# Patient Record
Sex: MALE | Race: WHITE | NOT HISPANIC OR LATINO | Employment: FULL TIME | URBAN - METROPOLITAN AREA
[De-identification: names, ages, dates, MRNs, and addresses within clinical notes are randomized per-mention and may not be internally consistent; named-entity substitution may affect disease eponyms.]

---

## 2017-01-13 ENCOUNTER — RADIOLOGY INJECTION OFFICE VISIT (OUTPATIENT)
Dept: PALLIATIVE MEDICINE | Facility: CLINIC | Age: 52
End: 2017-01-13
Payer: COMMERCIAL

## 2017-01-13 ENCOUNTER — RADIANT APPOINTMENT (OUTPATIENT)
Dept: RADIOLOGY | Facility: CLINIC | Age: 52
End: 2017-01-13
Attending: ANESTHESIOLOGY
Payer: COMMERCIAL

## 2017-01-13 VITALS — DIASTOLIC BLOOD PRESSURE: 86 MMHG | OXYGEN SATURATION: 96 % | HEART RATE: 102 BPM | SYSTOLIC BLOOD PRESSURE: 129 MMHG

## 2017-01-13 DIAGNOSIS — M54.41 ACUTE RIGHT-SIDED LOW BACK PAIN WITH RIGHT-SIDED SCIATICA: ICD-10-CM

## 2017-01-13 DIAGNOSIS — M54.16 LUMBAR RADICULOPATHY: Primary | ICD-10-CM

## 2017-01-13 DIAGNOSIS — M99.73 CONNECTIVE TISSUE AND DISC STENOSIS OF INTERVERTEBRAL FORAMINA OF LUMBAR REGION: ICD-10-CM

## 2017-01-13 PROCEDURE — 64483 NJX AA&/STRD TFRM EPI L/S 1: CPT | Mod: LT | Performed by: ANESTHESIOLOGY

## 2017-01-13 PROCEDURE — 64484 NJX AA&/STRD TFRM EPI L/S EA: CPT | Mod: LT | Performed by: ANESTHESIOLOGY

## 2017-01-13 ASSESSMENT — PAIN SCALES - GENERAL: PAINLEVEL: SEVERE PAIN (7)

## 2017-01-13 NOTE — NURSING NOTE
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 While walking patient out, I was uncomfortable being in the elevator.Patient was having hard time turning his phone on, asked him if I can help turn on thr phone, patient was unhappy and very loud patient stated \"no one touch my phone.\"    Lea Christianson MA    "

## 2017-01-13 NOTE — Clinical Note
[facility]  [address] [facility] [address]  Phone: [phone]  Fax: [phone]    2017        Re:  Bairon Reed  [address]  [place] [address]  :  [birth year]      To whom it may concern:    RE: Bairon Reed    Patient was seen and treated today at our clinic.    Please contact me for questions or concerns.      Sincerely,        Fahad Mathur MD

## 2017-01-13 NOTE — PATIENT INSTRUCTIONS
[facility]   Procedure Discharge Instructions    Today you saw:    Dr. Fahad Mathur    You had an:  Epidural steroid injection  -lumbar   Medications used:  Lidocaine, lidocaine with epinephrine,    Bupivacaine   Dexamethasone  Depo-medrol  Omnipaque           Be cautious when walking. Numbness and/or weakness in the lower extremities may occur up to 6-8 hours after the procedure due to effect of the local anesthetic    Do not drive for 6 hours. The effect of the local anesthetic could slow your reflexes.     You may resume your regular activities after 24 hours    Avoid strenuous activity for the first 24 hours    You may shower, however avoid swimming, tub baths or hot tubs for 24 hours following your procedure    You may have a mild to moderate increase in pain for several days following the injection.    It may take up to 14 days for the steroid medication to start working although you may feel the effect as early as a few days after the procedure.       You may use ice packs for 10-15 minutes, 3 to 4 times a day at the injection site for comfort    Do not use heat to painful areas for 6 to 8 hours. This will give the local anesthetic time to wear off and prevent you from accidentally burning your skin.     You may use anti-inflammatory medications (such as Ibuprofen or Aleve or Advil) or Tylenol for pain control if necessary    If you were fasting, you may resume your normal diet and medications after the procedure    If you have diabetes, check your blood sugar more frequently than usual as your blood sugar may be higher than normal for 10-14 days following a steroid injection. Contact your doctor who manages your diabetes if your blood sugar is higher than usual    If you experience any of the following, call the pain center nursing line during work hours at [phone] or the after hours provider line at [phone]:  -Fever over 100 degree F  -Swelling, bleeding, redness,  drainage, warmth at the injection site  -Progressive weakness or numbness in your legs or arms  -Loss of bowel or bladder function  -Unusual headache that is not relieved by Tylenol  -Unusual new onset of pain that is not improving      Phone #s:  Appointment line: [phone];  Nurse line: [phone]

## 2017-01-13 NOTE — MR AVS SNAPSHOT
After Visit Summary   1/13/2017    Bairon Reed    MRN: [id number]           Patient Information     Date Of Birth          1965        Visit Information        Provider Department      1/13/2017 7:45 AM Fahad Bates MD [facility]        Care Instructions    [facility]   Procedure Discharge Instructions    Today you saw:    Dr. Fahad Mathur    You had an:  Epidural steroid injection  -lumbar   Medications used:  Lidocaine, lidocaine with epinephrine,    Bupivacaine   Dexamethasone  Depo-medrol  Omnipaque           Be cautious when walking. Numbness and/or weakness in the lower extremities may occur up to 6-8 hours after the procedure due to effect of the local anesthetic    Do not drive for 6 hours. The effect of the local anesthetic could slow your reflexes.     You may resume your regular activities after 24 hours    Avoid strenuous activity for the first 24 hours    You may shower, however avoid swimming, tub baths or hot tubs for 24 hours following your procedure    You may have a mild to moderate increase in pain for several days following the injection.    It may take up to 14 days for the steroid medication to start working although you may feel the effect as early as a few days after the procedure.       You may use ice packs for 10-15 minutes, 3 to 4 times a day at the injection site for comfort    Do not use heat to painful areas for 6 to 8 hours. This will give the local anesthetic time to wear off and prevent you from accidentally burning your skin.     You may use anti-inflammatory medications (such as Ibuprofen or Aleve or Advil) or Tylenol for pain control if necessary    If you were fasting, you may resume your normal diet and medications after the procedure    If you have diabetes, check your blood sugar more frequently than usual as your blood sugar may be higher than normal for 10-14 days following a steroid injection.  Contact your doctor who manages your diabetes if your blood sugar is higher than usual    If you experience any of the following, call the pain center nursing line during work hours at [phone] or the after hours provider line at [phone]:  -Fever over 100 degree F  -Swelling, bleeding, redness, drainage, warmth at the injection site  -Progressive weakness or numbness in your legs or arms  -Loss of bowel or bladder function  -Unusual headache that is not relieved by Tylenol  -Unusual new onset of pain that is not improving      Phone #s:  Appointment line: [phone];  Nurse line: [phone]            Follow-ups after your visit        Your next 10 appointments already scheduled     Jan 23, 2017  3:00 PM   [facility]    [address] [address] [phone]            Feb 15, 2017  3:30 PM   Diabetic Education with [facility]    [address] [address] [phone]              Who to contact     If you have questions or need follow up information about today's clinic visit or your schedule please contact [facility] directly at [phone].  Normal or non-critical lab and imaging results will be communicated to you by [employer], letter or phone within 4 business days after the clinic has received the results. If you do not hear from us within 7 days, please contact the clinic through [employer] or phone. If you have a critical or abnormal lab result, we will notify you by phone as soon as possible.  Submit refill requests through [employer] or call your pharmacy and they will forward the refill request to us. Please allow 3 business days for your refill to be completed.          Additional Information About Your Visit        [employer] Information     [employer] lets you send messages to your doctor,  "view your test results, renew your prescriptions, schedule appointments and more. To sign up, go to [email or web address] . Click on \"Log in\" on the left side of the screen, which will take you to the Welcome page. Then click on \"Sign up Now\" on the right side of the page.     You will be asked to enter the access code listed below, as well as some personal information. Please follow the directions to create your username and password.     Your access code is: [email or web address]  Expires: 2017  7:51 AM     Your access code will  in 90 days. If you need help or a new code, please call your [place] clinic or [phone].        Care EveryWhere ID     This is your Care EveryWhere ID. This could be used by other organizations to access your [place] medical records  [id number]        Your Vitals Were     Pulse                   88            Blood Pressure from Last 3 Encounters:   17 133/93   16 112/77   16 132/84    Weight from Last 3 Encounters:   16 88.905 kg (196 lb)   16 87.091 kg (192 lb)   16 88.814 kg (195 lb 12.8 oz)              Today, you had the following     No orders found for display       Primary Care Provider Office Phone # Fax #    Triston Adame [phone]       [facility] [address] [address] [address]        Thank you!     Thank you for choosing [facility]  for your care. Our goal is always to provide you with excellent care. Hearing back from our patients is one way we can continue to improve our services. Please take a few minutes to complete the written survey that you may receive in the mail after your visit with us. Thank you!             Your Updated Medication List - Protect others around you: Learn how to safely use, store and throw away your medicines at www.disposemymeds.org.          This list is accurate as of: 17  8:34 AM.  Always use your most recent med list.                   " Brand Name Dispense Instructions for use    calcium carbonate 500 MG tablet    OS-HANNAH 500 mg [place]. Ca     Take 500 mg by mouth daily       cyclobenzaprine 10 MG tablet    FLEXERIL    30 tablet    Take 1 tablet (10 mg) by mouth nightly as needed for muscle spasms       fluticasone 50 MCG/ACT spray    FLONASE    1 Bottle    Spray 2 sprays into both nostrils daily       glucosamine-chondroitin 500-400 MG Caps per capsule      Take 1 capsule by mouth daily       hydrOXYzine 25 MG tablet    ATARAX    180 tablet    Take 2 tablets (50 mg) by mouth nightly as needed for itching       indomethacin 50 MG capsule    INDOCIN    30 capsule    Take 1 capsule (50 mg) by mouth 3 times daily as needed for moderate pain       lisinopril 5 MG tablet    PRINIVIL/ZESTRIL    90 tablet    Take 1 tablet (5 mg) by mouth daily       montelukast 10 MG tablet    SINGULAIR    90 tablet    Take 1 tablet (10 mg) by mouth At Bedtime       MULTIVITAMIN PO      Take 1 tablet by mouth daily.       naproxen 500 MG tablet    NAPROSYN    180 tablet    Take 1 tablet (500 mg) by mouth 2 times daily as needed for moderate pain       OMEGA-3 FISH OIL PO      Take 2 g by mouth daily       omeprazole 20 MG CR capsule    priLOSEC    90 capsule    Take 1 capsule (20 mg) by mouth daily       predniSONE 20 MG tablet    DELTASONE    10 tablet    Take 2 tablets (40 mg) by mouth daily as needed Gout flares x 2 days.       tamsulosin 0.4 MG capsule    FLOMAX    90 capsule    Take 2 capsules (0.8 mg) by mouth daily       tiZANidine 4 MG tablet    ZANAFLEX    90 tablet    1-2 tablets  3 times a day for daytime muscle spasm.       VITAMIN D3 PO      Take 1,000 Units by mouth daily       VITAMIN E NATURAL PO      Take 100 Units by mouth

## 2017-01-13 NOTE — PROGRESS NOTES
Pre procedure Diagnosis: lumbar radiculopathy, lumbar stenosis, post laminectomy lower back pain   Post procedure Diagnosis: Same  Procedure performed: lumbar transforaminal epidural steroid injection, L4-5 left, attempted L5-S1 left  Anesthesia: local  Complications: patient had post procedure numbness that was nearly completely resolved after 30 minutes observation  Operators: Fahad Mathur MD     Indications:   Bairon Reed is a 51 year old male was sent by Dr. Jorge Wallace for lumbar epidural steroid injection.  They have a history of prior lumbar laminotomy with discectomy L4-5 as well as bony fusion at L5-S1.  He has had recent onset of pain in the lower back on the left with pain down the left lower extremity and associated weakness on the left.  Exam shows antalgic gain, tenderness in the lower back, with positive straight leg raise and they have tried conservative treatment including medication management and home exercises.    MRI lumbar spine was done on 11/24/16 which showed   IMPRESSION:  1. Postsurgical changes at L4-L5 related to left hemilaminotomy with  enhancement in the laminotomy bed and left lateral recess which may be  related to postsurgical scar or granulation.  2. L4-L5 broad-based central disc herniation lateralizing slightly  greater to the left of midline and associated severe central stenosis  similar in appearance to 2/22/2013.  3. L3-L4 moderate central stenosis and bilateral foraminal stenosis,  unchanged.  4. Posterior fusion at L5-S1.    Options/alternatives, benefits and risks were discussed with the patient including bleeding, infection, tissue trauma, numbness, weakness, paralysis, spinal cord injury, radiation exposure, headache and reaction to medications. Questions were answered to his satisfaction and he agrees to proceed. Voluntary informed consent was obtained and signed.     Vitals were reviewed: Yes  /86 mmHg  Pulse 102  SpO2 96%  Allergies were  reviewed:  Yes   Medications were reviewed:  Yes   Pre-procedure pain score: 7/10    Procedure:  After getting informed consent, patient was brought into the procedure suite and was placed in a prone position on the procedure table.   A Pause for the Cause was performed.  Patient was prepped and draped in sterile fashion.     After identifying the left L4-5 neuroforamen, the C-arm was rotated to a left lateral oblique angle.  A total of 1 ml of Lidocaine 1% was used to anesthetize the skin and the needle track at a skin entry site coaxial with the fluoroscopy beam, and overriding the superior aspect of the neuroforamen.  A 27 gauge 3.5 inch spinal needle was advanced under intermittent fluoroscopy until it entered the foramen superiorly.    The position was then inspected from anteroposterior and lateral views, and the needle adjusted appropriately.  Aspiration was negative for blood or CSF.  Omnipaque-300 was injected, confirming appropriate position, with spread into the nerve root sheath and the epidural space, without intravascular or intrathecal uptake.    Then, I attempted to locate the L5-S1 neuroforamen on the left.  The skin and subcutaneous tissues were anesthetized with Lidocaine 1% overlying what appeared to be the L5-S1 neuroforamen.  Due to the patient's prior bony fusion at L5-S1 the neuroforamen was not apparent.  A 27 gauge 3.5 inch spinal needle was advanced towards the neuroforamen but I was not able to gain access to the neuroforamen after multiple attempts, so injection at the L5-S1 level was aborted.    Omnipaque used:  1 ml  Omnipaque wasted  9 ml    Then, after repeated negative aspiraton for blood or CSF, a combination of Depomedrol 40 mg, Decadron 5 mg, Bupivicaine 0.5% 1 ml diluted with 2.5 ml of normal saline was injected in a slow and incremental fashion and the needle was flushed with lidocaine as it was withdrawn.    During the procedure, there was not a paresthesia.   Hemostasis was  achieved, the area was cleaned, and bandaids were placed when appropriate.  The patient tolerated the procedure well, and was taken to the recovery room.    Images were saved to PACS.    Post-procedure pain score: 0/10  Follow-up includes:   -f/u phone call in one week  -f/u with referring provider    Of note, the patient presented to the pain clinic with some gait instability due to lower extremity weakness.  He did have some numbness in the left leg after the injection which resolved after approximately 30 minutes.  However, he appeared to have somewhat more unsteady gait which led to further questioning by the staff and the patient at that time stated that he had been drinking alcohol prior to the procedure, which was the likely caused his instability of gait.  He was escorted to his ride after recovery by medical staff without difficulty.    Fahad Mathur MD  [facility]

## 2017-01-13 NOTE — NURSING NOTE
Discharge Information    IV Discontiued Time:  NA    Amount of Fluid Infused:  NA    Discharge Criteria = When patient returns to baseline or as per MD order    Consciousness:  Pt is fully awake    Circulation:  BP +/- 20% of pre-procedure level    Respiration:  Patient is able to breathe deeply    O2 Sat:  Patient is able to maintain O2 Sat >92% on room air    Activity:  Moves 4 extremities on command    Ambulation:  Patient is able to stand and walk or stand and pivot into wheelchair    Dressing:  Clean/dry or No Dressing    Notes:   Discharge instructions and AVS given to patient    Patient meets criteria for discharge?  YES    Admitted to PCU?  No    Responsible adult present to accompany patient home?  Yes    Signature/Title:    bridget marin RN Care Coordinator  [facility]

## 2017-01-13 NOTE — NURSING NOTE
"Chief Complaint   Patient presents with     Pain     Low back pain        Initial /93 mmHg  Pulse 88 Estimated body mass index is 27.72 kg/(m^2) as calculated from the following:    Height as of 11/16/16: 1.791 m (5' 10.5\").    Weight as of 12/6/16: 88.905 kg (196 lb).  BP completed using cuff size: regular    Injection intake:    If this procedure is requiring IV sedation has patient been NPO for 6  Hours? NA    Is patient on coumadin, plavix or other prescribed blood thinner?   No    If patient is on coumadin was it held for 5 days?   NA    If patient is on plavix was it held for 7 days?    NA     Does patient take aspirin?  No    If this is for a cervical procedure and patient is on aspirin has it been held for 6 days?   NA    Any allergies to contrast dye, iodine, steroid and/or numbing medications?  NO    Is patient currently taking antibiotics or have an active infection?  NO    Does patient have a ? Yes       Is patient pregnant or breastfeeding?  Not Applicable    Are the vital signs normal?  Yes    Lea Christianson MA      "

## 2017-01-20 ENCOUNTER — TELEPHONE (OUTPATIENT)
Dept: PALLIATIVE MEDICINE | Facility: CLINIC | Age: 52
End: 2017-01-20

## 2017-01-20 NOTE — TELEPHONE ENCOUNTER
Patient had a Transforaminal  injection on 1/13/17.  Called patient for an update.      Left message that we were calling for an update about how s/he was doing after the injection.  LM that if s/he has any problems or questions to call the nurse line at [phone].     Anusha Soriano RT (R)

## 2017-02-01 DIAGNOSIS — N40.1 BENIGN NON-NODULAR PROSTATIC HYPERPLASIA WITH LOWER URINARY TRACT SYMPTOMS: Primary | ICD-10-CM

## 2017-02-01 RX ORDER — TAMSULOSIN HYDROCHLORIDE 0.4 MG/1
0.8 CAPSULE ORAL DAILY
Qty: 90 CAPSULE | Refills: 2 | Status: SHIPPED | OUTPATIENT
Start: 2017-02-01 | End: 2018-01-26

## 2017-02-21 ENCOUNTER — OFFICE VISIT (OUTPATIENT)
Dept: FAMILY MEDICINE | Facility: CLINIC | Age: 52
End: 2017-02-21
Payer: COMMERCIAL

## 2017-02-21 ENCOUNTER — OFFICE VISIT (OUTPATIENT)
Dept: BEHAVIORAL HEALTH | Facility: CLINIC | Age: 52
End: 2017-02-21

## 2017-02-21 VITALS
HEART RATE: 120 BPM | DIASTOLIC BLOOD PRESSURE: 86 MMHG | TEMPERATURE: 97.9 F | BODY MASS INDEX: 28.15 KG/M2 | WEIGHT: 199 LBS | SYSTOLIC BLOOD PRESSURE: 102 MMHG

## 2017-02-21 DIAGNOSIS — N32.81 OAB (OVERACTIVE BLADDER): Primary | ICD-10-CM

## 2017-02-21 DIAGNOSIS — R35.0 URINARY FREQUENCY: ICD-10-CM

## 2017-02-21 DIAGNOSIS — R69 DIAGNOSIS DEFERRED: Primary | ICD-10-CM

## 2017-02-21 LAB
ALBUMIN UR-MCNC: NEGATIVE MG/DL
APPEARANCE UR: CLEAR
BILIRUB UR QL STRIP: NEGATIVE
COLOR UR AUTO: YELLOW
GLUCOSE UR STRIP-MCNC: NEGATIVE MG/DL
HBA1C MFR BLD: 5.8 % (ref 4.3–6)
HGB UR QL STRIP: NEGATIVE
KETONES UR STRIP-MCNC: NEGATIVE MG/DL
LEUKOCYTE ESTERASE UR QL STRIP: NEGATIVE
NITRATE UR QL: NEGATIVE
PH UR STRIP: 7 PH (ref 5–7)
RBC #/AREA URNS AUTO: NORMAL /HPF (ref 0–2)
SP GR UR STRIP: 1.02 (ref 1–1.03)
URN SPEC COLLECT METH UR: NORMAL
UROBILINOGEN UR STRIP-ACNC: 0.2 EU/DL (ref 0.2–1)
WBC #/AREA URNS AUTO: NORMAL /HPF (ref 0–2)

## 2017-02-21 PROCEDURE — 81001 URINALYSIS AUTO W/SCOPE: CPT | Performed by: FAMILY MEDICINE

## 2017-02-21 PROCEDURE — 83036 HEMOGLOBIN GLYCOSYLATED A1C: CPT | Performed by: FAMILY MEDICINE

## 2017-02-21 PROCEDURE — 99213 OFFICE O/P EST LOW 20 MIN: CPT | Performed by: FAMILY MEDICINE

## 2017-02-21 PROCEDURE — 36415 COLL VENOUS BLD VENIPUNCTURE: CPT | Performed by: FAMILY MEDICINE

## 2017-02-21 RX ORDER — OXYBUTYNIN CHLORIDE 5 MG/1
5 TABLET, EXTENDED RELEASE ORAL DAILY
Qty: 30 TABLET | Refills: 1 | Status: SHIPPED | OUTPATIENT
Start: 2017-02-21 | End: 2018-01-26

## 2017-02-21 NOTE — PROGRESS NOTES
Bairon,  I have reviewed the results of the laboratory tests that we recently ordered. All of the lab work performed was normal or considered normal for you.  Sincerely,   Marcos Flower

## 2017-02-21 NOTE — PATIENT INSTRUCTIONS
If your urinary symptom(s) do not improve with the medication You should call specialty scheduling at [phone] to schedule the urology  appointment.    If your symptom(s) are well controlled please follow up with Dr Adame in 1-2 month(s)

## 2017-02-21 NOTE — MR AVS SNAPSHOT
"              After Visit Summary   2017    Bairon Reed    MRN: [id number]           Patient Information     Date Of Birth          1965        Visit Information        Provider Department      2017 3:00 PM [facility]        Today's Diagnoses     Diagnosis deferred    -  1       Follow-ups after your visit        Who to contact     If you have questions or need follow up information about today's clinic visit or your schedule please contact [facility] directly at [phone].  Normal or non-critical lab and imaging results will be communicated to you by [employer], letter or phone within 4 business days after the clinic has received the results. If you do not hear from us within 7 days, please contact the clinic through [employer] or phone. If you have a critical or abnormal lab result, we will notify you by phone as soon as possible.  Submit refill requests through [employer]na or call your pharmacy and they will forward the refill request to us. Please allow 3 business days for your refill to be completed.          Additional Information About Your Visit        MyChart Information     [employer]na lets you send messages to your doctor, view your test results, renew your prescriptions, schedule appointments and more. To sign up, go to [email or web address] . Click on \"Log in\" on the left side of the screen, which will take you to the Welcome page. Then click on \"Sign up Now\" on the right side of the page.     You will be asked to enter the access code listed below, as well as some personal information. Please follow the directions to create your username and password.     Your access code is: [email or web address]  Expires: 2017  7:51 AM     Your access code will  in 90 days. If you need help or a new code, please call your [facility] or [phone].        Care EveryWhere ID     This is your Care EveryWhere ID. This could be used by other organizations " to access your [place] medical records  [id number]         Blood Pressure from Last 3 Encounters:   02/21/17 102/86   01/13/17 129/86   12/06/16 112/77    Weight from Last 3 Encounters:   02/21/17 90.3 kg (199 lb)   12/06/16 88.9 kg (196 lb)   11/30/16 87.1 kg (192 lb)              Today, you had the following     No orders found for display         Today's Medication Changes          These changes are accurate as of: 2/21/17  3:21 PM.  If you have any questions, ask your nurse or doctor.               Start taking these medicines.        Dose/Directions    oxybutynin 5 MG 24 hr tablet   Commonly known as:  DITROPAN-XL   Used for:  OAB (overactive bladder)   Started by:  Marcos Flower MD        Dose:  5 mg   Take 1 tablet (5 mg) by mouth daily   Quantity:  30 tablet   Refills:  1            Where to get your medicines      These medications were sent to [facility] [address] [place] [address]  [address] [facility] [address]     Phone:  [phone]     oxybutynin 5 MG 24 hr tablet                Primary Care Provider Office Phone # Fax #    Triston Adame [phone]       [facility] [address] [facility] [address]        Thank you!     Thank you for choosing [facility]  for your care. Our goal is always to provide you with excellent care. Hearing back from our patients is one way we can continue to improve our services. Please take a few minutes to complete the written survey that you may receive in the mail after your visit with us. Thank you!             Your Updated Medication List - Protect others around you: Learn how to safely use, store and throw away your medicines at www.disposemymeds.org.          This list is accurate as of: 2/21/17  3:21 PM.  Always use your most recent med list.                   Brand Name Dispense Instructions for use    calcium carbonate 500 MG tablet    OS-HANNAH 500 mg [place]. Ca     Take  500 mg by mouth daily       glucosamine-chondroitin 500-400 MG Caps per capsule      Take 1 capsule by mouth daily       indomethacin 50 MG capsule    INDOCIN    30 capsule    Take 1 capsule (50 mg) by mouth 3 times daily as needed for moderate pain       lisinopril 5 MG tablet    PRINIVIL/ZESTRIL    90 tablet    Take 1 tablet (5 mg) by mouth daily       MULTIVITAMIN PO      Take 1 tablet by mouth daily.       naproxen 500 MG tablet    NAPROSYN    180 tablet    Take 1 tablet (500 mg) by mouth 2 times daily as needed for moderate pain       OMEGA-3 FISH OIL PO      Take 2 g by mouth daily       oxybutynin 5 MG 24 hr tablet    DITROPAN-XL    30 tablet    Take 1 tablet (5 mg) by mouth daily       tamsulosin 0.4 MG capsule    FLOMAX    90 capsule    Take 2 capsules (0.8 mg) by mouth daily       VITAMIN D3 PO      Take 1,000 Units by mouth daily       VITAMIN E NATURAL PO      Take 100 Units by mouth

## 2017-02-21 NOTE — MR AVS SNAPSHOT
After Visit Summary   2/21/2017    Bairon Reed    MRN: [id number]           Patient Information     Date Of Birth          1965        Visit Information        Provider Department      2/21/2017 2:30 PM Marcos Flower MD [facility]        Today's Diagnoses     OAB (overactive bladder)    -  1    Urinary frequency          Care Instructions    If your urinary symptom(s) do not improve with the medication You should call specialty scheduling at [phone] to schedule the urology  appointment.    If your symptom(s) are well controlled please follow up with Dr Adame in 1-2 month(s)         Follow-ups after your visit        Additional Services     UROLOGY ADULT REFERRAL       Your provider has referred you to: [facility]: [facility] [phone]   [email or web address]    Please be aware that coverage of these services is subject to the terms and limitations of your health insurance plan.  Call member services at your health plan with any benefit or coverage questions.      Please bring the following with you to your appointment:    (1) Any X-Rays, CTs or MRIs which have been performed.  Contact the facility where they were done to arrange for  prior to your scheduled appointment.    (2) List of current medications  (3) This referral request   (4) Any documents/labs given to you for this referral                  Who to contact     If you have questions or need follow up information about today's clinic visit or your schedule please contact [facility] directly at [phone].  Normal or non-critical lab and imaging results will be communicated to you by MyChart, letter or phone within 4 business days after the clinic has received the results. If you do not hear from us within 7 days, please contact the clinic through MyChart or phone. If you have a critical or abnormal lab result, we will notify you by phone as soon  "as possible.  Submit refill requests through [email or web address] or call your pharmacy and they will forward the refill request to us. Please allow 3 business days for your refill to be completed.          Additional Information About Your Visit        [email or web address] Information     [email or web address] lets you send messages to your doctor, view your test results, renew your prescriptions, schedule appointments and more. To sign up, go to [email or web address] . Click on \"Log in\" on the left side of the screen, which will take you to the Welcome page. Then click on \"Sign up Now\" on the right side of the page.     You will be asked to enter the access code listed below, as well as some personal information. Please follow the directions to create your username and password.     Your access code is: [email or web address]  Expires: 2017  7:51 AM     Your access code will  in 90 days. If you need help or a new code, please call your [place] clinic or [phone].        Care EveryWhere ID     This is your Care EveryWhere ID. This could be used by other organizations to access your [place] medical records  [id number]        Your Vitals Were     Pulse Temperature BMI (Body Mass Index)             120 97.9  F (36.6  C) (Oral) 28.15 kg/m2          Blood Pressure from Last 3 Encounters:   17 91/66   17 129/86   16 112/77    Weight from Last 3 Encounters:   17 199 lb (90.3 kg)   16 196 lb (88.9 kg)   16 192 lb (87.1 kg)              We Performed the Following     UROLOGY ADULT REFERRAL          Today's Medication Changes          These changes are accurate as of: 17  2:58 PM.  If you have any questions, ask your nurse or doctor.               Start taking these medicines.        Dose/Directions    oxybutynin 5 MG 24 hr tablet   Commonly known as:  DITROPAN-XL   Used for:  OAB (overactive bladder)   Started by:  Marcos Flower MD        Dose:  5 mg   Take 1 tablet (5 mg) by mouth daily   Quantity:  30 " tablet   Refills:  1            Where to get your medicines      These medications were sent to [facility] [id number]  [address] - [address]  [address] [place] [address]     Phone:  [phone]     oxybutynin 5 MG 24 hr tablet                Primary Care Provider Office Phone # Fax #    Triston Adame [phone]       [facility] [address] [address]        Thank you!     Thank you for choosing [facility]  for your care. Our goal is always to provide you with excellent care. Hearing back from our patients is one way we can continue to improve our services. Please take a few minutes to complete the written survey that you may receive in the mail after your visit with us. Thank you!             Your Updated Medication List - Protect others around you: Learn how to safely use, store and throw away your medicines at www.disposemymeds.org.          This list is accurate as of: 2/21/17  2:58 PM.  Always use your most recent med list.                   Brand Name Dispense Instructions for use    calcium carbonate 500 MG tablet    OS-HANNAH 500 mg [place]. [place]     Take 500 mg by mouth daily       glucosamine-chondroitin 500-400 MG Caps per capsule      Take 1 capsule by mouth daily       indomethacin 50 MG capsule    INDOCIN    30 capsule    Take 1 capsule (50 mg) by mouth 3 times daily as needed for moderate pain       lisinopril 5 MG tablet    PRINIVIL/ZESTRIL    90 tablet    Take 1 tablet (5 mg) by mouth daily       MULTIVITAMIN PO      Take 1 tablet by mouth daily.       naproxen 500 MG tablet    NAPROSYN    180 tablet    Take 1 tablet (500 mg) by mouth 2 times daily as needed for moderate pain       OMEGA-3 FISH OIL PO      Take 2 g by mouth daily       oxybutynin 5 MG 24 hr tablet    DITROPAN-XL    30 tablet    Take 1 tablet (5 mg) by mouth daily       tamsulosin 0.4 MG capsule    FLOMAX    90 capsule    Take 2 capsules (0.8  mg) by mouth daily       VITAMIN D3 PO      Take 1,000 Units by mouth daily       VITAMIN E NATURAL PO      Take 100 Units by mouth

## 2017-02-21 NOTE — LETTER
[facility]  [address] [address] [phone]        February 21, 2017    Bairon Reed  [address]  [place] [address]            Dear Bairon,    I have reviewed the results of the laboratory tests that we recently ordered. All of the lab work performed was normal or considered normal for you.   Sincerely,   Marcos Flower MD/salma    Results for orders placed or performed in visit on 02/21/17   UA with Microscopic   Result Value Ref Range    Color Urine Yellow     Appearance Urine Clear     Glucose Urine Negative NEG mg/dL    Bilirubin Urine Negative NEG    Ketones Urine Negative NEG mg/dL    Specific Gravity Urine 1.020 1.003 - 1.035    pH Urine 7.0 5.0 - 7.0 pH    Protein Albumin Urine Negative NEG mg/dL    Urobilinogen Urine 0.2 0.2 - 1.0 EU/dL    Nitrite Urine Negative NEG    Blood Urine Negative NEG    Leukocyte Esterase Urine Negative NEG    Source Midstream Urine     WBC Urine O - 2 0 - 2 /HPF    RBC Urine O - 2 0 - 2 /HPF   Hemoglobin A1c   Result Value Ref Range    Hemoglobin A1C 5.8 4.3 - 6.0 %

## 2017-02-21 NOTE — PROGRESS NOTES
SUBJECTIVE:   Bairon Reed is a 51 year old male who presents today for symptom of frequency. He started having these symptoms 6month(s) ago. He  denies hematuria, denies back pain,  denies nausea or vomiting,  denies fever or chills.  He denies a history of penile discharge.   He denies any dysuria  He has been taking the flomax for about 6 month(s).      Past Medical History   Diagnosis Date     Anxiety      Cervical spondylosis with myelopathy 2/4/2013     Cervicalgia      Chronic low back pain 9/13/2011     Chronic low back pain      Degeneration of cervical intervertebral disc      Gout      Hyperlipidemia LDL goal <160 11/18/2009     Hypertension goal BP (blood pressure) < 140/90 2/10/2015     Insomnia 11/18/2009     Nondependent alcohol abuse 11/14/2012     Pain in shoulder 9/13/2011     Peroneal nerve palsy Feb 2012     rt     Radiculopathy, cervical 9/13/2011     Shoulder impingement      right     Vocal cord paralysis      LEFT     Current Outpatient Prescriptions   Medication Sig Dispense Refill     oxybutynin (DITROPAN-XL) 5 MG 24 hr tablet Take 1 tablet (5 mg) by mouth daily 30 tablet 1     tamsulosin (FLOMAX) 0.4 MG capsule Take 2 capsules (0.8 mg) by mouth daily 90 capsule 2     Omega-3 Fatty Acids (OMEGA-3 FISH OIL PO) Take 2 g by mouth daily       Cholecalciferol (VITAMIN D3 PO) Take 1,000 Units by mouth daily       VITAMIN E NATURAL PO Take 100 Units by mouth       glucosamine-chondroitin 500-400 MG CAPS Take 1 capsule by mouth daily       calcium carbonate (OS-HANNAH 500 MG [place]. [place]) 500 MG tablet Take 500 mg by mouth daily       lisinopril (PRINIVIL,ZESTRIL) 5 MG tablet Take 1 tablet (5 mg) by mouth daily 90 tablet 3     Multiple Vitamins-Minerals (MULTIVITAMIN OR) Take 1 tablet by mouth daily.       naproxen (NAPROSYN) 500 MG tablet Take 1 tablet (500 mg) by mouth 2 times daily as needed for moderate pain (Patient not taking: Reported on 2/21/2017) 180 tablet 3     indomethacin (INDOCIN) 50 MG  capsule Take 1 capsule (50 mg) by mouth 3 times daily as needed for moderate pain (Patient not taking: Reported on 2/21/2017) 30 capsule 3     Social History   Substance Use Topics     Smoking status: Never Smoker     Smokeless tobacco: Current User     Types: Chew     Last attempt to quit: 4/1/2013      Comment: Lives in smoke free household     Alcohol use No      Comment: Quit 2010. Chemical dependency treatment in 1989. ...31 yrs of 1.5 tins weekly.  sober for 7 months..         OBJECTIVE:  /86  Pulse 120  Temp 97.9  F (36.6  C) (Oral)  Wt 199 lb (90.3 kg)  BMI 28.15 kg/m2  GENERAL APPEARANCE: healthy, alert and no distress    BACK: No CVA tenderness  Rectal exam: prostate 1+ enlarged without nodules, prostate nontender to palpation  SKIN: no suspicious lesions or rashes    Results for orders placed or performed in visit on 02/21/17   UA with Microscopic   Result Value Ref Range    Color Urine Yellow     Appearance Urine Clear     Glucose Urine Negative NEG mg/dL    Bilirubin Urine Negative NEG    Ketones Urine Negative NEG mg/dL    Specific Gravity Urine 1.020 1.003 - 1.035    pH Urine 7.0 5.0 - 7.0 pH    Protein Albumin Urine Negative NEG mg/dL    Urobilinogen Urine 0.2 0.2 - 1.0 EU/dL    Nitrite Urine Negative NEG    Blood Urine Negative NEG    Leukocyte Esterase Urine Negative NEG    Source Midstream Urine     WBC Urine O - 2 0 - 2 /HPF    RBC Urine O - 2 0 - 2 /HPF   Hemoglobin A1c   Result Value Ref Range    Hemoglobin A1C 5.8 4.3 - 6.0 %        ASSESSMENT:   Lower, uncomplicated urinary tract infection.    PLAN:  Start oxybutin  Patient Instructions   If your urinary symptom(s) do not improve with the medication You should call specialty scheduling at [phone] to schedule the urology  appointment.    If your symptom(s) are well controlled please follow up with Dr Adame in 1-2 month(s)

## 2017-02-21 NOTE — PROGRESS NOTES
Behavioral Health Home Services  [facility]: [place]    Social Work Care Navigator Note    Patient: Bairon Reed  Date: February 21, 2017  Preferred Name: Bairon    Previous PHQ-9:   PHQ-9 SCORE 4/3/2012   Total Score 10     Previous KAYODE-7:   KAYODE-7 SCORE 4/3/2012   Total Score 9     SOUMYA LEVEL:  SOUMYA Score (Last Two) 12/26/2012   SOUMYA Raw Score 50   Activation Score 86.3   SOUMYA Level 4       Preferred Contact:    Type of Contact: Face to Face in Clinic    Data: (subjective / Objective):    [facility] Introduction:  Hi my name is Dalia Johns from your [place] primary care clinic.     I work closely with your primary care provider, Triston Adame.     If it's ok I'd like to talk about some new services available to you, at no out of pocket cost to you.      Before we get started can you verify your insurance for me? [employer]    What social work or case management services do you receive? (If so, are you receiving ACT or TCM?) None Per Pt     Getting to Know You - Whole Person Care:  This new service is called Behavioral Health Home services, which is designed to support you as a whole person beyond just your medical needs.      Tell me about what types of things that are causing you stress OR impacting your quality of life?    I'm here to be a central point of contact for your healthcare needs and to help with:    Housing    Transportation    Financial resources    Comprehensive Health needs (appointment help, medication costs, etc.)    Employment    Education    Health Insurance applications    And connecting with social supports or community resources    Out of the things I mentioned what would you find helpful?    Writer met with pt to introduce [facility] service, pt took [facility] information but declined service. Stated that he is in a good place right now and would notify his PCP or writer should he want service further down the road. Writer welcomed phone calls from patient.     Patient response to [facility] Service offering:  "  Not interested enrolling in [facility] services    Dalia Johns, Social Work Care Coordinator   ________________________________________________________________  Administrative - Social Work Staff Info:     Update the [facility] Brief Needs Assessment Flowsheet \"enrollment status\"     \"declined\"    \"considering\" enrolling in [facility] services.      \"Interested and will enroll\"    \"waitlisted\"    Update enrollment status to \"enrolled\" only when they have come into clinic and completed the paper consent and brief needs assessment.    Verify that patient has had Diagnostic Assessment within the past 12 months and if not schedule an appointment with the [facility] to complete.            "

## 2017-02-22 ASSESSMENT — ANXIETY QUESTIONNAIRES
2. NOT BEING ABLE TO STOP OR CONTROL WORRYING: NOT AT ALL
3. WORRYING TOO MUCH ABOUT DIFFERENT THINGS: NOT AT ALL
IF YOU CHECKED OFF ANY PROBLEMS ON THIS QUESTIONNAIRE, HOW DIFFICULT HAVE THESE PROBLEMS MADE IT FOR YOU TO DO YOUR WORK, TAKE CARE OF THINGS AT HOME, OR GET ALONG WITH OTHER PEOPLE: NOT DIFFICULT AT ALL
5. BEING SO RESTLESS THAT IT IS HARD TO SIT STILL: NOT AT ALL
1. FEELING NERVOUS, ANXIOUS, OR ON EDGE: NOT AT ALL
6. BECOMING EASILY ANNOYED OR IRRITABLE: NOT AT ALL
GAD7 TOTAL SCORE: 1
7. FEELING AFRAID AS IF SOMETHING AWFUL MIGHT HAPPEN: NOT AT ALL

## 2017-02-22 ASSESSMENT — PATIENT HEALTH QUESTIONNAIRE - PHQ9: 5. POOR APPETITE OR OVEREATING: SEVERAL DAYS

## 2017-02-23 ASSESSMENT — ANXIETY QUESTIONNAIRES: GAD7 TOTAL SCORE: 1

## 2017-02-23 ASSESSMENT — PATIENT HEALTH QUESTIONNAIRE - PHQ9: SUM OF ALL RESPONSES TO PHQ QUESTIONS 1-9: 4

## 2017-02-27 ENCOUNTER — OFFICE VISIT (OUTPATIENT)
Dept: FAMILY MEDICINE | Facility: CLINIC | Age: 52
End: 2017-02-27
Payer: COMMERCIAL

## 2017-02-27 VITALS
WEIGHT: 198 LBS | BODY MASS INDEX: 28.35 KG/M2 | TEMPERATURE: 98.8 F | OXYGEN SATURATION: 93 % | SYSTOLIC BLOOD PRESSURE: 125 MMHG | HEART RATE: 90 BPM | HEIGHT: 70 IN | DIASTOLIC BLOOD PRESSURE: 87 MMHG

## 2017-02-27 DIAGNOSIS — R21 RASH: ICD-10-CM

## 2017-02-27 DIAGNOSIS — M54.50 RIGHT-SIDED LOW BACK PAIN WITHOUT SCIATICA, UNSPECIFIED CHRONICITY: Primary | ICD-10-CM

## 2017-02-27 PROCEDURE — 99214 OFFICE O/P EST MOD 30 MIN: CPT | Performed by: FAMILY MEDICINE

## 2017-02-27 RX ORDER — TRIAMCINOLONE ACETONIDE 1 MG/G
CREAM TOPICAL
Qty: 30 G | Refills: 0 | Status: SHIPPED | OUTPATIENT
Start: 2017-02-27 | End: 2018-03-23

## 2017-02-27 NOTE — PROGRESS NOTES
SUBJECTIVE:                                                    Bairon Reed is a 51 year old male who presents to clinic today for the following health issues:      FLANK PAIN     Onset: 6 months ago    Description:   Character: Sharp  Location: right flank lower  Radiation: None    Intensity: 6/10    Progression of Symptoms:  worsening    Accompanying Signs & Symptoms:  Fever/Chills?: no   Gas/Bloating: no   Nausea: no   Vomitting: no   Diarrhea?: no   Constipation:no   Dysuria or Hematuria: no    History:   Trauma: no   Previous similar pain: YES   Previous tests done: none    Precipitating factors:   Does the pain change with:     Food: no      BM: no     Urination: no     Alleviating factors:  Sitting    Therapies Tried and outcome: None    LMP:  not applicable       He's had this right sided mid to low back discomfort off and on for 6 months or so. He has a history of chronic back problems. He's had 2 surgeries on his back in 2 surgeries on his neck. He had a cortisone injection a month or so ago for his back. This back discomfort is not going down his leg. It's worse when he is moving around, especially going from a sitting to a standing position. It doesn't bother him when he is just sitting around.  He was seen last week for some urinary issues. He had a normal urinalysis and A1c. He is not having hematuria. He was concerned this current discomfort may represent a kidney problem.    Problem list and histories reviewed & adjusted, as indicated.  Additional history: as documented    Patient Active Problem List   Diagnosis     Hyperlipidemia LDL goal <160     Insomnia     Bilateral arm weakness     Anxiety     Nondependent alcohol abuse     Shoulder impingement - right     Cervical radiculopathy at C7     Cervicalgia     Degeneration of cervical intervertebral disc     Vitamin D deficiency     Cervical radiculopathy     S/P spinal fusion     Peroneal nerve palsy     Foot drop, right     Lumbar stenosis  with neurogenic claudication     Lumbar radiculopathy     Lumbar herniated disc     Lumbago     S/P lumbar microdiscectomy     Hypertension goal BP (blood pressure) < 140/90     Benign non-nodular prostatic hyperplasia with lower urinary tract symptoms     Gout, unspecified cause, unspecified chronicity, unspecified site     Past Surgical History   Procedure Laterality Date     Neck surgery  2/2010     [facility]     Knee surgery  2009     Right     Back surgery  1986-1988     x 3. Lower back surgery     Fusion cervical anterior one level  3/5/2013     Procedure: FUSION CERVICAL ANTERIOR ONE LEVEL;  C 6-7 ANTERIOR DISCECTOMY AND FUSION WITH NEURO MONITORING ;  Surgeon: Andrew Alejandre MD;  Location:  OR     Discectomy lumbar posterior microscopic one level  5/17/2013     Procedure: DISCECTOMY LUMBAR POSTERIOR MICROSCOPIC ONE LEVEL;  BILATERAL LUMBAR L4-5 EXTENDED HEMILAMINECTOMY, MICROFORAMINOTOMY AND MICRODISCECTOMY WITH METRIX II;  Surgeon: Andrew Alejandre MD;  Location:  OR     Laryngoscopy with microscope  5/28/2013     Procedure: LARYNGOSCOPY WITH MICROSCOPE;  MICRODIRECT LARYNGOSCOPY, WITH LEFT VOCAL CORD INJECTION ;  Surgeon: Jorge Vergara MD;  Location:  [facility]       Social History   Substance Use Topics     Smoking status: Never Smoker     Smokeless tobacco: Current User     Types: Chew     Last attempt to quit: 4/1/2013      Comment: Lives in smoke free household     Alcohol use No      Comment: Quit 2010. Chemical dependency treatment in 1989. ...31 yrs of 1.5 tins weekly.  sober for 7 months..     Family History   Problem Relation Age of Onset     HEART DISEASE Father      Arthritis Sister      Glaucoma No family hx of      Macular Degeneration No family hx of      CANCER No family hx of      DIABETES No family hx of      Hypertension No family hx of      CEREBROVASCULAR DISEASE No family hx of      Thyroid Disease No family hx of          Current Outpatient Prescriptions  "  Medication Sig Dispense Refill     triamcinolone (KENALOG) 0.1 % cream Apply sparingly to affected area two-three times daily for 14 days. 30 g 0     oxybutynin (DITROPAN-XL) 5 MG 24 hr tablet Take 1 tablet (5 mg) by mouth daily 30 tablet 1     tamsulosin (FLOMAX) 0.4 MG capsule Take 2 capsules (0.8 mg) by mouth daily 90 capsule 2     naproxen (NAPROSYN) 500 MG tablet Take 1 tablet (500 mg) by mouth 2 times daily as needed for moderate pain 180 tablet 3     Omega-3 Fatty Acids (OMEGA-3 FISH OIL PO) Take 2 g by mouth daily       Cholecalciferol (VITAMIN D3 PO) Take 1,000 Units by mouth daily       VITAMIN E NATURAL PO Take 100 Units by mouth       glucosamine-chondroitin 500-400 MG CAPS Take 1 capsule by mouth daily       calcium carbonate (OS-HANNAH 500 MG [place]. [place]) 500 MG tablet Take 500 mg by mouth daily       lisinopril (PRINIVIL,ZESTRIL) 5 MG tablet Take 1 tablet (5 mg) by mouth daily 90 tablet 3     indomethacin (INDOCIN) 50 MG capsule Take 1 capsule (50 mg) by mouth 3 times daily as needed for moderate pain 30 capsule 3     Multiple Vitamins-Minerals (MULTIVITAMIN OR) Take 1 tablet by mouth daily.       No Known Allergies    ROS:  He has a small rash on his right forearm that has been present for 2-3 months. He's tried a variety of over-the-counter antifungal products, but hasn't clear. It itches. He wonders if I can prescribe something for him to treat this.    OBJECTIVE:                                                    /87 (BP Location: Right arm, Patient Position: Chair, Cuff Size: Adult Regular)  Pulse 90  Temp 98.8  F (37.1  C) (Oral)  Ht 5' 9.75\" (1.772 m)  Wt 198 lb (89.8 kg)  SpO2 93%  BMI 28.61 kg/m2  Body mass index is 28.61 kg/(m^2).  GENERAL: alert, no distress and over weight  MS: He has some mild discomfort to palpation over the right mid to low back a bit lower than the CVA region. He has increased discomfort there with forward flexion at the waist and side to side trunk rotation. " Straight leg raising is negative on the right.  SKIN: He has a roughly 1 cm diameter circular erythematous scaly macular skin lesion on the proximal right forearm  NEURO: Grossly normal strength and tone, mentation intact and speech normal    Diagnostic Test Results:  Office Visit on 02/21/2017   Component Date Value Ref Range Status     Color Urine 02/21/2017 Yellow   Final     Appearance Urine 02/21/2017 Clear   Final     Glucose Urine 02/21/2017 Negative  NEG mg/dL Final     Bilirubin Urine 02/21/2017 Negative  NEG Final     Ketones Urine 02/21/2017 Negative  NEG mg/dL Final     Specific Gravity Urine 02/21/2017 1.020  1.003 - 1.035 Final     pH Urine 02/21/2017 7.0  5.0 - 7.0 pH Final     Protein Albumin Urine 02/21/2017 Negative  NEG mg/dL Final     Urobilinogen Urine 02/21/2017 0.2  0.2 - 1.0 EU/dL Final     Nitrite Urine 02/21/2017 Negative  NEG Final     Blood Urine 02/21/2017 Negative  NEG Final     Leukocyte Esterase Urine 02/21/2017 Negative  NEG Final     Source 02/21/2017 Midstream Urine   Final     WBC Urine 02/21/2017 O - 2  0 - 2 /HPF Final     RBC Urine 02/21/2017 O - 2  0 - 2 /HPF Final     Hemoglobin A1C 02/21/2017 5.8  4.3 - 6.0 % Final          ASSESSMENT/PLAN:                                                        ICD-10-CM    1. Right-sided low back pain without sciatica, unspecified chronicity M54.5    2. Rash R21 triamcinolone (KENALOG) 0.1 % cream     I think his current right-sided back discomfort is mechanical in nature and unrelated to a kidney problem  I discussed that with him and he was reassured  I recommended heat and stretching exercises and physical therapy          He plans to start physical therapy for this in the near future  The small skin rash on his forearm likely represents nummular eczema versus tinea corporis          It sounds like he's tried over-the-counter antifungal products, however, which haven't cleared it, so we will try some triamcinolone cream for this    If  new, worsening or persistent symptoms, the patient is to call or return for a recheck prn      Beto Salmeron MD  [facility]

## 2017-02-27 NOTE — NURSING NOTE
"Chief Complaint   Patient presents with     Flank Pain     Right side pain, did a urine test on 2/21/17       Initial BP (!) 134/91 (BP Location: Right arm, Patient Position: Chair, Cuff Size: Adult Regular)  Pulse 90  Temp 98.8  F (37.1  C) (Oral)  Ht 5' 9.75\" (1.772 m)  Wt 198 lb (89.8 kg)  SpO2 93%  BMI 28.61 kg/m2 Estimated body mass index is 28.61 kg/(m^2) as calculated from the following:    Height as of this encounter: 5' 9.75\" (1.772 m).    Weight as of this encounter: 198 lb (89.8 kg).  Medication Reconciliation: ruby Olmos CMA       "

## 2017-02-27 NOTE — MR AVS SNAPSHOT
"              After Visit Summary   2017    Bairon Reed    MRN: [id number]           Patient Information     Date Of Birth          1965        Visit Information        Provider Department      2017 9:00 AM Beto Salmeron MD [facility]        Today's Diagnoses     Right-sided low back pain without sciatica, unspecified chronicity    -  1    Rash           Follow-ups after your visit        Follow-up notes from your care team     Return if symptoms worsen or fail to improve.      Who to contact     If you have questions or need follow up information about today's clinic visit or your schedule please contact [facility] directly at [phone].  Normal or non-critical lab and imaging results will be communicated to you by MyChart, letter or phone within 4 business days after the clinic has received the results. If you do not hear from us within 7 days, please contact the clinic through MyChart or phone. If you have a critical or abnormal lab result, we will notify you by phone as soon as possible.  Submit refill requests through [employer] or call your pharmacy and they will forward the refill request to us. Please allow 3 business days for your refill to be completed.          Additional Information About Your Visit        MyChart Information     [employer] lets you send messages to your doctor, view your test results, renew your prescriptions, schedule appointments and more. To sign up, go to [email or web address] . Click on \"Log in\" on the left side of the screen, which will take you to the Welcome page. Then click on \"Sign up Now\" on the right side of the page.     You will be asked to enter the access code listed below, as well as some personal information. Please follow the directions to create your username and password.     Your access code is: [email or web address]  Expires: 2017  7:51 AM     Your access code will  in 90 days. If you need " "help or a new code, please call your [facility] clinic or [phone].        Care EveryWhere ID     This is your Care EveryWhere ID. This could be used by other organizations to access your [facility] medical records  [id number]        Your Vitals Were     Pulse Temperature Height Pulse Oximetry BMI (Body Mass Index)       90 98.8  F (37.1  C) (Oral) 5' 9.75\" (1.772 m) 93% 28.61 kg/m2        Blood Pressure from Last 3 Encounters:   02/27/17 (!) 134/91   02/21/17 102/86   01/13/17 129/86    Weight from Last 3 Encounters:   02/27/17 198 lb (89.8 kg)   02/21/17 199 lb (90.3 kg)   12/06/16 196 lb (88.9 kg)              Today, you had the following     No orders found for display         Today's Medication Changes          These changes are accurate as of: 2/27/17  9:43 AM.  If you have any questions, ask your nurse or doctor.               Start taking these medicines.        Dose/Directions    triamcinolone 0.1 % cream   Commonly known as:  KENALOG   Used for:  Rash   Started by:  Beto Salmeron MD        Apply sparingly to affected area two-three times daily for 14 days.   Quantity:  30 g   Refills:  0            Where to get your medicines      These medications were sent to [facility]  [address] [facility] [address]     Phone:  [phone]     triamcinolone 0.1 % cream                Primary Care Provider Office Phone # Fax #    Triston Adame [phone]       [facility] [address] [facility] [address]        Thank you!     Thank you for choosing [facility]  for your care. Our goal is always to provide you with excellent care. Hearing back from our patients is one way we can continue to improve our services. Please take a few minutes to complete the written survey that you may receive in the mail after your visit with us. Thank you!             Your Updated Medication List - Protect " others around you: Learn how to safely use, store and throw away your medicines at www.disposemymeds.org.          This list is accurate as of: 2/27/17  9:43 AM.  Always use your most recent med list.                   Brand Name Dispense Instructions for use    calcium carbonate 500 MG tablet    OS-HANNAH 500 mg [place]. [place]     Take 500 mg by mouth daily       glucosamine-chondroitin 500-400 MG Caps per capsule      Take 1 capsule by mouth daily       indomethacin 50 MG capsule    INDOCIN    30 capsule    Take 1 capsule (50 mg) by mouth 3 times daily as needed for moderate pain       lisinopril 5 MG tablet    PRINIVIL/ZESTRIL    90 tablet    Take 1 tablet (5 mg) by mouth daily       MULTIVITAMIN PO      Take 1 tablet by mouth daily.       naproxen 500 MG tablet    NAPROSYN    180 tablet    Take 1 tablet (500 mg) by mouth 2 times daily as needed for moderate pain       OMEGA-3 FISH OIL PO      Take 2 g by mouth daily       oxybutynin 5 MG 24 hr tablet    DITROPAN-XL    30 tablet    Take 1 tablet (5 mg) by mouth daily       tamsulosin 0.4 MG capsule    FLOMAX    90 capsule    Take 2 capsules (0.8 mg) by mouth daily       triamcinolone 0.1 % cream    KENALOG    30 g    Apply sparingly to affected area two-three times daily for 14 days.       VITAMIN D3 PO      Take 1,000 Units by mouth daily       VITAMIN E NATURAL PO      Take 100 Units by mouth

## 2017-03-07 ENCOUNTER — TELEPHONE (OUTPATIENT)
Dept: FAMILY MEDICINE | Facility: CLINIC | Age: 52
End: 2017-03-07

## 2017-03-07 DIAGNOSIS — M10.9 ACUTE GOUT OF LEFT FOOT, UNSPECIFIED CAUSE: ICD-10-CM

## 2017-03-07 RX ORDER — PREDNISONE 20 MG/1
40 TABLET ORAL DAILY PRN
Qty: 10 TABLET | Refills: 0 | Status: SHIPPED | OUTPATIENT
Start: 2017-03-07 | End: 2017-05-10

## 2017-03-07 NOTE — TELEPHONE ENCOUNTER
Reason for Call:  Medication or medication refill:     Name of the pharmacy and phone number for the current request: [facility] [address] [place] [address], [place] - [address] [facility]  Name of the medication requested:methylPREDNISolone (MEDROL DOSEPAK) 4 MG tablet   Can we leave a detailed message on this number? YES    Phone number patient can be reached at: Home number on file [phone] (home)    Best Time: Anytime    Call taken on 3/7/2017 at 8:05 AM by Coty Coulter

## 2017-03-07 NOTE — TELEPHONE ENCOUNTER
Patient is calling to check on the script.    Patient has to go to work at 3 and needs to take this medication before he goes.    Please call patient once the script is being sent to pharmacy.    Niesha Ma

## 2017-03-07 NOTE — TELEPHONE ENCOUNTER
He takes prednisonel for a gout flare on right big toe & then takes indomethicin afterwords. He can't get boot on & he has to work at 3pm today.  Seen 8/24 & it is mentioned in notes, pended.  Shey Jaimes RN

## 2017-03-07 NOTE — TELEPHONE ENCOUNTER
Typically I will see patient's for this. So please let him know that I will fill it now, but if further flares, he should have a visit to discuss management of flares.    PALLAVI Garvin, PA-C  [facility]

## 2017-03-14 ENCOUNTER — TELEPHONE (OUTPATIENT)
Dept: FAMILY MEDICINE | Facility: CLINIC | Age: 52
End: 2017-03-14

## 2017-03-14 NOTE — TELEPHONE ENCOUNTER
Composite cancer screening  Chart review shows that this patient is due/due soon for the following Colonoscopy  Summary:    Patient is due/failing the following:   COLONOSCOPY    Action needed:   Patient needs office visit for colonoscopy.    Type of outreach:    Phone, left message for patient to call back.     Questions for provider review:    None                                                                                                                                      Giulia Boyd MA       Chart routed to Care Team .

## 2017-03-14 NOTE — LETTER
[address] [address]  [phone]      March 21, 2017      Bairon Reed  [address]  [place] [address]              Dear aBiron,    In order to ensure we are providing the best quality care, we have reviewed your chart and see that you are due for:  1 Colonoscopy   Please call the clinic at your earliest convenience to schedule an appointment with [facility] [phone]    Thank you for trusting us with your health care.        Sincerely,    Grace Marin PA-C/pranav

## 2017-03-31 ENCOUNTER — OFFICE VISIT (OUTPATIENT)
Dept: FAMILY MEDICINE | Facility: CLINIC | Age: 52
End: 2017-03-31
Payer: COMMERCIAL

## 2017-03-31 VITALS
SYSTOLIC BLOOD PRESSURE: 116 MMHG | WEIGHT: 199.8 LBS | TEMPERATURE: 97.6 F | DIASTOLIC BLOOD PRESSURE: 78 MMHG | HEART RATE: 104 BPM | OXYGEN SATURATION: 97 % | BODY MASS INDEX: 28.87 KG/M2

## 2017-03-31 DIAGNOSIS — M10.071 ACUTE IDIOPATHIC GOUT OF RIGHT FOOT: Primary | ICD-10-CM

## 2017-03-31 PROCEDURE — 99213 OFFICE O/P EST LOW 20 MIN: CPT | Performed by: NURSE PRACTITIONER

## 2017-03-31 RX ORDER — PREDNISONE 20 MG/1
TABLET ORAL
Qty: 20 TABLET | Refills: 0 | Status: SHIPPED | OUTPATIENT
Start: 2017-03-31 | End: 2017-05-12

## 2017-03-31 NOTE — PROGRESS NOTES
SUBJECTIVE:                                                    Bairon Reed is a 51 year old male who presents to clinic today for the following health issues:        Patient presents with:  Arthritis: Rt big toe possible gout    Patient has a history of gout.  He has noticed pain to his right great toe for the past 2 weeks.  He has been taking indomethacin with some improvement, but pain remains.  Patient denies fever.    Problem list and histories reviewed & adjusted, as indicated.  Additional history: as documented    Patient Active Problem List   Diagnosis     Hyperlipidemia LDL goal <160     Insomnia     Bilateral arm weakness     Anxiety     Nondependent alcohol abuse     Shoulder impingement - right     Cervical radiculopathy at C7     Cervicalgia     Degeneration of cervical intervertebral disc     Vitamin D deficiency     Cervical radiculopathy     S/P spinal fusion     Peroneal nerve palsy     Foot drop, right     Lumbar stenosis with neurogenic claudication     Lumbar radiculopathy     Lumbar herniated disc     Lumbago     S/P lumbar microdiscectomy     Hypertension goal BP (blood pressure) < 140/90     Benign non-nodular prostatic hyperplasia with lower urinary tract symptoms     Gout, unspecified cause, unspecified chronicity, unspecified site     Past Surgical History:   Procedure Laterality Date     BACK SURGERY  1986-1988    x 3. Lower back surgery     DISCECTOMY LUMBAR POSTERIOR MICROSCOPIC ONE LEVEL  5/17/2013    Procedure: DISCECTOMY LUMBAR POSTERIOR MICROSCOPIC ONE LEVEL;  BILATERAL LUMBAR L4-5 EXTENDED HEMILAMINECTOMY, MICROFORAMINOTOMY AND MICRODISCECTOMY WITH METRIX II;  Surgeon: Andrew Alejandre MD;  Location: [facility] OR     FUSION CERVICAL ANTERIOR ONE LEVEL  3/5/2013    Procedure: FUSION CERVICAL ANTERIOR ONE LEVEL;  C 6-7 ANTERIOR DISCECTOMY AND FUSION WITH NEURO MONITORING ;  Surgeon: Andrew Alejandre MD;  Location: [facility] OR     KNEE SURGERY  2009    Right     LARYNGOSCOPY  WITH MICROSCOPE  5/28/2013    Procedure: LARYNGOSCOPY WITH MICROSCOPE;  MICRODIRECT LARYNGOSCOPY, WITH LEFT VOCAL CORD INJECTION ;  Surgeon: Jorge Vergara MD;  Location: [facility]     NECK SURGERY  2/2010    [facility]       Social History   Substance Use Topics     Smoking status: Never Smoker     Smokeless tobacco: Current User     Types: Chew     Last attempt to quit: 4/1/2013      Comment: Lives in smoke free household     Alcohol use No      Comment: Quit 2010. Chemical dependency treatment in 1989. ...31 yrs of 1.5 tins weekly.  sober for 7 months..     Family History   Problem Relation Age of Onset     HEART DISEASE Father      Arthritis Sister      Glaucoma No family hx of      Macular Degeneration No family hx of      CANCER No family hx of      DIABETES No family hx of      Hypertension No family hx of      CEREBROVASCULAR DISEASE No family hx of      Thyroid Disease No family hx of          Current Outpatient Prescriptions   Medication Sig Dispense Refill     predniSONE (DELTASONE) 20 MG tablet Take 3 tabs (60 mg) by mouth daily x 3 days, 2 tabs (40 mg) daily x 3 days, 1 tab (20 mg) daily x 3 days, then 1/2 tab (10 mg) x 3 days. 20 tablet 0     triamcinolone (KENALOG) 0.1 % cream Apply sparingly to affected area two-three times daily for 14 days. 30 g 0     oxybutynin (DITROPAN-XL) 5 MG 24 hr tablet Take 1 tablet (5 mg) by mouth daily 30 tablet 1     tamsulosin (FLOMAX) 0.4 MG capsule Take 2 capsules (0.8 mg) by mouth daily 90 capsule 2     naproxen (NAPROSYN) 500 MG tablet Take 1 tablet (500 mg) by mouth 2 times daily as needed for moderate pain 180 tablet 3     Omega-3 Fatty Acids (OMEGA-3 FISH OIL PO) Take 2 g by mouth daily       Cholecalciferol (VITAMIN D3 PO) Take 1,000 Units by mouth daily       VITAMIN E NATURAL PO Take 100 Units by mouth       glucosamine-chondroitin 500-400 MG CAPS Take 1 capsule by mouth daily       calcium carbonate (OS-HANNAH 500 MG [employer] [place]) 500 MG tablet Take 500 mg by  mouth daily       lisinopril (PRINIVIL,ZESTRIL) 5 MG tablet Take 1 tablet (5 mg) by mouth daily 90 tablet 3     indomethacin (INDOCIN) 50 MG capsule Take 1 capsule (50 mg) by mouth 3 times daily as needed for moderate pain 30 capsule 3     Multiple Vitamins-Minerals (MULTIVITAMIN OR) Take 1 tablet by mouth daily.       predniSONE (DELTASONE) 20 MG tablet Take 2 tablets (40 mg) by mouth daily as needed Gout flares x 2 days. (Patient not taking: Reported on 3/31/2017) 10 tablet 0     No Known Allergies  BP Readings from Last 3 Encounters:   03/31/17 116/78   02/27/17 125/87   02/21/17 102/86    Wt Readings from Last 3 Encounters:   03/31/17 199 lb 12.8 oz (90.6 kg)   02/27/17 198 lb (89.8 kg)   02/21/17 199 lb (90.3 kg)                  Labs reviewed in EPIC    Reviewed and updated as needed this visit by clinical staff  Tobacco  Allergies  Meds  Med Hx  Surg Hx  Fam Hx  Soc Hx      Reviewed and updated as needed this visit by Provider         ROS:  C: NEGATIVE for fever, chills, change in weight  R: NEGATIVE for significant cough or SOB  CV: NEGATIVE for chest pain, palpitations or peripheral edema  MUSCULOSKELETAL: POSITIVE  for Right great toe pain    OBJECTIVE:                                                    /78 (BP Location: Right arm, Patient Position: Chair, Cuff Size: Adult Regular)  Pulse 104  Temp 97.6  F (36.4  C) (Oral)  Wt 199 lb 12.8 oz (90.6 kg)  SpO2 97%  BMI 28.87 kg/m2  Body mass index is 28.87 kg/(m^2).  GENERAL: healthy, alert and no distress  MS: Right great toe: tenderness, erythema, warmth noted to 1st MTP joint.    Diagnostic Test Results:  none      ASSESSMENT/PLAN:                                                      1. Acute idiopathic gout of right foot  If no improvement, patient to follow-up with podiatry.    - predniSONE (DELTASONE) 20 MG tablet; Take 3 tabs (60 mg) by mouth daily x 3 days, 2 tabs (40 mg) daily x 3 days, 1 tab (20 mg) daily x 3 days, then 1/2 tab (10  mg) x 3 days.  Dispense: 20 tablet; Refill: 0    FUTURE APPOINTMENTS:       - Follow-up for annual visit or as needed    YULIA Molina CNP  [facility]

## 2017-03-31 NOTE — PATIENT INSTRUCTIONS
[facility]    If you have any questions regarding to your visit please contact your care team:     Team Pink:   Clinic Hours Telephone Number   Internal Medicine:  Dr. Laura Gallagher NP       7am-7pm  Monday - Thursday   7am-5pm  Fridays  [phone]  (Appointment scheduling available 24/7)    Questions about your visit?  Team Line  [phone]   Urgent Care - [place] and [facility] - 11am-9pm Monday-Friday Saturday-Sunday- 9am-5pm   [place] - 5pm-9pm Monday-Friday Saturday-Sunday- 9am-5pm  [phone] - [place]   [phone] - [place]       What options do I have for visits at the clinic other than the traditional office visit?  To expand how we care for you, many of our providers are utilizing electronic visits (e-visits) and telephone visits, when medically appropriate, for interactions with their patients rather than a visit in the clinic.   We also offer nurse visits for many medical concerns. Just like any other service, we will bill your insurance company for this type of visit based on time spent on the phone with your provider. Not all insurance companies cover these visits. Please check with your medical insurance if this type of visit is covered. You will be responsible for any charges that are not paid by your insurance.      E-visits via [employer]:  generally incur a $35.00 fee.  Telephone visits:  Time spent on the phone: *charged based on time that is spent on the phone in increments of 10 minutes. Estimated cost:   5-10 mins $30.00   11-20 mins. $59.00   21-30 mins. $85.00   Use [employer] (secure email communication and access to your chart) to send your primary care provider a message or make an appointment. Ask someone on your Team how to sign up for [employer].    For a Price Quote for your services, please call our Consumer Price Line at [phone].    As always, Thank you for trusting us with your health care  needs!    Nathalie ODOM CMA ([facility])

## 2017-03-31 NOTE — NURSING NOTE
"Chief Complaint   Patient presents with     Arthritis     Rt big toe possible gout       Initial /78 (BP Location: Right arm, Patient Position: Chair, Cuff Size: Adult Regular)  Pulse 104  Temp 97.6  F (36.4  C) (Oral)  Wt 199 lb 12.8 oz (90.6 kg)  SpO2 97%  BMI 28.87 kg/m2 Estimated body mass index is 28.87 kg/(m^2) as calculated from the following:    Height as of 2/27/17: 5' 9.75\" (1.772 m).    Weight as of this encounter: 199 lb 12.8 oz (90.6 kg).  Medication Reconciliation: complete   Nathalie ODOM CMA ([facility])      "

## 2017-03-31 NOTE — MR AVS SNAPSHOT
After Visit Summary   3/31/2017    Bairon Reed    MRN: [id number]           Patient Information     Date Of Birth          1965        Visit Information        Provider Department      3/31/2017 3:40 PM Letty Gallagher APRN [facility]        Today's Diagnoses     Acute idiopathic gout of right foot    -  1      Care Instructions    [facility]    If you have any questions regarding to your visit please contact your care team:     Team Pink:   Clinic Hours Telephone Number   Internal Medicine:  Dr. Laura Gallagher, NP       7am-7pm  Monday - Thursday   7am-5pm  Fridays  [phone]  (Appointment scheduling available 24/7)    Questions about your visit?  Team Line  [phone]   Urgent Care - [place] and [facility] - 11am-9pm Monday-Friday Saturday-Sunday- 9am-5pm   [place] - 5pm-9pm Monday-Friday Saturday-Sunday- 9am-5pm  [phone] - [place]   [phone] - [place]       What options do I have for visits at the clinic other than the traditional office visit?  To expand how we care for you, many of our providers are utilizing electronic visits (e-visits) and telephone visits, when medically appropriate, for interactions with their patients rather than a visit in the clinic.   We also offer nurse visits for many medical concerns. Just like any other service, we will bill your insurance company for this type of visit based on time spent on the phone with your provider. Not all insurance companies cover these visits. Please check with your medical insurance if this type of visit is covered. You will be responsible for any charges that are not paid by your insurance.      E-visits via Multigig:  generally incur a $35.00 fee.  Telephone visits:  Time spent on the phone: *charged based on time that is spent on the phone in increments of 10 minutes. Estimated cost:   5-10 mins $30.00   11-20 mins.  "$59.00   21-30 mins. $85.00   Use EXPOhart (secure email communication and access to your chart) to send your primary care provider a message or make an appointment. Ask someone on your Team how to sign up for EXPOhart.    For a Price Quote for your services, please call our Consumer Price Line at [phone].    As always, Thank you for trusting us with your health care needs!    Nathalie ODOM CMA ([facility])          Follow-ups after your visit        Who to contact     If you have questions or need follow up information about today's clinic visit or your schedule please contact [facility] directly at [phone].  Normal or non-critical lab and imaging results will be communicated to you by MyChart, letter or phone within 4 business days after the clinic has received the results. If you do not hear from us within 7 days, please contact the clinic through EXPOhart or phone. If you have a critical or abnormal lab result, we will notify you by phone as soon as possible.  Submit refill requests through KSKT or call your pharmacy and they will forward the refill request to us. Please allow 3 business days for your refill to be completed.          Additional Information About Your Visit        EXPOhart Information     Qubellt lets you send messages to your doctor, view your test results, renew your prescriptions, schedule appointments and more. To sign up, go to [email or web address] . Click on \"Log in\" on the left side of the screen, which will take you to the Welcome page. Then click on \"Sign up Now\" on the right side of the page.     You will be asked to enter the access code listed below, as well as some personal information. Please follow the directions to create your username and password.     Your access code is: [email or web address]  Expires: 2017  4:05 PM     Your access code will  in 90 days. If you need help or a new code, please call your [place] clinic or [phone].        Care EveryWhere ID "     This is your Care EveryWhere ID. This could be used by other organizations to access your [place] medical records  [id number]        Your Vitals Were     Pulse Temperature Pulse Oximetry BMI (Body Mass Index)          104 97.6  F (36.4  C) (Oral) 97% 28.87 kg/m2         Blood Pressure from Last 3 Encounters:   03/31/17 116/78   02/27/17 125/87   02/21/17 102/86    Weight from Last 3 Encounters:   03/31/17 199 lb 12.8 oz (90.6 kg)   02/27/17 198 lb (89.8 kg)   02/21/17 199 lb (90.3 kg)              Today, you had the following     No orders found for display         Today's Medication Changes          These changes are accurate as of: 3/31/17  4:05 PM.  If you have any questions, ask your nurse or doctor.               These medicines have changed or have updated prescriptions.        Dose/Directions    * predniSONE 20 MG tablet   Commonly known as:  DELTASONE   This may have changed:  Another medication with the same name was added. Make sure you understand how and when to take each.   Used for:  Acute gout of left foot, unspecified cause   Changed by:  Grace Marin PA-C        Dose:  40 mg   Take 2 tablets (40 mg) by mouth daily as needed Gout flares x 2 days.   Quantity:  10 tablet   Refills:  0       * predniSONE 20 MG tablet   Commonly known as:  DELTASONE   This may have changed:  You were already taking a medication with the same name, and this prescription was added. Make sure you understand how and when to take each.   Used for:  Acute idiopathic gout of right foot   Changed by:  Letty Gallagher APRN CNP        Take 3 tabs (60 mg) by mouth daily x 3 days, 2 tabs (40 mg) daily x 3 days, 1 tab (20 mg) daily x 3 days, then 1/2 tab (10 mg) x 3 days.   Quantity:  20 tablet   Refills:  0       * Notice:  This list has 2 medication(s) that are the same as other medications prescribed for you. Read the directions carefully, and ask your doctor or other care provider to review them with you.          Where to get your medicines      These medications were sent to [facility] [place] - [place], [place] - [address]  [address], [place] [address]     Phone:  [phone]     predniSONE 20 MG tablet                Primary Care Provider Office Phone # Fax #    Triston Adame [phone]       [facility] [address] [facility] [address]        Thank you!     Thank you for choosing [facility]  for your care. Our goal is always to provide you with excellent care. Hearing back from our patients is one way we can continue to improve our services. Please take a few minutes to complete the written survey that you may receive in the mail after your visit with us. Thank you!             Your Updated Medication List - Protect others around you: Learn how to safely use, store and throw away your medicines at www.disposemymeds.org.          This list is accurate as of: 3/31/17  4:05 PM.  Always use your most recent med list.                   Brand Name Dispense Instructions for use    calcium carbonate 500 MG tablet    OS-HANNAH 500 mg [place]. Ca     Take 500 mg by mouth daily       glucosamine-chondroitin 500-400 MG Caps per capsule      Take 1 capsule by mouth daily       indomethacin 50 MG capsule    INDOCIN    30 capsule    Take 1 capsule (50 mg) by mouth 3 times daily as needed for moderate pain       lisinopril 5 MG tablet    PRINIVIL/ZESTRIL    90 tablet    Take 1 tablet (5 mg) by mouth daily       MULTIVITAMIN PO      Take 1 tablet by mouth daily.       naproxen 500 MG tablet    NAPROSYN    180 tablet    Take 1 tablet (500 mg) by mouth 2 times daily as needed for moderate pain       OMEGA-3 FISH OIL PO      Take 2 g by mouth daily       oxybutynin 5 MG 24 hr tablet    DITROPAN-XL    30 tablet    Take 1 tablet (5 mg) by mouth daily       * predniSONE 20 MG tablet    DELTASONE    10 tablet    Take 2 tablets (40 mg) by mouth daily as  needed Gout flares x 2 days.       * predniSONE 20 MG tablet    DELTASONE    20 tablet    Take 3 tabs (60 mg) by mouth daily x 3 days, 2 tabs (40 mg) daily x 3 days, 1 tab (20 mg) daily x 3 days, then 1/2 tab (10 mg) x 3 days.       tamsulosin 0.4 MG capsule    FLOMAX    90 capsule    Take 2 capsules (0.8 mg) by mouth daily       triamcinolone 0.1 % cream    KENALOG    30 g    Apply sparingly to affected area two-three times daily for 14 days.       VITAMIN D3 PO      Take 1,000 Units by mouth daily       VITAMIN E NATURAL PO      Take 100 Units by mouth       * Notice:  This list has 2 medication(s) that are the same as other medications prescribed for you. Read the directions carefully, and ask your doctor or other care provider to review them with you.

## 2017-04-04 ENCOUNTER — OFFICE VISIT (OUTPATIENT)
Dept: FAMILY MEDICINE | Facility: CLINIC | Age: 52
End: 2017-04-04
Payer: COMMERCIAL

## 2017-04-04 VITALS
DIASTOLIC BLOOD PRESSURE: 70 MMHG | HEIGHT: 70 IN | HEART RATE: 115 BPM | SYSTOLIC BLOOD PRESSURE: 124 MMHG | RESPIRATION RATE: 24 BRPM | BODY MASS INDEX: 28.36 KG/M2 | TEMPERATURE: 98.4 F | OXYGEN SATURATION: 95 % | WEIGHT: 198.1 LBS

## 2017-04-04 DIAGNOSIS — R07.81 RIB PAIN ON RIGHT SIDE: Primary | ICD-10-CM

## 2017-04-04 DIAGNOSIS — S29.9XXA CHEST WALL INJURY, INITIAL ENCOUNTER: ICD-10-CM

## 2017-04-04 PROCEDURE — 99213 OFFICE O/P EST LOW 20 MIN: CPT | Performed by: PHYSICIAN ASSISTANT

## 2017-04-04 NOTE — PROGRESS NOTES
"  SUBJECTIVE:                                                    Bairon Reed is a 51 year old male who presents to clinic today for the following health issues:      Joint Pain     Onset:  Saturday. Patient was seen Friday for gout and started on prednisone. He got up in the night and tripped over foot due to pain and fell on his left side and right face. He reports rib pain, but only with pushing on it. He is able to do activities and reports that movement actually helps some.     He needs a note he can return to work.    Description:   Location: Left Rib Pain  Character: Sharp    Intensity: moderate, severe    Progression of Symptoms: intermittent    Accompanying Signs & Symptoms:  Other symptoms: none   History:   Previous similar pain: no       Precipitating factors:   Trauma or overuse: YES-  Having Issues with Gout and then on Saturday he Fell and hit table    Alleviating factors:  Improved by: nothing       Therapies Tried and outcome: None        -------------------------------------    Problem list, Medication list, Allergies, and Medical/Social/Surgical histories reviewed in [facility] and updated as appropriate.    ROS:  A pertinent ROS of the General  Cardiovascular  Pulmonary  Musculoskeletal   Neurological  Integumentary systems is otherwise unremarkable.      OBJECTIVE:                                                    /70 (BP Location: Right arm, Patient Position: Chair, Cuff Size: Adult Large)  Pulse 115  Temp 98.4  F (36.9  C) (Oral)  Resp 24  Ht 5' 9.75\" (1.772 m)  Wt 198 lb 1.6 oz (89.9 kg)  SpO2 95%  BMI 28.63 kg/m2 .  GENERAL: healthy, alert and no distress  RESP: lungs clear to auscultation - no rales, no rhonchi, no wheezes  CV: regular rates and rhythm, normal S1 S2, no S3 or S4 and no murmur, no click or rub -  MS: tender to palpation right anterior rib 8 and 9    No ecchymosis or abdominal tenderness.    Diagnostic test results:  Diagnostic Test Results:  none  " "    ASSESSMENT/PLAN:                                                          ICD-10-CM    1. Rib pain on right side R07.81    2. Chest wall injury, initial encounter S29.9XXA      Recommend patient use ice on the area 2-3 times daily.  Use NSAIDs as needed for pain, he prefers to avoid medication and \"tough it out\". He feels he can do all his work activities at this point.  Work note given. If activities at work do aggravate it or he gets worse, he should follow up for change in restrictions. Patient is in agreement with this plan.    Follow up with Provider - BELINDAN         Grace Marin PA-C  [facility]   "

## 2017-04-04 NOTE — PATIENT INSTRUCTIONS
[facility]   Discharged by : Coty Irene MA    Paper scripts provided to patient : no     If you have any questions regarding your visit please contact your care team:     Team Gold Clinic Hours Telephone Number   Dr. Sarai Marin PA-C   7am-7pm Monday - Thursday   7am-5pm Fridays  [phone]   (Appointment scheduling available 24/7)   RN Line   [phone] option 2       For a Price Quote for your services, please call our Consumer Price Line at [phone].     What options do I have for visits at the clinic other than the traditional office visit?     To expand how we care for you, many of our providers are utilizing electronic visits (e-visits) and telephone visits, when medically appropriate, for interactions with their patients rather than a visit in the clinic. We also offer nurse visits for many medical concerns. Just like any other service, we will bill your insurance company for this type of visit based on time spent on the phone with your provider. Not all insurance companies cover these visits. Please check with your medical insurance if this type of visit is covered. You will be responsible for any charges that are not paid by your insurance.   E-visits via Kerlink: generally incur a $35.00 fee.     Telephone visits:   Time spent on the phone: *charged based on time that is spent on the phone in increments of 10 minutes. Estimated cost:   5-10 mins $30.00   11-20 mins. $59.00   21-30 mins. $85.00     Use CytoLogichart (secure email communication and access to your chart) to send your primary care provider a message or make an appointment. Ask someone on your Team how to sign up for Clipsuret.     As always, Thank you for trusting us with your health care needs!      [facility]:    Scheduling Appointments  [facility]  Call: [phone]    [facility]  Call:  [phone]    [facility]  Call: [phone]      WHERE TO GO FOR CARE?    Clinic    Make an appointment if you:       Are sick (cold, cough, flu, sore throat, earache or in pain).       Have a small injury (sprain, small cut, burn or broken bone).       Need a physical exam, Pap smear, vaccine or prescription refill.       Have questions about your health or medicines.    To reach us:      Call [phone] ([phone]). Open 24 hours every day. (For counseling services, call [phone].)    Log into [email or web address] at [employer]. (Visit [email or web address] to create an account.) Hospital emergency room    An emergency is a serious or life- threatening problem that must be treated right away.    Call 036 or get to the hospital if you have:      Very bad or sudden:            - Chest pain or pressure         - Bleeding         - Head or belly pain         - Dizziness or trouble seeing, walking or                          Speaking      Problems breathing      Blood in your vomit or you are coughing up blood      A major injury (knocked out, loss of a finger or limb, rape, broken bone protruding from skin)    A mental health crisis. (Or call the Mental Health Crisis line at 1-598.971.7195 or Suicide Prevention Hotline at 1-698.539.8261.)    Open 24 hours every day. You don't need an appointment.     Urgent care    Visit urgent care for sickness or small injuries when the clinic is closed. You don't need an appointment. To check hours or find an urgent care near you, visit [email or web address]. Online care    Get online care from [employer] for more than 70 common problems, like colds, allergies and infections. Open 24 hours every day at: [email or web address]   Need help deciding?    For advice about where to be seen, you may call your clinic and ask to speak with a nurse. We're here for you 24 hours every day.         If you are deaf or hard of hearing, please let us  know. We provide many free services including sign language interpreters, oral interpreters, TTYs, telephone amplifiers, note takers and written materials.

## 2017-04-04 NOTE — NURSING NOTE
"Chief Complaint   Patient presents with     Rib Pain     Left Side     Patient Request for Note/Letter     Missed work yesterday and today and needs excuse for work       Initial /70 (BP Location: Right arm, Patient Position: Chair, Cuff Size: Adult Large)  Pulse 115  Temp 98.4  F (36.9  C) (Oral)  Resp 24  Ht 5' 9.75\" (1.772 m)  Wt 198 lb 1.6 oz (89.9 kg)  SpO2 95%  BMI 28.63 kg/m2 Estimated body mass index is 28.63 kg/(m^2) as calculated from the following:    Height as of this encounter: 5' 9.75\" (1.772 m).    Weight as of this encounter: 198 lb 1.6 oz (89.9 kg).  Medication Reconciliation: complete    "

## 2017-04-04 NOTE — MR AVS SNAPSHOT
After Visit Summary   4/4/2017    Bairon Reed    MRN: [id number]           Patient Information     Date Of Birth          1965        Visit Information        Provider Department      4/4/2017 1:20 PM Grace Marin PA-C [facility]        Today's Diagnoses     Rib pain on right side    -  1    Chest wall injury, initial encounter          Care Instructions    [facility]   Discharged by : Coty Irene MA    Paper scripts provided to patient : no     If you have any questions regarding your visit please contact your care team:     Team Gold Clinic Hours Telephone Number   Dr. Sarai Marin PA-C   7am-7pm Monday - Thursday   7am-5pm Fridays  [phone]   (Appointment scheduling available 24/7)   RN Line   [phone] option 2       For a Price Quote for your services, please call our Consumer Price Line at [phone].     What options do I have for visits at the clinic other than the traditional office visit?     To expand how we care for you, many of our providers are utilizing electronic visits (e-visits) and telephone visits, when medically appropriate, for interactions with their patients rather than a visit in the clinic. We also offer nurse visits for many medical concerns. Just like any other service, we will bill your insurance company for this type of visit based on time spent on the phone with your provider. Not all insurance companies cover these visits. Please check with your medical insurance if this type of visit is covered. You will be responsible for any charges that are not paid by your insurance.   E-visits via Property Pointe: generally incur a $35.00 fee.     Telephone visits:   Time spent on the phone: *charged based on time that is spent on the phone in increments of 10 minutes. Estimated cost:   5-10 mins $30.00   11-20 mins. $59.00   21-30 mins. $85.00     Use Property Pointe  (secure email communication and access to your chart) to send your primary care provider a message or make an appointment. Ask someone on your Team how to sign up for Okta.     As always, Thank you for trusting us with your health care needs!      [facility]:    Scheduling Appointments  [facility]  Call: [phone]    [facility] [place]  Call: [phone]    [facility]  Call: [phone]      WHERE TO GO FOR CARE?    Clinic    Make an appointment if you:       Are sick (cold, cough, flu, sore throat, earache or in pain).       Have a small injury (sprain, small cut, burn or broken bone).       Need a physical exam, Pap smear, vaccine or prescription refill.       Have questions about your health or medicines.    To reach us:      Call [phone] ([phone]). Open 24 hours every day. (For counseling services, call [phone].)    Log into Okta at [email or web address]. (Visit [email or web address] to create an account.) Hospital emergency room    An emergency is a serious or life- threatening problem that must be treated right away.    Call 232 or get to the hospital if you have:      Very bad or sudden:            - Chest pain or pressure         - Bleeding         - Head or belly pain         - Dizziness or trouble seeing, walking or                          Speaking      Problems breathing      Blood in your vomit or you are coughing up blood      A major injury (knocked out, loss of a finger or limb, rape, broken bone protruding from skin)    A mental health crisis. (Or call the Mental Health Crisis line at 1-548.333.6299 or Suicide Prevention Hotline at 1-605.973.2993.)    Open 24 hours every day. You don't need an appointment.     Urgent care    Visit urgent care for sickness or small injuries when the clinic is closed. You don't need an appointment. To check hours or find an urgent care near you, visit  "[email or web address]. Online care    Get online care from [facility] for more than 70 common problems, like colds, allergies and infections. Open 24 hours every day at: [email or web address]   Need help deciding?    For advice about where to be seen, you may call your clinic and ask to speak with a nurse. We're here for you 24 hours every day.         If you are deaf or hard of hearing, please let us know. We provide many free services including sign language interpreters, oral interpreters, TTYs, telephone amplifiers, note takers and written materials.                       Follow-ups after your visit        Who to contact     If you have questions or need follow up information about today's clinic visit or your schedule please contact [facility] directly at [phone].  Normal or non-critical lab and imaging results will be communicated to you by motifyhart, letter or phone within 4 business days after the clinic has received the results. If you do not hear from us within 7 days, please contact the clinic through MyChart or phone. If you have a critical or abnormal lab result, we will notify you by phone as soon as possible.  Submit refill requests through Calleoo or call your pharmacy and they will forward the refill request to us. Please allow 3 business days for your refill to be completed.          Additional Information About Your Visit        Calleoo Information     Calleoo lets you send messages to your doctor, view your test results, renew your prescriptions, schedule appointments and more. To sign up, go to [email or web address] . Click on \"Log in\" on the left side of the screen, which will take you to the Welcome page. Then click on \"Sign up Now\" on the right side of the page.     You will be asked to enter the access code listed below, as well as some personal information. Please follow the directions to create your username and password.     Your access code is: " [email or web address]  Expires: 2017  4:05 PM     Your access code will  in 90 days. If you need help or a new code, please call your [facility] clinic or [phone].        Care EveryWhere ID     This is your Care EveryWhere ID. This could be used by other organizations to access your [facility] medical records  [id number]        Your Vitals Were     Pulse Temperature Respirations Height Pulse Oximetry BMI (Body Mass Index)    115 98.4  F (36.9  C) (Oral) 24 5' 9.75\" (1.772 m) 95% 28.63 kg/m2       Blood Pressure from Last 3 Encounters:   17 124/70   17 116/78   17 125/87    Weight from Last 3 Encounters:   17 198 lb 1.6 oz (89.9 kg)   17 199 lb 12.8 oz (90.6 kg)   17 198 lb (89.8 kg)              Today, you had the following     No orders found for display       Primary Care Provider Office Phone # Fax #    Triston Adame [phone]       [facility] [address] [address]        Thank you!     Thank you for choosing [facility]  for your care. Our goal is always to provide you with excellent care. Hearing back from our patients is one way we can continue to improve our services. Please take a few minutes to complete the written survey that you may receive in the mail after your visit with us. Thank you!             Your Updated Medication List - Protect others around you: Learn how to safely use, store and throw away your medicines at www.disposemymeds.org.          This list is accurate as of: 17  2:05 PM.  Always use your most recent med list.                   Brand Name Dispense Instructions for use    calcium carbonate 500 MG tablet    OS-HANNAH 500 mg [place]. Ca     Take 500 mg by mouth daily       glucosamine-chondroitin 500-400 MG Caps per capsule      Take 1 capsule by mouth daily       indomethacin 50 MG capsule    INDOCIN    30 capsule    Take 1 capsule (50 mg) by mouth 3 times daily as needed for " moderate pain       lisinopril 5 MG tablet    PRINIVIL/ZESTRIL    90 tablet    Take 1 tablet (5 mg) by mouth daily       MULTIVITAMIN PO      Take 1 tablet by mouth daily.       naproxen 500 MG tablet    NAPROSYN    180 tablet    Take 1 tablet (500 mg) by mouth 2 times daily as needed for moderate pain       OMEGA-3 FISH OIL PO      Take 2 g by mouth daily       oxybutynin 5 MG 24 hr tablet    DITROPAN-XL    30 tablet    Take 1 tablet (5 mg) by mouth daily       * predniSONE 20 MG tablet    DELTASONE    10 tablet    Take 2 tablets (40 mg) by mouth daily as needed Gout flares x 2 days.       * predniSONE 20 MG tablet    DELTASONE    20 tablet    Take 3 tabs (60 mg) by mouth daily x 3 days, 2 tabs (40 mg) daily x 3 days, 1 tab (20 mg) daily x 3 days, then 1/2 tab (10 mg) x 3 days.       tamsulosin 0.4 MG capsule    FLOMAX    90 capsule    Take 2 capsules (0.8 mg) by mouth daily       triamcinolone 0.1 % cream    KENALOG    30 g    Apply sparingly to affected area two-three times daily for 14 days.       VITAMIN D3 PO      Take 1,000 Units by mouth daily       VITAMIN E NATURAL PO      Take 100 Units by mouth       * Notice:  This list has 2 medication(s) that are the same as other medications prescribed for you. Read the directions carefully, and ask your doctor or other care provider to review them with you.

## 2017-04-04 NOTE — LETTER
[facility]  [address] [address]  Phone: [phone]    April 4, 2017        Bairon Reed  [address]  [place] [address]          To whom it may concern:    RE: Bairon Reed    Patient was seen and treated today at our clinic for rib injury. Patient can return to work without restrictions. If symptoms worsen, he will follow up.     Patient was also seen at  Urgent care Friday 3/31/17 for gout.     Please contact me for questions or concerns.      Sincerely,         Grace Marin PA-C

## 2017-05-05 ENCOUNTER — TELEPHONE (OUTPATIENT)
Dept: FAMILY MEDICINE | Facility: CLINIC | Age: 52
End: 2017-05-05

## 2017-05-05 NOTE — TELEPHONE ENCOUNTER
5/5/2017    Call Regarding Preventive Health Screening Colonoscopy    Attempt 2    Message on voicemail     Comments: Clinic made 1 prior attempt      Outreach   CC

## 2017-05-10 ENCOUNTER — OFFICE VISIT (OUTPATIENT)
Dept: FAMILY MEDICINE | Facility: CLINIC | Age: 52
End: 2017-05-10
Payer: MEDICAID

## 2017-05-10 VITALS
WEIGHT: 190 LBS | RESPIRATION RATE: 14 BRPM | DIASTOLIC BLOOD PRESSURE: 70 MMHG | HEIGHT: 71 IN | SYSTOLIC BLOOD PRESSURE: 110 MMHG | TEMPERATURE: 98.9 F | HEART RATE: 86 BPM | BODY MASS INDEX: 26.6 KG/M2 | OXYGEN SATURATION: 96 %

## 2017-05-10 DIAGNOSIS — M10.9 ACUTE GOUT INVOLVING TOE OF RIGHT FOOT, UNSPECIFIED CAUSE: Primary | ICD-10-CM

## 2017-05-10 DIAGNOSIS — M10.071 ACUTE IDIOPATHIC GOUT OF RIGHT FOOT: ICD-10-CM

## 2017-05-10 DIAGNOSIS — Z12.11 SCREEN FOR COLON CANCER: ICD-10-CM

## 2017-05-10 DIAGNOSIS — M10.9 ACUTE GOUT OF LEFT FOOT, UNSPECIFIED CAUSE: ICD-10-CM

## 2017-05-10 LAB
CREAT SERPL-MCNC: 0.78 MG/DL (ref 0.66–1.25)
GFR SERPL CREATININE-BSD FRML MDRD: NORMAL ML/MIN/1.7M2
URATE SERPL-MCNC: 6.4 MG/DL (ref 3.5–7.2)

## 2017-05-10 PROCEDURE — 82565 ASSAY OF CREATININE: CPT | Performed by: FAMILY MEDICINE

## 2017-05-10 PROCEDURE — 84550 ASSAY OF BLOOD/URIC ACID: CPT | Performed by: FAMILY MEDICINE

## 2017-05-10 PROCEDURE — 36415 COLL VENOUS BLD VENIPUNCTURE: CPT | Performed by: FAMILY MEDICINE

## 2017-05-10 PROCEDURE — 99213 OFFICE O/P EST LOW 20 MIN: CPT | Performed by: FAMILY MEDICINE

## 2017-05-10 RX ORDER — COLCHICINE 0.6 MG/1
TABLET ORAL
Qty: 30 TABLET | Refills: 0 | Status: SHIPPED | OUTPATIENT
Start: 2017-05-10 | End: 2017-06-28

## 2017-05-10 RX ORDER — PREDNISONE 20 MG/1
40 TABLET ORAL DAILY PRN
Qty: 10 TABLET | Refills: 0 | Status: CANCELLED | OUTPATIENT
Start: 2017-05-10

## 2017-05-10 ASSESSMENT — PAIN SCALES - GENERAL: PAINLEVEL: SEVERE PAIN (7)

## 2017-05-10 NOTE — TELEPHONE ENCOUNTER
Medication Detail      Disp Refills Start End DANETTE   predniSONE (DELTASONE) 20 MG tablet 20 tablet 0 3/31/2017  --   Sig: Take 3 tabs (60 mg) by mouth daily x 3 days, 2 tabs (40 mg) daily x 3 days, 1 tab (20 mg) daily x 3 days, then 1/2 tab (10 mg) x 3 days.   Class: E-Prescribe   Order: [id number]       Last Office Visit with [facility] or [facility] prescribing provider: 4/4/2017  Future Office visit:    Next 5 appointments (look out 90 days)     May 10, 2017  2:40 PM CDT   SHORT with Martina Ocampo MD   [facility] ([facility]    [address] [address]   [phone]                   Routing refill request to provider for review/approval because:  Drug not on the [facility], [facility] or [facility] refill protocol or controlled substance    gabapentin (NEURONTIN) 300 MG capsule (Discontinued) 180 capsule 0 6/24/2015 11/4/2015 --      Sig: Take 1 tablet (300 mg) every night for 1-3 days, then 1 tablet twice daily for 1-3 days, then 1 tablet three times daily, after taking medication three times daily for 1-2 weeks, may increase to 2 capsules three times daily     Class: Fax     Reason for Discontinue: Reorder     Order: [id number]       Last Office Visit with [facility] or [facility] prescribing provider: 4/4/2017  Future Office visit:    Next 5 appointments (look out 90 days)     May 10, 2017  2:40 PM CDT   SHORT with Martina Ocampo MD   [facility] ([facility]    [address] [address]   [phone]                   Routing refill request to provider for review/approval because:  Drug not active on patient's medication list

## 2017-05-10 NOTE — PATIENT INSTRUCTIONS
Eating to Prevent Gout  Gout is a painful form of arthritis caused by an excess of uric acid. This is a waste product made by the body. It builds up in the body and forms crystals that collect in the joints, bringing on a gout attack. Alcohol and certain foods can trigger a gout attack. Below are some guidelines for changing your diet to help you manage gout. Your healthcare provider can work with you to determine the best eating plan for you. Know that diet is only one part of managing gout. Take your medicines as prescribed and follow the other guidelines your healthcare provider has given you.  Foods to limit  Eating too many foods containing purines may increase the levels of uric acid in your body and increase your risk for a gout attack. It may be best to limit these high-purine foods:    Alcohol (beer, red wine). You may be told to avoid alcohol completely.    Certain fish (anchovies, sardines, fish roes, herring, tuna, mussels, codfish, scallops, trout, and ramos)    Certain meats (red meat, processed meat, monteiro, turkey, wild game, and goose)    Sauces and gravies made with meat    Organ meats (such as liver, kidneys, sweetbreads, and tripe)    Legumes (such as dried beans, peas)    Mushrooms, spinach, asparagus, and cauliflower    Yeast and yeast extract supplements  Foods to try  Some foods may be helpful for people with gout. You may want to try adding some of the following foods to your diet:    Dark berries: These include blueberries, blackberries, and cherries. These berries contain chemicals that may lower uric acid.    Tofu: Tofu, which is made from soy, is a good source of protein. Studies have shown that it may be a better choice than meat for people with gout.    Omega fatty acids: These acids are found in fatty fish (such as salmon), certain oils (such as flax, olive, or nut oils), or nuts. They may help prevent inflammation due to gout.  The following guidelines are recommended by the  American Medical Association for people with gout. Your diet should be:    High in fiber, whole grains, fruits, and vegetables.    Low in protein (15% of calories should come from protein. Choose lean sources such as soy, lean meats, and poultry).    Low in fat (no more than 30% of calories should come from fat, with only 10% coming from animal fat).     8431-7539 [employer]. [address] [place] [address], [place] [address]. All rights reserved. This information is not intended as a substitute for professional medical care. Always follow your healthcare professional's instructions.        Treating Gout Attacks  Gout is a disease that affects the joints. Left untreated, it can lead to painful foot deformity and even kidney problems. But, by treating gout early, you can relieve pain and help prevent future problems. Gout can usually be treated with medication and proper diet. In severe cases, surgery may be needed.  Gout attacks are painful and often happen more than once. Taking medications may reduce pain and prevent attacks in the future. There are also some things you can do at home to relieve symptoms.    Medications for Gout  Your health care provider may prescribe a daily medication to reduce levels of uric acid. This may help prevent gout attacks. Other medications can help relieve pain and swelling during an attack. Be sure to take your medication as directed.  What You Can Do  Below are some things you can do at home to relieve gout symptoms. Your health care provider may have other tips.    Rest the painful joint as much as you can.    Raise the painful joint so it is at a level higher than your heart.  How Can I Prevent Gout?  With a little effort, you may be able to prevent gout attacks in the future. Here are some things you can do:    Avoid alcohol and foods that trigger gout.    Avoid foods high in purines    Certain meats (red meat, processed meat, turkey)    Organ meats (kidney, liver,  sweetbread)    Shellfish (lobster, crab, shrimp, scallop, mussel)    Certain fish (anchovy, sardine, herring, mackerel)    Take any medications prescribed by your health care provider.    Lose weight if you need to.    Control blood pressure and cholesterol.    Drink plenty of water to help flush uric acid from your body.    0731-6030 [employer]. [address] [place] [address]. All rights reserved. This information is not intended as a substitute for professional medical care. Always follow your healthcare professional's instructions.      [facility]    If you have any questions regarding to your visit please contact your care team:       Team Red:   Clinic Hours Telephone Number   Dr. Cathie Canchola, NP   7am-7pm  Monday - Thursday   7am-5pm  Fridays  [phone]  (Appointment scheduling available 24/7)    Questions about your visit?   Team Line  [phone]   Urgent Care - [place] [facility] - 11am-9pm Monday-Friday Saturday-Sunday- 9am-5pm   [place] - 5pm-9pm Monday-Friday Saturday-Sunday- 9am-5pm  [phone] - [place]   [phone] - [place]       What options do I have for visits at the clinic other than the traditional office visit?  To expand how we care for you, many of our providers are utilizing electronic visits (e-visits) and telephone visits, when medically appropriate, for interactions with their patients rather than a visit in the clinic.   We also offer nurse visits for many medical concerns. Just like any other service, we will bill your insurance company for this type of visit based on time spent on the phone with your provider. Not all insurance companies cover these visits. Please check with your medical insurance if this type of visit is covered. You will be responsible for any charges that are not paid by your insurance.      E-visits via Altair Prep:  generally incur a $35.00  fee.  Telephone visits:  Time spent on the phone: *charged based on time that is spent on the phone in increments of 10 minutes. Estimated cost:   5-10 mins $30.00   11-20 mins. $59.00   21-30 mins. $85.00     Use Payverishart (secure email communication and access to your chart) to send your primary care provider a message or make an appointment. Ask someone on your Team how to sign up for demandmartt.  For a Price Quote for your services, please call our [employer] Price Line at [phone].      As always, Thank you for trusting us with your health care needs!  Discharged by SCARLET Matthew

## 2017-05-10 NOTE — NURSING NOTE
"Chief Complaint   Patient presents with     Arthritis       Initial /70  Pulse 86  Temp 98.9  F (37.2  C) (Oral)  Resp 14  Ht 5' 10.75\" (1.797 m)  Wt 190 lb (86.2 kg)  SpO2 96%  BMI 26.69 kg/m2 Estimated body mass index is 26.69 kg/(m^2) as calculated from the following:    Height as of this encounter: 5' 10.75\" (1.797 m).    Weight as of this encounter: 190 lb (86.2 kg).  Medication Reconciliation: complete   Krista Duggan CMA (AAMA)      "

## 2017-05-10 NOTE — MR AVS SNAPSHOT
After Visit Summary   5/10/2017    Bairon Reed    MRN: [id number]           Patient Information     Date Of Birth          1965        Visit Information        Provider Department      5/10/2017 2:40 PM Martina Ocampo MD [facility]        Today's Diagnoses     Acute gout involving toe of right foot, unspecified cause    -  1      Care Instructions      Eating to Prevent Gout  Gout is a painful form of arthritis caused by an excess of uric acid. This is a waste product made by the body. It builds up in the body and forms crystals that collect in the joints, bringing on a gout attack. Alcohol and certain foods can trigger a gout attack. Below are some guidelines for changing your diet to help you manage gout. Your healthcare provider can work with you to determine the best eating plan for you. Know that diet is only one part of managing gout. Take your medicines as prescribed and follow the other guidelines your healthcare provider has given you.  Foods to limit  Eating too many foods containing purines may increase the levels of uric acid in your body and increase your risk for a gout attack. It may be best to limit these high-purine foods:    Alcohol (beer, red wine). You may be told to avoid alcohol completely.    Certain fish (anchovies, sardines, fish roes, herring, tuna, mussels, codfish, scallops, trout, and ramos)    Certain meats (red meat, processed meat, monteiro, turkey, wild game, and goose)    Sauces and gravies made with meat    Organ meats (such as liver, kidneys, sweetbreads, and tripe)    Legumes (such as dried beans, peas)    Mushrooms, spinach, asparagus, and cauliflower    Yeast and yeast extract supplements  Foods to try  Some foods may be helpful for people with gout. You may want to try adding some of the following foods to your diet:    Dark berries: These include blueberries, blackberries, and cherries. These berries contain chemicals that may lower uric  acid.    Tofu: Tofu, which is made from soy, is a good source of protein. Studies have shown that it may be a better choice than meat for people with gout.    Omega fatty acids: These acids are found in fatty fish (such as salmon), certain oils (such as flax, olive, or nut oils), or nuts. They may help prevent inflammation due to gout.  The following guidelines are recommended by the American Medical Association for people with gout. Your diet should be:    High in fiber, whole grains, fruits, and vegetables.    Low in protein (15% of calories should come from protein. Choose lean sources such as soy, lean meats, and poultry).    Low in fat (no more than 30% of calories should come from fat, with only 10% coming from animal fat).     6987-1540 [employer]. [address] [place] [address]. All rights reserved. This information is not intended as a substitute for professional medical care. Always follow your healthcare professional's instructions.        Treating Gout Attacks  Gout is a disease that affects the joints. Left untreated, it can lead to painful foot deformity and even kidney problems. But, by treating gout early, you can relieve pain and help prevent future problems. Gout can usually be treated with medication and proper diet. In severe cases, surgery may be needed.  Gout attacks are painful and often happen more than once. Taking medications may reduce pain and prevent attacks in the future. There are also some things you can do at home to relieve symptoms.    Medications for Gout  Your health care provider may prescribe a daily medication to reduce levels of uric acid. This may help prevent gout attacks. Other medications can help relieve pain and swelling during an attack. Be sure to take your medication as directed.  What You Can Do  Below are some things you can do at home to relieve gout symptoms. Your health care provider may have other tips.    Rest the painful joint as  much as you can.    Raise the painful joint so it is at a level higher than your heart.  How Can I Prevent Gout?  With a little effort, you may be able to prevent gout attacks in the future. Here are some things you can do:    Avoid alcohol and foods that trigger gout.    Avoid foods high in purines    Certain meats (red meat, processed meat, turkey)    Organ meats (kidney, liver, sweetbread)    Shellfish (lobster, crab, shrimp, scallop, mussel)    Certain fish (anchovy, sardine, herring, mackerel)    Take any medications prescribed by your health care provider.    Lose weight if you need to.    Control blood pressure and cholesterol.    Drink plenty of water to help flush uric acid from your body.    0560-1733 [employer]. [address] [place] [address], [place] [address]. All rights reserved. This information is not intended as a substitute for professional medical care. Always follow your healthcare professional's instructions.      [facility]    If you have any questions regarding to your visit please contact your care team:       Team Red:   Clinic Hours Telephone Number   Dr. Cathie Canchola, NP   7am-7pm  Monday - Thursday   7am-5pm  Fridays  [phone]  (Appointment scheduling available 24/7)    Questions about your visit?   Team Line  [phone]   Urgent Care - [facility] - 11am-9pm Monday-Friday Saturday-Sunday- 9am-5pm   [place] - 5pm-9pm Monday-Friday Saturday-Sunday- 9am-5pm  [phone] - [facility]  [phone] - [place]       What options do I have for visits at the clinic other than the traditional office visit?  To expand how we care for you, many of our providers are utilizing electronic visits (e-visits) and telephone visits, when medically appropriate, for interactions with their patients rather than a visit in the clinic.   We also offer nurse visits for many medical concerns. Just like  any other service, we will bill your insurance company for this type of visit based on time spent on the phone with your provider. Not all insurance companies cover these visits. Please check with your medical insurance if this type of visit is covered. You will be responsible for any charges that are not paid by your insurance.      E-visits via [employer]:  generally incur a $35.00 fee.  Telephone visits:  Time spent on the phone: *charged based on time that is spent on the phone in increments of 10 minutes. Estimated cost:   5-10 mins $30.00   11-20 mins. $59.00   21-30 mins. $85.00     Use Qnary (secure email communication and access to your chart) to send your primary care provider a message or make an appointment. Ask someone on your Team how to sign up for Qnary.  For a Price Quote for your services, please call our [employer] Line at [phone].      As always, Thank you for trusting us with your health care needs!  Discharged by SCAR[facility]          Follow-ups after your visit        Who to contact     If you have questions or need follow up information about today's clinic visit or your schedule please contact [facility] directly at [phone].  Normal or non-critical lab and imaging results will be communicated to you by MyChart, letter or phone within 4 business days after the clinic has received the results. If you do not hear from us within 7 days, please contact the clinic through [employer] or phone. If you have a critical or abnormal lab result, we will notify you by phone as soon as possible.  Submit refill requests through Qnary or call your pharmacy and they will forward the refill request to us. Please allow 3 business days for your refill to be completed.          Additional Information About Your Visit        Qnary Information     Qnary lets you send messages to your doctor, view your test results, renew your prescriptions, schedule appointments and more. To sign up,  "go to [email or web address] . Click on \"Log in\" on the left side of the screen, which will take you to the Welcome page. Then click on \"Sign up Now\" on the right side of the page.     You will be asked to enter the access code listed below, as well as some personal information. Please follow the directions to create your username and password.     Your access code is: [email or web address]  Expires: 2017  4:05 PM     Your access code will  in 90 days. If you need help or a new code, please call your [facility] clinic or [phone].        Care EveryWhere ID     This is your Care EveryWhere ID. This could be used by other organizations to access your [facility] medical records  [id number]        Your Vitals Were     Pulse Temperature Respirations Height Pulse Oximetry BMI (Body Mass Index)    86 98.9  F (37.2  C) (Oral) 14 5' 10.75\" (1.797 m) 96% 26.69 kg/m2       Blood Pressure from Last 3 Encounters:   05/10/17 110/70   17 124/70   17 116/78    Weight from Last 3 Encounters:   05/10/17 190 lb (86.2 kg)   17 198 lb 1.6 oz (89.9 kg)   17 199 lb 12.8 oz (90.6 kg)              We Performed the Following     Creatinine     Uric acid          Today's Medication Changes          These changes are accurate as of: 5/10/17  3:34 PM.  If you have any questions, ask your nurse or doctor.               Start taking these medicines.        Dose/Directions    colchicine 0.6 MG tablet   Commonly known as:  COLCRYS   Used for:  Acute gout involving toe of right foot, unspecified cause   Started by:  Martina Ocampo MD        2 tabs at the onset of flare, then 1 tab every 2 hrs until pain is better or diarrhea occurs, up to 10 doses. Wait 3 days until taking again   Quantity:  30 tablet   Refills:  0         These medicines have changed or have updated prescriptions.        Dose/Directions    predniSONE 20 MG tablet   Commonly known as:  DELTASONE   This may have changed:  Another medication with the same " name was removed. Continue taking this medication, and follow the directions you see here.   Used for:  Acute idiopathic gout of right foot   Changed by:  Letty Gallagher APRN CNP        Take 3 tabs (60 mg) by mouth daily x 3 days, 2 tabs (40 mg) daily x 3 days, 1 tab (20 mg) daily x 3 days, then 1/2 tab (10 mg) x 3 days.   Quantity:  20 tablet   Refills:  0            Where to get your medicines      These medications were sent to [facility] [place] - [address]  [address], [facility] [address]     Phone:  [phone]     colchicine 0.6 MG tablet                Primary Care Provider Office Phone # Fax #    Triston Adame [phone]       [facility] [address] [facility] [address]        Thank you!     Thank you for choosing [facility]  for your care. Our goal is always to provide you with excellent care. Hearing back from our patients is one way we can continue to improve our services. Please take a few minutes to complete the written survey that you may receive in the mail after your visit with us. Thank you!             Your Updated Medication List - Protect others around you: Learn how to safely use, store and throw away your medicines at www.disposemymeds.org.          This list is accurate as of: 5/10/17  3:34 PM.  Always use your most recent med list.                   Brand Name Dispense Instructions for use    calcium carbonate 500 MG tablet    OS-HANNAH 500 mg [place]. [place]     Take 500 mg by mouth daily       colchicine 0.6 MG tablet    COLCRYS    30 tablet    2 tabs at the onset of flare, then 1 tab every 2 hrs until pain is better or diarrhea occurs, up to 10 doses. Wait 3 days until taking again       glucosamine-chondroitin 500-400 MG Caps per capsule      Take 1 capsule by mouth daily       indomethacin 50 MG capsule    INDOCIN    30 capsule    Take 1 capsule (50 mg) by mouth 3 times daily as  needed for moderate pain       lisinopril 5 MG tablet    PRINIVIL/ZESTRIL    90 tablet    Take 1 tablet (5 mg) by mouth daily       MULTIVITAMIN PO      Take 1 tablet by mouth daily.       naproxen 500 MG tablet    NAPROSYN    180 tablet    Take 1 tablet (500 mg) by mouth 2 times daily as needed for moderate pain       OMEGA-3 FISH OIL PO      Take 2 g by mouth daily       oxybutynin 5 MG 24 hr tablet    DITROPAN-XL    30 tablet    Take 1 tablet (5 mg) by mouth daily       predniSONE 20 MG tablet    DELTASONE    20 tablet    Take 3 tabs (60 mg) by mouth daily x 3 days, 2 tabs (40 mg) daily x 3 days, 1 tab (20 mg) daily x 3 days, then 1/2 tab (10 mg) x 3 days.       tamsulosin 0.4 MG capsule    FLOMAX    90 capsule    Take 2 capsules (0.8 mg) by mouth daily       triamcinolone 0.1 % cream    KENALOG    30 g    Apply sparingly to affected area two-three times daily for 14 days.       VITAMIN D3 PO      Take 1,000 Units by mouth daily       VITAMIN E NATURAL PO      Take 100 Units by mouth

## 2017-05-10 NOTE — PROGRESS NOTES
SUBJECTIVE:                                                    Bairon Reed is a 51 year old male who presents to clinic today for the following health issues:    Gout  Duration: 2 weeks  Description   Location: big toe and little toe - right  Joint Swelling: YES  Redness: YES  Pain intensity:  moderate  Accompanying signs and symptoms: None  History  Previous history of gout: YES   Trauma to the area: no   Precipitating factors:   Alcohol usage: none  Diuretic use: no   Recent illness: no   Therapies tried and outcome: prednisone but out of medication   Pt Took Prednisone 2 weeks ago and Then Indocin        Problem list and histories reviewed & adjusted, as indicated.  Additional history: as documented    Patient Active Problem List   Diagnosis     Hyperlipidemia LDL goal <160     Insomnia     Bilateral arm weakness     Anxiety     Nondependent alcohol abuse     Shoulder impingement - right     Cervical radiculopathy at C7     Cervicalgia     Degeneration of cervical intervertebral disc     Vitamin D deficiency     Cervical radiculopathy     S/P spinal fusion     Peroneal nerve palsy     Foot drop, right     Lumbar stenosis with neurogenic claudication     Lumbar radiculopathy     Lumbar herniated disc     Lumbago     S/P lumbar microdiscectomy     Hypertension goal BP (blood pressure) < 140/90     Benign non-nodular prostatic hyperplasia with lower urinary tract symptoms     Gout, unspecified cause, unspecified chronicity, unspecified site     Past Surgical History:   Procedure Laterality Date     BACK SURGERY  1986-1988    x 3. Lower back surgery     DISCECTOMY LUMBAR POSTERIOR MICROSCOPIC ONE LEVEL  5/17/2013    Procedure: DISCECTOMY LUMBAR POSTERIOR MICROSCOPIC ONE LEVEL;  BILATERAL LUMBAR L4-5 EXTENDED HEMILAMINECTOMY, MICROFORAMINOTOMY AND MICRODISCECTOMY WITH METRIX II;  Surgeon: Andrew Alejandre MD;  Location: [facility] OR     FUSION CERVICAL ANTERIOR ONE LEVEL  3/5/2013    Procedure: FUSION  CERVICAL ANTERIOR ONE LEVEL;  C 6-7 ANTERIOR DISCECTOMY AND FUSION WITH NEURO MONITORING ;  Surgeon: Andrew Alejandre MD;  Location:  OR     KNEE SURGERY  2009    Right     LARYNGOSCOPY WITH MICROSCOPE  5/28/2013    Procedure: LARYNGOSCOPY WITH MICROSCOPE;  MICRODIRECT LARYNGOSCOPY, WITH LEFT VOCAL CORD INJECTION ;  Surgeon: Jorge Vergara MD;  Location:  [facility]     NECK SURGERY  2/2010    [facility]       Social History   Substance Use Topics     Smoking status: Never Smoker     Smokeless tobacco: Current User     Types: Chew     Last attempt to quit: 4/1/2013      Comment: Lives in smoke free household     Alcohol use No      Comment: Quit 2010. Chemical dependency treatment in 1989. ...31 yrs of 1.5 tins weekly.  sober for 7 months..     Family History   Problem Relation Age of Onset     HEART DISEASE Father      Arthritis Sister      Glaucoma No family hx of      Macular Degeneration No family hx of      CANCER No family hx of      DIABETES No family hx of      Hypertension No family hx of      CEREBROVASCULAR DISEASE No family hx of      Thyroid Disease No family hx of          Current Outpatient Prescriptions   Medication Sig Dispense Refill     colchicine (COLCRYS) 0.6 MG tablet 2 tabs at the onset of flare, then 1 tab every 2 hrs until pain is better or diarrhea occurs, up to 10 doses. Wait 3 days until taking again 30 tablet 0     triamcinolone (KENALOG) 0.1 % cream Apply sparingly to affected area two-three times daily for 14 days. 30 g 0     oxybutynin (DITROPAN-XL) 5 MG 24 hr tablet Take 1 tablet (5 mg) by mouth daily 30 tablet 1     tamsulosin (FLOMAX) 0.4 MG capsule Take 2 capsules (0.8 mg) by mouth daily 90 capsule 2     naproxen (NAPROSYN) 500 MG tablet Take 1 tablet (500 mg) by mouth 2 times daily as needed for moderate pain 180 tablet 3     Omega-3 Fatty Acids (OMEGA-3 FISH OIL PO) Take 2 g by mouth daily       Cholecalciferol (VITAMIN D3 PO) Take 1,000 Units by mouth daily        VITAMIN E NATURAL PO Take 100 Units by mouth       glucosamine-chondroitin 500-400 MG CAPS Take 1 capsule by mouth daily       calcium carbonate (OS-HANNAH 500 MG [place]. [place]) 500 MG tablet Take 500 mg by mouth daily       lisinopril (PRINIVIL,ZESTRIL) 5 MG tablet Take 1 tablet (5 mg) by mouth daily 90 tablet 3     indomethacin (INDOCIN) 50 MG capsule Take 1 capsule (50 mg) by mouth 3 times daily as needed for moderate pain 30 capsule 3     Multiple Vitamins-Minerals (MULTIVITAMIN OR) Take 1 tablet by mouth daily.       predniSONE (DELTASONE) 20 MG tablet Take 3 tabs (60 mg) by mouth daily x 3 days, 2 tabs (40 mg) daily x 3 days, 1 tab (20 mg) daily x 3 days, then 1/2 tab (10 mg) x 3 days. (Patient not taking: Reported on 5/10/2017) 20 tablet 0     No Known Allergies  Recent Labs   Lab Test  02/21/17   1507  08/24/16   1617  01/25/16   0949  02/04/15   1033   02/11/13   1526   A1C  5.8   --    --    --    --   5.5   LDL   --   113*  127*  101   --    --    HDL   --   43  38*  44   --    --    TRIG   --   214*  169*  179*   --    --    ALT   --   40   --   48   --    --    CR   --   0.87  0.88  0.87   < >  0.85   GFRESTIMATED   --   >90  Non  GFR Calc    >90  Non  GFR Calc    >90  Non  GFR Calc     < >  >90   GFRESTBLACK   --   >90   GFR Calc    >90   GFR Calc    >90   GFR Calc     < >  >90   POTASSIUM   --   4.2  4.2  4.3   < >  4.3   TSH   --    --    --   1.17   --    --     < > = values in this interval not displayed.      BP Readings from Last 3 Encounters:   05/10/17 110/70   04/04/17 124/70   03/31/17 116/78    Wt Readings from Last 3 Encounters:   05/10/17 190 lb (86.2 kg)   04/04/17 198 lb 1.6 oz (89.9 kg)   03/31/17 199 lb 12.8 oz (90.6 kg)                  Labs reviewed in EPIC    Reviewed and updated as needed this visit by clinical staff  Tobacco  Allergies  Meds  Med Hx  Surg Hx  Fam Hx  Soc Hx      Reviewed  "and updated as needed this visit by Provider         ROS:  Rest of the pertinent  ROS is Negative except see above and Problem list [stable]      OBJECTIVE:                                                    /70  Pulse 86  Temp 98.9  F (37.2  C) (Oral)  Resp 14  Ht 5' 10.75\" (1.797 m)  Wt 190 lb (86.2 kg)  SpO2 96%  BMI 26.69 kg/m2  Body mass index is 26.69 kg/(m^2).  GENERAL: healthy, alert and no distress  Right foot swelling Right Big toe  And Little Toe    Diagnostic Test Results:  Pending      ASSESSMENT/PLAN:                                                      1. Acute gout involving toe of right foot, unspecified cause  SEE [facility] care orders  The potential side effects of this medication have been discussed with the patient.  Call if any significant problems with these are experienced.    - colchicine (COLCRYS) 0.6 MG tablet; 2 tabs at the onset of flare, then 1 tab every 2 hrs until pain is better or diarrhea occurs, up to 10 doses. Wait 3 days until taking again  Dispense: 30 tablet; Refill: 0  - Uric acid  - Creatinine  Follow up if not better  SEE [facility] care orders  The potential side effects of this medication have been discussed with the patient.  Call if any significant problems with these are experienced.    2. Screen for colon cancer  Advised   - GASTROENTEROLOGY ADULT REF PROCEDURE ONLY    Martina Ocampo MD  [facility]    "

## 2017-05-10 NOTE — LETTER
[address] [place] [address]    May 11, 2017    Bairon Reed  [address] [place] [address]  [facility] [place] [facility] [place] [place] [address]          Dear Bairon,  Uric acid and Kidney test is normal.  Take care.  Enclosed is a copy of your results.     Results for orders placed or performed in visit on 05/10/17   Uric acid   Result Value Ref Range    Uric Acid 6.4 3.5 - 7.2 mg/dL   Creatinine   Result Value Ref Range    Creatinine 0.78 0.66 - 1.25 mg/dL    GFR Estimate >90  Non  GFR Calc   >60 mL/min/1.7m2    GFR Estimate If Black >90   GFR Calc   >60 mL/min/1.7m2       If you have any questions or concerns, please call myself or my nurse at [phone].      Sincerely,      Martina Ocampo MD/pb

## 2017-05-11 NOTE — TELEPHONE ENCOUNTER
Route to last provider- patient was seen yesterday for gout & he states he forgot to ask for it.     Shey Jaimes RN

## 2017-05-12 RX ORDER — PREDNISONE 20 MG/1
TABLET ORAL
Qty: 20 TABLET | Refills: 0 | Status: SHIPPED | OUTPATIENT
Start: 2017-05-12 | End: 2017-06-28

## 2017-05-12 RX ORDER — TRAZODONE HYDROCHLORIDE 50 MG/1
50 TABLET, FILM COATED ORAL
Qty: 30 TABLET | Refills: 1 | OUTPATIENT
Start: 2017-05-12

## 2017-05-12 RX ORDER — GABAPENTIN 300 MG/1
300 CAPSULE ORAL 3 TIMES DAILY
Qty: 270 CAPSULE | Refills: 1 | OUTPATIENT
Start: 2017-05-12

## 2017-05-12 NOTE — TELEPHONE ENCOUNTER
Patient states symptoms are much better. Would like prednisone just in case it gets worse but is okay without it.    Diana Spencer, ABDI - BC

## 2017-05-15 ENCOUNTER — TELEPHONE (OUTPATIENT)
Dept: FAMILY MEDICINE | Facility: CLINIC | Age: 52
End: 2017-05-15

## 2017-05-15 NOTE — TELEPHONE ENCOUNTER
Last visit with me 1/25/16. He has seen many other providers in the interim.    May forward to them. Or must come in to see me.    Triston Adame M.D.

## 2017-05-15 NOTE — TELEPHONE ENCOUNTER
[facility]    Trazodone 50mg, take 1 tablet by mouth nightly as needed for sleep. Last fill 6/3/2016  Gabapentin 300mg   Last fill 6/24/2015

## 2017-05-24 ENCOUNTER — APPOINTMENT (OUTPATIENT)
Dept: OPTOMETRY | Facility: CLINIC | Age: 52
End: 2017-05-24
Payer: MEDICAID

## 2017-05-24 ENCOUNTER — OFFICE VISIT (OUTPATIENT)
Dept: OPTOMETRY | Facility: CLINIC | Age: 52
End: 2017-05-24
Payer: MEDICAID

## 2017-05-24 DIAGNOSIS — H52.203 ASTIGMATISM OF BOTH EYES: Primary | ICD-10-CM

## 2017-05-24 DIAGNOSIS — H52.4 PRESBYOPIA: ICD-10-CM

## 2017-05-24 PROCEDURE — 92341 FIT SPECTACLES BIFOCAL: CPT | Performed by: OPTOMETRIST

## 2017-05-24 PROCEDURE — 92014 COMPRE OPH EXAM EST PT 1/>: CPT | Performed by: OPTOMETRIST

## 2017-05-24 PROCEDURE — 92015 DETERMINE REFRACTIVE STATE: CPT | Performed by: OPTOMETRIST

## 2017-05-24 ASSESSMENT — KERATOMETRY
OS_AXISANGLE2_DEGREES: 5
OS_K2POWER_DIOPTERS: 44.75
OD_K2POWER_DIOPTERS: 44.50
OD_K1POWER_DIOPTERS: 43.75
OS_K1POWER_DIOPTERS: 44.00
OD_AXISANGLE2_DEGREES: 168

## 2017-05-24 ASSESSMENT — EXTERNAL EXAM - LEFT EYE: OS_EXAM: NORMAL

## 2017-05-24 ASSESSMENT — CUP TO DISC RATIO
OD_RATIO: 0.4
OS_RATIO: 0.4

## 2017-05-24 ASSESSMENT — REFRACTION_WEARINGRX
OS_AXIS: 080
OS_ADD: +2.00
SPECS_TYPE: BIFOCAL
OD_CYLINDER: +0.75
OS_SPHERE: -1.25
OD_AXIS: 085
OD_ADD: +2.00
OD_SPHERE: -1.25
OS_CYLINDER: +0.75

## 2017-05-24 ASSESSMENT — VISUAL ACUITY
OD_SC: 20/30
OS_SC: 20/50-1
METHOD: SNELLEN - LINEAR
OS_SC: 20/30
OD_SC+: -1
OD_SC: 20/70-2
OS_SC+: -1

## 2017-05-24 ASSESSMENT — TONOMETRY
IOP_METHOD: APPLANATION
OS_IOP_MMHG: 17
OD_IOP_MMHG: 17

## 2017-05-24 ASSESSMENT — REFRACTION_MANIFEST
OS_SPHERE: -1.25
OS_AXIS: 084
OD_CYLINDER: +0.75
OD_ADD: +2.00
OD_SPHERE: -0.75
OS_SPHERE: -1.25
OS_CYLINDER: +1.00
METHOD_AUTOREFRACTION: 1
OD_AXIS: 085
OS_AXIS: 085
OS_CYLINDER: +0.75
OS_ADD: +2.00
OD_SPHERE: -1.00
OD_CYLINDER: +1.00
OD_AXIS: 084

## 2017-05-24 ASSESSMENT — CONF VISUAL FIELD
OS_NORMAL: 1
OD_NORMAL: 1

## 2017-05-24 ASSESSMENT — EXTERNAL EXAM - RIGHT EYE: OD_EXAM: NORMAL

## 2017-05-24 ASSESSMENT — SLIT LAMP EXAM - LIDS
COMMENTS: NORMAL
COMMENTS: NORMAL

## 2017-05-24 NOTE — PROGRESS NOTES
Chief Complaint   Patient presents with     COMPREHENSIVE EYE EXAM         Last Eye Exam: 7/14/2015  Dilated Previously: Yes    What are you currently using to see?  Patient had glasses but he lost them,about 3 months ago . Also uses OTC Readers for near tasks        Distance Vision Acuity: Noticed gradual change in both eyes, thinks that his vision while driving has gotten a little worse, he used to use the glasses to drive     Near Vision Acuity: Satisfied with vision while reading and using computer with readers, he uses +1.75's, he thinks.     Eye Comfort: good  Do you use eye drops? : No  Occupation or Hobbies: [employer]           Medical, surgical and family histories reviewed and updated 5/24/2017.       OBJECTIVE: See Ophthalmology exam    ASSESSMENT:    ICD-10-CM    1. Astigmatism of both eyes H52.203 EYE EXAM (SIMPLE-NONBILLABLE)     REFRACTIVE STATUS   2. Presbyopia H52.4 EYE EXAM (SIMPLE-NONBILLABLE)     REFRACTIVE STATUS      PLAN:     Patient Instructions   Patient was advised of today's exam findings.  Fill glasses prescription  Return in 1-2 years for eye exam    Taylor Isbell O.D.  [facility]   [address] [place], [place] [id number] [phone]

## 2017-05-24 NOTE — MR AVS SNAPSHOT
"              After Visit Summary   2017    Bairon Reed    MRN: [id number]           Patient Information     Date Of Birth          1965        Visit Information        Provider Department      2017 3:30 PM Taylor Isbell OD [facility]        Care Instructions    Patient was advised of today's exam findings.  Fill glasses prescription  Return in 1-2 years for eye exam    Taylor Isbell O.D.  [facility]   [address] [place] [address]  [phone]            Follow-ups after your visit        Who to contact     If you have questions or need follow up information about today's clinic visit or your schedule please contact [facility] directly at [phone].  Normal or non-critical lab and imaging results will be communicated to you by MyChart, letter or phone within 4 business days after the clinic has received the results. If you do not hear from us within 7 days, please contact the clinic through MyChart or phone. If you have a critical or abnormal lab result, we will notify you by phone as soon as possible.  Submit refill requests through Bookitit or call your pharmacy and they will forward the refill request to us. Please allow 3 business days for your refill to be completed.          Additional Information About Your Visit        [employer] Information     Bookitit lets you send messages to your doctor, view your test results, renew your prescriptions, schedule appointments and more. To sign up, go to [email or web address] . Click on \"Log in\" on the left side of the screen, which will take you to the Welcome page. Then click on \"Sign up Now\" on the right side of the page.     You will be asked to enter the access code listed below, as well as some personal information. Please follow the directions to create your username and password.     Your access code is: [email or web address]  Expires: 2017  4:05 PM     Your access code will  in 90 " days. If you need help or a new code, please call your [facility] clinic or [phone].        Care EveryWhere ID     This is your Care EveryWhere ID. This could be used by other organizations to access your [facility] medical records  [id number]         Blood Pressure from Last 3 Encounters:   05/10/17 110/70   04/04/17 124/70   03/31/17 116/78    Weight from Last 3 Encounters:   05/10/17 86.2 kg (190 lb)   04/04/17 89.9 kg (198 lb 1.6 oz)   03/31/17 90.6 kg (199 lb 12.8 oz)              Today, you had the following     No orders found for display       Primary Care Provider Office Phone # Fax #    Triston Adame [phone]       [facility] [address] [facility] [address]        Thank you!     Thank you for choosing [facility]  for your care. Our goal is always to provide you with excellent care. Hearing back from our patients is one way we can continue to improve our services. Please take a few minutes to complete the written survey that you may receive in the mail after your visit with us. Thank you!             Your Updated Medication List - Protect others around you: Learn how to safely use, store and throw away your medicines at www.disposemymeds.org.          This list is accurate as of: 5/24/17  4:32 PM.  Always use your most recent med list.                   Brand Name Dispense Instructions for use    calcium carbonate 500 MG tablet    OS-HANNAH 500 mg [place]. [place]     Take 500 mg by mouth daily       colchicine 0.6 MG tablet    COLCRYS    30 tablet    2 tabs at the onset of flare, then 1 tab every 2 hrs until pain is better or diarrhea occurs, up to 10 doses. Wait 3 days until taking again       glucosamine-chondroitin 500-400 MG Caps per capsule      Take 1 capsule by mouth daily       indomethacin 50 MG capsule    INDOCIN    30 capsule    Take 1 capsule (50 mg) by mouth 3 times daily as needed for moderate pain       lisinopril 5 MG tablet     PRINIVIL/ZESTRIL    90 tablet    Take 1 tablet (5 mg) by mouth daily       MULTIVITAMIN PO      Take 1 tablet by mouth daily.       naproxen 500 MG tablet    NAPROSYN    180 tablet    Take 1 tablet (500 mg) by mouth 2 times daily as needed for moderate pain       OMEGA-3 FISH OIL PO      Take 2 g by mouth daily       oxybutynin 5 MG 24 hr tablet    DITROPAN-XL    30 tablet    Take 1 tablet (5 mg) by mouth daily       predniSONE 20 MG tablet    DELTASONE    20 tablet    Take 3 tabs (60 mg) by mouth daily x 3 days, 2 tabs (40 mg) daily x 3 days, 1 tab (20 mg) daily x 3 days, then 1/2 tab (10 mg) x 3 days.       tamsulosin 0.4 MG capsule    FLOMAX    90 capsule    Take 2 capsules (0.8 mg) by mouth daily       triamcinolone 0.1 % cream    KENALOG    30 g    Apply sparingly to affected area two-three times daily for 14 days.       VITAMIN D3 PO      Take 1,000 Units by mouth daily       VITAMIN E NATURAL PO      Take 100 Units by mouth

## 2017-05-24 NOTE — PATIENT INSTRUCTIONS
Patient was advised of today's exam findings.  Fill glasses prescription  Return in 1-2 years for eye exam    Taylor Isbell O.D.  [facility]   [address] [place], [place] [address] [phone]

## 2017-06-13 ENCOUNTER — TELEPHONE (OUTPATIENT)
Dept: FAMILY MEDICINE | Facility: CLINIC | Age: 52
End: 2017-06-13

## 2017-06-13 NOTE — TELEPHONE ENCOUNTER
Patient returned call at 11:38 AM.  Patient advised of message below.  Patient advised to keep appointment on 6- and that Dr. Aadme would recheck prostate and look at the patients mole.  Patient stated Dr. Adame has not looked at the mole previously.  Patient advised if Dr. Adame felt mole removal was appropriate we could schedule him for a mole removal while in clinic for a later date.    Patient verbalized good understanding.    Dianna Hudson LPN

## 2017-06-13 NOTE — TELEPHONE ENCOUNTER
Patient scheduled an appointment with Dr. Adame on 6- @ 4:40pm for Re Ck Prostrate / remove mole on back.    Patient needs a longer appointment time for a removal and no other concerns can be addressed during a procedure appointment.    Called patient @ 8:59 AM.  Left message to call back [phone] ([facility] hotline)    Dianna Hudson LPN

## 2017-06-20 ENCOUNTER — TELEPHONE (OUTPATIENT)
Dept: FAMILY MEDICINE | Facility: CLINIC | Age: 52
End: 2017-06-20

## 2017-06-20 DIAGNOSIS — M54.41 ACUTE RIGHT-SIDED LOW BACK PAIN WITH RIGHT-SIDED SCIATICA: ICD-10-CM

## 2017-06-20 RX ORDER — NAPROXEN 500 MG/1
500 TABLET ORAL 2 TIMES DAILY PRN
Qty: 60 TABLET | Refills: 0 | Status: SHIPPED | OUTPATIENT
Start: 2017-06-20 | End: 2017-09-19

## 2017-06-20 NOTE — TELEPHONE ENCOUNTER
Patient needs refill on Naproxen. He had to resched. His appt. A couple of weeks out due to heavy traffic. Ok to leave a message.

## 2017-06-20 NOTE — TELEPHONE ENCOUNTER
Medication refilled per [facility] RN protocol.   Patient notified.       No further questions or concerns at this time.  Kiarra Katz RN   [facility]

## 2017-06-28 ENCOUNTER — OFFICE VISIT (OUTPATIENT)
Dept: FAMILY MEDICINE | Facility: CLINIC | Age: 52
End: 2017-06-28
Payer: COMMERCIAL

## 2017-06-28 VITALS
OXYGEN SATURATION: 97 % | BODY MASS INDEX: 27.44 KG/M2 | HEART RATE: 88 BPM | SYSTOLIC BLOOD PRESSURE: 122 MMHG | TEMPERATURE: 98 F | DIASTOLIC BLOOD PRESSURE: 70 MMHG | HEIGHT: 71 IN | WEIGHT: 196 LBS

## 2017-06-28 DIAGNOSIS — Z12.11 SCREEN FOR COLON CANCER: ICD-10-CM

## 2017-06-28 DIAGNOSIS — M10.9 GOUT, UNSPECIFIED CAUSE, UNSPECIFIED CHRONICITY, UNSPECIFIED SITE: ICD-10-CM

## 2017-06-28 DIAGNOSIS — R19.7 DIARRHEA, UNSPECIFIED TYPE: ICD-10-CM

## 2017-06-28 DIAGNOSIS — M10.9 ACUTE GOUTY ARTHRITIS: ICD-10-CM

## 2017-06-28 DIAGNOSIS — M79.671 RIGHT FOOT PAIN: Primary | ICD-10-CM

## 2017-06-28 PROCEDURE — 99213 OFFICE O/P EST LOW 20 MIN: CPT | Performed by: FAMILY MEDICINE

## 2017-06-28 RX ORDER — PREDNISONE 20 MG/1
60 TABLET ORAL DAILY
Qty: 15 TABLET | Refills: 0 | Status: SHIPPED | OUTPATIENT
Start: 2017-06-28 | End: 2018-01-26

## 2017-06-28 RX ORDER — LOPERAMIDE HYDROCHLORIDE 2 MG/1
TABLET ORAL
Qty: 20 TABLET | Refills: 0 | Status: SHIPPED | OUTPATIENT
Start: 2017-06-28 | End: 2018-01-26

## 2017-06-28 RX ORDER — INDOMETHACIN 50 MG/1
50 CAPSULE ORAL 3 TIMES DAILY PRN
Qty: 30 CAPSULE | Refills: 0 | Status: SHIPPED | OUTPATIENT
Start: 2017-06-28 | End: 2018-09-18

## 2017-06-28 NOTE — MR AVS SNAPSHOT
After Visit Summary   6/28/2017    Bairon Reed    MRN: [id number]           Patient Information     Date Of Birth          1965        Visit Information        Provider Department      6/28/2017 9:40 AM Iglesia Barrientos MD [facility]        Today's Diagnoses     Right foot pain    -  1    Acute gouty arthritis        Diarrhea, unspecified type        Screen for colon cancer        Gout, unspecified cause, unspecified chronicity, unspecified site          Care Instructions    [facility]    If you have any questions regarding to your visit please contact your care team:       Team Purple:   Clinic Hours Telephone Number   Dr. Salina Jones     7am-7pm  Monday - Thursday   7am-5pm  Fridays  [phone]  (Appointment scheduling available 24/7)    Questions about your Visit?   Team Line:  [phone]   [facility] [place] [facility] - 11am-9pm Monday-Friday Saturday-Sunday- 9am-5pm   [place] - 5pm-9pm Monday-Friday Saturday-Sunday- 9am-5pm  [phone] - [place]   [phone] - [place]       What options do I have for visits at the clinic other than the traditional office visit?  To expand how we care for you, many of our providers are utilizing electronic visits (e-visits) and telephone visits, when medically appropriate, for interactions with their patients rather than a visit in the clinic.   We also offer nurse visits for many medical concerns. Just like any other service, we will bill your insurance company for this type of visit based on time spent on the phone with your provider. Not all insurance companies cover these visits. Please check with your medical insurance if this type of visit is covered. You will be responsible for any charges that are not paid by your insurance.      E-visits via [employer]:  generally incur a $35.00 fee.  Telephone visits:  Time spent on the phone:  "*charged based on time that is spent on the phone in increments of 10 minutes. Estimated cost:   5-10 mins $30.00   11-20 mins. $59.00   21-30 mins. $85.00     Use AppLovinhart (secure email communication and access to your chart) to send your primary care provider a message or make an appointment. Ask someone on your Team how to sign up for [employer].  For a Price Quote for your services, please call our Consumer Price Line at [phone].  As always, Thank you for trusting us with your health care needs!    Discharged By: An            Follow-ups after your visit        Your next 10 appointments already scheduled     Jul 03, 2017  2:50 PM CDT   [facility]    [address] [facility] [address] [phone]              Who to contact     If you have questions or need follow up information about today's clinic visit or your schedule please contact [facility] directly at [phone].  Normal or non-critical lab and imaging results will be communicated to you by AppLovinhart, letter or phone within 4 business days after the clinic has received the results. If you do not hear from us within 7 days, please contact the clinic through AppLovinhart or phone. If you have a critical or abnormal lab result, we will notify you by phone as soon as possible.  Submit refill requests through BiocroÃƒÂ­ or call your pharmacy and they will forward the refill request to us. Please allow 3 business days for your refill to be completed.          Additional Information About Your Visit        BiocroÃƒÂ­ Information     BiocroÃƒÂ­ lets you send messages to your doctor, view your test results, renew your prescriptions, schedule appointments and more. To sign up, go to [email or web address] . Click on \"Log in\" on the left side of the screen, which will take you to the Welcome page. Then click on \"Sign up Now\" on the right side of the page.     You will be asked to " "enter the access code listed below, as well as some personal information. Please follow the directions to create your username and password.     Your access code is: [email or web address]  Expires: 2017  4:05 PM     Your access code will  in 90 days. If you need help or a new code, please call your [facility] clinic or [phone].        Care EveryWhere ID     This is your Care EveryWhere ID. This could be used by other organizations to access your [facility] medical records  [id number]        Your Vitals Were     Pulse Temperature Height Pulse Oximetry BMI (Body Mass Index)       88 98  F (36.7  C) (Oral) 5' 10.75\" (1.797 m) 97% 27.53 kg/m2        Blood Pressure from Last 3 Encounters:   17 122/70   05/10/17 110/70   17 124/70    Weight from Last 3 Encounters:   17 196 lb (88.9 kg)   05/10/17 190 lb (86.2 kg)   17 198 lb 1.6 oz (89.9 kg)              Today, you had the following     No orders found for display         Today's Medication Changes          These changes are accurate as of: 17 10:12 AM.  If you have any questions, ask your nurse or doctor.               Start taking these medicines.        Dose/Directions    loperamide 2 MG tablet   Commonly known as:  IMODIUM A-D   Used for:  Diarrhea, unspecified type   Started by:  Iglesia Barrientos MD        Take 2 tabs (4 mg) after first loose stool, and then take one tab (2 mg) after each diarrheal stool.  Max of 8 tabs (16 mg) per day.   Quantity:  20 tablet   Refills:  0       predniSONE 20 MG tablet   Commonly known as:  DELTASONE   Used for:  Right foot pain   Started by:  Iglesia Barrientos MD        Dose:  60 mg   Take 3 tablets (60 mg) by mouth daily   Quantity:  15 tablet   Refills:  0            Where to get your medicines      These medications were sent to [facility]  [address]  [address]  [address] [place] [address]     Phone:  [phone]     indomethacin 50 MG " capsule    loperamide 2 MG tablet    predniSONE 20 MG tablet                Primary Care Provider Office Phone # Fax #    Triston Adame [phone]       [facility] [address] [facility] [address]        Equal Access to Services     MUSA OCAMPO : Hadii caroline ku hadmorenao Soomaali, waaxda luqadaha, qaybta kaalmada adeegyada, waxay idiin haykingsleyn adereina khvikisteph senior. So [facility] [phone].    ATENCIÓN: Si habla español, tiene a walter disposición servicios gratuitos de asistencia lingüística. Llame al [phone].    We comply with applicable federal civil rights laws and [place] laws. We do not discriminate on the basis of race, color, national origin, age, disability sex, sexual orientation or gender identity.            Thank you!     Thank you for choosing [facility]  for your care. Our goal is always to provide you with excellent care. Hearing back from our patients is one way we can continue to improve our services. Please take a few minutes to complete the written survey that you may receive in the mail after your visit with us. Thank you!             Your Updated Medication List - Protect others around you: Learn how to safely use, store and throw away your medicines at www.disposemymeds.org.          This list is accurate as of: 6/28/17 10:12 AM.  Always use your most recent med list.                   Brand Name Dispense Instructions for use Diagnosis    calcium carbonate 1250 MG tablet    OS-HANNAH 500 mg [place]. [place]     Take 500 mg by mouth daily        glucosamine-chondroitin 500-400 MG Caps per capsule      Take 1 capsule by mouth daily        indomethacin 50 MG capsule    INDOCIN    30 capsule    Take 1 capsule (50 mg) by mouth 3 times daily as needed for moderate pain    Gout, unspecified cause, unspecified chronicity, unspecified site       lisinopril 5 MG tablet    PRINIVIL/ZESTRIL    90 tablet    Take 1 tablet (5 mg) by mouth daily    Essential hypertension  with goal blood pressure less than 140/90       loperamide 2 MG tablet    IMODIUM A-D    20 tablet    Take 2 tabs (4 mg) after first loose stool, and then take one tab (2 mg) after each diarrheal stool.  Max of 8 tabs (16 mg) per day.    Diarrhea, unspecified type       MULTIVITAMIN PO      Take 1 tablet by mouth daily.        naproxen 500 MG tablet    NAPROSYN    60 tablet    Take 1 tablet (500 mg) by mouth 2 times daily as needed for moderate pain    Acute right-sided low back pain with right-sided sciatica       OMEGA-3 FISH OIL PO      Take 2 g by mouth daily        oxybutynin 5 MG 24 hr tablet    DITROPAN-XL    30 tablet    Take 1 tablet (5 mg) by mouth daily    OAB (overactive bladder)       predniSONE 20 MG tablet    DELTASONE    15 tablet    Take 3 tablets (60 mg) by mouth daily    Right foot pain       tamsulosin 0.4 MG capsule    FLOMAX    90 capsule    Take 2 capsules (0.8 mg) by mouth daily    Benign non-nodular prostatic hyperplasia with lower urinary tract symptoms       triamcinolone 0.1 % cream    KENALOG    30 g    Apply sparingly to affected area two-three times daily for 14 days.    Rash       VITAMIN D3 PO      Take 1,000 Units by mouth daily        VITAMIN E NATURAL PO      Take 100 Units by mouth

## 2017-06-28 NOTE — PATIENT INSTRUCTIONS
[facility]    If you have any questions regarding to your visit please contact your care team:       Team Purple:   Clinic Hours Telephone Number   Dr. Salina Jones     7am-7pm  Monday - Thursday   7am-5pm  Fridays  [phone]  (Appointment scheduling available 24/7)    Questions about your Visit?   Team Line:  [phone]   Urgent Care - [place] [facility] - 11am-9pm Monday-Friday Saturday-Sunday- 9am-5pm   [place] - 5pm-9pm Monday-Friday Saturday-Sunday- 9am-5pm  [phone] - [facility]  [phone] - [place]       What options do I have for visits at the clinic other than the traditional office visit?  To expand how we care for you, many of our providers are utilizing electronic visits (e-visits) and telephone visits, when medically appropriate, for interactions with their patients rather than a visit in the clinic.   We also offer nurse visits for many medical concerns. Just like any other service, we will bill your insurance company for this type of visit based on time spent on the phone with your provider. Not all insurance companies cover these visits. Please check with your medical insurance if this type of visit is covered. You will be responsible for any charges that are not paid by your insurance.      E-visits via [employer]:  generally incur a $35.00 fee.  Telephone visits:  Time spent on the phone: *charged based on time that is spent on the phone in increments of 10 minutes. Estimated cost:   5-10 mins $30.00   11-20 mins. $59.00   21-30 mins. $85.00     Use ShipHawkt (secure email communication and access to your chart) to send your primary care provider a message or make an appointment. Ask someone on your Team how to sign up for [employer].  For a Price Quote for your services, please call our Consumer Price Line at [phone].  As always, Thank you for trusting us with your health care needs!    Discharged By: An

## 2017-06-28 NOTE — PROGRESS NOTES
SUBJECTIVE:                                                    Bairon Reed is a 51 year old male who presents to clinic today for the following health issues:    Musculoskeletal problem/pain      Duration: 3 days     Description  Location: Right foot and right calf    Intensity:  8/10    Accompanying signs and symptoms: swelling and throbbing pain     History  Previous similar problem: YES  Previous evaluation:  none    Precipitating or alleviating factors:  Trauma or overuse: no   Aggravating factors include: standing, walking and climbing stairs    Therapies tried and outcome: nothing    Pain in the Rt foot going up the calf.    Diarrhea    For past 4 days   Stools are watery, no blood   Thinks could be stress related.    No history of travel or eating out.    Problem list and histories reviewed & adjusted, as indicated.  Additional history: as documented    Patient Active Problem List   Diagnosis     Hyperlipidemia LDL goal <160     Insomnia     Bilateral arm weakness     Anxiety     Nondependent alcohol abuse     Shoulder impingement - right     Cervical radiculopathy at C7     Cervicalgia     Degeneration of cervical intervertebral disc     Vitamin D deficiency     Cervical radiculopathy     S/P spinal fusion     Peroneal nerve palsy     Foot drop, right     Lumbar stenosis with neurogenic claudication     Lumbar radiculopathy     Lumbar herniated disc     Lumbago     S/P lumbar microdiscectomy     Hypertension goal BP (blood pressure) < 140/90     Benign non-nodular prostatic hyperplasia with lower urinary tract symptoms     Gout, unspecified cause, unspecified chronicity, unspecified site     Past Surgical History:   Procedure Laterality Date     BACK SURGERY  1986-1988    x 3. Lower back surgery     DISCECTOMY LUMBAR POSTERIOR MICROSCOPIC ONE LEVEL  5/17/2013    Procedure: DISCECTOMY LUMBAR POSTERIOR MICROSCOPIC ONE LEVEL;  BILATERAL LUMBAR L4-5 EXTENDED HEMILAMINECTOMY, MICROFORAMINOTOMY AND  "MICRODISCECTOMY WITH METRIX II;  Surgeon: Andrew Alejandre MD;  Location: [facility] OR     FUSION CERVICAL ANTERIOR ONE LEVEL  3/5/2013    Procedure: FUSION CERVICAL ANTERIOR ONE LEVEL;  C 6-7 ANTERIOR DISCECTOMY AND FUSION WITH NEURO MONITORING ;  Surgeon: Andrew Alejandre MD;  Location:  OR     KNEE SURGERY  2009    Right     LARYNGOSCOPY WITH MICROSCOPE  5/28/2013    Procedure: LARYNGOSCOPY WITH MICROSCOPE;  MICRODIRECT LARYNGOSCOPY, WITH LEFT VOCAL CORD INJECTION ;  Surgeon: Jorge Vergara MD;  Location:  [facility]     NECK SURGERY  2/2010    [facility]       Social History   Substance Use Topics     Smoking status: Never Smoker     Smokeless tobacco: Current User     Types: Chew     Last attempt to quit: 4/1/2013      Comment: Lives in smoke free household     Alcohol use No      Comment: Quit 2010. Chemical dependency treatment in 1989. ...31 yrs of 1.5 tins weekly.  sober for 7 months..     Family History   Problem Relation Age of Onset     HEART DISEASE Father      Arthritis Sister      Glaucoma No family hx of      Macular Degeneration No family hx of      CANCER No family hx of      DIABETES No family hx of      Hypertension No family hx of      CEREBROVASCULAR DISEASE No family hx of      Thyroid Disease No family hx of            Reviewed and updated as needed this visit by clinical staff  Tobacco  Allergies  Meds  Med Hx  Surg Hx  Fam Hx  Soc Hx      Reviewed and updated as needed this visit by Provider         ROS:  Constitutional, HEENT, cardiovascular, pulmonary, gi and gu systems are negative, except as otherwise noted.    OBJECTIVE:     /70  Pulse 88  Temp 98  F (36.7  C) (Oral)  Ht 5' 10.75\" (1.797 m)  Wt 196 lb (88.9 kg)  SpO2 97%  BMI 27.53 kg/m2  Body mass index is 27.53 kg/(m^2).  GENERAL: healthy, alert and no distress  NECK: no adenopathy and thyroid normal to palpation  RESP: lungs clear to auscultation - no rales, rhonchi or wheezes  CV: regular rate and " rhythm, no murmur, click or rub, no peripheral edema   MS:   Rt FOOT:   mild swelling around the ankle joint, tenderness over the lateral and anterior ankle joint as well as around the 1st MTP. No tenderness over the 5th metatarsal.    ASSESSMENT/PLAN:     (M79.671) Right foot pain  (primary encounter diagnosis)  Comment: Consistent with acute gouty arthritis. Treatment with prednisone started  Plan: predniSONE (DELTASONE) 20 MG tablet      (M10.9) Gout, unspecified cause, unspecified chronicity, unspecified site  Comment: Usually does indomethacin with flare ups  Plan: indomethacin (INDOCIN) 50 MG capsule    (R19.7) Diarrhea, unspecified type  Comment: Appears benign , probably irritable in nature, short term antidiarrhea agent. Plenty of fluids  Plan: loperamide (IMODIUM A-D) 2 MG tablet    Call or return to clinic prn if these symptoms worsen or fail to improve as anticipated in 1 week.    Iglesia Barrientos MD  [facility]

## 2017-06-28 NOTE — NURSING NOTE
"Chief Complaint   Patient presents with     Foot Problems     right foot is painful and very swelling x 3 days, pain radiated to the right calf        Initial /70  Pulse 88  Temp 98  F (36.7  C) (Oral)  Ht 5' 10.75\" (1.797 m)  Wt 196 lb (88.9 kg)  SpO2 97%  BMI 27.53 kg/m2 Estimated body mass index is 27.53 kg/(m^2) as calculated from the following:    Height as of this encounter: 5' 10.75\" (1.797 m).    Weight as of this encounter: 196 lb (88.9 kg).  Medication Reconciliation: complete     An RYANNE Boyd    "

## 2017-09-19 DIAGNOSIS — M54.41 ACUTE RIGHT-SIDED LOW BACK PAIN WITH RIGHT-SIDED SCIATICA: ICD-10-CM

## 2017-09-20 ENCOUNTER — TELEPHONE (OUTPATIENT)
Dept: FAMILY MEDICINE | Facility: CLINIC | Age: 52
End: 2017-09-20

## 2017-09-20 RX ORDER — NAPROXEN 500 MG/1
TABLET ORAL
Qty: 60 TABLET | Refills: 3 | Status: SHIPPED | OUTPATIENT
Start: 2017-09-20 | End: 2018-05-16

## 2017-09-20 NOTE — TELEPHONE ENCOUNTER
Left message on pt vm that it was allready filled today.  Received refill request yesterday.    Grace Steen RN

## 2017-10-19 ENCOUNTER — TELEPHONE (OUTPATIENT)
Dept: FAMILY MEDICINE | Facility: CLINIC | Age: 52
End: 2017-10-19

## 2017-10-19 NOTE — TELEPHONE ENCOUNTER
Panel Management Review      Patient has the following on his problem list: None      Composite cancer screening  Chart review shows that this patient is due/due soon for the following Colonoscopy  Summary:    Patient is due/failing the following:   COLONOSCOPY    Action needed:   Patient needs referral/order: Colonoscopy    Type of outreach:    Sent letter.    Questions for provider review:    None                                                                                                                                    eBth Boyd MA       Chart routed to none .

## 2017-10-19 NOTE — LETTER
October 19, 2017          Bairon Reed,  [address] [place] [address]        Dear Bairon Reed      Monitoring and managing your preventative and chronic health conditions are very important to us. Our records indicate that you have not scheduled for Colonoscopy  which was recommended by Dr. Barrientos.      If you have received your health care elsewhere, please call the clinic so the information can be documented in your chart.    Please call [phone] or message us through your [employer] account to schedule an appointment or provide information for your chart.     Feel free to contact us if you have any questions or concerns!    I look forward to seeing you and working with you on your health care needs.     Sincerely,         Iglesia Barrientos / [facility]

## 2017-11-09 ENCOUNTER — TELEPHONE (OUTPATIENT)
Dept: FAMILY MEDICINE | Facility: CLINIC | Age: 52
End: 2017-11-09

## 2017-11-09 NOTE — TELEPHONE ENCOUNTER
Called and informed him of Dr Adame's message. Patient will talk to Dr Adame at his appointment on 11/15/17.Janelle Almonte MA/RENE

## 2017-11-09 NOTE — TELEPHONE ENCOUNTER
I called and spoke to patient to verify what he is asking for. He wants another lumbar injection, he had one in January and it has lasted until now. He also said that he might be dur for a CT or MRI of his back. Is this something that you can order(lumbar injection)?Janelle Almonte MA/RENE

## 2017-11-09 NOTE — TELEPHONE ENCOUNTER
Last visit with me Jan 2017. He has seen multiple providers since then. His options are:     1. Set up an appointment with pain clinic - consult first and injection if they feel it is appropriate.   2. Set up an appointment with spine specialist (non surgical) - consult first and injection if they feel it is appropriate.   3. Come to see me - I can order injection after discussion.   4. Come to see another provider.    Triston Adame M.D.

## 2017-11-10 ENCOUNTER — TELEPHONE (OUTPATIENT)
Dept: FAMILY MEDICINE | Facility: CLINIC | Age: 52
End: 2017-11-10

## 2017-11-10 DIAGNOSIS — I10 ESSENTIAL HYPERTENSION WITH GOAL BLOOD PRESSURE LESS THAN 140/90: ICD-10-CM

## 2017-11-10 RX ORDER — LISINOPRIL 5 MG/1
5 TABLET ORAL DAILY
Qty: 30 TABLET | Refills: 0 | Status: SHIPPED | OUTPATIENT
Start: 2017-11-10 | End: 2018-01-26

## 2017-11-15 ENCOUNTER — TELEPHONE (OUTPATIENT)
Dept: FAMILY MEDICINE | Facility: CLINIC | Age: 52
End: 2017-11-15

## 2017-11-15 NOTE — TELEPHONE ENCOUNTER
Advised pt Dr. Triston Adame has not seen him since Jan 2016 and providers need to see patients at least once every 12 months to do orders.  Pt has flu now so cancelled appointment today.  Pt states this is chronic pain.  Pt asking if Dr. Triston Adame can do a pain pill.  Advised ov needed within last 12 months for prescriptions also. Pt has not seen any other provider recently for back pain.  Appointment made for next Tuesday evening with Dr. Triston Adame for evaluation and plan.  Pt agrees to plan.  To provider to cosign.  Grace Steen RN

## 2017-11-15 NOTE — TELEPHONE ENCOUNTER
Patient has the flu and cannot come in to his appointment today. He cancelled it and is wondering if he can get a call personally from Dr. Adame. He stated that Dr. Adame is aware of his condition with his back. He thinks he needs an MRI or cortisone shot. He wants to consult/discuss with Dr. Adame if he needs to be referred to pain management or what his recommendation would be.     Grace HORAN  Central Scheduler

## 2018-01-22 ENCOUNTER — OFFICE VISIT (OUTPATIENT)
Dept: FAMILY MEDICINE | Facility: CLINIC | Age: 53
End: 2018-01-22
Payer: COMMERCIAL

## 2018-01-22 VITALS
WEIGHT: 194 LBS | DIASTOLIC BLOOD PRESSURE: 77 MMHG | HEART RATE: 99 BPM | BODY MASS INDEX: 27.77 KG/M2 | HEIGHT: 70 IN | OXYGEN SATURATION: 98 % | SYSTOLIC BLOOD PRESSURE: 111 MMHG | TEMPERATURE: 98.9 F

## 2018-01-22 DIAGNOSIS — H69.92 DYSFUNCTION OF LEFT EUSTACHIAN TUBE: ICD-10-CM

## 2018-01-22 DIAGNOSIS — R00.0 TACHYCARDIA: ICD-10-CM

## 2018-01-22 DIAGNOSIS — R07.0 THROAT PAIN: Primary | ICD-10-CM

## 2018-01-22 LAB
DEPRECATED S PYO AG THROAT QL EIA: NORMAL
SPECIMEN SOURCE: NORMAL

## 2018-01-22 PROCEDURE — 99213 OFFICE O/P EST LOW 20 MIN: CPT | Performed by: FAMILY MEDICINE

## 2018-01-22 PROCEDURE — 87081 CULTURE SCREEN ONLY: CPT | Performed by: FAMILY MEDICINE

## 2018-01-22 PROCEDURE — 87880 STREP A ASSAY W/OPTIC: CPT | Performed by: FAMILY MEDICINE

## 2018-01-22 RX ORDER — PREDNISONE 20 MG/1
60 TABLET ORAL DAILY
Qty: 15 TABLET | Refills: 0 | Status: SHIPPED | OUTPATIENT
Start: 2018-01-22 | End: 2018-05-07

## 2018-01-22 ASSESSMENT — ANXIETY QUESTIONNAIRES
7. FEELING AFRAID AS IF SOMETHING AWFUL MIGHT HAPPEN: NOT AT ALL
GAD7 TOTAL SCORE: 0
3. WORRYING TOO MUCH ABOUT DIFFERENT THINGS: NOT AT ALL
6. BECOMING EASILY ANNOYED OR IRRITABLE: NOT AT ALL
2. NOT BEING ABLE TO STOP OR CONTROL WORRYING: NOT AT ALL
IF YOU CHECKED OFF ANY PROBLEMS ON THIS QUESTIONNAIRE, HOW DIFFICULT HAVE THESE PROBLEMS MADE IT FOR YOU TO DO YOUR WORK, TAKE CARE OF THINGS AT HOME, OR GET ALONG WITH OTHER PEOPLE: NOT DIFFICULT AT ALL
5. BEING SO RESTLESS THAT IT IS HARD TO SIT STILL: NOT AT ALL
1. FEELING NERVOUS, ANXIOUS, OR ON EDGE: NOT AT ALL

## 2018-01-22 ASSESSMENT — PATIENT HEALTH QUESTIONNAIRE - PHQ9
SUM OF ALL RESPONSES TO PHQ QUESTIONS 1-9: 4
5. POOR APPETITE OR OVEREATING: NOT AT ALL

## 2018-01-22 ASSESSMENT — PAIN SCALES - GENERAL: PAINLEVEL: SEVERE PAIN (6)

## 2018-01-22 NOTE — PROGRESS NOTES
SUBJECTIVE:   Bairon Reed is a 52 year old male who presents to clinic today for the following health issues:      Acute Illness   Acute illness concerns: URI  Onset: 2 weeks    Fever: no    Chills/Sweats: YES, sweats    Headache (location?): no    Sinus Pressure:YES    Conjunctivitis:  no    Ear Pain: YES: left    Rhinorrhea: YES    Congestion: YES    Sore Throat: YES     Cough: YES-productive, unsure of color    Wheeze: no    Decreased Appetite: YES    Nausea: no    Vomiting: no    Diarrhea:  YES    Dysuria/Freq.: no    Fatigue/Achiness: YES- achy ear    Sick/Strep Exposure: no     Therapies Tried and outcome: OTC cold and cough which does not help  History of gout in right toe   Occurs every 6 months     He has never used an inhaler     History of ablation in the past   He jogs every day   He does have a family history of dying at 51 (father )   He occasionally gets lightheaded while walking     Problem list and histories reviewed & adjusted, as indicated.    Reviewed and updated as needed this visit by clinical staffTobacco  Allergies  Meds  Med Hx  Surg Hx  Fam Hx  Soc Hx      Reviewed and updated as needed this visit by Provider       Current Outpatient Prescriptions   Medication Sig Dispense Refill       15 tablet 0     lisinopril (PRINIVIL/ZESTRIL) 5 MG tablet Take 1 tablet (5 mg) by mouth daily 30 tablet 0     naproxen (NAPROSYN) 500 MG tablet TAKE ONE TABLET BY MOUTH TWICE DAILY AS NEEDED FOR MODERATE PAIN 60 tablet 3     predniSONE (DELTASONE) 20 MG tablet Take 3 tablets (60 mg) by mouth daily (Patient taking differently: Take 60 mg by mouth ) 15 tablet 0     indomethacin (INDOCIN) 50 MG capsule Take 1 capsule (50 mg) by mouth 3 times daily as needed for moderate pain 30 capsule 0     tamsulosin (FLOMAX) 0.4 MG capsule Take 2 capsules (0.8 mg) by mouth daily 90 capsule 2     Omega-3 Fatty Acids (OMEGA-3 FISH OIL PO) Take 2 g by mouth daily       Cholecalciferol (VITAMIN D3 PO) Take 1,000  "Units by mouth daily       VITAMIN E NATURAL PO Take 100 Units by mouth       glucosamine-chondroitin 500-400 MG CAPS Take 1 capsule by mouth daily       calcium carbonate (OS-HANNAH 500 MG [place]. [place]) 500 MG tablet Take 500 mg by mouth daily       Multiple Vitamins-Minerals (MULTIVITAMIN OR) Take 1 tablet by mouth daily.       loperamide (IMODIUM A-D) 2 MG tablet Take 2 tabs (4 mg) after first loose stool, and then take one tab (2 mg) after each diarrheal stool.  Max of 8 tabs (16 mg) per day. (Patient not taking: Reported on 1/22/2018) 20 tablet 0     triamcinolone (KENALOG) 0.1 % cream Apply sparingly to affected area two-three times daily for 14 days. (Patient not taking: Reported on 6/28/2017) 30 g 0     oxybutynin (DITROPAN-XL) 5 MG 24 hr tablet Take 1 tablet (5 mg) by mouth daily (Patient not taking: Reported on 6/28/2017) 30 tablet 1     O: /77 (BP Location: Right arm, Patient Position: Chair, Cuff Size: Adult Regular)  Pulse 99  Temp 98.9  F (37.2  C) (Oral)  Ht 5' 9.59\" (1.768 m)  Wt 194 lb (88 kg)  SpO2 98%  BMI 28.17 kg/m2    Head: Normocephalic, atraumatic.  Eyes: Conjunctiva clear, non icteric. PERRLA.  Ears: External ears and TMs normal BL.  Nose: Septum midline, nasal mucosa pink and moist. No discharge.  Mouth / Throat: Normal dentition.  No oral lesions. Pharynx non erythematous, tonsils without hypertrophy.  Neck: Supple, no enlarged LN, trachea midline.    Chest wall normal to inspection and palpation. Good excursion bilaterally. Lungs clear to auscultation. Good air movement bilaterally without rales,slight  Wheezes present on expiration but no  rhonchi.   Regular rate and  rhythm. S1 and S2 normal, no murmurs, clicks, gallops or rubs. No edema or JVD.    No evidence of rhythm problems now       ICD-10-CM    1. Throat pain R07.0 Rapid strep screen     Beta strep group A culture   2. Dysfunction of left eustachian tube H69.82 predniSONE (DELTASONE) 20 MG tablet   3. Tachycardia R00.0 " CARDIOLOGY EVAL ADULT REFERRAL     With history of tachycardia with some kind of ablation years ago patient [facility]

## 2018-01-22 NOTE — LETTER
[facility]  [address] [place]  [address]  [phone]        January 24, 2018    Bairon Reed  [address]  [facility] [facility] [address]        Dear Bairon,    Strep culture is normal    Results for orders placed or performed in visit on 01/22/18   Rapid strep screen   Result Value Ref Range    Specimen Description Throat     Rapid Strep A Screen       NEGATIVE: No Group A streptococcal antigen detected by immunoassay, await culture report.   Beta strep group A culture   Result Value Ref Range    Specimen Description Throat     Culture Micro No beta hemolytic Streptococcus Group A isolated        If you have any questions please call the clinic at [phone].    Sincerely,    Danilo NIELSEN

## 2018-01-22 NOTE — LETTER
January 22, 2018      Bairon Reed  [address] [place] [address]        To Whom It May Concern:    Bairon Reed was seen in our clinic. He may return to work without restrictions.      Sincerely,        Danilo Jones MD

## 2018-01-22 NOTE — LETTER
[facility]   [address] [place]  [address]  [phone]                                   January 22, 2018    Bairon Reed  [address]  [place] [address]        Dear Bairon,    Rapid strep screen reported to patient as normal.  No treatment, culture pending.     Results for orders placed or performed in visit on 01/22/18   Rapid strep screen   Result Value Ref Range    Specimen Description Throat     Rapid Strep A Screen       NEGATIVE: No Group A streptococcal antigen detected by immunoassay, await culture report.       If you have any questions please call the clinic at [phone]    Sincerely,    Danilo Jones MD  bmd

## 2018-01-22 NOTE — NURSING NOTE
"Chief Complaint   Patient presents with     URI     Health Maintenance     FIT, PHQ-9, lipid       Initial /77 (BP Location: Right arm, Patient Position: Chair, Cuff Size: Adult Regular)  Pulse 99  Temp 98.9  F (37.2  C) (Oral)  Ht 5' 9.59\" (1.768 m)  Wt 194 lb (88 kg)  SpO2 98%  BMI 28.17 kg/m2 Estimated body mass index is 28.17 kg/(m^2) as calculated from the following:    Height as of this encounter: 5' 9.59\" (1.768 m).    Weight as of this encounter: 194 lb (88 kg).  Medication Reconciliation: complete   Anitha See RYANNE Neal      "

## 2018-01-22 NOTE — MR AVS SNAPSHOT
After Visit Summary   1/22/2018    Bairon Reed    MRN: [id number]           Patient Information     Date Of Birth          1965        Visit Information        Provider Department      1/22/2018 8:20 AM Danilo Jones MD [facility]        Today's Diagnoses     Throat pain    -  1    Dysfunction of left eustachian tube        Tachycardia           Follow-ups after your visit        Additional Services     CARDIOLOGY EVAL ADULT REFERRAL       Your provider has referred you to:  FM[facility]: [facility] [phone]   [email or web address]    Please be aware that coverage of these services is subject to the terms and limitations of your health insurance plan.  Call member services at your health plan with any benefit or coverage questions.      Type of Referral:  New Cardiology Consult    Timeframe requested:  Within 1 month    Please bring the following to your appointment:  >>   Any x-rays, CTs or MRIs which have been performed.  Contact the facility where they were done to arrange for  prior to your scheduled appointment.    >>   List of current medications  >>   This referral request   >>   Any documents/labs given to you for this referral                  Your next 10 appointments already scheduled     Jan 26, 2018  1:30 PM CST   SHORT with Triston Adame MD   [facility] ([facility]    [address] [facility] [address] [phone]              Who to contact     If you have questions or need follow up information about today's clinic visit or your schedule please contact [facility] directly at [phone].  Normal or non-critical lab and imaging results will be communicated to you by MyChart, letter or phone within 4 business days after the clinic has received the results. If you do not hear from us within 7 days, please  "contact the clinic through [employer] or phone. If you have a critical or abnormal lab result, we will notify you by phone as soon as possible.  Submit refill requests through [employer] or call your pharmacy and they will forward the refill request to us. Please allow 3 business days for your refill to be completed.          Additional Information About Your Visit        [employer] Information     [employer] lets you send messages to your doctor, view your test results, renew your prescriptions, schedule appointments and more. To sign up, go to [email or web address] . Click on \"Log in\" on the left side of the screen, which will take you to the Welcome page. Then click on \"Sign up Now\" on the right side of the page.     You will be asked to enter the access code listed below, as well as some personal information. Please follow the directions to create your username and password.     Your access code is: [email or web address]  Expires: 2018  9:14 AM     Your access code will  in 90 days. If you need help or a new code, please call your [facility] clinic or [phone].        Care EveryWhere ID     This is your Care EveryWhere ID. This could be used by other organizations to access your [facility] medical records  [id number]        Your Vitals Were     Pulse Temperature Height Pulse Oximetry BMI (Body Mass Index)       99 98.9  F (37.2  C) (Oral) 5' 9.59\" (1.768 m) 98% 28.17 kg/m2        Blood Pressure from Last 3 Encounters:   18 111/77   17 122/70   05/10/17 110/70    Weight from Last 3 Encounters:   18 194 lb (88 kg)   17 196 lb (88.9 kg)   05/10/17 190 lb (86.2 kg)              We Performed the Following     Beta strep group A culture     CARDIOLOGY EVAL ADULT REFERRAL     Rapid strep screen          Today's Medication Changes          These changes are accurate as of: 18  9:14 AM.  If you have any questions, ask your nurse or doctor.               These medicines have changed or have updated " prescriptions.        Dose/Directions    * predniSONE 20 MG tablet   Commonly known as:  DELTASONE   This may have changed:  when to take this   Used for:  Right foot pain   Changed by:  Iglesia Barrientos MD        Dose:  60 mg   Take 3 tablets (60 mg) by mouth daily   Quantity:  15 tablet   Refills:  0       * predniSONE 20 MG tablet   Commonly known as:  DELTASONE   This may have changed:  You were already taking a medication with the same name, and this prescription was added. Make sure you understand how and when to take each.   Used for:  Dysfunction of left eustachian tube   Changed by:  Danilo Jones MD        Dose:  60 mg   Take 3 tablets (60 mg) by mouth daily   Quantity:  15 tablet   Refills:  0       * Notice:  This list has 2 medication(s) that are the same as other medications prescribed for you. Read the directions carefully, and ask your doctor or other care provider to review them with you.         Where to get your medicines      These medications were sent to [facility] - [place], [place] - [address]  [address], [facility] [address]     Phone:  [phone]     predniSONE 20 MG tablet                Primary Care Provider Office Phone # Fax #    Triston Adame [phone] [address] [facility] [address]        Equal Access to Services     MUSA OCAMPO : Hadii aad ku hadasho Soomaali, waaxda luqadaha, qaybta kaalmada adeegyada, bairon senior. So [facility] [phone].    ATENCIÓN: Si habla español, tiene a walter disposición servicios gratuitos de asistencia lingüística. Llame al [phone].    We comply with applicable federal civil rights laws and [place] laws. We do not discriminate on the basis of race, color, national origin, age, disability, sex, sexual orientation, or gender identity.            Thank you!     Thank you for choosing [facility]  for  your care. Our goal is always to provide you with excellent care. Hearing back from our patients is one way we can continue to improve our services. Please take a few minutes to complete the written survey that you may receive in the mail after your visit with us. Thank you!             Your Updated Medication List - Protect others around you: Learn how to safely use, store and throw away your medicines at www.disposemymeds.org.          This list is accurate as of: 1/22/18  9:14 AM.  Always use your most recent med list.                   Brand Name Dispense Instructions for use Diagnosis    calcium carbonate 1250 MG tablet    OS-HANNAH 500 mg [place]. [place]     Take 500 mg by mouth daily        glucosamine-chondroitin 500-400 MG Caps per capsule      Take 1 capsule by mouth daily        indomethacin 50 MG capsule    INDOCIN    30 capsule    Take 1 capsule (50 mg) by mouth 3 times daily as needed for moderate pain    Gout, unspecified cause, unspecified chronicity, unspecified site       lisinopril 5 MG tablet    PRINIVIL/ZESTRIL    30 tablet    Take 1 tablet (5 mg) by mouth daily    Essential hypertension with goal blood pressure less than 140/90       loperamide 2 MG tablet    IMODIUM A-D    20 tablet    Take 2 tabs (4 mg) after first loose stool, and then take one tab (2 mg) after each diarrheal stool.  Max of 8 tabs (16 mg) per day.    Diarrhea, unspecified type       MULTIVITAMIN PO      Take 1 tablet by mouth daily.        naproxen 500 MG tablet    NAPROSYN    60 tablet    TAKE ONE TABLET BY MOUTH TWICE DAILY AS NEEDED FOR MODERATE PAIN    Acute right-sided low back pain with right-sided sciatica       OMEGA-3 FISH OIL PO      Take 2 g by mouth daily        oxybutynin 5 MG 24 hr tablet    DITROPAN-XL    30 tablet    Take 1 tablet (5 mg) by mouth daily    OAB (overactive bladder)       * predniSONE 20 MG tablet    DELTASONE    15 tablet    Take 3 tablets (60 mg) by mouth daily    Right foot pain       * predniSONE 20  MG tablet    DELTASONE    15 tablet    Take 3 tablets (60 mg) by mouth daily    Dysfunction of left eustachian tube       tamsulosin 0.4 MG capsule    FLOMAX    90 capsule    Take 2 capsules (0.8 mg) by mouth daily    Benign non-nodular prostatic hyperplasia with lower urinary tract symptoms       triamcinolone 0.1 % cream    KENALOG    30 g    Apply sparingly to affected area two-three times daily for 14 days.    Rash       VITAMIN D3 PO      Take 1,000 Units by mouth daily        VITAMIN E NATURAL PO      Take 100 Units by mouth        * Notice:  This list has 2 medication(s) that are the same as other medications prescribed for you. Read the directions carefully, and ask your doctor or other care provider to review them with you.

## 2018-01-23 LAB
BACTERIA SPEC CULT: NORMAL
SPECIMEN SOURCE: NORMAL

## 2018-01-23 ASSESSMENT — ANXIETY QUESTIONNAIRES: GAD7 TOTAL SCORE: 0

## 2018-01-26 ENCOUNTER — OFFICE VISIT (OUTPATIENT)
Dept: FAMILY MEDICINE | Facility: CLINIC | Age: 53
End: 2018-01-26
Payer: COMMERCIAL

## 2018-01-26 VITALS
DIASTOLIC BLOOD PRESSURE: 87 MMHG | TEMPERATURE: 98 F | WEIGHT: 197 LBS | OXYGEN SATURATION: 97 % | SYSTOLIC BLOOD PRESSURE: 128 MMHG | HEART RATE: 81 BPM | BODY MASS INDEX: 28.6 KG/M2

## 2018-01-26 DIAGNOSIS — N32.81 OAB (OVERACTIVE BLADDER): ICD-10-CM

## 2018-01-26 DIAGNOSIS — N40.1 BENIGN NON-NODULAR PROSTATIC HYPERPLASIA WITH LOWER URINARY TRACT SYMPTOMS: ICD-10-CM

## 2018-01-26 DIAGNOSIS — M54.16 LUMBAR RADICULOPATHY: Primary | ICD-10-CM

## 2018-01-26 DIAGNOSIS — I10 ESSENTIAL HYPERTENSION WITH GOAL BLOOD PRESSURE LESS THAN 140/90: ICD-10-CM

## 2018-01-26 PROCEDURE — 99215 OFFICE O/P EST HI 40 MIN: CPT | Performed by: FAMILY MEDICINE

## 2018-01-26 RX ORDER — LISINOPRIL 5 MG/1
5 TABLET ORAL DAILY
Qty: 90 TABLET | Refills: 3 | Status: SHIPPED | OUTPATIENT
Start: 2018-01-26 | End: 2018-03-23

## 2018-01-26 RX ORDER — GABAPENTIN 100 MG/1
100 CAPSULE ORAL 3 TIMES DAILY
Qty: 90 CAPSULE | Refills: 0 | Status: SHIPPED | OUTPATIENT
Start: 2018-01-26 | End: 2018-03-23

## 2018-01-26 RX ORDER — TAMSULOSIN HYDROCHLORIDE 0.4 MG/1
0.8 CAPSULE ORAL DAILY
Qty: 180 CAPSULE | Refills: 3 | Status: SHIPPED | OUTPATIENT
Start: 2018-01-26 | End: 2018-09-25

## 2018-01-26 RX ORDER — OXYBUTYNIN CHLORIDE 5 MG/1
5 TABLET, EXTENDED RELEASE ORAL DAILY
Qty: 90 TABLET | Refills: 3 | Status: SHIPPED | OUTPATIENT
Start: 2018-01-26 | End: 2019-02-01

## 2018-01-26 NOTE — MR AVS SNAPSHOT
After Visit Summary   1/26/2018    Bairon Reed    MRN: [id number]           Patient Information     Date Of Birth          1965        Visit Information        Provider Department      1/26/2018 1:30 PM Triston Adame MD [facility]        Today's Diagnoses     Lumbar radiculopathy    -  1    OAB (overactive bladder)        Essential hypertension with goal blood pressure less than 140/90        Benign non-nodular prostatic hyperplasia with lower urinary tract symptoms          Care Instructions    1. Set up the MRI - [phone].    2. Set up the spine specialist appointment [phone].    3. Gabapentin 100 mg 3 times per day with the option of increasing to 300 or even 600 mg 3 times per day.    4. Start Finasteride 5 mg daily for prostate - may take up to 6 months to work.    5. Take Oxybutynin 5 mg daily as needed for frequency with the option of 10 mg per day.    6. See you in 1-2 months for a physical with fasting labs.          Follow-ups after your visit        Additional Services     ORTHO  REFERRAL       [facility] is referring you to the Orthopedic  Services at [facility] [place].       The  Representative will assist you in the coordination of your Orthopedic and Musculoskeletal Care as prescribed by your physician.    The  Representative will call you within 1 business day to help schedule your appointment, or you may contact the  Representative at:    All areas ~ [phone]     Type of Referral : Spine: Lumbar  **Choose Medical Spine Specialist (unless patient was seen by a Medical Spine Specialist within the past 6 months).**  Surgical Evaluation is advised if the patient presents with one or more of the following red flags: Evidence of Spinal Tumor, Infection or Fracture, Cauda Equina Syndrome, Sudden or Progressive Weakness, Loss of Bowel or Bladder Control, or any other  "documented emergent neurological condition resulting from a Lumbar Spinal Condition. Medical Spine Specialist        Timeframe requested: Routine    Coverage of these services is subject to the terms and limitations of your health insurance plan.  Please call member services at your health plan with any benefit or coverage questions.      If X-rays, CT or MRI's have been performed, please contact the facility where they were done to arrange for , prior to your scheduled appointment.  Please bring this referral request to your appointment and present it to your specialist.                  Future tests that were ordered for you today     Open Future Orders        Priority Expected Expires Ordered    MR Lumbar Spine w/o & w Contrast Routine  1/26/2019 1/26/2018            Who to contact     If you have questions or need follow up information about today's clinic visit or your schedule please contact [facility] directly at [phone].  Normal or non-critical lab and imaging results will be communicated to you by Vupenhart, letter or phone within 4 business days after the clinic has received the results. If you do not hear from us within 7 days, please contact the clinic through Vupenhart or phone. If you have a critical or abnormal lab result, we will notify you by phone as soon as possible.  Submit refill requests through [employer] or call your pharmacy and they will forward the refill request to us. Please allow 3 business days for your refill to be completed.          Additional Information About Your Visit        [employer] Information     [employer] lets you send messages to your doctor, view your test results, renew your prescriptions, schedule appointments and more. To sign up, go to [email or web address] . Click on \"Log in\" on the left side of the screen, which will take you to the Welcome page. Then click on \"Sign up Now\" on the right side of the page.     You will be asked to enter the access " code listed below, as well as some personal information. Please follow the directions to create your username and password.     Your access code is: [email or web address]  Expires: 2018  9:14 AM     Your access code will  in 90 days. If you need help or a new code, please call your [facility] clinic or [phone].        Care EveryWhere ID     This is your Care EveryWhere ID. This could be used by other organizations to access your [facility] medical records  [id number]        Your Vitals Were     Pulse Temperature Pulse Oximetry BMI (Body Mass Index)          81 98  F (36.7  C) (Oral) 97% 28.6 kg/m2         Blood Pressure from Last 3 Encounters:   18 128/87   18 111/77   17 122/70    Weight from Last 3 Encounters:   18 197 lb (89.4 kg)   18 194 lb (88 kg)   17 196 lb (88.9 kg)              We Performed the Following     ORTHO  REFERRAL          Today's Medication Changes          These changes are accurate as of 18  2:08 PM.  If you have any questions, ask your nurse or doctor.               Start taking these medicines.        Dose/Directions    gabapentin 100 MG capsule   Commonly known as:  NEURONTIN   Used for:  Lumbar radiculopathy   Started by:  Triston Adame MD        Dose:  100 mg   Take 1 capsule (100 mg) by mouth 3 times daily   Quantity:  90 capsule   Refills:  0         These medicines have changed or have updated prescriptions.        Dose/Directions    predniSONE 20 MG tablet   Commonly known as:  DELTASONE   This may have changed:  Another medication with the same name was removed. Continue taking this medication, and follow the directions you see here.   Used for:  Dysfunction of left eustachian tube   Changed by:  Triston Adame MD        Dose:  60 mg   Take 3 tablets (60 mg) by mouth daily   Quantity:  15 tablet   Refills:  0         Stop taking these medicines if you haven't already. Please contact your care team if you have questions.      loperamide 2 MG tablet   Commonly known as:  IMODIUM A-D   Stopped by:  Triston Adame MD                Where to get your medicines      These medications were sent to [facility] - [place], [place] - [address] [place] [address]     Phone:  [phone]     gabapentin 100 MG capsule    lisinopril 5 MG tablet    oxybutynin 5 MG 24 hr tablet    tamsulosin 0.4 MG capsule                Primary Care Provider Office Phone # Fax #    Trsiton Adame [phone] [address] [address]        Equal Access to Services     [facility]: Hadii aad ku hadasho Soomaali, waaxda luqadaha, qaybta kaalmada adeegyada, waxay idiin hayaan adereina trotter . So [facility] [phone].    ATENCIÓN: Si habla español, tiene a walter disposición servicios gratuitos de asistencia lingüística. [facility] [phone].    We comply with applicable federal civil rights laws and [place] laws. We do not discriminate on the basis of race, color, national origin, age, disability, sex, sexual orientation, or gender identity.            Thank you!     Thank you for choosing [facility]  for your care. Our goal is always to provide you with excellent care. Hearing back from our patients is one way we can continue to improve our services. Please take a few minutes to complete the written survey that you may receive in the mail after your visit with us. Thank you!             Your Updated Medication List - Protect others around you: Learn how to safely use, store and throw away your medicines at www.disposemymeds.org.          This list is accurate as of 1/26/18  2:08 PM.  Always use your most recent med list.                   Brand Name Dispense Instructions for use Diagnosis    calcium carbonate 1250 MG tablet    OS-HANNAH 500 mg [place]. [place]     Take 500 mg by mouth daily        gabapentin 100 MG capsule    NEURONTIN    90 capsule    Take 1 capsule (100 mg)  by mouth 3 times daily    Lumbar radiculopathy       glucosamine-chondroitin 500-400 MG Caps per capsule      Take 1 capsule by mouth daily        indomethacin 50 MG capsule    INDOCIN    30 capsule    Take 1 capsule (50 mg) by mouth 3 times daily as needed for moderate pain    Gout, unspecified cause, unspecified chronicity, unspecified site       lisinopril 5 MG tablet    PRINIVIL/ZESTRIL    90 tablet    Take 1 tablet (5 mg) by mouth daily    Essential hypertension with goal blood pressure less than 140/90       MULTIVITAMIN PO      Take 1 tablet by mouth daily.        naproxen 500 MG tablet    NAPROSYN    60 tablet    TAKE ONE TABLET BY MOUTH TWICE DAILY AS NEEDED FOR MODERATE PAIN    Acute right-sided low back pain with right-sided sciatica       OMEGA-3 FISH OIL PO      Take 2 g by mouth daily        oxybutynin 5 MG 24 hr tablet    DITROPAN-XL    90 tablet    Take 1 tablet (5 mg) by mouth daily    OAB (overactive bladder)       predniSONE 20 MG tablet    DELTASONE    15 tablet    Take 3 tablets (60 mg) by mouth daily    Dysfunction of left eustachian tube       tamsulosin 0.4 MG capsule    FLOMAX    180 capsule    Take 2 capsules (0.8 mg) by mouth daily    Benign non-nodular prostatic hyperplasia with lower urinary tract symptoms       triamcinolone 0.1 % cream    KENALOG    30 g    Apply sparingly to affected area two-three times daily for 14 days.    Rash       VITAMIN D3 PO      Take 1,000 Units by mouth daily        VITAMIN E NATURAL PO      Take 100 Units by mouth

## 2018-01-26 NOTE — NURSING NOTE
"Chief Complaint   Patient presents with     Prostate Problem       Initial /87 (Cuff Size: Adult Large)  Pulse 81  Temp 98  F (36.7  C) (Oral)  Wt 197 lb (89.4 kg)  SpO2 97%  BMI 28.6 kg/m2 Estimated body mass index is 28.6 kg/(m^2) as calculated from the following:    Height as of 1/22/18: 5' 9.59\" (1.768 m).    Weight as of this encounter: 197 lb (89.4 kg).  Medication Reconciliation: complete    Dianna Hudson LPN    "

## 2018-01-26 NOTE — PATIENT INSTRUCTIONS
1. Set up the MRI - [phone].    2. Set up the spine specialist appointment [phone].    3. Gabapentin 100 mg 3 times per day with the option of increasing to 300 or even 600 mg 3 times per day.    4. Start Finasteride 5 mg daily for prostate - may take up to 6 months to work.    5. Take Oxybutynin 5 mg daily as needed for frequency with the option of 10 mg per day.    6. See you in 1-2 months for a physical with fasting labs.
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
## 2018-03-16 ENCOUNTER — RADIANT APPOINTMENT (OUTPATIENT)
Dept: MRI IMAGING | Facility: CLINIC | Age: 53
End: 2018-03-16
Attending: FAMILY MEDICINE
Payer: COMMERCIAL

## 2018-03-16 DIAGNOSIS — M54.16 LUMBAR RADICULOPATHY: ICD-10-CM

## 2018-03-16 PROCEDURE — 72158 MRI LUMBAR SPINE W/O & W/DYE: CPT | Mod: TC

## 2018-03-16 PROCEDURE — A9585 GADOBUTROL INJECTION: HCPCS | Mod: JW | Performed by: FAMILY MEDICINE

## 2018-03-16 RX ORDER — GADOBUTROL 604.72 MG/ML
10 INJECTION INTRAVENOUS ONCE
Status: COMPLETED | OUTPATIENT
Start: 2018-03-16 | End: 2018-03-16

## 2018-03-16 RX ADMIN — GADOBUTROL 10 ML: 604.72 INJECTION INTRAVENOUS at 11:44

## 2018-03-21 ENCOUNTER — TELEPHONE (OUTPATIENT)
Dept: FAMILY MEDICINE | Facility: CLINIC | Age: 53
End: 2018-03-21

## 2018-03-21 NOTE — TELEPHONE ENCOUNTER
Please call patient:     1. Your MRI showed disc bulge and nerve pinch a bit worse than before.      2. Set up the spine specialist appointment [phone].     3. I wanted to see you for a physical with fasting labs - please set that up.    Thanks.    Triston Adame M.D.

## 2018-03-21 NOTE — TELEPHONE ENCOUNTER
Pt notified of provider message as written. Pt states this is very bad news for him.  He has hx of back surgery.  Pt asking if he can just come back and see Dr. Triston Adame.  Advised he can see Dr. 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  Pt states this may be workers comp.  Appointment made for tomorrow to discuss result, plan, and if this is workers comp.  Pt agrees to plan.  Grace Steen RN

## 2018-03-23 ENCOUNTER — TELEPHONE (OUTPATIENT)
Dept: PALLIATIVE MEDICINE | Facility: CLINIC | Age: 53
End: 2018-03-23

## 2018-03-23 ENCOUNTER — OFFICE VISIT (OUTPATIENT)
Dept: FAMILY MEDICINE | Facility: CLINIC | Age: 53
End: 2018-03-23
Payer: COMMERCIAL

## 2018-03-23 VITALS
TEMPERATURE: 97.2 F | WEIGHT: 186 LBS | OXYGEN SATURATION: 98 % | SYSTOLIC BLOOD PRESSURE: 124 MMHG | DIASTOLIC BLOOD PRESSURE: 94 MMHG | BODY MASS INDEX: 27.01 KG/M2 | HEART RATE: 94 BPM

## 2018-03-23 DIAGNOSIS — M54.42 ACUTE BILATERAL LOW BACK PAIN WITH LEFT-SIDED SCIATICA: Primary | ICD-10-CM

## 2018-03-23 DIAGNOSIS — I10 ESSENTIAL HYPERTENSION WITH GOAL BLOOD PRESSURE LESS THAN 140/90: ICD-10-CM

## 2018-03-23 DIAGNOSIS — I10 HYPERTENSION GOAL BP (BLOOD PRESSURE) < 140/90: ICD-10-CM

## 2018-03-23 PROCEDURE — 99214 OFFICE O/P EST MOD 30 MIN: CPT | Performed by: FAMILY MEDICINE

## 2018-03-23 RX ORDER — LISINOPRIL 10 MG/1
10 TABLET ORAL DAILY
Qty: 30 TABLET | Refills: 1 | Status: SHIPPED | OUTPATIENT
Start: 2018-03-23 | End: 2018-05-07 | Stop reason: DRUGHIGH

## 2018-03-23 NOTE — TELEPHONE ENCOUNTER
Requesting chart review.   Order came for interventional eval for TBD injection.   Routing for determination of radiology vs non-radiology procedure.   Please route back to schedulers to coordinate.          Sandra HORAN    [facility] [place]

## 2018-03-23 NOTE — TELEPHONE ENCOUNTER
I wanted him to have a discussion about the injection.  It had helped in the past, but his exam and his MRI are not that clear cut to me.

## 2018-03-23 NOTE — TELEPHONE ENCOUNTER
Hi Dr Jones    Did you want a clinic visit for patient to discuss an injection or just a lumbar injection? Let us know so that we can schedule appropriately.     Thanks.     Berta Blanco RN, [facility]  Pain Clinic Care Coordinator

## 2018-03-23 NOTE — MR AVS SNAPSHOT
After Visit Summary   3/23/2018    Bairon Reed    MRN: [id number]           Patient Information     Date Of Birth          1965        Visit Information        Provider Department      3/23/2018 10:40 AM Danilo Jones MD [facility]        Today's Diagnoses     Acute bilateral low back pain with left-sided sciatica    -  1    Essential hypertension with goal blood pressure less than 140/90           Follow-ups after your visit        Additional Services     PAIN INJECTION EVAL/TREAT/FOLLOW UP           [facility] PT, HAND, AND CHIROPRACTIC REFERRAL       === This order will print in the [facility] Scheduling  Office ===    Physical therapy, hand therapy and chiropractic care are available through:    [facility]  [facility]    Call one easy number to schedule at any of the above locations:  [phone].    Your provider has referred you to Physical Therapy at [facility] or [facility]    Indication/Reason for Referral: Low Back Pain  Onset of Illness:    Therapy Orders:  Evaluate and Treat  Special Programs:  None  Special Request:  None    Additional Comments for the therapist or chiropractor:      Please be aware that coverage of these services is subject to the terms and limitations of your health insurance plan.  Call member services at your health plan with any benefit or coverage questions.      Please bring the following to your appointment:    >>   Your personal calendar for scheduling future appointments  >>   Comfortable clothing                  Future tests that were ordered for you today     Open Future Orders        Priority Expected Expires Ordered    [facility] PT, HAND, AND CHIROPRACTIC REFERRAL Routine 3/24/2018 4/6/2018 3/23/2018            Who to contact     If you have questions or need follow up information about today's clinic visit or your schedule please contact [facility] directly  "at [phone].  Normal or non-critical lab and imaging results will be communicated to you by MyChart, letter or phone within 4 business days after the clinic has received the results. If you do not hear from us within 7 days, please contact the clinic through [employer] or phone. If you have a critical or abnormal lab result, we will notify you by phone as soon as possible.  Submit refill requests through [employer] or call your pharmacy and they will forward the refill request to us. Please allow 3 business days for your refill to be completed.          Additional Information About Your Visit        [employer] Information     [employer] lets you send messages to your doctor, view your test results, renew your prescriptions, schedule appointments and more. To sign up, go to [email or web address] . Click on \"Log in\" on the left side of the screen, which will take you to the Welcome page. Then click on \"Sign up Now\" on the right side of the page.     You will be asked to enter the access code listed below, as well as some personal information. Please follow the directions to create your username and password.     Your access code is: [email or web address]  Expires: 2018 10:14 AM     Your access code will  in 90 days. If you need help or a new code, please call your [facility] clinic or [phone].        Care EveryWhere ID     This is your Care EveryWhere ID. This could be used by other organizations to access your [facility] medical records  [id number]        Your Vitals Were     Pulse Temperature Pulse Oximetry BMI (Body Mass Index)          94 97.2  F (36.2  C) (Oral) 98% 27.01 kg/m2         Blood Pressure from Last 3 Encounters:   18 (!) 134/94   18 128/87   18 111/77    Weight from Last 3 Encounters:   18 186 lb (84.4 kg)   18 197 lb (89.4 kg)   18 194 lb (88 kg)              We Performed the Following     PAIN INJECTION EVAL/TREAT/FOLLOW UP          Today's Medication Changes       "    These changes are accurate as of 3/23/18 11:26 AM.  If you have any questions, ask your nurse or doctor.               These medicines have changed or have updated prescriptions.        Dose/Directions    lisinopril 10 MG tablet   Commonly known as:  PRINIVIL/ZESTRIL   This may have changed:    - medication strength  - how much to take   Used for:  Essential hypertension with goal blood pressure less than 140/90   Changed by:  Danilo Jones MD        Dose:  10 mg   Take 1 tablet (10 mg) by mouth daily   Quantity:  30 tablet   Refills:  1       predniSONE 20 MG tablet   Commonly known as:  DELTASONE   This may have changed:    - when to take this  - reasons to take this   Used for:  Dysfunction of left eustachian tube        Dose:  60 mg   Take 3 tablets (60 mg) by mouth daily   Quantity:  15 tablet   Refills:  0         Stop taking these medicines if you haven't already. Please contact your care team if you have questions.     gabapentin 100 MG capsule   Commonly known as:  NEURONTIN   Stopped by:  Danilo Jones MD           triamcinolone 0.1 % cream   Commonly known as:  KENALOG   Stopped by:  Danilo Jones MD                Where to get your medicines      These medications were sent to [facility] [place], [place] - [address]  [address], [facility] [address]     Phone:  [phone]     lisinopril 10 MG tablet                Primary Care Provider Office Phone # Fax #    Triston Adame [phone] [address] [facility] [address]        Equal Access to Services     [facility] AH: Hadii aad ku hadasho Soomaali, waaxda luqadaha, qaybta kaalmada adeegyada, bairon senior. So [facility] [phone].    ATENCIÓN: Si habla español, tiene a walter disposición servicios gratuitos de asistencia lingüística. Llame al [phone].    We comply with applicable federal civil rights laws and  [place] laws. We do not discriminate on the basis of race, color, national origin, age, disability, sex, sexual orientation, or gender identity.            Thank you!     Thank you for choosing [facility]  for your care. Our goal is always to provide you with excellent care. Hearing back from our patients is one way we can continue to improve our services. Please take a few minutes to complete the written survey that you may receive in the mail after your visit with us. Thank you!             Your Updated Medication List - Protect others around you: Learn how to safely use, store and throw away your medicines at www.disposemymeds.org.          This list is accurate as of 3/23/18 11:26 AM.  Always use your most recent med list.                   Brand Name Dispense Instructions for use Diagnosis    calcium carbonate 1250 MG tablet    OS-HANNAH 500 mg Ruby. Ca     Take 500 mg by mouth daily        glucosamine-chondroitin 500-400 MG Caps per capsule      Take 1 capsule by mouth daily        indomethacin 50 MG capsule    INDOCIN    30 capsule    Take 1 capsule (50 mg) by mouth 3 times daily as needed for moderate pain    Gout, unspecified cause, unspecified chronicity, unspecified site       lisinopril 10 MG tablet    PRINIVIL/ZESTRIL    30 tablet    Take 1 tablet (10 mg) by mouth daily    Essential hypertension with goal blood pressure less than 140/90       MULTIVITAMIN PO      Take 1 tablet by mouth daily.        naproxen 500 MG tablet    NAPROSYN    60 tablet    TAKE ONE TABLET BY MOUTH TWICE DAILY AS NEEDED FOR MODERATE PAIN    Acute right-sided low back pain with right-sided sciatica       OMEGA-3 FISH OIL PO      Take 2 g by mouth daily        oxybutynin 5 MG 24 hr tablet    DITROPAN-XL    90 tablet    Take 1 tablet (5 mg) by mouth daily    OAB (overactive bladder)       predniSONE 20 MG tablet    DELTASONE    15 tablet    Take 3 tablets (60 mg) by mouth daily    Dysfunction of left  eustachian tube       tamsulosin 0.4 MG capsule    FLOMAX    180 capsule    Take 2 capsules (0.8 mg) by mouth daily    Benign non-nodular prostatic hyperplasia with lower urinary tract symptoms       VITAMIN D3 PO      Take 1,000 Units by mouth daily        VITAMIN E NATURAL PO      Take 100 Units by mouth

## 2018-03-23 NOTE — TELEPHONE ENCOUNTER
Schedulers - please see Dr. GERARDO's response and call pt to schedule accordingly.     Berta Blanco RN, [facility]  Pain Clinic Care Coordinator

## 2018-03-23 NOTE — LETTER
[address] [address]  Phone: [phone]  Fax: [phone]    March 23, 2018        Bairon Reed  [address] [place] [address]          To whom it may concern:    RE: Bairon Reed    Patient was seen and treated today at our clinic and missed work 3/20-3/23/2018 inclusive.     Please contact me for questions or concerns.      Sincerely,        Danilo Jones MD

## 2018-03-23 NOTE — PROGRESS NOTES
SUBJECTIVE:   Bairon Reed is a 52 year old male who presents to clinic today for the following health issues:  .    MRI results - Lumbar  On/Off Right sided neck pain x 1 week / Sharp pain 7/10    Complains of pain across the back and going to the left   He has been to physician neck and back clinic     Patient has been missing work   Has been out the last 3 days   Right neck pain       Past Medical History:   Diagnosis Date     Anxiety      Cervical spondylosis with myelopathy 2/4/2013     Cervicalgia      Chronic low back pain 9/13/2011     Chronic low back pain      Degeneration of cervical intervertebral disc      Gout      Hyperlipidemia LDL goal <160 11/18/2009     Hypertension goal BP (blood pressure) < 140/90 2/10/2015     Insomnia 11/18/2009     Nondependent alcohol abuse 11/14/2012     Pain in shoulder 9/13/2011     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 Lower back surgery     DISCECTOMY LUMBAR POSTERIOR MICROSCOPIC ONE LEVEL  5/17/2013    Procedure: DISCECTOMY LUMBAR POSTERIOR MICROSCOPIC ONE LEVEL;  BILATERAL LUMBAR L4-5 EXTENDED HEMILAMINECTOMY, MICROFORAMINOTOMY AND MICRODISCECTOMY WITH METRIX II;  Surgeon: Andrew Alejandre MD;  Location: [facility] OR     FUSION CERVICAL ANTERIOR ONE LEVEL  3/5/2013    Procedure: FUSION CERVICAL ANTERIOR ONE LEVEL;  C 6-7 ANTERIOR DISCECTOMY AND FUSION WITH NEURO MONITORING ;  Surgeon: Andrew Alejandre MD;  Location:  OR     KNEE SURGERY  2009    Right     LARYNGOSCOPY WITH MICROSCOPE  5/28/2013    Procedure: LARYNGOSCOPY WITH MICROSCOPE;  MICRODIRECT LARYNGOSCOPY, WITH LEFT VOCAL CORD INJECTION ;  Surgeon: Jorge Vergara MD;  Location:  [facility]     NECK SURGERY  2/2010    [facility]       Family History   Problem Relation Age of Onset     HEART  DISEASE Father      Arthritis Sister      Glaucoma No family hx of      Macular Degeneration No family hx of      CANCER No family hx of      DIABETES No family hx of      Hypertension No family hx of      CEREBROVASCULAR DISEASE No family hx of      Thyroid Disease No family hx of        Social History   Substance Use Topics     Smoking status: Never Smoker     Smokeless tobacco: Current User     Types: Chew     Last attempt to quit: 4/1/2013      Comment: Lives in smoke free household     Alcohol use No      Comment: Quit 2010. Chemical dependency treatment in 1989. ...31 yrs of 1.5 tins weekly.  sober for 7 months..     Current Outpatient Prescriptions   Medication Sig Dispense Refill     oxybutynin (DITROPAN-XL) 5 MG 24 hr tablet Take 1 tablet (5 mg) by mouth daily 90 tablet 3     lisinopril (PRINIVIL/ZESTRIL) 5 MG tablet Take 1 tablet (5 mg) by mouth daily 90 tablet 3     tamsulosin (FLOMAX) 0.4 MG capsule Take 2 capsules (0.8 mg) by mouth daily 180 capsule 3     predniSONE (DELTASONE) 20 MG tablet Take 3 tablets (60 mg) by mouth daily (Patient taking differently: Take 60 mg by mouth daily as needed ) 15 tablet 0     naproxen (NAPROSYN) 500 MG tablet TAKE ONE TABLET BY MOUTH TWICE DAILY AS NEEDED FOR MODERATE PAIN 60 tablet 3     indomethacin (INDOCIN) 50 MG capsule Take 1 capsule (50 mg) by mouth 3 times daily as needed for moderate pain 30 capsule 0     Omega-3 Fatty Acids (OMEGA-3 FISH OIL PO) Take 2 g by mouth daily       Cholecalciferol (VITAMIN D3 PO) Take 1,000 Units by mouth daily       VITAMIN E NATURAL PO Take 100 Units by mouth       glucosamine-chondroitin 500-400 MG CAPS Take 1 capsule by mouth daily       calcium carbonate (OS-HANNAH 500 MG [place]. CA) 500 MG tablet Take 500 mg by mouth daily       Multiple Vitamins-Minerals (MULTIVITAMIN OR) Take 1 tablet by mouth daily.       O: BP (!) 134/94 (BP Location: Left arm, Patient Position: Sitting, Cuff Size: Adult Regular)  Pulse 94  Temp 97.2  F (36.2   C) (Oral)  Wt 186 lb (84.4 kg)  SpO2 98%  BMI 27.01 kg/m2    No pain along the spine   Negative straight leg raises     Pain along the middle of the back     Pain right side of the neck       ICD-10-CM    1. Acute bilateral low back pain with left-sided sciatica M54.42 [facility] PT, HAND, AND CHIROPRACTIC REFERRAL     PAIN INJECTION EVAL/TREAT/FOLLOW UP   2. Essential hypertension with goal blood pressure less than 140/90 I10 lisinopril (PRINIVIL/ZESTRIL) 10 MG tablet   3. Hypertension goal BP (blood pressure) < 140/90 I10    I have increased the dose of the patient's lisinopril to 10 mg q day   This should be rechecked after 3-4 weeks

## 2018-03-26 NOTE — TELEPHONE ENCOUNTER
NICK to schedule interventional eval for patient.       Sandra HORAN    [facility]

## 2018-03-29 ENCOUNTER — THERAPY VISIT (OUTPATIENT)
Dept: PHYSICAL THERAPY | Facility: CLINIC | Age: 53
End: 2018-03-29
Payer: COMMERCIAL

## 2018-03-29 DIAGNOSIS — M54.42 ACUTE LEFT-SIDED LOW BACK PAIN WITH LEFT-SIDED SCIATICA: Primary | ICD-10-CM

## 2018-03-29 PROCEDURE — 97162 PT EVAL MOD COMPLEX 30 MIN: CPT | Mod: GP | Performed by: PHYSICAL THERAPIST

## 2018-03-29 PROCEDURE — 97110 THERAPEUTIC EXERCISES: CPT | Mod: GP | Performed by: PHYSICAL THERAPIST

## 2018-03-29 PROCEDURE — 97530 THERAPEUTIC ACTIVITIES: CPT | Mod: GP | Performed by: PHYSICAL THERAPIST

## 2018-03-29 NOTE — PROGRESS NOTES
[facility] Initial Evaluation -- Lumbar    Date: March 29, 2018  Bairon Reed is a 52 year old male with a lumbar spine condition.   Referral: GP  Work mechanical stresses:    Employment status:  Currently not working  Leisure mechanical stresses: Walking, elliptical, some weights  Functional disability score (SOCRATES/STarT Back):  See SOCRATES/STarT Back flow sheets  VAS score (0-10): 5/10  Patient goals/expectations:  Reduce pain in lower back and leg    HISTORY:    Present symptoms: central low back, (L) lateral leg to lateral foot  Pain quality (sharp/shooting/stabbing/aching/burning/cramping):  sharp   Paresthesia (yes/no):  Yes    Present since (onset date): March 2018.     Symptoms (improving/unchanging/worsening):  Worsening.     Symptoms commenced as a result of: Pushing cart    Condition occurred in the following environment:   Work     Symptoms at onset (back/thigh/leg): back, thigh, leg  Constant symptoms (back/thigh/leg): None  Intermittent symptoms (back/thigh/leg): As above    Symptoms are made worse with the following: Always Sitting, Always standing, Sometimes walking, rising, always am   Symptoms are made better with the following: Always On the move    Disturbed sleep (yes/no):  Yes Sleeping postures (prone/sup/side R/L): R side    Previous episodes (0/1-5/6-10/11+): 1-5 Year of first episode: 1990    Previous history: Yes  Previous treatments: 1989 first surgery, has had 3-4 surgeries on lower back.      Specific Questions:  Cough/Sneeze/Strain (pos/neg): Pos coughing  Bowel/Bladder (normal/abnormal): Normal  Gait (normal/abnormal): Normal  Medications (nil/NSAIDS/analg/steroids/anticoag/other):  NSAIDS  Medical allergies:  None  General health (excellent/good/fair/poor):  Fair  Pertinent medical history:  None  Imaging (None/Xray/MRI/Other):  MRI  Recent or major surgery (yes/no):  Yes, low back x 3-4  Night pain (yes/no): No  Accidents (yes/no): No  Unexplained weight  loss (yes/no): No  Barriers at home: No  Other red flags: No    EXAMINATION    Posture:   Sitting (good/fair/poor): fair  Standing (good/fair/poor):Fair  Lordosis (red/acc/normal): Reduced  Correction of posture (better/worse/no effect): No effect    Lateral Shift (right/left/nil): Nil  Relevant (yes/no):  NA  Other Observations: NA    Neurological:    Motor deficit:  (L) Quad 4/5, DF 4/5, PF 4/5  Reflexes:  NT  Sensory deficit:  NT  Dural signs:  Pos slump (L)    Movement Loss:   Nadir Mod Min Nil Pain   Flexion  X   (L) low back, post thigh   Extension X    (L) low back, thigh to foot   Side Gliding R   X     Side Gliding L  X   (L) low back     Test Movements:   During: produces, abolishes, increases, decreases, no effect, centralizing, peripheralizing   After: better, worse, no better, no worse, no effect, centralized, peripheralized    Pretest symptoms standing: (L) low back, lat thigh to foot   Symptoms During Symptoms After ROM increased ROM decreased No Effect   FIS        Rep FIS        EIS        Rep EIS        Pretest symptoms lying: (L) low back    Symptoms During Symptoms After ROM increased ROM decreased No Effect   LENORA        Rep LENORA        EIL Increases No Worse         Rep EIL Increases  Peripheralising    Worse      X   If required, pretest symptoms: (L) low back, lateral thigh to foot   Symptoms During Symptoms After ROM increased ROM decreased No Effect   SGIS - R        Rep SGIS - R        SGIS - L Increases    No Worse         Rep SGIS - L Increases  Centralising    No Worse      X     Static Tests:  Sitting slouched:    Sitting erect:    Standing slouched   Standing erect:    Lying prone in extension:   Long sitting:      Other Tests: NT    Provisional Classification:  Inconclusive/Other - Mechanically Inconclusive    Principle of Management:  Education:  Centralization, specificity of exercise   Equipment provided:  None  Mechanical therapy (Y/N):  Y   Extension principle:    Lateral Principle:   (L) SGIS x 10-15 reps every 2 hrs  Flexion principle:    Other:      ASSESSMENT/PLAN:    Patient is a 52 year old male with lumbar complaints.    Patient has the following significant findings with corresponding treatment plan.                Diagnosis 1:  LBP    Pain -  self management, education, directional preference exercise and home program  Decreased ROM/flexibility - manual therapy, therapeutic exercise and home program  Decreased joint mobility - manual therapy, therapeutic exercise and home program  Decreased strength - therapeutic exercise, therapeutic activities and home program  Impaired muscle performance - neuro re-education and home program  Decreased function - therapeutic activities and home program  Impaired posture - neuro re-education and home program    Therapy Evaluation Codes:   1) History comprised of:   Personal factors that impact the plan of care:      Time since onset of symptoms.    Comorbidity factors that impact the plan of care are:      None.     Medications impacting care: Anti-inflammatory.  2) Examination of Body Systems comprised of:   Body structures and functions that impact the plan of care:      Lumbar spine.   Activity limitations that impact the plan of care are:      Bending, Sitting, Standing, Walking and Laying down.  3) Clinical presentation characteristics are:   Evolving/Changing.  4) Decision-Making    Moderate complexity using standardized patient assessment instrument and/or measureable assessment of functional outcome.  Cumulative Therapy Evaluation is: Moderate complexity.    Previous and current functional limitations:  (See Goal Flow Sheet for this information)    Short term and Long term goals: (See Goal Flow Sheet for this information)     Communication ability:  Patient appears to be able to clearly communicate and understand verbal and written communication and follow directions correctly.  Treatment Explanation - The following has been discussed with the  patient:   RX ordered/plan of care  Anticipated outcomes  Possible risks and side effects  This patient would benefit from PT intervention to resume normal activities.   Rehab potential is good.    Frequency:  1 X week, once daily  Duration:  for 6 weeks  Discharge Plan:  Achieve all LTG.  Independent in home treatment program.  Reach maximal therapeutic benefit.    Please refer to the daily flowsheet for treatment today, total treatment time and time spent performing 1:1 timed codes.

## 2018-03-29 NOTE — LETTER
DEPARTMENT OF HEALTH AND HUMAN SERVICES  CENTERS FOR MEDICARE & MEDICAID SERVICES    PLAN/UPDATED PLAN OF PROGRESS FOR OUTPATIENT REHABILITATION    PATIENTS NAME:  Bairon Reed   : 1965    PROVIDER NUMBER:    [id number]  [id number]    PROVIDER NAME: [place] [facility] PHYSICAL THERAPY  MEDICAL RECORD NUMBER: [id number]     START OF CARE DATE:   3/29/18   TYPE:  PT    PRIMARY/TREATMENT DIAGNOSIS: (Pertinent Medical Diagnosis)  Acute left-sided low back pain with left-sided sciatica    VISITS FROM START OF CARE:   1     [facility] Initial Evaluation -- Lumbar  Date: 2018  Bairon Reed is a 52 year old male with a lumbar spine condition.   Referral: GP  Work mechanical stresses:    Employment status:  Currently not working  Leisure mechanical stresses: Walking, elliptical, some weights  Functional disability score (SOCRATES/STarT Back):  See SOCRATES/STarT Back flow sheets  VAS score (0-10): 5/10  Patient goals/expectations:  Reduce pain in lower back and leg    HISTORY:  Present symptoms: central low back, (L) lateral leg to lateral foot  Pain quality (sharp/shooting/stabbing/aching/burning/cramping):  sharp   Paresthesia (yes/no):  Yes  Present since (onset date): 2018    Symptoms (improving/unchanging/worsening):  Worsening.   Symptoms commenced as a result of: Pushing cart    Condition occurred in the following environment:   Work   Symptoms at onset (back/thigh/leg): back, thigh, leg  Constant symptoms (back/thigh/leg): None  Intermittent symptoms (back/thigh/leg): As above  Symptoms are made worse with the following: Always Sitting, Always standing, Sometimes walking, rising, always am   Symptoms are made better with the following: Always On the move  Disturbed sleep (yes/no):  Yes   Sleeping postures (prone/sup/side R/L): R side  Previous episodes (0/1-5/6-10/11+): 1-5   Year of first episode:   Previous history: Yes  Previous  treatments:  first surgery, has had 3-4 surgeries on lower back.      PATIENTS NAME:  Bairon Reed   : 1965  PRIMARY/TREATMENT DIAGNOSIS: (Pertinent Medical Diagnosis)  Acute left-sided low back pain with left-sided sciatica    Specific Questions:  Cough/Sneeze/Strain (pos/neg): Pos coughing  Bowel/Bladder (normal/abnormal): Normal  Gait (normal/abnormal): Normal  Medications (nil/NSAIDS/analg/steroids/anticoag/other):  NSAIDS  Medical allergies:  None  General health (excellent/good/fair/poor):  Fair  Pertinent medical history:  None  Imaging (None/Xray/MRI/Other):  MRI  Recent or major surgery (yes/no):  Yes, low back x 3-4  Night pain (yes/no): No  Accidents (yes/no): No  Unexplained weight loss (yes/no): No  Barriers at home: No  Other red flags: No    EXAMINATION    Posture:   Sitting (good/fair/poor): fair  Standing (good/fair/poor):Fair  Lordosis (red/acc/normal): Reduced  Correction of posture (better/worse/no effect): No effect  Lateral Shift (right/left/nil): Nil  Relevant (yes/no):  NA  Other Observations: NA    Neurological:  Motor deficit:  (L) Quad 4/5, DF 4/5, PF 4/5    Reflexes:  NT  Sensory deficit:  NT    Dural signs:  Pos slump (L)    Movement Loss:   Nadir Mod Min Nil Pain   Flexion  X   (L) low back, post thigh   Extension X    (L) low back, thigh to foot   Side Gliding R   X     Side Gliding L  X   (L) low back     Test Movements:   During: produces, abolishes, increases, decreases, no effect, centralizing, peripheralizing   After: better, worse, no better, no worse, no effect, centralized, peripheralized            PATIENTS NAME:  Bairon Reed   : 1965  PRIMARY/TREATMENT DIAGNOSIS: (Pertinent Medical Diagnosis)  Acute left-sided low back pain with left-sided sciatica    Pretest symptoms standing: (L) low back, lat thigh to foot   Symptoms During Symptoms After ROM increased ROM decreased No Effect   FIS        Rep FIS        EIS        Rep EIS        Pretest  symptoms lying: (L) low back    Symptoms During Symptoms After ROM increased ROM decreased No Effect   LENORA        Rep LENORA        EIL Increases No Worse         Rep EIL Increases  Peripheralising    Worse      X   If required, pretest symptoms: (L) low back, lateral thigh to foot   Symptoms During Symptoms After ROM increased ROM decreased No Effect   SGIS - R        Rep SGIS - R        SGIS - L Increases    No Worse         Rep SGIS - L Increases  Centralising    No Worse      X     Static Tests:  Sitting slouched:    Sitting erect:    Standing slouched   Standing erect:    Lying prone in extension:   Long sitting:      Other Tests: NT  Provisional Classification:  Inconclusive/Other - Mechanically Inconclusive  Principle of Management:  Education:  Centralization, specificity of exercise     Equipment provided:  None  Mechanical therapy (Y/N):  Y   Extension principle:      Lateral Principle:  (L) SGIS x 10-15 reps every 2 hrs  Flexion principle:      Other:              PATIENTS NAME:  Bairon Reed   : 1965  PRIMARY/TREATMENT DIAGNOSIS: (Pertinent Medical Diagnosis)  Acute left-sided low back pain with left-sided sciatica    ASSESSMENT/PLAN:  Patient is a 52 year old male with lumbar complaints.    Patient has the following significant findings with corresponding treatment plan.                Diagnosis 1:  LBP    Pain -  self management, education, directional preference exercise and home program  Decreased ROM/flexibility - manual therapy, therapeutic exercise and home program  Decreased joint mobility - manual therapy, therapeutic exercise and home program  Decreased strength - therapeutic exercise, therapeutic activities and home program  Impaired muscle performance - neuro re-education and home program  Decreased function - therapeutic activities and home program  Impaired posture - neuro re-education and home program    Therapy Evaluation Codes:   1) History comprised of:   Personal factors that  impact the plan of care:      Time since onset of symptoms.    Comorbidity factors that impact the plan of care are:      None.     Medications impacting care: Anti-inflammatory.  2) Examination of Body Systems comprised of:   Body structures and functions that impact the plan of care:      Lumbar spine.   Activity limitations that impact the plan of care are:      Bending, Sitting, Standing, Walking and Laying down.  3) Clinical presentation characteristics are:   Evolving/Changing.  4) Decision-Making    Moderate complexity using standardized patient assessment instrument and/or   measureable assessment of functional outcome.  Cumulative Therapy Evaluation is: Moderate complexity.    Previous and current functional limitations:  (See Goal Flow Sheet for this information)    Short term and Long term goals: (See Goal Flow Sheet for this information)   Communication ability:  Patient appears to be able to clearly communicate and understand verbal and written communication and follow directions correctly.  Treatment Explanation - The following has been discussed with the patient:   RX ordered/plan of care, Anticipated outcomes, Possible risks and side effects                        PATIENTS NAME:  Bairon Reed   : 1965  PRIMARY/TREATMENT DIAGNOSIS: (Pertinent Medical Diagnosis)  Acute left-sided low back pain with left-sided sciatica    This patient would benefit from PT intervention to resume normal activities.   Rehab potential is good.  Frequency:  1 X week, once daily  Duration:  for 6 weeks  Discharge Plan:  Achieve all LTG.  Independent in home treatment program.  Reach maximal therapeutic benefit.            Caregiver Signature/Credentials _____________________________ Date ________         Jovon Knight DPT, Cert MDT     I have reviewed and certified the need for these services and plan of treatment while under my care.        PHYSICIAN'S SIGNATURE:   _________________________________________       "Date___________   Danilo Jones MD    Certification period:  Beginning of Cert date period 03/29/18  To 6/27/18        Functional Level Progress Report: Please see attached \"Goal Flow sheet for Functional level.\"    ____X____ Continue Services or       ________ DC Services                Service dates: From   SOC Date: 3/29/18 to present                         "

## 2018-03-30 NOTE — TELEPHONE ENCOUNTER
Scheduled with Dr. Senior in [place] on 4/25.    Erlinda Ceballos    [facility]

## 2018-04-02 PROBLEM — M54.42 ACUTE LEFT-SIDED LOW BACK PAIN WITH LEFT-SIDED SCIATICA: Status: ACTIVE | Noted: 2018-04-02

## 2018-04-04 ENCOUNTER — TELEPHONE (OUTPATIENT)
Dept: FAMILY MEDICINE | Facility: CLINIC | Age: 53
End: 2018-04-04

## 2018-04-04 NOTE — TELEPHONE ENCOUNTER
Reason for Call:  Other     Detailed comments: Patients place of employment needs some more information regarding work note patient received.      Phone Number Patient can be reached at: Other phone number:    [employer]) [phone]         Best Time:     Can we leave a detailed message on this number? Not Applicable    Call taken on 4/4/2018 at 10:51 AM by Cheryl Mcghee

## 2018-04-04 NOTE — TELEPHONE ENCOUNTER
Patients HR person (Heydi) called and wanted information given to them on why patient was seen and when and what was discussed.  Advised that we need a release of information to discuss this. We can not give this information out unless we have a release of information.  She stated ok and then hung up. Did not give any information to her.    Veronica Suarez RN CPC Triage.

## 2018-04-10 ENCOUNTER — TELEPHONE (OUTPATIENT)
Dept: FAMILY MEDICINE | Facility: CLINIC | Age: 53
End: 2018-04-10

## 2018-04-10 NOTE — PROGRESS NOTES
HPI:    Bairon Reed is an 52 year old male who presents to discuss work related injury:    Lumbar spine problems - he has chronic low back pain. 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 hich can be severe and interfere with his daily activities. He denies pain in the legs, focal weakness, numbness. No red flag symptoms like fevers, weight loss or incontinence.  Evaluation and treatment:    multiple surgeries. Has followed with multiple specialists.    He has had MRI as below.   He has had lumbar surgery and injections.   He follows with PT.   I gave him letter for work restrictions.    Lumbar MRI 3/16/18.    IMPRESSION:   1. Early degenerative disc disease L1-L2 and L2-L3 with minimal  bilateral foraminal stenosis, not significantly changed.  2. Increasing central disc bulge or development of a small central  disc protrusion at L3-L4. This combines with other factors to cause  moderate central stenosis, slightly increased since the prior exam.  Moderate bilateral foraminal stenosis has slightly increased on the  right.  3. Postoperative changes L4-L5 with resolution of previously seen  residual/recurrent central/left central disc protrusion as well as  postoperative fluid collection in the laminectomy defect. Currently  there is mild central stenosis and mild to moderate bilateral  foraminal stenosis, left greater than right.     ASTRID YOUNG MD    ROS:    Const: No fevers, weight changes or night sweats recently.    Exam:    /90 (Cuff Size: Adult Large)  Pulse 88  Temp 98  F (36.7  C) (Oral)  Resp 16  Wt 200 lb (90.7 kg)  SpO2 98%  BMI 29.04 kg/m2    Gen: Healthy appearing male in no acute distress  MS: reduced ROM lumbar spine. Slow gait.    Assessment and Plan - Decision Making    1. Lumbar radiculopathy  Per HPI      Written instructions given as follows:    Patient Instructions   See you in 4 weeks.

## 2018-04-11 ENCOUNTER — THERAPY VISIT (OUTPATIENT)
Dept: PHYSICAL THERAPY | Facility: CLINIC | Age: 53
End: 2018-04-11
Payer: COMMERCIAL

## 2018-04-11 DIAGNOSIS — M54.42 ACUTE LEFT-SIDED LOW BACK PAIN WITH LEFT-SIDED SCIATICA: ICD-10-CM

## 2018-04-11 PROCEDURE — 97110 THERAPEUTIC EXERCISES: CPT | Mod: GP | Performed by: PHYSICAL THERAPIST

## 2018-04-11 PROCEDURE — 97530 THERAPEUTIC ACTIVITIES: CPT | Mod: GP | Performed by: PHYSICAL THERAPIST

## 2018-04-13 ENCOUNTER — TELEPHONE (OUTPATIENT)
Dept: FAMILY MEDICINE | Facility: CLINIC | Age: 53
End: 2018-04-13

## 2018-04-13 NOTE — TELEPHONE ENCOUNTER
Reason for Call:  Other call back    Detailed comments: Patient called stating he was in to see Dr. GERARDO on 03/28/18, he wanted to let the Dr know that it was a work comp and that he is requesting a letter written explaining why he missed work as well as a copy of the clinic notes. The Patient would like to pick all this up by Monday    Phone Number Patient can be reached at: Home number on file [phone] (home)    Best Time: anytime    Can we leave a detailed message on this number? YES    Call taken on 4/13/2018 at 1:44 PM by Shruthi Adams

## 2018-04-13 NOTE — TELEPHONE ENCOUNTER
Advised patient that provider is out of office until Monday.    Routed message below.  Veronica Suarez RN CPC Triage.

## 2018-04-16 NOTE — TELEPHONE ENCOUNTER
This was not listed as a work comp visit with me   We did not discuss any work injury   He had been out of work for 3 days   A letter was given to the patient on 3/23/2018 regarding the days he had been off for his back pain.  This was dated the day of the visit.    He can get a copy of the note.

## 2018-04-17 NOTE — TELEPHONE ENCOUNTER
Informed patient below and he stated he still wanted copies of records. TC advised he would need to complete an JOSE at  and information will be released. RENE Marie

## 2018-04-18 ENCOUNTER — OFFICE VISIT (OUTPATIENT)
Dept: FAMILY MEDICINE | Facility: CLINIC | Age: 53
End: 2018-04-18
Payer: OTHER MISCELLANEOUS

## 2018-04-18 VITALS
OXYGEN SATURATION: 98 % | TEMPERATURE: 98 F | WEIGHT: 200 LBS | SYSTOLIC BLOOD PRESSURE: 128 MMHG | HEART RATE: 88 BPM | RESPIRATION RATE: 16 BRPM | DIASTOLIC BLOOD PRESSURE: 90 MMHG | BODY MASS INDEX: 29.04 KG/M2

## 2018-04-18 DIAGNOSIS — M54.16 LUMBAR RADICULOPATHY: Primary | ICD-10-CM

## 2018-04-18 PROCEDURE — 99213 OFFICE O/P EST LOW 20 MIN: CPT | Performed by: FAMILY MEDICINE

## 2018-04-18 NOTE — LETTER
REPORT OF WORK COMP    [facility]  [address] [id number] [phone]      PATIENT DATA    Employee Name: Bairon Reed      : 1965     #: [id number]    Work related injury: Yes  Employer at time of injury: [employer]  Employed elsewhere? No    Today's date: 2018  Date of injury: 3/16/18  Date of first visit: 18    PROVIDER EVALUATION: Please fill in as needed.  Please give copy to employee for employer.    1. Diagnosis: low back pain with radiculopathy  2. Treatment: Physical therapy, injection is planned.  3. Medication: Naproxen as needed.  4. Return to work date: 18 with the following restrictions:     No lifting over 40 #.   No pushing over 80 #.   Rare bending and kneeling.    Maximum Medical Improvement (Date): n/a  Any Permanent Partial Disability? Deferred to future exam/consult.    Medical Examiner: Triston Adame M.D.          License or registration: [id number]    Next appointment: 1 month    CC: Employer, Managed Care Plan/Payor, Patient

## 2018-04-18 NOTE — MR AVS SNAPSHOT
"              After Visit Summary   4/18/2018    Bairon Reed    MRN: [id number]           Patient Information     Date Of Birth          1965        Visit Information        Provider Department      4/18/2018 2:50 PM Triston Adame MD [facility]        Care Instructions    See you in 4 weeks.          Follow-ups after your visit        Your next 10 appointments already scheduled     Apr 24, 2018  3:30 PM CDT   [facility] with Andrea Knight PT   [facility] ([facility] [place])    [address]  [place] [address] [phone]            Apr 25, 2018  9:00 AM CDT   PROCEDURE with Mayco Senior MD   [facility] [place] ([facility])    [address]  [place] [address] [phone]              Who to contact     If you have questions or need follow up information about today's clinic visit or your schedule please contact [facility] directly at [phone].  Normal or non-critical lab and imaging results will be communicated to you by MyChart, letter or phone within 4 business days after the clinic has received the results. If you do not hear from us within 7 days, please contact the clinic through MyChart or phone. If you have a critical or abnormal lab result, we will notify you by phone as soon as possible.  Submit refill requests through [employer] or call your pharmacy and they will forward the refill request to us. Please allow 3 business days for your refill to be completed.          Additional Information About Your Visit        MyChart Information     [employer] lets you send messages to your doctor, view your test results, renew your prescriptions, schedule appointments and more. To sign up, go to [email or web address] . Click on \"Log in\" on the left side of the screen, which will take you to the Welcome page. Then click on \"Sign up Now\" on the right side " of the page.     You will be asked to enter the access code listed below, as well as some personal information. Please follow the directions to create your username and password.     Your access code is: [email or web address]  Expires: 2018 10:14 AM     Your access code will  in 90 days. If you need help or a new code, please call your [place] clinic or [phone].        Care EveryWhere ID     This is your Care EveryWhere ID. This could be used by other organizations to access your [place] medical records  [id number]        Your Vitals Were     Pulse Temperature Respirations Pulse Oximetry BMI (Body Mass Index)       88 98  F (36.7  C) (Oral) 16 98% 29.04 kg/m2        Blood Pressure from Last 3 Encounters:   18 128/90   18 (!) 124/94   18 128/87    Weight from Last 3 Encounters:   18 200 lb (90.7 kg)   18 186 lb (84.4 kg)   18 197 lb (89.4 kg)              Today, you had the following     No orders found for display         Today's Medication Changes          These changes are accurate as of 18  3:42 PM.  If you have any questions, ask your nurse or doctor.               These medicines have changed or have updated prescriptions.        Dose/Directions    predniSONE 20 MG tablet   Commonly known as:  DELTASONE   This may have changed:    - when to take this  - reasons to take this   Used for:  Dysfunction of left eustachian tube        Dose:  60 mg   Take 3 tablets (60 mg) by mouth daily   Quantity:  15 tablet   Refills:  0                Primary Care Provider Office Phone # Fax #    Triston Adame [phone] [address] [facility] [address]        Equal Access to Services     BARAK OCAMPO : Floyd Brown, jasmyne murillo, bairon reyes. [facility] [phone].    ATENCIÓN: Si habla español, tiene a walter disposición servicios gratuitos de asistencia lingüística. Llame al  [phone].    We comply with applicable federal civil rights laws and [place] laws. We do not discriminate on the basis of race, color, national origin, age, disability, sex, sexual orientation, or gender identity.            Thank you!     Thank you for choosing [facility]  for your care. Our goal is always to provide you with excellent care. Hearing back from our patients is one way we can continue to improve our services. Please take a few minutes to complete the written survey that you may receive in the mail after your visit with us. Thank you!             Your Updated Medication List - Protect others around you: Learn how to safely use, store and throw away your medicines at www.disposemymeds.org.          This list is accurate as of 4/18/18  3:42 PM.  Always use your most recent med list.                   Brand Name Dispense Instructions for use Diagnosis    calcium carbonate 1250 MG tablet    OS-HANNAH 500 mg [place]. Ca     Take 500 mg by mouth daily        glucosamine-chondroitin 500-400 MG Caps per capsule      Take 1 capsule by mouth daily        indomethacin 50 MG capsule    INDOCIN    30 capsule    Take 1 capsule (50 mg) by mouth 3 times daily as needed for moderate pain    Gout, unspecified cause, unspecified chronicity, unspecified site       lisinopril 10 MG tablet    PRINIVIL/ZESTRIL    30 tablet    Take 1 tablet (10 mg) by mouth daily    Essential hypertension with goal blood pressure less than 140/90       MULTIVITAMIN PO      Take 1 tablet by mouth daily.        naproxen 500 MG tablet    NAPROSYN    60 tablet    TAKE ONE TABLET BY MOUTH TWICE DAILY AS NEEDED FOR MODERATE PAIN    Acute right-sided low back pain with right-sided sciatica       OMEGA-3 FISH OIL PO      Take 2 g by mouth daily        oxybutynin 5 MG 24 hr tablet    DITROPAN-XL    90 tablet    Take 1 tablet (5 mg) by mouth daily    OAB (overactive bladder)       predniSONE 20 MG tablet    DELTASONE    15 tablet    Take  3 tablets (60 mg) by mouth daily    Dysfunction of left eustachian tube       tamsulosin 0.4 MG capsule    FLOMAX    180 capsule    Take 2 capsules (0.8 mg) by mouth daily    Benign non-nodular prostatic hyperplasia with lower urinary tract symptoms       VITAMIN D3 PO      Take 1,000 Units by mouth daily        VITAMIN E NATURAL PO      Take 100 Units by mouth

## 2018-04-18 NOTE — NURSING NOTE
"Chief Complaint   Patient presents with     Work Comp     back       Initial /90 (Cuff Size: Adult Large)  Pulse 88  Temp 98  F (36.7  C) (Oral)  Resp 16  Wt 200 lb (90.7 kg)  SpO2 98%  BMI 29.04 kg/m2 Estimated body mass index is 29.04 kg/(m^2) as calculated from the following:    Height as of 1/22/18: 5' 9.59\" (1.768 m).    Weight as of this encounter: 200 lb (90.7 kg).      Dianna Hudson LPN    "

## 2018-05-07 ENCOUNTER — OFFICE VISIT (OUTPATIENT)
Dept: FAMILY MEDICINE | Facility: CLINIC | Age: 53
End: 2018-05-07
Payer: MEDICAID

## 2018-05-07 VITALS
TEMPERATURE: 98 F | SYSTOLIC BLOOD PRESSURE: 138 MMHG | WEIGHT: 197 LBS | HEART RATE: 77 BPM | DIASTOLIC BLOOD PRESSURE: 100 MMHG | OXYGEN SATURATION: 98 % | BODY MASS INDEX: 28.6 KG/M2

## 2018-05-07 DIAGNOSIS — I10 HYPERTENSION GOAL BP (BLOOD PRESSURE) < 140/90: ICD-10-CM

## 2018-05-07 DIAGNOSIS — M54.16 LUMBAR RADICULOPATHY: Primary | ICD-10-CM

## 2018-05-07 PROCEDURE — 99214 OFFICE O/P EST MOD 30 MIN: CPT | Performed by: PHYSICIAN ASSISTANT

## 2018-05-07 RX ORDER — METHYLPREDNISOLONE 4 MG
TABLET, DOSE PACK ORAL
Qty: 21 TABLET | Refills: 0 | Status: SHIPPED | OUTPATIENT
Start: 2018-05-07 | End: 2018-09-18

## 2018-05-07 RX ORDER — LISINOPRIL 20 MG/1
20 TABLET ORAL DAILY
Qty: 30 TABLET | Refills: 1 | Status: SHIPPED | OUTPATIENT
Start: 2018-05-07 | End: 2018-07-14

## 2018-05-07 NOTE — LETTER
May 7, 2018      Bairon Reed  [address] [place] [address]        To Whom It May Concern,      Please excuse Bairon from work due to pain May 3-7, 2018.           Sincerely,        Ani Ibrahim PA-C

## 2018-05-07 NOTE — PROGRESS NOTES
SUBJECTIVE:   Bairon Reed is a 52 year old male who presents to clinic today for the following health issues:        Medication Followup of Naproxen and wants to discuss     Taking Medication as prescribed: yes    Side Effects:  None    Medication Helping Symptoms:  NO -wants something else for his back pain      Has been seeing others for this.  Has tried gabapentin and naproxen.  Tylenol and aleve and nothing really helps.  Prednisone was for gout.  Not sure if helped back.  Missing work.  This pain comes and goes.  Not sure what triggers the pain.  Pain has been present for years, getting worse starting in March.  Not sure if from work related activities.  Sharp pain in low back that gets better as he walks.  Has to change position frequently.  Down left leg.  Doing PT-not sure if helping yet.  No new symptoms since last time he was seen.    Injections in back helped in past so he was more active.  Seeing pain clinic in 3 days for this.    Not able to golf but really wants to.    Walking leg gives out  Now having more pain down right leg          Problem list and histories reviewed & adjusted, as indicated.  Additional history: as documented    Patient Active Problem List   Diagnosis     Hyperlipidemia LDL goal <160     Insomnia     Bilateral arm weakness     Anxiety     Nondependent alcohol abuse     Shoulder impingement - right     Cervical radiculopathy at C7     Cervicalgia     Degeneration of cervical intervertebral disc     Vitamin D deficiency     Cervical radiculopathy     S/P spinal fusion     Peroneal nerve palsy     Foot drop, right     Lumbar stenosis with neurogenic claudication     Lumbar radiculopathy     Lumbar herniated disc     S/P lumbar microdiscectomy     Hypertension goal BP (blood pressure) < 140/90     Benign non-nodular prostatic hyperplasia with lower urinary tract symptoms     Gout, unspecified cause, unspecified chronicity, unspecified site     Acute left-sided low back pain  with left-sided sciatica     Past Surgical History:   Procedure Laterality Date     BACK SURGERY  1986-1988    x 3. Lower back surgery     DISCECTOMY LUMBAR POSTERIOR MICROSCOPIC ONE LEVEL  5/17/2013    Procedure: DISCECTOMY LUMBAR POSTERIOR MICROSCOPIC ONE LEVEL;  BILATERAL LUMBAR L4-5 EXTENDED HEMILAMINECTOMY, MICROFORAMINOTOMY AND MICRODISCECTOMY WITH METRIX II;  Surgeon: Andrew Alejandre MD;  Location: [facility] OR     FUSION CERVICAL ANTERIOR ONE LEVEL  3/5/2013    Procedure: FUSION CERVICAL ANTERIOR ONE LEVEL;  C 6-7 ANTERIOR DISCECTOMY AND FUSION WITH NEURO MONITORING ;  Surgeon: Andrew Alejandre MD;  Location:  OR     KNEE SURGERY  2009    Right     LARYNGOSCOPY WITH MICROSCOPE  5/28/2013    Procedure: LARYNGOSCOPY WITH MICROSCOPE;  MICRODIRECT LARYNGOSCOPY, WITH LEFT VOCAL CORD INJECTION ;  Surgeon: Jorge Vergara MD;  Location: [facility]     NECK SURGERY  2/2010    [facility]       Social History   Substance Use Topics     Smoking status: Never Smoker     Smokeless tobacco: Current User     Types: Chew     Last attempt to quit: 4/1/2013      Comment: Lives in smoke free household     Alcohol use No      Comment: Quit 2010. Chemical dependency treatment in 1989. ...31 yrs of 1.5 tins weekly.  sober for 7 months..     Family History   Problem Relation Age of Onset     HEART DISEASE Father      Arthritis Sister      Glaucoma No family hx of      Macular Degeneration No family hx of      CANCER No family hx of      DIABETES No family hx of      Hypertension No family hx of      CEREBROVASCULAR DISEASE No family hx of      Thyroid Disease No family hx of            Reviewed and updated as needed this visit by clinical staff  Tobacco  Allergies  Meds  Med Hx  Surg Hx  Fam Hx  Soc Hx      Reviewed and updated as needed this visit by Provider         ROS:  As above    OBJECTIVE:     BP (!) 138/100  Pulse 77  Temp 98  F (36.7  C) (Oral)  Wt 197 lb (89.4 kg)  SpO2 98%  BMI 28.6  kg/m2  Body mass index is 28.6 kg/(m^2).  GENERAL: healthy, alert and no distress  RESP: lungs clear to auscultation - no rales, rhonchi or wheezes  CV: regular rates and rhythm, normal S1 S2, no S3 or S4 and no murmur, click or rub  Comprehensive back pain exam:  No tenderness, Range of motion not limited by pain, Lower extremity strength functional and equal on both sides, Lower extremity reflexes within normal limits bilaterally and Straight leg raise negative bilaterally  PSYCH: mentation appears normal and anxious    Diagnostic Test Results:  none     ASSESSMENT/PLAN:       1. Lumbar radiculopathy  Chronic.  Try short course of steroids until he sees pain.    - methylPREDNISolone (MEDROL DOSEPAK) 4 MG tablet; Follow package instructions  Dispense: 21 tablet; Refill: 0    2. Hypertension goal BP (blood pressure) < 140/90  Increase to 20mg.  Recheck with ancillary or pharmacy in 2 weeks.    - lisinopril (PRINIVIL/ZESTRIL) 20 MG tablet; Take 1 tablet (20 mg) by mouth daily  Dispense: 30 tablet; Refill: 1    Patient Instructions   Increase blood pressure medication to 20mg  Try the steroid  See pain clinic     Follow up in 2 weeks for a blood pressure check-can be on the ancillary schedule or with the pharmacy         Ani Ibrahim PA-C  [facility]

## 2018-05-07 NOTE — PATIENT INSTRUCTIONS
Increase blood pressure medication to 20mg  Try the steroid  See pain clinic     Follow up in 2 weeks for a blood pressure check-can be on the ancillary schedule or with the pharmacy

## 2018-05-07 NOTE — MR AVS SNAPSHOT
After Visit Summary   5/7/2018    Bairon Reed    MRN: [id number]           Patient Information     Date Of Birth          1965        Visit Information        Provider Department      5/7/2018 8:40 AM Ani Ibrahim PA-C [facility]        Today's Diagnoses     Lumbar radiculopathy    -  1    Hypertension goal BP (blood pressure) < 140/90          Care Instructions    Increase blood pressure medication to 20mg  Try the steroid  See pain clinic     Follow up in 2 weeks for a blood pressure check-can be on the ancillary schedule or with the pharmacy             Follow-ups after your visit        Your next 10 appointments already scheduled     May 09, 2018  7:10 AM CDT   [facility] Spine with Andrea Knight PT   [facility] ([facility] [place])    [address]  [place] [address]   [phone]            May 10, 2018 11:00 AM CDT   PROCEDURE with Mayco Senior MD   [facility] ([facility])    [address]  [place] [address] [phone]            May 16, 2018  7:10 AM CDT   [facility] with Andrea Knight PT   [facility] ([facility])    [address]  [place] [address] [phone]            May 23, 2018  7:10 AM CDT   [facility] with Andrea Knight PT   [facility] ([facility])    [address]  [place] [address]   [phone]              Who to contact     If you have questions or need follow up information about today's clinic visit or your schedule please contact [facility] directly at [phone].  Normal or non-critical lab and imaging results will be communicated to you by MyChart, letter or phone within 4 business days after the clinic has received the results. If you do  "not hear from us within 7 days, please contact the clinic through [employer] or phone. If you have a critical or abnormal lab result, we will notify you by phone as soon as possible.  Submit refill requests through [employer] or call your pharmacy and they will forward the refill request to us. Please allow 3 business days for your refill to be completed.          Additional Information About Your Visit        [employer] Tag Information     [employer] lets you send messages to your doctor, view your test results, renew your prescriptions, schedule appointments and more. To sign up, go to [email or web address] . Click on \"Log in\" on the left side of the screen, which will take you to the Welcome page. Then click on \"Sign up Now\" on the right side of the page.     You will be asked to enter the access code listed below, as well as some personal information. Please follow the directions to create your username and password.     Your access code is: [email or web address]  Expires: 2018  9:29 AM     Your access code will  in 90 days. If you need help or a new code, please call your [place] clinic or [phone].        Care EveryWhere ID     This is your Care EveryWhere ID. This could be used by other organizations to access your [place] medical records  [id number]        Your Vitals Were     Pulse Temperature Pulse Oximetry BMI (Body Mass Index)          77 98  F (36.7  C) (Oral) 98% 28.6 kg/m2         Blood Pressure from Last 3 Encounters:   18 (!) 138/100   18 128/90   18 (!) 124/94    Weight from Last 3 Encounters:   18 197 lb (89.4 kg)   18 200 lb (90.7 kg)   18 186 lb (84.4 kg)              Today, you had the following     No orders found for display         Today's Medication Changes          These changes are accurate as of 18  9:29 AM.  If you have any questions, ask your nurse or doctor.               Start taking these medicines.        Dose/Directions    methylPREDNISolone 4 MG " tablet   Commonly known as:  MEDROL DOSEPAK   Used for:  Lumbar radiculopathy   Started by:  Ani Ibrahim PA-C        Follow package instructions   Quantity:  21 tablet   Refills:  0         These medicines have changed or have updated prescriptions.        Dose/Directions    lisinopril 20 MG tablet   Commonly known as:  PRINIVIL/ZESTRIL   This may have changed:    - medication strength  - how much to take   Used for:  Hypertension goal BP (blood pressure) < 140/90   Changed by:  Ani Ibrahim PA-C        Dose:  20 mg   Take 1 tablet (20 mg) by mouth daily   Quantity:  30 tablet   Refills:  1            Where to get your medicines      These medications were sent to [facility] [address] [place] [address] [address] [place] [address]  [address] [facility] [address]     Phone:  [phone]     lisinopril 20 MG tablet    methylPREDNISolone 4 MG tablet                Primary Care Provider Office Phone # Fax #    Triston Adame [phone] [address] [facility] [address]        Equal Access to Services     [facility]: Hadii aad ku hadasho Soomaali, waaxda luqadaha, qaybta kaalmada adeegyada, waxay idiin haykingsleyn nano trotter . So [facility] [phone].    ATENCIÓN: Si habla español, tiene a walter disposición servicios gratuitos de asistencia lingüística. Llame al [phone].    We comply with applicable federal civil rights laws and [place] laws. We do not discriminate on the basis of race, color, national origin, age, disability, sex, sexual orientation, or gender identity.            Thank you!     Thank you for choosing [facility]  for your care. Our goal is always to provide you with excellent care. Hearing back from our patients is one way we can continue to improve our services. Please take a few minutes to complete the written survey that you may receive in the mail after your visit with us. Thank you!             Your  Updated Medication List - Protect others around you: Learn how to safely use, store and throw away your medicines at www.disposemymeds.org.          This list is accurate as of 5/7/18  9:29 AM.  Always use your most recent med list.                   Brand Name Dispense Instructions for use Diagnosis    calcium carbonate 500 tablet    OS-HANNAH 500 mg [place]. Ca     Take 500 mg by mouth daily        glucosamine-chondroitin 500-400 MG Caps per capsule      Take 1 capsule by mouth daily        indomethacin 50 MG capsule    INDOCIN    30 capsule    Take 1 capsule (50 mg) by mouth 3 times daily as needed for moderate pain    Gout, unspecified cause, unspecified chronicity, unspecified site       lisinopril 20 MG tablet    PRINIVIL/ZESTRIL    30 tablet    Take 1 tablet (20 mg) by mouth daily    Hypertension goal BP (blood pressure) < 140/90       methylPREDNISolone 4 MG tablet    MEDROL DOSEPAK    21 tablet    Follow package instructions    Lumbar radiculopathy       MULTIVITAMIN PO      Take 1 tablet by mouth daily.        naproxen 500 MG tablet    NAPROSYN    60 tablet    TAKE ONE TABLET BY MOUTH TWICE DAILY AS NEEDED FOR MODERATE PAIN    Acute right-sided low back pain with right-sided sciatica       OMEGA-3 FISH OIL PO      Take 2 g by mouth daily        oxybutynin 5 MG 24 hr tablet    DITROPAN-XL    90 tablet    Take 1 tablet (5 mg) by mouth daily    OAB (overactive bladder)       tamsulosin 0.4 MG capsule    FLOMAX    180 capsule    Take 2 capsules (0.8 mg) by mouth daily    Benign non-nodular prostatic hyperplasia with lower urinary tract symptoms       VITAMIN D3 PO      Take 1,000 Units by mouth daily        VITAMIN E NATURAL PO      Take 100 Units by mouth

## 2018-05-07 NOTE — LETTER
May 7, 2018      Bairon Reed  [address] [place] [address]        To Whom It May Concern,      Bairon missed work due to pain May 3-May 8, 2018.            Sincerely,        Ani Ibrahim PA-C

## 2018-05-16 ENCOUNTER — THERAPY VISIT (OUTPATIENT)
Dept: PHYSICAL THERAPY | Facility: CLINIC | Age: 53
End: 2018-05-16
Payer: MEDICAID

## 2018-05-16 DIAGNOSIS — M54.41 ACUTE RIGHT-SIDED LOW BACK PAIN WITH RIGHT-SIDED SCIATICA: ICD-10-CM

## 2018-05-16 DIAGNOSIS — M54.42 ACUTE LEFT-SIDED LOW BACK PAIN WITH LEFT-SIDED SCIATICA: ICD-10-CM

## 2018-05-16 PROCEDURE — 97110 THERAPEUTIC EXERCISES: CPT | Mod: GP | Performed by: PHYSICAL THERAPIST

## 2018-05-16 PROCEDURE — 97530 THERAPEUTIC ACTIVITIES: CPT | Mod: GP | Performed by: PHYSICAL THERAPIST

## 2018-05-16 RX ORDER — NAPROXEN 500 MG/1
TABLET ORAL
Qty: 60 TABLET | Refills: 3 | Status: SHIPPED | OUTPATIENT
Start: 2018-05-16 | End: 2018-09-14

## 2018-05-16 NOTE — TELEPHONE ENCOUNTER
Routing refill request to provider for review/approval because:  Drug not on the [facility] refill protocol   Grace Steen BSN, RN

## 2018-06-15 ENCOUNTER — OFFICE VISIT (OUTPATIENT)
Dept: PALLIATIVE MEDICINE | Facility: CLINIC | Age: 53
End: 2018-06-15
Payer: MEDICAID

## 2018-06-15 VITALS
BODY MASS INDEX: 28.31 KG/M2 | DIASTOLIC BLOOD PRESSURE: 82 MMHG | WEIGHT: 195 LBS | HEART RATE: 108 BPM | SYSTOLIC BLOOD PRESSURE: 120 MMHG

## 2018-06-15 DIAGNOSIS — M54.16 LUMBAR RADICULOPATHY: ICD-10-CM

## 2018-06-15 DIAGNOSIS — M96.1 FAILED BACK SYNDROME: Primary | ICD-10-CM

## 2018-06-15 PROCEDURE — 99215 OFFICE O/P EST HI 40 MIN: CPT | Performed by: PAIN MEDICINE

## 2018-06-15 ASSESSMENT — PAIN SCALES - GENERAL: PAINLEVEL: SEVERE PAIN (6)

## 2018-06-15 NOTE — PROGRESS NOTES
[facility] Consultation    Date of visit: 6/15/2018    Reason for consultation:    Bairon Reed is a 52 year old male who is seen in consultation today at the request of his provider, Danilo Jones MD for an interventional procedure evaluation.    Primary Care Provider is Triston Adame.  Pain medications are being prescribed by N/A.    Please see the [facility] health questionnaire which the patient completed and reviewed with me in detail.    Chief Complaint:    Chief Complaint   Patient presents with     Pain       Pain history:  Bairon Reed is a 52 year old male who first started having problems with pain in the low back many years ago. He has a history of prior lumbar laminotomy with discectomy at L4-5 with fusion at L5-S1. He reports pain has been worse in severity over the past 4-5 months after he pushed a ~700 pound cart at work. He has pain in the left >> right low back with radiation into the posterolateral left leg down to the foot. He has new (over the course of 4-5 months) radiation of pain into the right posterior leg to the knee which is not as bothersome for his as the pain on the left side. When the pain is severe he feels his legs give out on him. He reports that his back is 70% of the pain and left leg is 30%. He has no progressive weakness in the legs. No bowel/bladder incontinence.      He has been doing physical therapy with some benefit. He had a prior left L4-5 transforaminal epidural injection in January 2017 from which he reports having good improvement in pain maybe for about 4 months.    Current treatments include:  Nothing currently    Previous medication treatments included:  Naproxen 500 mg tablets  Methylprednisolone  Gabapentin    Past Medical History:  Past Medical History:   Diagnosis Date     Anxiety      Cervical spondylosis with myelopathy 2/4/2013     Cervicalgia      Chronic low back pain 9/13/2011     Chronic low  back pain      Degeneration of cervical intervertebral disc      Gout      Hyperlipidemia LDL goal <160 11/18/2009     Hypertension goal BP (blood pressure) < 140/90 2/10/2015     Insomnia 11/18/2009     Nondependent alcohol abuse 11/14/2012     Pain in shoulder 9/13/2011     Peroneal nerve palsy Feb 2012    rt     Radiculopathy, cervical 9/13/2011     Shoulder impingement     right     Vocal cord paralysis     LEFT     Past Surgical History:  Past Surgical History:   Procedure Laterality Date     BACK SURGERY  1986-1988    x 3. Lower back surgery     DISCECTOMY LUMBAR POSTERIOR MICROSCOPIC ONE LEVEL  5/17/2013    Procedure: DISCECTOMY LUMBAR POSTERIOR MICROSCOPIC ONE LEVEL;  BILATERAL LUMBAR L4-5 EXTENDED HEMILAMINECTOMY, MICROFORAMINOTOMY AND MICRODISCECTOMY WITH METRIX II;  Surgeon: Andrew Alejandre MD;  Location: [facility] OR     FUSION CERVICAL ANTERIOR ONE LEVEL  3/5/2013    Procedure: FUSION CERVICAL ANTERIOR ONE LEVEL;  C 6-7 ANTERIOR DISCECTOMY AND FUSION WITH NEURO MONITORING ;  Surgeon: Andrew Alejandre MD;  Location:  OR     KNEE SURGERY  2009    Right     LARYNGOSCOPY WITH MICROSCOPE  5/28/2013    Procedure: LARYNGOSCOPY WITH MICROSCOPE;  MICRODIRECT LARYNGOSCOPY, WITH LEFT VOCAL CORD INJECTION ;  Surgeon: Jorge Vergara MD;  Location:  [facility]     NECK SURGERY  2/2010    [facility]     Medications:  Current Outpatient Prescriptions   Medication Sig Dispense Refill     calcium carbonate (OS-HANNAH 500 MG [place]. [place]) 500 MG tablet Take 500 mg by mouth daily       Cholecalciferol (VITAMIN D3 PO) Take 1,000 Units by mouth daily       glucosamine-chondroitin 500-400 MG CAPS Take 1 capsule by mouth daily       indomethacin (INDOCIN) 50 MG capsule Take 1 capsule (50 mg) by mouth 3 times daily as needed for moderate pain 30 capsule 0     lisinopril (PRINIVIL/ZESTRIL) 20 MG tablet Take 1 tablet (20 mg) by mouth daily 30 tablet 1     methylPREDNISolone (MEDROL DOSEPAK) 4 MG tablet Follow package  instructions 21 tablet 0     Multiple Vitamins-Minerals (MULTIVITAMIN OR) Take 1 tablet by mouth daily.       naproxen (NAPROSYN) 500 MG tablet TAKE ONE TABLET BY MOUTH TWICE DAILY AS NEEDED FOR MODERATE PAIN 60 tablet 3     Omega-3 Fatty Acids (OMEGA-3 FISH OIL PO) Take 2 g by mouth daily       oxybutynin (DITROPAN-XL) 5 MG 24 hr tablet Take 1 tablet (5 mg) by mouth daily 90 tablet 3     tamsulosin (FLOMAX) 0.4 MG capsule Take 2 capsules (0.8 mg) by mouth daily 180 capsule 3     VITAMIN E NATURAL PO Take 100 Units by mouth       Allergies:   No Known Allergies     Social History:  Home situation: Lives in [place], [place].  Occupation/Schooling: works at medical [employer]  Tobacco use: none  Alcohol use: no current use  Drug use: none    Family history:  Family History   Problem Relation Age of Onset     HEART DISEASE Father      Arthritis Sister      Glaucoma No family hx of      Macular Degeneration No family hx of      CANCER No family hx of      DIABETES No family hx of      Hypertension No family hx of      CEREBROVASCULAR DISEASE No family hx of      Thyroid Disease No family hx of      Review of Systems:  Skin: negative  Eyes: negative  Ears/Nose/Throat: negative  Respiratory: No shortness of breath, dyspnea on exertion, cough, or hemoptysis  Cardiovascular: negative  Gastrointestinal: negative  Genitourinary: negative  Musculoskeletal: back pain, neck pain   Neurologic: negative and numbness or tingling of hands  Psychiatric: negative  Hematologic/Lymphatic/Immunologic: negative  Endocrine: negative    Physical Exam:  Vitals:    06/15/18 1519   Weight: 88.5 kg (195 lb)     Exam:  Constitutional: healthy, alert and no distress  Head: normocephalic. Atraumatic.   Eyes: no redness or jaundice noted  ENT: oropharnx normal.  MMM.  Neck supple.    Cardiovascular:  No edema or JVD appreciated, palpable pulses  Respiratory: non-labored, equal expansion, no audible wheezing, speaking in full  "sentences  Gastrointestinal: soft, non-distended, non-tender  : deferred  Skin: no suspicious lesions or rashes  Psychiatric: mentation appears normal and affect normal/bright    Musculoskeletal exam:  Gait/Station/Posture: normal gait and posture    Lumbar spine: No limitations in ROM.     Myofascial tenderness:  No significant tenderness with palpation of lumbar paraspinals, PSIS, gluteal musculature  Straight leg exam: negative  Dominic's maneuver: negative    Neurologic exam:  CN:  Cranial nerves 2-12 are normal  Motor:  5/5 in bilateral lower extremities  Other reflexes: no clonus  Sensation symmetric to light touch bilateral lower extremities     Diagnostic tests:  MRI of lumbar spine was completed on 3/16/2018 showing:    \"FINDINGS: Five functional lumbar vertebral segments are again assumed.  Nerve root sheath cysts in the mid sacral region, largest at S2-S3  measuring 1.8 cm. This is important only if caudal approach lumbar  epidural steroid injection is considered. The caudal thecal sac  contents otherwise appear intrinsically normal. Alignment in the  sagittal view is normal. Minimal benign peridiscal degenerative signal  changes at some levels. Few small Schmorl's nodes. No acute bony  abnormality.     T12-L1: Early signal loss and no other abnormality.     L1-L2: Early signal loss and minimal posterior disc bulging which is  slightly greater on the left than the right. No acute disc protrusion  or significant central stenosis. Minimal bilateral foraminal  narrowing. Posterior facets are unremarkable. No change.     L2-L3: Very early signal loss. Minimal posterolateral disc bulging and  no disc protrusion or significant central stenosis. Minimal bilateral  foraminal narrowing, left greater than right. Posterior facets are  unremarkable. No change.     L3-L4: Signal loss, mild to moderate disc space narrowing and diffuse  disc bulging. The disc bulge has increased centrally since the prior  exam, and " this may be a superimposed small broad-based central disc  protrusion (image 8 of series 2). This combines with a congenitally  small bony canal, thickening of the ligamentum flavum, mild posterior  facet degenerative changes and dorsal epidural fat to cause moderate  central stenosis which has slightly increased since the prior exam.  Moderate bilateral foraminal stenosis, slightly increased on the right  since the prior study, and unchanged on the left.     L4-L5: Signal loss, mild disc space narrowing and diffuse disc  bulging. The previously seen residual/recurrent central/left central  disc protrusion is no longer identified. There is some anterior  epidural scarring. Laminectomy changes are again seen, and the  previously seen postoperative collection of fluid within and posterior  to the laminectomy defect is no longer present. Currently there is  mild overall central stenosis, significantly improved since the prior  exam. Mild to moderate bilateral foraminal stenosis, left greater than  right, without significant change. Remaining posterior facets show  moderate hypertrophic degenerative change bilaterally.     L5-S1: Signal loss and degenerative disc space narrowing without disc  bulge/protrusion or central stenosis. A previous solid-appearing  posterolateral bone graft fusion is again noted. Minimal bilateral  foraminal stenosis. No change.     Paraspinal soft tissues are unremarkable.         IMPRESSION:   1. Early degenerative disc disease L1-L2 and L2-L3 with minimal  bilateral foraminal stenosis, not significantly changed.  2. Increasing central disc bulge or development of a small central  disc protrusion at L3-L4. This combines with other factors to cause  moderate central stenosis, slightly increased since the prior exam.  Moderate bilateral foraminal stenosis has slightly increased on the  right.  3. Postoperative changes L4-L5 with resolution of previously seen  residual/recurrent central/left  "central disc protrusion as well as  postoperative fluid collection in the laminectomy defect. Currently  there is mild central stenosis and mild to moderate bilateral  foraminal stenosis, left greater than right. \"    Other testing (labs, diagnostics):  None    Assessment:  1. Chronic low back pain - s/p lumbar L4-5 laminotomy with discectomy and L5-S1 fusion with ongoing low back pain left >> right with radicular pain into left > right. Neurological examination is normal. There is no reproducible pain with palpation of lumbar paraspinals.     Plan:  1.   Schedule left L3-4, 4-5 TFESI  2.   If this does not provide benefit consider left L2,3 medial branch block.    Stephanie Ram MD  Pain Medicine Fellow   I saw and examined the patient with the Pain Fellow/Resident. I have reviewed and agree with the resident's note and plan of care and made changes and corrections directly to the body of the note.   TIME SPENT:   BY FELLOW/RESIDENT ALONE 15 MIN   BY MYSELF AND FELLOW/RESIDENT TOGETHER 15 MIN   BY MYSELF WITHOUT THE FELLOW/RESIDENT 15 MIN   These times included 30 minutes I spent counseling him about his diagnosis and treatment options and coordination of care with the primary team       "

## 2018-06-15 NOTE — MR AVS SNAPSHOT
After Visit Summary   6/15/2018    Bairon Reed    MRN: [id number]           Patient Information     Date Of Birth          1965        Visit Information        Provider Department      6/15/2018 3:30 PM Mayco eSnior MD [facility]        Today's Diagnoses     Failed back syndrome    -  1    Lumbar radiculopathy          Care Instructions    - Further procedures recommended:    - Left TRANSFORAMINAL EPIDURAL STEROID INJECTION    - Follow up: For procedure             ----------------------------------------------------------------  Nurse Triage line:  [phone]   Call this number with any questions or concerns. You may leave a detailed message anytime. Calls are typically returned Monday through Friday between 8 AM and 4:30 PM. We usually get back to you within 2 business days depending on the issue/request.       Medication refills:    For non-narcotic medications, call your pharmacy directly to request a refill. The pharmacy will contact the [facility] for authorization. Please allow 3-4 days for these refills to be processed.     For narcotic refills, call the nurse triage line or send a [employer] message. Please contact us 7-10 days before your refill is due. The message MUST include the name of the specific medication(s) requested and how you would like to receive the prescription(s). The options are as follows:    Pain Clinic staff can mail the prescription to your pharmacy. Please tell us the name of the pharmacy.    You may pick the prescription up at the Pain Clinic (tell us the location) or during a clinic visit with your pain provider    Pain Clinic staff can deliver the prescription to the [place] pharmacy in the clinic building. Please tell us the location.      Scheduling number: [phone].  Call this number to schedule or change appointments.    We believe regular attendance is key to your success in our program.    Any time you are  "unable to keep your appointment we ask that you call us at least 24 hours in advance to let us know. This will allow us to offer the appointment time to another patient.               Follow-ups after your visit        Additional Services     PAIN INJECTION EVAL/TREAT/FOLLOW UP                 Your next 10 appointments already scheduled     2018  4:50 PM CDT   [facility] with Andrea Knight PT   [facility] [place] [facility] ([facility] [place])    [address]  [place] [id number] [phone]              Who to contact     If you have questions or need follow up information about today's clinic visit or your schedule please contact [facility] directly at [phone].  Normal or non-critical lab and imaging results will be communicated to you by MyChart, letter or phone within 4 business days after the clinic has received the results. If you do not hear from us within 7 days, please contact the clinic through MyChart or phone. If you have a critical or abnormal lab result, we will notify you by phone as soon as possible.  Submit refill requests through [employer] or call your pharmacy and they will forward the refill request to us. Please allow 3 business days for your refill to be completed.          Additional Information About Your Visit        [employer] Information     [employer] lets you send messages to your doctor, view your test results, renew your prescriptions, schedule appointments and more. To sign up, go to [email or web address] . Click on \"Log in\" on the left side of the screen, which will take you to the Welcome page. Then click on \"Sign up Now\" on the right side of the page.     You will be asked to enter the access code listed below, as well as some personal information. Please follow the directions to create your username and password.     Your access code is: [email or web address]  Expires: 2018  9:29 AM     Your access code will  in 90 " days. If you need help or a new code, please call your [place] clinic or [phone].        Care EveryWhere ID     This is your Care EveryWhere ID. This could be used by other organizations to access your [place] medical records  [id number]        Your Vitals Were     Pulse BMI (Body Mass Index)                108 28.31 kg/m2           Blood Pressure from Last 3 Encounters:   06/15/18 120/82   05/07/18 (!) 138/100   04/18/18 128/90    Weight from Last 3 Encounters:   06/15/18 88.5 kg (195 lb)   05/07/18 89.4 kg (197 lb)   04/18/18 90.7 kg (200 lb)              We Performed the Following     PAIN INJECTION EVAL/TREAT/FOLLOW UP        Primary Care Provider Office Phone # Fax #    Triston Adame [phone] [address] [facility] [address]        Equal Access to Services     BARAK OCAMPO : Hadii caroline mercero Socarson, waaxda luqadaha, qaybta kaalmada adeegyada, bairon trotter . So [facility] [phone].    ATENCIÓN: Si habla español, tiene a walter disposición servicios gratuitos de asistencia lingüística. Llame al [phone].    We comply with applicable federal civil rights laws and [place] laws. We do not discriminate on the basis of race, color, national origin, age, disability, sex, sexual orientation, or gender identity.            Thank you!     Thank you for choosing [facility]  for your care. Our goal is always to provide you with excellent care. Hearing back from our patients is one way we can continue to improve our services. Please take a few minutes to complete the written survey that you may receive in the mail after your visit with us. Thank you!             Your Updated Medication List - Protect others around you: Learn how to safely use, store and throw away your medicines at www.disposemymeds.org.          This list is accurate as of 6/15/18  4:19 PM.  Always use your most recent med list.                   Brand Name Dispense  Instructions for use Diagnosis    calcium carbonate 500 MG tablet    OS-HANNAH 500 mg [place]. [place]     Take 500 mg by mouth daily        glucosamine-chondroitin 500-400 MG Caps per capsule      Take 1 capsule by mouth daily        indomethacin 50 MG capsule    INDOCIN    30 capsule    Take 1 capsule (50 mg) by mouth 3 times daily as needed for moderate pain    Gout, unspecified cause, unspecified chronicity, unspecified site       lisinopril 20 MG tablet    PRINIVIL/ZESTRIL    30 tablet    Take 1 tablet (20 mg) by mouth daily    Hypertension goal BP (blood pressure) < 140/90       methylPREDNISolone 4 MG tablet    MEDROL DOSEPAK    21 tablet    Follow package instructions    Lumbar radiculopathy       MULTIVITAMIN PO      Take 1 tablet by mouth daily.        naproxen 500 MG tablet    NAPROSYN    60 tablet    TAKE ONE TABLET BY MOUTH TWICE DAILY AS NEEDED FOR MODERATE PAIN    Acute right-sided low back pain with right-sided sciatica       OMEGA-3 FISH OIL PO      Take 2 g by mouth daily        oxybutynin 5 MG 24 hr tablet    DITROPAN-XL    90 tablet    Take 1 tablet (5 mg) by mouth daily    OAB (overactive bladder)       tamsulosin 0.4 MG capsule    FLOMAX    180 capsule    Take 2 capsules (0.8 mg) by mouth daily    Benign non-nodular prostatic hyperplasia with lower urinary tract symptoms       VITAMIN D3 PO      Take 1,000 Units by mouth daily        VITAMIN E NATURAL PO      Take 100 Units by mouth

## 2018-06-15 NOTE — PATIENT INSTRUCTIONS
- Further procedures recommended:    - Left TRANSFORAMINAL EPIDURAL STEROID INJECTION    - Follow up: For procedure             ----------------------------------------------------------------  Nurse Triage line:  [phone]   Call this number with any questions or concerns. You may leave a detailed message anytime. Calls are typically returned Monday through Friday between 8 AM and 4:30 PM. We usually get back to you within 2 business days depending on the issue/request.       Medication refills:    For non-narcotic medications, call your pharmacy directly to request a refill. The pharmacy will contact the [facility] for authorization. Please allow 3-4 days for these refills to be processed.     For narcotic refills, call the nurse triage line or send a [employer] message. Please contact us 7-10 days before your refill is due. The message MUST include the name of the specific medication(s) requested and how you would like to receive the prescription(s). The options are as follows:    Pain Clinic staff can mail the prescription to your pharmacy. Please tell us the name of the pharmacy.    You may pick the prescription up at the Pain Clinic (tell us the location) or during a clinic visit with your pain provider    Pain Clinic staff can deliver the prescription to the [place] pharmacy in the clinic building. Please tell us the location.      Scheduling number: [phone].  Call this number to schedule or change appointments.    We believe regular attendance is key to your success in our program.    Any time you are unable to keep your appointment we ask that you call us at least 24 hours in advance to let us know. This will allow us to offer the appointment time to another patient.

## 2018-06-16 ENCOUNTER — TELEPHONE (OUTPATIENT)
Dept: PALLIATIVE MEDICINE | Facility: CLINIC | Age: 53
End: 2018-06-16

## 2018-06-16 NOTE — TELEPHONE ENCOUNTER
Left VM for patient to schedule Left side TFLESI.      Shakira LEWIS    [facility]

## 2018-06-18 NOTE — TELEPHONE ENCOUNTER
Pre-screening Questions for Radiology Injections:    Injection to be done at which interventional clinic site? [facility]   If [facility], instruct patient to arrive 30 minutes before the scheduled appointment time.     Procedure ordered by ISIDORO    Procedure ordered? Lumbar Epidural Steroid Injection    What insurance would patient like us to bill for this procedure? [place]      Worker's comp or MVA (motor vehicle accident) -Any injection DO NOT SCHEDULE and route to Sandra Payton.      [employer] - For SI joint injections, DO NOT SCHEDULE and route Shakira Paiz. [employer] FREEDOM NO PA REQUIRED EFFECTIVE 11/1/2017      [employer]- MBB's must be scheduled at LEAST two weeks apart      [employer] - Any injection besides hip/shoulder/knee joint DO NOT SCHEDULE and route to Shakira Paiz. She will obtain PA and call pt back to schedule procedure or notify pt of denial.       [employer]-Route to Shakira for review    Any chance of pregnancy? Not Applicable   If YES, do NOT schedule and route to RN pool    Is an  needed? No     Patient has a drive home? (mandatory) YES:     Is patient taking any blood thinners (plavix, coumadin, jantoven, warfarin, heparin, pradaxa or dabigatran )? No   If hold needed, do NOT schedule, route to RN pool     Is patient taking any aspirin products? No     If more than 325mg/day do NOT schedule; route to RN pool     For CERVICAL procedures, hold all aspirin products for 6 days.      Does the patient have a bleeding or clotting disorder? No     If YES, okay to schedule AND route to RN nurse pool    **For any patients with platelet count <100, must be forwarded to provider**    Is patient diabetic?  No  If YES, have them bring their glucometer.    Does patient have an active infection or treated for one within the past week? No     Is patient currently taking any antibiotics?  No     For patients on chronic, preventative, or prophylactic  antibiotics, procedures may be scheduled.     For patients on antibiotics for active or recent infection:    Jackson Herrera Burton, Snitzer-antibiotic course must have been completed for 4 days    Is patient currently taking any steroid medications? (i.e. Prednisone, Medrol)  No     For patients on steroid medications:    Jackson Herrera Burton, Snitzer-steroid course must have been completed for 4 days    Reviewed with patient:  If you are started on any steroids or antibiotics between now and your appointment, you must contact us because it may affect our ability to perform your procedure.  Yes    Is patient actively being treated for cancer or immunocompromised? No  If YES, do NOT schedule and route to RN pool     Are you able to get on and off an exam table with minimal or no assistance? Yes  If NO, do NOT schedule and route to RN pool    Are you able to roll over and lay on your stomach with minimal or no assistance? Yes  If NO, do NOT schedule and route to RN pool     Any allergies to contrast dye, iodine, shellfish, or numbing and steroid medications? No  If YES, route to RN pool AND add allergy information to appointment notes    Allergies: Review of patient's allergies indicates no known allergies.      Has the patient had a flu shot or any other vaccinations within 7 days before or after the procedure.  No     Does patient have an MRI/CT?  YES:   (SI joint, hip injections, lumbar sympathetic blocks, and stellate ganglion blocks do not require an MRI)    Was the MRI done w/in the last 3 years?  Yes    Was MRI done at [place]? Yes      If not, where was it done? N/A       If MRI was not done at [place], [facility] or [facility] do NOT schedule and route to nursing.  If pt has an imaging disc, the injection may be scheduled but pt has to bring disc to appt. If they show up w/out disc the injection cannot be done    Reminders (please tell patient if applicable):       Instructed pt to  arrive 30 minutes early for IV start if this is for a cervical procedure, ALL sympathetic (stellate ganglion, hypogastric, or lumbar sympathetic block) and all sedation procedures (RFA, spinal cord stimulation trials).  Not Applicable   -IVs are not routinely placed for Dr. Robert cervical cases   -Dr. Senior: IVs for cervical ESIs and cervical TBDs (not CMBBs/facet inj)      If NPO for sedation, informed patient that it is okay to take medications with sips of water (except if they are to hold blood thinners).  Not Applicable   *DO take blood pressure medication if it is prescribed*      If this is for a cervical CHANDRAKANT, informed patient that aspirin needs to be held for 6 days.   Not Applicable      For all patients not having spinal cord stimulator (SCS) trials or radiofrequency ablations (RFAs), informed patient:    IV sedation is not provided for this procedure.  If you feel that an oral anti-anxiety medication is needed, you can discuss this further with your referring provider or primary care provider.  The Pain Clinic provider will discuss specifics of what the procedure includes at your appointment.  Most procedures last 10-20 minutes.  We use numbing medications to help with any discomfort during the procedure.  Not Applicable      Do not schedule procedures requiring IV placement in the first appointment of the day or first appointment after lunch. Do NOT schedule at 0745, 0815 or 1245.       For patients 85 or older we recommend having an adult stay w/ them for the remainder of the day.       Does the patient have any questions?    Shakira Paiz  [facility]

## 2018-06-21 ENCOUNTER — THERAPY VISIT (OUTPATIENT)
Dept: PHYSICAL THERAPY | Facility: CLINIC | Age: 53
End: 2018-06-21
Payer: COMMERCIAL

## 2018-06-21 DIAGNOSIS — M54.42 ACUTE LEFT-SIDED LOW BACK PAIN WITH LEFT-SIDED SCIATICA: ICD-10-CM

## 2018-06-21 NOTE — PROGRESS NOTES
Pt arrived and stated that he became nauseated and vomited in restroom prior to coming into clinic.  Wished to re-schedule appointment.

## 2018-06-27 ENCOUNTER — THERAPY VISIT (OUTPATIENT)
Dept: PHYSICAL THERAPY | Facility: CLINIC | Age: 53
End: 2018-06-27
Payer: MEDICAID

## 2018-06-27 DIAGNOSIS — M54.42 ACUTE LEFT-SIDED LOW BACK PAIN WITH LEFT-SIDED SCIATICA: ICD-10-CM

## 2018-06-27 PROCEDURE — 97110 THERAPEUTIC EXERCISES: CPT | Mod: GP | Performed by: PHYSICAL THERAPIST

## 2018-06-27 NOTE — LETTER
DEPARTMENT OF HEALTH AND HUMAN SERVICES  CENTERS FOR MEDICARE & MEDICAID SERVICES    PLAN/UPDATED PLAN OF PROGRESS FOR OUTPATIENT REHABILITATION    PATIENTS NAME:  Bairon Reed   : 1965    PROVIDER NUMBER:    [id number]  [id number]    PROVIDER NAME: [facility]  MEDICAL RECORD NUMBER: [id number]     START OF CARE DATE:  SOC Date: 18   TYPE:  PT    PRIMARY/TREATMENT DIAGNOSIS: (Pertinent Medical Diagnosis)  Acute left-sided low back pain with left-sided sciatica  VISITS FROM START OF CARE:   4     PROGRESS  REPORT: Progress reporting period is from 3.29.18 to 18.     SUBJECTIVE  Subjective changes noted by patient:  Pt returns after 6 week lapse in treatment.  Missed last 3 scheduled appointment and stated there was an issue with his insurance.  Reports that his back and leg pain are about the same.  Feels a little looser in low back with doing stretch and has been doing about 4 x daily.  Is scheduled to get CHANDRAKANT on .  Is also returning back to work next week.    Current Pain level: 0/10.   Initial Pain level: 5/10.   Changes in function:  Yes (See Goal flowsheet attached for changes in current functional level). Adverse reaction to treatment or activity: None    OBJECTIVE  Objective: L/S AROM:  Flex Min loss; Ext Mod loss; SG Rt Min loss inc (L) leg pain Lt WNL.  Myotomes:  Lt Hip Flex 5/5, Quad 5-/5; DF 4+/5; PF 5/5.  Repeated movements:  FISit - NE, NE, Inc Quad and DF strength afterwards and abolishes Lt leg pain with Rt SG.    ASSESSMENT/PLAN  Updated problem list and treatment plan:   Diagnosis 1:  LBP  Pain -  self management, education, directional preference exercise and home program  Decreased ROM/flexibility - manual therapy, therapeutic exercise and home program  Decreased joint mobility - manual therapy, therapeutic exercise and home program  Decreased strength - therapeutic exercise, therapeutic activities and home  "program  Impaired muscle performance - neuro re-education and home program  Decreased function - therapeutic activities and home program  STG/LTGs have been met or progress has been made towards goals:  Yes (See Goal flow sheet completed today.)  Assessment of Progress: The patient's condition is unchanged.  Self Management Plans:  Patient has been instructed in a home treatment program.  Patient  has been instructed in self management of symptoms.  I have re-evaluated this patient and find that the nature, scope, duration and intensity of the therapy is appropriate for the medical condition of the patient.  Bairon continues to require the following intervention to meet STG and LTG's:  PT  PATIENTS NAME:  Bairon Reed   : 1965  PRIMARY/TREATMENT DIAGNOSIS: (Pertinent Medical Diagnosis)  Acute left-sided low back pain with left-sided sciatica    Recommendations:  This patient would benefit from continued therapy.     Frequency:  2 X a month, once daily  Duration:  for 2 months          Caregiver Signature/Credentials _____________________________ Date ________         Jovon Knight DPT, Cert MDT     I have reviewed and certified the need for these services and plan of treatment while under my care.        PHYSICIAN'S SIGNATURE:   _________________________________________      Date___________   Danilo Jones MD    Certification period:  Beginning of Cert date period: 18 to  18     Functional Level Progress Report: Please see attached \"Goal Flow sheet for Functional level.\"    ____X____ Continue Services or       ________ DC Services                Service dates: From  SOC Date: 18  to present                         "

## 2018-07-03 NOTE — PROGRESS NOTES
PROGRESS  REPORT    Progress reporting period is from 3.29.18 to 6.27.18.       SUBJECTIVE  Subjective changes noted by patient:  Pt returns after 6 week lapse in treatment.  Missed last 3 scheduled appointment and stated there was an issue with his insurance.  Reports that his back and leg pain are about the same.  Feels a little looser in low back with doing stretch and has been doing about 4 x daily.  Is scheduled to get CHANDRAKANT on 7/13.  Is also returning back to work next week.    Current Pain level: 0/10.     Initial Pain level: 5/10.   Changes in function:  Yes (See Goal flowsheet attached for changes in current functional level)  Adverse reaction to treatment or activity: None    OBJECTIVE  Objective: L/S AROM:  Flex Min loss; Ext Mod loss; SG Rt Min loss inc (L) leg pain Lt WNL.  Myotomes:  Lt Hip Flex 5/5, Quad 5-/5; DF 4+/5; PF 5/5.  Repeated movements:  FISit - NE, NE, Inc Quad and DF strength afterwards and abolishes Lt leg pain with Rt SG.      ASSESSMENT/PLAN  Updated problem list and treatment plan:   Diagnosis 1:  LBP    Pain -  self management, education, directional preference exercise and home program  Decreased ROM/flexibility - manual therapy, therapeutic exercise and home program  Decreased joint mobility - manual therapy, therapeutic exercise and home program  Decreased strength - therapeutic exercise, therapeutic activities and home program  Impaired muscle performance - neuro re-education and home program  Decreased function - therapeutic activities and home program  STG/LTGs have been met or progress has been made towards goals:  Yes (See Goal flow sheet completed today.)  Assessment of Progress: The patient's condition is unchanged.  Self Management Plans:  Patient has been instructed in a home treatment program.  Patient  has been instructed in self management of symptoms.  I have re-evaluated this patient and find that the nature, scope, duration and intensity of the therapy is appropriate  for the medical condition of the patient.  Bairon continues to require the following intervention to meet STG and LTG's:  PT    Recommendations:  This patient would benefit from continued therapy.     Frequency:  2 X a month, once daily  Duration:  for 2 months

## 2018-07-11 ENCOUNTER — THERAPY VISIT (OUTPATIENT)
Dept: PHYSICAL THERAPY | Facility: CLINIC | Age: 53
End: 2018-07-11
Payer: COMMERCIAL

## 2018-07-11 DIAGNOSIS — M54.42 ACUTE LEFT-SIDED LOW BACK PAIN WITH LEFT-SIDED SCIATICA: ICD-10-CM

## 2018-07-11 PROCEDURE — 97110 THERAPEUTIC EXERCISES: CPT | Mod: GP | Performed by: PHYSICAL THERAPIST

## 2018-07-13 ENCOUNTER — RADIANT APPOINTMENT (OUTPATIENT)
Dept: RADIOLOGY | Facility: CLINIC | Age: 53
End: 2018-07-13
Attending: PAIN MEDICINE
Payer: COMMERCIAL

## 2018-07-13 ENCOUNTER — RADIOLOGY INJECTION OFFICE VISIT (OUTPATIENT)
Dept: PALLIATIVE MEDICINE | Facility: CLINIC | Age: 53
End: 2018-07-13
Payer: COMMERCIAL

## 2018-07-13 VITALS — DIASTOLIC BLOOD PRESSURE: 92 MMHG | HEART RATE: 82 BPM | OXYGEN SATURATION: 97 % | SYSTOLIC BLOOD PRESSURE: 151 MMHG

## 2018-07-13 DIAGNOSIS — M54.16 LUMBAR RADICULOPATHY: ICD-10-CM

## 2018-07-13 DIAGNOSIS — M54.16 LUMBAR RADICULOPATHY: Primary | ICD-10-CM

## 2018-07-13 PROCEDURE — 64483 NJX AA&/STRD TFRM EPI L/S 1: CPT | Mod: LT | Performed by: PAIN MEDICINE

## 2018-07-13 PROCEDURE — 64484 NJX AA&/STRD TFRM EPI L/S EA: CPT | Mod: LT | Performed by: PAIN MEDICINE

## 2018-07-13 ASSESSMENT — PAIN SCALES - GENERAL: PAINLEVEL: SEVERE PAIN (6)

## 2018-07-13 NOTE — LETTER
[facility]  [address] [address]  Phone: [phone]  Fax: [phone]    July 13, 2018      Re:  Bairon Reed         [address] [place] [address]      To whom it may concern:    RE: Bairon Reed    Patient was seen and treated today for a procedure.  Please excuse him from work for the day.    Please contact me for questions or concerns.      Sincerely,        Mayco Senior MD

## 2018-07-13 NOTE — MR AVS SNAPSHOT
After Visit Summary   7/13/2018    Bairon Reed    MRN: [id number]           Patient Information     Date Of Birth          1965        Visit Information        Provider Department      7/13/2018 7:45 AM Mayco Senior MD [facility]        Care Instructions    [facility]   Procedure Discharge Instructions    Today you saw:  Dr. Mayco Senior      You had an:  Epidural steroid injection   -lumbar     Medications used:  Lidocaine   Bupivacaine   Dexamethasone Omnipaque           Be cautious when walking. Numbness and/or weakness in the lower extremities may occur for up to 6-8 hours after the procedure due to effect of the local anesthetic    Do not drive for 6 hours. The effect of the local anesthetic could slow your reflexes.     You may resume your regular activities after 24 hours    Avoid strenuous activity for the first 24 hours    You may shower, however avoid swimming, tub baths or hot tubs for 24 hours following your procedure    You may have a mild to moderate increase in pain for several days following the injection.    It may take up to 14 days for the steroid medication to start working although you may feel the effect as early as a few days after the procedure.       You may use ice packs for 10-15 minutes, 3 to 4 times a day at the injection site for comfort    Do not use heat to painful areas for 6 to 8 hours. This will give the local anesthetic time to wear off and prevent you from accidentally burning your skin.     You may use anti-inflammatory medications (such as Ibuprofen or Aleve or Advil) or Tylenol for pain control if necessary    If you were fasting, you may resume your normal diet and medications after the procedure    If you have diabetes, check your blood sugar more frequently than usual as your blood sugar may be higher than normal for 10-14 days following a steroid injection. Contact your doctor who manages your  diabetes if your blood sugar is higher than usual    If you experience any of the following, call the Pain Clinic during work hours at [phone] or the Provider Line after hours at [phone]:  -Fever over 100 degree F  -Swelling, bleeding, redness, drainage, warmth at the injection site  -Progressive weakness or numbness in your legs or arms  -Loss of bowel or bladder function  -Unusual headache that is not relieved by Tylenol or other pain reliever  -Unusual new onset of pain that is not improving                Follow-ups after your visit        Your next 10 appointments already scheduled     Jul 19, 2018  4:50 PM CDT   [facility] with Andrea Knight, PT   [facility] ([facility] [place])    [address]   [place] [address] [phone]              Who to contact     If you have questions or need follow up information about today's clinic visit or your schedule please contact [facility] directly at [phone].  Normal or non-critical lab and imaging results will be communicated to you by MyChart, letter or phone within 4 business days after the clinic has received the results. If you do not hear from us within 7 days, please contact the clinic through MyChart or phone. If you have a critical or abnormal lab result, we will notify you by phone as soon as possible.  Submit refill requests through [employer] or call your pharmacy and they will forward the refill request to us. Please allow 3 business days for your refill to be completed.          Additional Information About Your Visit        Care EveryWhere ID     This is your Care EveryWhere ID. This could be used by other organizations to access your [facility] medical records  [id number]        Your Vitals Were     Pulse                   99            Blood Pressure from Last 3 Encounters:   07/13/18 (!) 154/95   06/15/18 120/82   05/07/18 (!) 138/100    Weight from Last 3  Encounters:   06/15/18 88.5 kg (195 lb)   05/07/18 89.4 kg (197 lb)   04/18/18 90.7 kg (200 lb)              Today, you had the following     No orders found for display       Primary Care Provider Office Phone # Fax #    Triston Adame [phone] [address] [facility] [address]        Equal Access to Services     BARAK OCAMPO : Hadii aad ku hadasho Soomaali, waaxda luqadaha, qaybta kaalmada adeegyada, waxay idiin hayaan adeeg kathyvikisteph lahilda . So [facility] [place] [phone].    ATENCIÓN: Si rosa morrow, tiene a walter disposición servicios gratuitos de asistencia lingüística. Llame al [phone].    We comply with applicable federal civil rights laws and [place] laws. We do not discriminate on the basis of race, color, national origin, age, disability, sex, sexual orientation, or gender identity.            Thank you!     Thank you for choosing [facility]  for your care. Our goal is always to provide you with excellent care. Hearing back from our patients is one way we can continue to improve our services. Please take a few minutes to complete the written survey that you may receive in the mail after your visit with us. Thank you!             Your Updated Medication List - Protect others around you: Learn how to safely use, store and throw away your medicines at www.disposemymeds.org.          This list is accurate as of 7/13/18  8:16 AM.  Always use your most recent med list.                   Brand Name Dispense Instructions for use Diagnosis    calcium carbonate 500 MG tablet    OS-HANNAH 500 mg [place]. Ca     Take 500 mg by mouth daily        glucosamine-chondroitin 500-400 MG Caps per capsule      Take 1 capsule by mouth daily        indomethacin 50 MG capsule    INDOCIN    30 capsule    Take 1 capsule (50 mg) by mouth 3 times daily as needed for moderate pain    Gout, unspecified cause, unspecified chronicity, unspecified site       lisinopril 20 MG tablet    PRINIVIL/ZESTRIL     30 tablet    Take 1 tablet (20 mg) by mouth daily    Hypertension goal BP (blood pressure) < 140/90       methylPREDNISolone 4 MG tablet    MEDROL DOSEPAK    21 tablet    Follow package instructions    Lumbar radiculopathy       MULTIVITAMIN PO      Take 1 tablet by mouth daily.        naproxen 500 MG tablet    NAPROSYN    60 tablet    TAKE ONE TABLET BY MOUTH TWICE DAILY AS NEEDED FOR MODERATE PAIN    Acute right-sided low back pain with right-sided sciatica       OMEGA-3 FISH OIL PO      Take 2 g by mouth daily        oxybutynin 5 MG 24 hr tablet    DITROPAN-XL    90 tablet    Take 1 tablet (5 mg) by mouth daily    OAB (overactive bladder)       tamsulosin 0.4 MG capsule    FLOMAX    180 capsule    Take 2 capsules (0.8 mg) by mouth daily    Benign non-nodular prostatic hyperplasia with lower urinary tract symptoms       VITAMIN D3 PO      Take 1,000 Units by mouth daily        VITAMIN E NATURAL PO      Take 100 Units by mouth

## 2018-07-13 NOTE — NURSING NOTE
Pre-procedure Intake    Have you been fasting? NA    If yes, for how long? No     Are you taking a prescribed blood thinner such as coumadin, Plavix, Xarelto?    No    If yes, when did you take your last dose? No     Do you take aspirin?  No    If cervical procedure, have you held aspirin for 6 days?   NA    Do you have any allergies to contrast dye, iodine, steroid and/or numbing medications?  NO    Are you currently taking antibiotics or have an active infection?  NO    Have you had a fever/elevated temperature within the past week? NO    Are you currently taking oral steroids? NO    Do you have a ? Yes       Are you pregnant or breastfeeding?  Not Applicable    Are the vital signs normal?  No    Lea Christianson MA

## 2018-07-13 NOTE — PATIENT INSTRUCTIONS
[facility]   Procedure Discharge Instructions    Today you saw:  Dr. Mayco Senior      You had an:  Epidural steroid injection   -lumbar     Medications used:  Lidocaine   Bupivacaine   Dexamethasone Omnipaque           Be cautious when walking. Numbness and/or weakness in the lower extremities may occur for up to 6-8 hours after the procedure due to effect of the local anesthetic    Do not drive for 6 hours. The effect of the local anesthetic could slow your reflexes.     You may resume your regular activities after 24 hours    Avoid strenuous activity for the first 24 hours    You may shower, however avoid swimming, tub baths or hot tubs for 24 hours following your procedure    You may have a mild to moderate increase in pain for several days following the injection.    It may take up to 14 days for the steroid medication to start working although you may feel the effect as early as a few days after the procedure.       You may use ice packs for 10-15 minutes, 3 to 4 times a day at the injection site for comfort    Do not use heat to painful areas for 6 to 8 hours. This will give the local anesthetic time to wear off and prevent you from accidentally burning your skin.     You may use anti-inflammatory medications (such as Ibuprofen or Aleve or Advil) or Tylenol for pain control if necessary    If you were fasting, you may resume your normal diet and medications after the procedure    If you have diabetes, check your blood sugar more frequently than usual as your blood sugar may be higher than normal for 10-14 days following a steroid injection. Contact your doctor who manages your diabetes if your blood sugar is higher than usual    If you experience any of the following, call the Pain Clinic during work hours at [phone] or the Provider Line after hours at [phone]:  -Fever over 100 degree F  -Swelling, bleeding, redness, drainage, warmth at the injection site  -Progressive  weakness or numbness in your legs or arms  -Loss of bowel or bladder function  -Unusual headache that is not relieved by Tylenol or other pain reliever  -Unusual new onset of pain that is not improving

## 2018-07-13 NOTE — PROGRESS NOTES
Pre procedure Diagnosis: lumbar radiculopathy, lumbar degenerative disc disease   Post procedure Diagnosis: Same  Procedure performed: lumbar transforaminal epidural steroid injection at Left L3-4, L4-5, fluoroscopically guided, contrast controlled  Anesthesia: none  Complications: none  Operators: Mayco Senior MD     Indications:   Bairon Reed is a 52 year old male who first started having problems with pain in the low back many years ago. He has a history of prior lumbar laminotomy with discectomy at L4-5 with fusion at L5-S1. He reports pain has been worse in severity over the past 4-5 months after he pushed a ~700 pound cart at work. He has pain in the left >> right low back with radiation into the posterolateral left leg down to the foot. He has new (over the course of 4-5 months) radiation of pain into the right posterior leg to the knee which is not as bothersome for his as the pain on the left side. When the pain is severe he feels his legs give out on him. He reports that his back is 70% of the pain and left leg is 30%. He has no progressive weakness in the legs. No bowel/bladder incontinence.       He has been doing physical therapy with some benefit. He had a prior left L4-5 transforaminal epidural injection in January 2017 from which he reports having good improvement in pain maybe for about 4 months.  MRI   IMPRESSION:   1. Early degenerative disc disease L1-L2 and L2-L3 with minimal  bilateral foraminal stenosis, not significantly changed.  2. Increasing central disc bulge or development of a small central  disc protrusion at L3-L4. This combines with other factors to cause  moderate central stenosis, slightly increased since the prior exam.  Moderate bilateral foraminal stenosis has slightly increased on the  right.  3. Postoperative changes L4-L5 with resolution of previously seen  residual/recurrent central/left central disc protrusion as well as  postoperative fluid collection in the  "laminectomy defect. Currently  there is mild central stenosis and mild to moderate bilateral  foraminal stenosis, left greater than right. \"       Options/alternatives, benefits and risks were discussed with the patient including bleeding, infection, tissue trauma, numbness, weakness, paralysis, spinal cord injury, radiation exposure, headache and reaction to medications. Questions were answered to his satisfaction and he agrees to proceed. Voluntary informed consent was obtained and signed.     Vitals were reviewed: Yes  There were no vitals taken for this visit.  Allergies were reviewed:  Yes   Medications were reviewed:  Yes   Pre-procedure pain score: 6/10    Procedure:  After getting informed consent, patient was brought into the procedure suite and was placed in a prone position on the procedure table.   A Pause for the Cause was performed.  Patient was prepped and draped in sterile fashion.     After identifying the left L3-4, 4-5 neuroforamen, the C-arm was rotated to a left lateral oblique angle.  A total of 6ml of Lidocaine 1% was used to anesthetize the skin and the needle track at a skin entry site coaxial with the fluoroscopy beam, and overriding the superior aspect of the neuroforamen.  A 22 gauge 3.5 inch spinal needle was advanced under intermittent fluoroscopy until it entered the foramen superiorly.    The position was then inspected from anteroposterior and lateral views, and the needle adjusted appropriately.  A total of 1ml of Omnipaque-300 was injected, confirming appropriate position, with spread into the nerve root sheath and the epidural space, with no intravascular uptake. 9ml was wasted    Then, after repeated negative aspiration, a combination of Decadron 10 mg, 0.25% bupivacaine 2 ml, diluted with 3ml of normal saline was injected.     Hemostasis was achieved, the area was cleaned, and bandaids were placed when appropriate.  The patient tolerated the procedure well, and was taken to the " recovery room.    Images were saved to PACS.    Post-procedure pain score: 3/10  Follow-up includes:   -f/u phone call in one week  -f/u with referring provider    Mayco Senior MD  [facility] [place]

## 2018-09-14 DIAGNOSIS — M54.41 ACUTE RIGHT-SIDED LOW BACK PAIN WITH RIGHT-SIDED SCIATICA: ICD-10-CM

## 2018-09-17 RX ORDER — NAPROXEN 500 MG/1
TABLET ORAL
Qty: 60 TABLET | Refills: 0 | Status: SHIPPED | OUTPATIENT
Start: 2018-09-17 | End: 2018-10-25

## 2018-09-17 NOTE — TELEPHONE ENCOUNTER
"Requested Prescriptions   Pending Prescriptions Disp Refills     naproxen (NAPROSYN) 500 MG tablet [Pharmacy Med Name: NAPROXEN 500MG      TAB] 60 tablet 3     Sig: TAKE 1 TABLET BY MOUTH TWICE DAILY AS NEEDED FOR MODERATE PAIN    NSAID Medications Failed    9/14/2018  2:00 PM       Failed - Blood pressure under 140/90 in past 12 months    BP Readings from Last 3 Encounters:   07/13/18 (!) 151/92   06/15/18 120/82   05/07/18 (!) 138/100                Failed - Normal ALT on file in past 12 months    Recent Labs   Lab Test  08/24/16   1617   ALT  40            Failed - Normal AST on file in past 12 months    Recent Labs   Lab Test  08/24/16   1617   AST  26            Failed - Normal CBC on file in past 12 months    Recent Labs   Lab Test  08/24/16   1617   WBC  6.4   RBC  4.53   HGB  14.3   HCT  40.6   PLT  276       For [facility] ONLY: GNKW523 = WBC, JDZV568 = RBC         Failed - Normal serum creatinine on file in past 12 months    Recent Labs   Lab Test  05/10/17   1535   CR  0.78            Passed - Recent (12 mo) or future (30 days) visit within the authorizing provider's specialty    Patient had office visit in the last 12 months or has a visit in the next 30 days with authorizing provider or within the authorizing provider's specialty.  See \"Patient Info\" tab in inbasket, or \"Choose Columns\" in Meds & Orders section of the refill encounter.           Passed - Patient is age 6-64 years        "

## 2018-09-18 ENCOUNTER — OFFICE VISIT (OUTPATIENT)
Dept: FAMILY MEDICINE | Facility: CLINIC | Age: 53
End: 2018-09-18
Payer: COMMERCIAL

## 2018-09-18 VITALS
RESPIRATION RATE: 16 BRPM | HEART RATE: 86 BPM | SYSTOLIC BLOOD PRESSURE: 140 MMHG | TEMPERATURE: 99.3 F | OXYGEN SATURATION: 97 % | BODY MASS INDEX: 28.14 KG/M2 | DIASTOLIC BLOOD PRESSURE: 92 MMHG | HEIGHT: 71 IN | WEIGHT: 201 LBS

## 2018-09-18 DIAGNOSIS — R19.7 DIARRHEA, UNSPECIFIED TYPE: Primary | ICD-10-CM

## 2018-09-18 PROCEDURE — 99214 OFFICE O/P EST MOD 30 MIN: CPT | Performed by: INTERNAL MEDICINE

## 2018-09-18 NOTE — NURSING NOTE
"Chief Complaint   Patient presents with     Diarrhea     has been having loose stools for the past month.       Initial BP (!) 140/92  Pulse 86  Temp 99.3  F (37.4  C) (Oral)  Resp 16  Ht 5' 11\" (1.803 m)  Wt 201 lb (91.2 kg)  SpO2 97%  BMI 28.03 kg/m2 Estimated body mass index is 28.03 kg/(m^2) as calculated from the following:    Height as of this encounter: 5' 11\" (1.803 m).    Weight as of this encounter: 201 lb (91.2 kg)..  BP completed using cuff size: regular    "

## 2018-09-18 NOTE — MR AVS SNAPSHOT
After Visit Summary   9/18/2018    Bairon Reed    MRN: [id number]           Patient Information     Date Of Birth          1965        Visit Information        Provider Department      9/18/2018 9:40 AM Katy De MD [facility]        Today's Diagnoses     Diarrhea, unspecified type    -  1      Care Instructions    Keep your scheduled appointments for a physical and for a colonoscopy.          Follow-ups after your visit        Follow-up notes from your care team     Return in about 1 week (around 9/25/2018), or if symptoms worsen or fail to improve, for follow up with primary doctor.      Your next 10 appointments already scheduled     Sep 20, 2018  9:00 AM CDT   PHYSICAL with Mono Chapman PA-C   [facility] ([facility]    [address] [facility] [address] [phone]            Sep 24, 2018  2:30 PM CDT   New Visit with Amarilis Kraus MD   [facility] ([facility])    [address] [place] [address] [address]nd Floor  [facility] [address] [phone]              Future tests that were ordered for you today     Open Future Orders        Priority Expected Expires Ordered    Cryptosporidium in stool, stain Routine  9/18/2019 9/18/2018    Clostridium difficile Toxin B PCR Routine  10/18/2018 9/18/2018    Fecal Lactoferrin Routine  9/18/2019 9/18/2018    Giardia antigen Routine  9/18/2019 9/18/2018    Enteric Bacteria and Virus Panel by LITA Stool Routine  9/18/2019 9/18/2018    Ova and Parasite Exam Routine Routine  9/18/2019 9/18/2018            Who to contact     If you have questions or need follow up information about today's clinic visit or your schedule please contact [facility] directly at [phone].  Normal or non-critical lab and imaging results will be communicated to you by MyChart, letter or phone within 4 business days after the clinic has received the  "results. If you do not hear from us within 7 days, please contact the clinic through [employer] or phone. If you have a critical or abnormal lab result, we will notify you by phone as soon as possible.  Submit refill requests through [facility] or call your pharmacy and they will forward the refill request to us. Please allow 3 business days for your refill to be completed.          Additional Information About Your Visit        Care EveryWhere ID     This is your Care EveryWhere ID. This could be used by other organizations to access your [place] medical records  [id number]        Your Vitals Were     Pulse Temperature Respirations Height Pulse Oximetry BMI (Body Mass Index)    86 99.3  F (37.4  C) (Oral) 16 5' 11\" (1.803 m) 97% 28.03 kg/m2       Blood Pressure from Last 3 Encounters:   09/18/18 (!) 140/92   07/13/18 (!) 151/92   06/15/18 120/82    Weight from Last 3 Encounters:   09/18/18 201 lb (91.2 kg)   06/15/18 195 lb (88.5 kg)   05/07/18 197 lb (89.4 kg)                 Today's Medication Changes          These changes are accurate as of 9/18/18 11:04 AM.  If you have any questions, ask your nurse or doctor.               Stop taking these medicines if you haven't already. Please contact your care team if you have questions.     indomethacin 50 MG capsule   Commonly known as:  INDOCIN   Stopped by:  Katy De MD                    Primary Care Provider Office Phone # Fax #    Triston Adame [phone] [address] [facility] [address]        Equal Access to Services     [facility]: Hadii caroline goldman hadasho Soomaali, waaxda luqadaha, qaybta kaalmada bairon gilman. So [facility] [phone].    ATENCIÓN: Si habla español, tiene a walter disposición servicios gratuitos de asistencia lingüística. Llame al [phone].    We comply with applicable federal civil rights laws and [place] laws. We do not discriminate on the basis of race, color, " national origin, age, disability, sex, sexual orientation, or gender identity.            Thank you!     Thank you for [facility] [place]  [facility]. Our goal is always to provide you with excellent care. Hearing back from our patients is one way we can continue to improve our services. Please take a few minutes to complete the written survey that you may receive in the mail after your visit with us. Thank you!             Your Updated Medication List - Protect others around you: Learn how to safely use, store and throw away your medicines at www.disposemymeds.org.          This list is accurate as of 9/18/18 11:04 AM.  Always use your most recent med list.                   Brand Name Dispense Instructions for use Diagnosis    calcium carbonate 500 mg {elemental} 500 MG tablet    OS-HANNAH     Take 500 mg by mouth daily        glucosamine-chondroitin 500-400 MG Caps per capsule      Take 1 capsule by mouth daily        lisinopril 20 MG tablet    PRINIVIL/ZESTRIL    30 tablet    Take 1 tablet (20 mg) by mouth daily You need to be seen in the pharmacy for a blood pressure check before further refills.    Hypertension goal BP (blood pressure) < 140/90       MULTIVITAMIN PO      Take 1 tablet by mouth daily.        naproxen 500 MG tablet    NAPROSYN    60 tablet    TAKE 1 TABLET BY MOUTH TWICE DAILY AS NEEDED FOR MODERATE PAIN    Acute right-sided low back pain with right-sided sciatica       OMEGA-3 FISH OIL PO      Take 2 g by mouth daily        oxybutynin 5 MG 24 hr tablet    DITROPAN-XL    90 tablet    Take 1 tablet (5 mg) by mouth daily    OAB (overactive bladder)       tamsulosin 0.4 MG capsule    FLOMAX    180 capsule    Take 2 capsules (0.8 mg) by mouth daily    Benign non-nodular prostatic hyperplasia with lower urinary tract symptoms       VITAMIN D3 PO      Take 1,000 Units by mouth daily        VITAMIN E NATURAL PO      Take 100 Units by mouth

## 2018-09-18 NOTE — PROGRESS NOTES
SUBJECTIVE:  Bairon Reed is an 52 year old male who presents for diarrhea for about a month.   Feels hungry all the time.  When eats sometimes has a BM shortly after in mornings.  Has tried changing his diet but didn't help.  Stopping vitamins didn't help.  Has a BM about 3 times in morning fairly close together after getting up in the morning.  Then has one in afternoon, and one in evening.  Stools will be either soft or liquid.  Normal brown color, no blood or mucous.  No fevers.  Feels fine otherwise.  No n/v.  No increased farting.  No cramping.  No abdominal pain.  No h/o diarrhea or constipation.  No known exposures.  No one at home sick.No recent ravel.  Took some pepto bismal a couple times but didn't help.  No recent abx.  Not feel bloated.  Previously would have one, or maybe two, formed BMs daily.       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 2, 2012     Social History Main Topics     Smoking status: Never Smoker     Smokeless tobacco: Current User     Types: Chew     Last attempt to quit: 4/1/2013      Comment: Lives in smoke free household     Alcohol use No      Comment: Quit 2010. Chemical dependency treatment in 1989. ...31 yrs of 1.5 tins weekly.  sober for 7 months..     Drug use: No     Sexual  "activity: Yes     Partners: Female     Other Topics Concern     Parent/Sibling W/ Cabg, Mi Or Angioplasty Before 65f 55m? Yes     father     Social History Narrative     Family History   Problem Relation Age of Onset     HEART DISEASE Father      Arthritis Sister      Glaucoma No family hx of      Macular Degeneration No family hx of      Cancer No family hx of      Diabetes No family hx of      Hypertension No family hx of      Cerebrovascular Disease No family hx of      Thyroid Disease No family hx of        ALLERGIES:  Review of patient's allergies indicates no known allergies.    Current Outpatient Prescriptions   Medication     calcium carbonate (OS-HANNAH 500 MG [place]. [place]) 500 MG tablet     Cholecalciferol (VITAMIN D3 PO)     glucosamine-chondroitin 500-400 MG CAPS     lisinopril (PRINIVIL/ZESTRIL) 20 MG tablet     Multiple Vitamins-Minerals (MULTIVITAMIN OR)     naproxen (NAPROSYN) 500 MG tablet     Omega-3 Fatty Acids (OMEGA-3 FISH OIL PO)     oxybutynin (DITROPAN-XL) 5 MG 24 hr tablet     tamsulosin (FLOMAX) 0.4 MG capsule     VITAMIN E NATURAL PO     No current facility-administered medications for this visit.      Facility-Administered Medications Ordered in Other Visits   Medication     ondansetron (ZOFRAN) injection         ROS:  ROS is done and is negative for general/constitutional, eye, ENT, Respiratory, cardiovascular, GI, , Skin, musculoskeletal except as noted elsewhere.  All other review of systems negative except as noted elsewhere.      OBJECTIVE:  BP (!) 140/92  Pulse 86  Temp 99.3  F (37.4  C) (Oral)  Resp 16  Ht 5' 11\" (1.803 m)  Wt 201 lb (91.2 kg)  SpO2 97%  BMI 28.03 kg/m2  GENERAL APPEARANCE: Alert, in no acute distress  EYES: normal  NOSE:normal  OROPHARYNX:normal, mmm  NECK:No adenopathy,masses or thyromegaly  RESP: normal and clear to auscultation  CV:regular rate and rhythm and no murmurs, clicks, or gallops  ABDOMEN: Abdomen soft, non-tender. BS normal. No masses, " organomegaly  SKIN: no ulcers, lesions or rash  MUSCULOSKELETAL:Musculoskeletal normal      RESULTS  .  No results found for this or any previous visit (from the past 48 hour(s)).    ASSESSMENT/PLAN:    ASSESSMENT / PLAN:  (R19.7) Diarrhea, unspecified type  (primary encounter diagnosis)  Comment: sxs for about a month.  Will check for infectious etiologies.  If sxs persist and no cause identified with initial testing, may need addl evaluation with further work up.  Pt does have a colonoscopy scheduled fairly soon, which he is advised to keep.  Also advised to keep his upcoming annual physical.  Plan: Giardia antigen, Enteric Bacteria and Virus         Panel by LITA Stool, Ova and Parasite Exam         Routine, Clostridium difficile Toxin B PCR,         Fecal Lactoferrin, Cryptosporidium in stool,         stain, CANCELED: Cryptosporidium Antigen by EIA        Stool        I reviewed supportive care, expected course, and signs of concern.  Once stool studies are collected, he may use pepto bismal as needed. Follow up with pcp as needed or if his sxs persist for more than a week and his stool tests do not reveal a cause..  Reviewed red flag symptoms and is to go to the ER if experiences any of these.      See [facility] for orders, medications, letters, patient instructions    Katy De M.D.

## 2018-09-24 ENCOUNTER — OFFICE VISIT (OUTPATIENT)
Dept: CARDIOLOGY | Facility: CLINIC | Age: 53
End: 2018-09-24
Payer: COMMERCIAL

## 2018-09-24 ENCOUNTER — RADIANT APPOINTMENT (OUTPATIENT)
Dept: CARDIOLOGY | Facility: CLINIC | Age: 53
End: 2018-09-24
Attending: INTERNAL MEDICINE
Payer: COMMERCIAL

## 2018-09-24 VITALS
OXYGEN SATURATION: 98 % | BODY MASS INDEX: 28.17 KG/M2 | SYSTOLIC BLOOD PRESSURE: 122 MMHG | DIASTOLIC BLOOD PRESSURE: 88 MMHG | WEIGHT: 202 LBS | HEART RATE: 78 BPM

## 2018-09-24 DIAGNOSIS — R00.2 PALPITATIONS: ICD-10-CM

## 2018-09-24 DIAGNOSIS — R00.2 PALPITATIONS: Primary | ICD-10-CM

## 2018-09-24 PROCEDURE — 93306 TTE W/DOPPLER COMPLETE: CPT | Performed by: INTERNAL MEDICINE

## 2018-09-24 PROCEDURE — 93000 ELECTROCARDIOGRAM COMPLETE: CPT | Performed by: INTERNAL MEDICINE

## 2018-09-24 PROCEDURE — 99203 OFFICE O/P NEW LOW 30 MIN: CPT | Mod: 25 | Performed by: INTERNAL MEDICINE

## 2018-09-24 NOTE — NURSING NOTE
2 week ZioXT applied to patient today. Instructions on use and removal given and mail back instructions discussed. All questions answered. Consent form signed. Device registered. Form faxed to [employer].  Device number: [id number].    Mignon Morgan MA

## 2018-09-24 NOTE — PATIENT INSTRUCTIONS
Thank you for coming to the [facility]; please note the following instructions:    1. Echo today.    2.  We have applied a holter (heart) monitor for you to wear for 14 days.  You may remove the heart monitor on 10/8/18 at 5:00pm.  Please see the enclosed instructions for further information, as well as instructions for removal of the heart monitor and return mailing directions.  If you should have questions regarding your monitor, please call [employer] at [phone].  The Cardiology Nurse will contact you with your results (please see result notification details at bottom of summary).    *PLEASE DO NOT SHOWER OR INDUCE EXCESSIVE SWEATING IN THE FIRST 24 HOURS OF WEARING*    3. Follow up with Jina to discuss results. please see following pages for appointment detail information.            If you have any questions regarding your visit please contact your care team:     Cardiology  Telephone Number   Rani EUGENIEFelisa, ABDI KASPER, RN   Jocelyne HORAN, TIFFANIE TALYOR, MA  Luli FRANCO, N   [phone]    *After hours: [phone]   For scheduling appts:     [phone] or    [phone] (select option [phone])    *After hours: [phone]     For the Device Clinic (Pacemakers and ICD's)  RN's :  Iliana Robb   During business hours: [phone]    *After business hours:  [phone] (select option 4)      Normal test result notifications will be released via [employer] or mailed within 7 business days.  All other test results, will be communicated via telephone once reviewed by your cardiologist.    If you need a medication refill please contact your pharmacy.  Please allow 3 business days for your refill to be completed.    As always, thank you for trusting us with your health care needs!

## 2018-09-24 NOTE — LETTER
9/24/2018      RE: Bairon Reed  [address] [place]  [place] [address]       Dear Colleague,    Thank you for the opportunity to participate in the care of your patient, Bairon Reed, at the [facility] HEART AT [facility] at [facility]. Please see a copy of my visit note below.    HPI:    Patient is a 52 year old male who presents as self-referred for palpitations.    Patient is a 52 year old male with a PMH most remarkable for an non-specified ablation (per patient occurred in 1980's, doesn't know the reason), and non-specified angioplasty (per patient on one of his heart valves that was calcified) anxiety, essential hypertension, who presents in clinic for evaluation of palpitations. Patient states that he gets periods of palpitations that last 1 minute and are associated with dizziness/lightheadedness. Spontaneously abort. Occur randomly, ranging from 2-3X per day, to as little as once per week. Not associated with exercise. Additionally, patient has good exercise tolerance, can ride a bicycle 10-20 miles 3X a week.     Patient denies fevers, chills, chest pain, SOB, abdominal pain, nausea, vomiting, diarrhea. No syncope.     PAST MEDICAL HISTORY:  Past Medical History:   Diagnosis Date     Anxiety      Cervical spondylosis with myelopathy 2/4/2013     Cervicalgia      Chronic low back pain 9/13/2011     Chronic low back pain      Degeneration of cervical intervertebral disc      Gout      Hyperlipidemia LDL goal <160 11/18/2009     Hypertension goal BP (blood pressure) < 140/90 2/10/2015     Insomnia 11/18/2009     Nondependent alcohol abuse 11/14/2012     Pain in shoulder 9/13/2011     Peroneal nerve palsy Feb 2012    rt     Radiculopathy, cervical 9/13/2011     Shoulder impingement     right     Vocal cord paralysis     LEFT       CURRENT MEDICATIONS:  Current Outpatient Prescriptions   Medication Sig Dispense Refill     calcium  carbonate (OS-HANNAH 500 MG [place]. [place]) 500 MG tablet Take 500 mg by mouth daily       Cholecalciferol (VITAMIN D3 PO) Take 1,000 Units by mouth daily       glucosamine-chondroitin 500-400 MG CAPS Take 1 capsule by mouth daily       lisinopril (PRINIVIL/ZESTRIL) 20 MG tablet Take 1 tablet (20 mg) by mouth daily You need to be seen in the pharmacy for a blood pressure check before further refills. 30 tablet 0     Multiple Vitamins-Minerals (MULTIVITAMIN OR) Take 1 tablet by mouth daily.       naproxen (NAPROSYN) 500 MG tablet TAKE 1 TABLET BY MOUTH TWICE DAILY AS NEEDED FOR MODERATE PAIN 60 tablet 0     Omega-3 Fatty Acids (OMEGA-3 FISH OIL PO) Take 2 g by mouth daily       oxybutynin (DITROPAN-XL) 5 MG 24 hr tablet Take 1 tablet (5 mg) by mouth daily 90 tablet 3     tamsulosin (FLOMAX) 0.4 MG capsule Take 2 capsules (0.8 mg) by mouth daily 180 capsule 3     VITAMIN E NATURAL PO Take 100 Units by mouth         PAST SURGICAL HISTORY:  Past Surgical History:   Procedure Laterality Date     BACK SURGERY  1986-1988    x 3. Lower back surgery     DISCECTOMY LUMBAR POSTERIOR MICROSCOPIC ONE LEVEL  5/17/2013    Procedure: DISCECTOMY LUMBAR POSTERIOR MICROSCOPIC ONE LEVEL;  BILATERAL LUMBAR L4-5 EXTENDED HEMILAMINECTOMY, MICROFORAMINOTOMY AND MICRODISCECTOMY WITH METRIX II;  Surgeon: Andrew Alejandre MD;  Location:  OR     FUSION CERVICAL ANTERIOR ONE LEVEL  3/5/2013    Procedure: FUSION CERVICAL ANTERIOR ONE LEVEL;  C 6-7 ANTERIOR DISCECTOMY AND FUSION WITH NEURO MONITORING ;  Surgeon: Andrew Alejandre MD;  Location:  OR     KNEE SURGERY  2009    Right     LARYNGOSCOPY WITH MICROSCOPE  5/28/2013    Procedure: LARYNGOSCOPY WITH MICROSCOPE;  MICRODIRECT LARYNGOSCOPY, WITH LEFT VOCAL CORD INJECTION ;  Surgeon: Jorge Vergara MD;  Location: [facility]     NECK SURGERY  2/2010    [facility]       ALLERGIES:   No Known Allergies    FAMILY HISTORY:  + Premature coronary artery disease  - Atrial fibrillation  -  Sudden cardiac death     SOCIAL HISTORY:  Social History   Substance Use Topics     Smoking status: Never Smoker     Smokeless tobacco: Current User     Types: Chew     Last attempt to quit: 4/1/2013      Comment: Lives in smoke free household     Alcohol use No      Comment: Quit 2010. Chemical dependency treatment in 1989. ...31 yrs of 1.5 tins weekly.  sober for 7 months..     Exam:  /88 (BP Location: Left arm, Patient Position: Sitting, Cuff Size: Adult Large)  Pulse 78  Wt 91.6 kg (202 lb)  SpO2 98%  BMI 28.17 kg/m2     GENERAL APPEARANCE: healthy, alert and no distress  HEENT: no icterus, no xanthelasmas, normal pupil size and reaction, normal palate, mucosa moist, no central cyanosis  NECK: no adenopathy, no asymmetry, masses, or scars, thyroid normal to palpation and no bruits, JVP not elevated  RESPIRATORY: lungs clear to auscultation - no rales, rhonchi or wheezes, no use of accessory muscles, no retractions, respirations are unlabored, normal respiratory rate  CARDIOVASCULAR: regular rhythm, normal S1 with physiologic split S2, no S3 or S4 and no murmur, click or rub, precordium quiet with normal PMI.  ABDOMEN: soft, non tender, without hepatosplenomegaly, no masses palpable, bowel sounds normal, aorta not enlarged by palpation, no abdominal bruits  EXTREMITIES: peripheral pulses normal, no edema, no bruits  NEURO: alert and oriented to person/place/time, normal speech, gait and affect  VASC: Radial, femoral, dorsalis pedis and posterior tibialis pulses are normal in volumes and symmetric bilaterally. No bruits are heard.  SKIN: no ecchymoses, no rashes    Labs:  CBC RESULTS:   Lab Results   Component Value Date    WBC 6.4 08/24/2016    RBC 4.53 08/24/2016    HGB 14.3 08/24/2016    HCT 40.6 08/24/2016    MCV 90 08/24/2016    MCH 31.6 08/24/2016    MCHC 35.2 08/24/2016    RDW 12.4 08/24/2016     08/24/2016       BMP RESULTS:  Lab Results   Component Value Date     08/24/2016     POTASSIUM 4.2 08/24/2016    CHLORIDE 106 08/24/2016    CO2 27 08/24/2016    ANIONGAP 4 08/24/2016    GLC 99 08/24/2016    BUN 10 08/24/2016    CR 0.78 05/10/2017    GFRESTIMATED >90  Non  GFR Calc   05/10/2017    GFRESTBLACK >90   GFR Calc   05/10/2017    HANNAH 9.0 08/24/2016        INR RESULTS:  No results found for: INR    Procedures: Reviewed previous procedures in [facility], which was patient's past EKGs. EKGs were consistent with sinus rhythm with 1st degree AV block.    Assessment and Plan:     Patient is a 52 year old male with a PMH most remarkable for an non-specified ablation (per patient occurred in 1980's, doesn't know the reason), anxiety, essential hypertension, who presents in clinic for evaluation of palpitations.     1. Palpitations. Unclear etiology, potentially recurrence of arrhythmia that patient reports was ablated in the 1980's, vs PVC/atrial fibrillation/SVT. Unlikely coronary heart disease, patient has good exercise tolerance. Episodes do not have alarming features, specifically patient has never had syncope with these symptoms and they are not associated with exercise.  - Will do 2 week Ziopatch study and have patient follow up with Jina Simons in clinic.  - Will do an echocardiogram as well to rule out valvular disease as patient reports angioplasty of one of his heart valves in the 1980's.      I have seen, interviewed, and examined patient. I have reviewed the laboratory tests, imaging, and other investigations. I have reviewed the management plan with the patient. I discussed with the team and agree with the findings and plan in this resident/fellow/nurse practitioner's note. In addition, changes in the physical examination, assessment and plan have been incorporated into the note by myself, as to make it a single cohesive document.       Amarilis Kraus MD, MS  Cardiology/Cardiac EP Attending Staff        CC  Patient Care Team:  Triston Adame MD as PCP -  General (Family Practice)

## 2018-09-24 NOTE — NURSING NOTE
"Chief Complaint   Patient presents with     Palpitations     New patient referred by : Danilo Jones MD for tachycardia.Per patient heart palpitations,lightheaded occationally.       Initial /88 (BP Location: Left arm, Patient Position: Sitting, Cuff Size: Adult Large)  Pulse 78  Wt 91.6 kg (202 lb)  SpO2 98%  BMI 28.17 kg/m2 Estimated body mass index is 28.17 kg/(m^2) as calculated from the following:    Height as of 9/18/18: 1.803 m (5' 11\").    Weight as of this encounter: 91.6 kg (202 lb)..  BP completed using cuff size: large    Luli Kaur L.P.N.  Ekg.test performed today per Provider order after patient Ekg. Education relayed to patient,then Ekg. Result handed to provider for review.  Luli Kaur L.P.N.          "

## 2018-09-24 NOTE — MR AVS SNAPSHOT
After Visit Summary   9/24/2018    Bairon Reed    MRN: [id number]           Patient Information     Date Of Birth          1965        Visit Information        Provider Department      9/24/2018 2:30 PM Amarilis Kraus MD [facility] PHYSICIANS HEART AT [facility]        Today's Diagnoses     Palpitations    -  1      Care Instructions    Thank you for coming to the [facility] Heart @ [employer]; please note the following instructions:    1. Echo today.    2.  We have applied a holter (heart) monitor for you to wear for 14 days.  You may remove the heart monitor on 10/8/18 at 5:00pm.  Please see the enclosed instructions for further information, as well as instructions for removal of the heart monitor and return mailing directions.  If you should have questions regarding your monitor, please call [email or web address] at [phone].  The Cardiology Nurse will contact you with your results (please see result notification details at bottom of summary).    *PLEASE DO NOT SHOWER OR INDUCE EXCESSIVE SWEATING IN THE FIRST 24 HOURS OF WEARING*    3. Follow up with Jina to discuss results. please see following pages for appointment detail information.            If you have any questions regarding your visit please contact your care team:     Cardiology  Telephone Number   Rani MILLER, ABDI KASPER, RN   Jocelyne HORAN, TIFFANIE TAYLOR, MA  Luli FRANCO, FARAZN   [phone]    *After hours: [phone]   For scheduling appts:     [phone] or    [phone] (select option 1)    *After hours: [phone]     For the Device Clinic (Pacemakers and ICD's)  RN's :  Iliana Robb   During business hours: [phone]    *After business hours:  [phone] (select option 4)      Normal test result notifications will be released via [email or web address] or mailed within 7 business days.  All other test results, will be communicated via telephone once reviewed by your  cardiologist.    If you need a medication refill please contact your pharmacy.  Please allow 3 business days for your refill to be completed.    As always, thank you for trusting us with your health care needs!                Follow-ups after your visit        Your next 10 appointments already scheduled     Sep 24, 2018  4:00 PM CDT   Ech Complete with [email or web address]   [facility] ([facility])    [address] [place] [id number]   [phone]           1.  Please bring or wear a comfortable two-piece outfit. 2.  You may eat, drink and take your normal medicines. 3.  For any questions that cannot be answered, please contact the ordering physician 4.  Please do not wear perfumes or scented lotions on the day of your exam.            Sep 25, 2018  9:20 AM CDT   PHYSICAL with Mono Chapman PA-C   [facility]    [address] [facility] [id number] [phone]            Nov 06, 2018  8:00 AM CST   Return Visit with YULIA Arce CNP   [facility] PHYSICIANS HEART AT [facility] ([facility])    [address] [place] [id number] [phone]              Future tests that were ordered for you today     Open Future Orders        Priority Expected Expires Ordered    Ziopatch Holter Monitor - Adult Routine  11/23/2018 9/24/2018            Who to contact     If you have questions or need follow up information about today's clinic visit or your schedule please contact [facility] PHYSICIANS HEART [facility] directly at [phone].  Normal or non-critical lab and imaging results will be communicated to you by MyChart, letter or phone within 4 business days after the clinic has received the results. If you do not hear from us within 7 days, please contact the clinic through MyChart or phone. If you have a critical or abnormal lab  result, we will notify you by phone as soon as possible.  Submit refill requests through [employer] or call your pharmacy and they will forward the refill request to us. Please allow 3 business days for your refill to be completed.          Additional Information About Your Visit        Care EveryWhere ID     This is your Care EveryWhere ID. This could be used by other organizations to access your [place] medical records  [id number]        Your Vitals Were     Pulse Pulse Oximetry BMI (Body Mass Index)             78 98% 28.17 kg/m2          Blood Pressure from Last 3 Encounters:   09/24/18 122/88   09/18/18 (!) 140/92   07/13/18 (!) 151/92    Weight from Last 3 Encounters:   09/24/18 91.6 kg (202 lb)   09/18/18 91.2 kg (201 lb)   06/15/18 88.5 kg (195 lb)               Primary Care Provider Office Phone # Fax #    Triston Adame [phone] [address] [facility] [address]        Equal Access to Services     BARAK OCAMPO : Hadii aad ku hadasho Soomaali, waaxda luqadaha, qaybta kaalmada adeegyada, bairon olivia haycipriano trotter . So [facility] [phone].    ATENCIÓN: Si habla español, tiene a walter disposición servicios gratuitos de asistencia lingüística. [facility] [phone].    We comply with applicable federal civil rights laws and [place] laws. We do not discriminate on the basis of race, color, national origin, age, disability, sex, sexual orientation, or gender identity.            Thank you!     Thank you for choosing [facility] PHYSICIANS HEART AT [facility]  for your care. Our goal is always to provide you with excellent care. Hearing back from our patients is one way we can continue to improve our services. Please take a few minutes to complete the written survey that you may receive in the mail after your visit with us. Thank you!             Your Updated Medication List - Protect others around you: Learn how to safely use, store and throw away your  medicines at www.disposemymeds.org.          This list is accurate as of 9/24/18  3:37 PM.  Always use your most recent med list.                   Brand Name Dispense Instructions for use Diagnosis    calcium carbonate 500 mg {elemental} 500 MG tablet    OS-HANNAH     Take 500 mg by mouth daily        glucosamine-chondroitin 500-400 MG Caps per capsule      Take 1 capsule by mouth daily        lisinopril 20 MG tablet    PRINIVIL/ZESTRIL    30 tablet    Take 1 tablet (20 mg) by mouth daily You need to be seen in the pharmacy for a blood pressure check before further refills.    Hypertension goal BP (blood pressure) < 140/90       MULTIVITAMIN PO      Take 1 tablet by mouth daily.        naproxen 500 MG tablet    NAPROSYN    60 tablet    TAKE 1 TABLET BY MOUTH TWICE DAILY AS NEEDED FOR MODERATE PAIN    Acute right-sided low back pain with right-sided sciatica       OMEGA-3 FISH OIL PO      Take 2 g by mouth daily        oxybutynin 5 MG 24 hr tablet    DITROPAN-XL    90 tablet    Take 1 tablet (5 mg) by mouth daily    OAB (overactive bladder)       tamsulosin 0.4 MG capsule    FLOMAX    180 capsule    Take 2 capsules (0.8 mg) by mouth daily    Benign non-nodular prostatic hyperplasia with lower urinary tract symptoms       VITAMIN D3 PO      Take 1,000 Units by mouth daily        VITAMIN E NATURAL PO      Take 100 Units by mouth

## 2018-09-24 NOTE — PROGRESS NOTES
HPI:    Patient is a 52 year old male who presents as self-referred for palpitations.    Patient is a 52 year old male with a PMH most remarkable for an non-specified ablation (per patient occurred in 1980's, doesn't know the reason), and non-specified angioplasty (per patient on one of his heart valves that was calcified) anxiety, essential hypertension, who presents in clinic for evaluation of palpitations. Patient states that he gets periods of palpitations that last 1 minute and are associated with dizziness/lightheadedness. Spontaneously abort. Occur randomly, ranging from 2-3X per day, to as little as once per week. Not associated with exercise. Additionally, patient has good exercise tolerance, can ride a bicycle 10-20 miles 3X a week.     Patient denies fevers, chills, chest pain, SOB, abdominal pain, nausea, vomiting, diarrhea. No syncope.     PAST MEDICAL HISTORY:  Past Medical History:   Diagnosis Date     Anxiety      Cervical spondylosis with myelopathy 2/4/2013     Cervicalgia      Chronic low back pain 9/13/2011     Chronic low back pain      Degeneration of cervical intervertebral disc      Gout      Hyperlipidemia LDL goal <160 11/18/2009     Hypertension goal BP (blood pressure) < 140/90 2/10/2015     Insomnia 11/18/2009     Nondependent alcohol abuse 11/14/2012     Pain in shoulder 9/13/2011     Peroneal nerve palsy Feb 2012    rt     Radiculopathy, cervical 9/13/2011     Shoulder impingement     right     Vocal cord paralysis     LEFT       CURRENT MEDICATIONS:  Current Outpatient Prescriptions   Medication Sig Dispense Refill     calcium carbonate (OS-HANNAH 500 MG [place]. [place]) 500 MG tablet Take 500 mg by mouth daily       Cholecalciferol (VITAMIN D3 PO) Take 1,000 Units by mouth daily       glucosamine-chondroitin 500-400 MG CAPS Take 1 capsule by mouth daily       lisinopril (PRINIVIL/ZESTRIL) 20 MG tablet Take 1 tablet (20 mg) by mouth daily You need to be seen in the pharmacy for a blood pressure  check before further refills. 30 tablet 0     Multiple Vitamins-Minerals (MULTIVITAMIN OR) Take 1 tablet by mouth daily.       naproxen (NAPROSYN) 500 MG tablet TAKE 1 TABLET BY MOUTH TWICE DAILY AS NEEDED FOR MODERATE PAIN 60 tablet 0     Omega-3 Fatty Acids (OMEGA-3 FISH OIL PO) Take 2 g by mouth daily       oxybutynin (DITROPAN-XL) 5 MG 24 hr tablet Take 1 tablet (5 mg) by mouth daily 90 tablet 3     tamsulosin (FLOMAX) 0.4 MG capsule Take 2 capsules (0.8 mg) by mouth daily 180 capsule 3     VITAMIN E NATURAL PO Take 100 Units by mouth         PAST SURGICAL HISTORY:  Past Surgical History:   Procedure Laterality Date     BACK SURGERY  1986-1988    x 3. Lower back surgery     DISCECTOMY LUMBAR POSTERIOR MICROSCOPIC ONE LEVEL  5/17/2013    Procedure: DISCECTOMY LUMBAR POSTERIOR MICROSCOPIC ONE LEVEL;  BILATERAL LUMBAR L4-5 EXTENDED HEMILAMINECTOMY, MICROFORAMINOTOMY AND MICRODISCECTOMY WITH METRIX II;  Surgeon: Andrew Alejandre MD;  Location:  OR     FUSION CERVICAL ANTERIOR ONE LEVEL  3/5/2013    Procedure: FUSION CERVICAL ANTERIOR ONE LEVEL;  C 6-7 ANTERIOR DISCECTOMY AND FUSION WITH NEURO MONITORING ;  Surgeon: Andrew Alejandre MD;  Location:  OR     KNEE SURGERY  2009    Right     LARYNGOSCOPY WITH MICROSCOPE  5/28/2013    Procedure: LARYNGOSCOPY WITH MICROSCOPE;  MICRODIRECT LARYNGOSCOPY, WITH LEFT VOCAL CORD INJECTION ;  Surgeon: Jorge Vergara MD;  Location: [facility]     NECK SURGERY  2/2010    [facility]       ALLERGIES:   No Known Allergies    FAMILY HISTORY:  + Premature coronary artery disease  - Atrial fibrillation  - Sudden cardiac death     SOCIAL HISTORY:  Social History   Substance Use Topics     Smoking status: Never Smoker     Smokeless tobacco: Current User     Types: Chew     Last attempt to quit: 4/1/2013      Comment: Lives in smoke free household     Alcohol use No      Comment: Quit 2010. Chemical dependency treatment in 1989. ...31 yrs of 1.5 tins weekly.  sober  for 7 months..       ROS:   Constitutional: No fever, chills, or sweats. Weight stable.   ENT: No visual disturbance, ear ache, epistaxis, sore throat.   Cardiovascular: As per HPI.   Respiratory: No cough, hemoptysis.    GI: No nausea, vomiting, hematemesis, melena, or hematochezia.   : No hematuria.   Integument: Negative.   Psychiatric: Negative.   Hematologic:  Easy bruising, no easy bleeding.  Neuro: Negative.   Endocrinology: No significant heat or cold intolerance   Musculoskeletal: No myalgia.    Exam:  /88 (BP Location: Left arm, Patient Position: Sitting, Cuff Size: Adult Large)  Pulse 78  Wt 91.6 kg (202 lb)  SpO2 98%  BMI 28.17 kg/m2     GENERAL APPEARANCE: healthy, alert and no distress  HEENT: no icterus, no xanthelasmas, normal pupil size and reaction, normal palate, mucosa moist, no central cyanosis  NECK: no adenopathy, no asymmetry, masses, or scars, thyroid normal to palpation and no bruits, JVP not elevated  RESPIRATORY: lungs clear to auscultation - no rales, rhonchi or wheezes, no use of accessory muscles, no retractions, respirations are unlabored, normal respiratory rate  CARDIOVASCULAR: regular rhythm, normal S1 with physiologic split S2, no S3 or S4 and no murmur, click or rub, precordium quiet with normal PMI.  ABDOMEN: soft, non tender, without hepatosplenomegaly, no masses palpable, bowel sounds normal, aorta not enlarged by palpation, no abdominal bruits  EXTREMITIES: peripheral pulses normal, no edema, no bruits  NEURO: alert and oriented to person/place/time, normal speech, gait and affect  VASC: Radial, femoral, dorsalis pedis and posterior tibialis pulses are normal in volumes and symmetric bilaterally. No bruits are heard.  SKIN: no ecchymoses, no rashes    Labs:  CBC RESULTS:   Lab Results   Component Value Date    WBC 6.4 08/24/2016    RBC 4.53 08/24/2016    HGB 14.3 08/24/2016    HCT 40.6 08/24/2016    MCV 90 08/24/2016    MCH 31.6 08/24/2016    MCHC 35.2 08/24/2016     RDW 12.4 08/24/2016     08/24/2016       BMP RESULTS:  Lab Results   Component Value Date     08/24/2016    POTASSIUM 4.2 08/24/2016    CHLORIDE 106 08/24/2016    CO2 27 08/24/2016    ANIONGAP 4 08/24/2016    GLC 99 08/24/2016    BUN 10 08/24/2016    CR 0.78 05/10/2017    GFRESTIMATED >90  Non  GFR Calc   05/10/2017    GFRESTBLACK >90   GFR Calc   05/10/2017    HANNAH 9.0 08/24/2016        INR RESULTS:  No results found for: INR    Procedures: Reviewed previous procedures in [facility], which was patient's past EKGs. EKGs were consistent with sinus rhythm with 1st degree AV block.    Assessment and Plan:     Patient is a 52 year old male with a PMH most remarkable for an non-specified ablation (per patient occurred in 1980's, doesn't know the reason), anxiety, essential hypertension, who presents in clinic for evaluation of palpitations.     1. Palpitations. Unclear etiology, potentially recurrence of arrhythmia that patient reports was ablated in the 1980's, vs PVC/atrial fibrillation/SVT. Unlikely coronary heart disease, patient has good exercise tolerance. Episodes do not have alarming features, specifically patient has never had syncope with these symptoms and they are not associated with exercise.  - Will do 2 week Ziopatch study and have patient follow up with Jina Simons in clinic.  - Will do an echocardiogram as well to rule out valvular disease as patient reports angioplasty of one of his heart valves in the 1980's.      I have seen, interviewed, and examined patient. I have reviewed the laboratory tests, imaging, and other investigations. I have reviewed the management plan with the patient. I discussed with the team and agree with the findings and plan in this resident/fellow/nurse practitioner's note. In addition, changes in the physical examination, assessment and plan have been incorporated into the note by myself, as to make it a single cohesive document.        Amarilis Kraus MD, MS  Cardiology/Cardiac EP Attending Staff        CC  Patient Care Team:  Triston Adame MD as PCP - General (Family Practice)

## 2018-09-24 NOTE — PROGRESS NOTES
SUBJECTIVE:   CC: Barion Reed is an 52 year old male who presents for preventative health visit.       Annual Exam:  Getting at least 3 servings of Calcium per day:: NO  Bi-annual eye exam:: Yes  Dental care twice a year:: NO  Sleep apnea or symptoms of sleep apnea:: None  Diet:: Regular (no restrictions)  Frequency of exercise:: 2-3 days/week  Positive for the following: diarrhea, frequency, mood changes, palpitations, urgency, weakness  Negative for the following: abdominal pain, Blood in stool, Blood in urine, chest pain, chills, congestion, constipation, cough, dizziness, ear pain, eye pain, nervous/anxious, fever, genital sores, headaches, hearing loss, heartburn, arthralgias, joint swelling, peripheral edema, myalgias, nausea, dysuria, Skin sensation changes, sore throat, rash, shortness of breath, visual disturbance  impotence: No  penile discharge: No  Taking medications regularly:: Yes  Medication side effects:: Not applicable  Additional concerns today:: No  PHQ-2 Score: 0  Duration of exercise:: 15-30 minutes    Also con't urinary frequency every 1-2 yrs. Stopped flomax with no help.  No dysurian or hematuria.     Also would like to quit chewing. Tx: none    Also would like order for colonoscopy.    Also history of heart palpitations, is in process of working with cardiology for evaluation.      Today's PHQ-2 Score:   PHQ-2 ( 1999 [employer]) 9/25/2018 8/24/2016   Q1: Little interest or pleasure in doing things 0 0   Q2: Feeling down, depressed or hopeless 0 0   PHQ-2 Score 0 0   Q1: Little interest or pleasure in doing things Not at all -   Q2: Feeling down, depressed or hopeless Not at all -   PHQ-2 Score 0 -       Abuse: Current or Past(Physical, Sexual or Emotional)- No  Do you feel safe in your environment - No    Social History   Substance Use Topics     Smoking status: Never Smoker     Smokeless tobacco: Current User     Types: Chew     Last attempt to quit: 4/1/2013      Comment: Lives in  smoke free household     Alcohol use No      Comment: Quit 2010. Chemical dependency treatment in 1989. ...31 yrs of 1.5 tins weekly.  sober for 7 months..      If you drink alcohol do you typically have >3 drinks per day or >7 drinks per week? No                      Last PSA:   PSA   Date Value Ref Range Status   01/25/2016 1.51 0 - 4 ug/L Final       Reviewed orders with patient. Reviewed health maintenance and updated orders accordingly - Yes  Labs reviewed in EPIC  BP Readings from Last 3 Encounters:   09/25/18 122/86   09/24/18 122/88   09/18/18 (!) 140/92    Wt Readings from Last 3 Encounters:   09/25/18 201 lb (91.2 kg)   09/24/18 202 lb (91.6 kg)   09/18/18 201 lb (91.2 kg)                  Patient Active Problem List   Diagnosis     Hyperlipidemia LDL goal <160     Insomnia     Bilateral arm weakness     Anxiety     Nondependent alcohol abuse     Shoulder impingement - right     Cervical radiculopathy at C7     Cervicalgia     Degeneration of cervical intervertebral disc     Vitamin D deficiency     Cervical radiculopathy     S/P spinal fusion     Peroneal nerve palsy     Foot drop, right     Lumbar stenosis with neurogenic claudication     Lumbar radiculopathy     Lumbar herniated disc     S/P lumbar microdiscectomy     Hypertension goal BP (blood pressure) < 140/90     Benign non-nodular prostatic hyperplasia with lower urinary tract symptoms     Gout, unspecified cause, unspecified chronicity, unspecified site     Acute left-sided low back pain with left-sided sciatica     Tobacco abuse     Past Surgical History:   Procedure Laterality Date     BACK SURGERY  1986-1988    x 3. Lower back surgery     DISCECTOMY LUMBAR POSTERIOR MICROSCOPIC ONE LEVEL  5/17/2013    Procedure: DISCECTOMY LUMBAR POSTERIOR MICROSCOPIC ONE LEVEL;  BILATERAL LUMBAR L4-5 EXTENDED HEMILAMINECTOMY, MICROFORAMINOTOMY AND MICRODISCECTOMY WITH METRIX II;  Surgeon: Andrew Alejandre MD;  Location: [facility]     FUSION CERVICAL  ANTERIOR ONE LEVEL  3/5/2013    Procedure: FUSION CERVICAL ANTERIOR ONE LEVEL;  C 6-7 ANTERIOR DISCECTOMY AND FUSION WITH NEURO MONITORING ;  Surgeon: Andrew Alejandre MD;  Location:  OR     KNEE SURGERY  2009    Right     LARYNGOSCOPY WITH MICROSCOPE  5/28/2013    Procedure: LARYNGOSCOPY WITH MICROSCOPE;  MICRODIRECT LARYNGOSCOPY, WITH LEFT VOCAL CORD INJECTION ;  Surgeon: Jorge Vergara MD;  Location:  [facility]     NECK SURGERY  2/2010    [facility]       Social History   Substance Use Topics     Smoking status: Never Smoker     Smokeless tobacco: Current User     Types: Chew     Last attempt to quit: 4/1/2013      Comment: Lives in smoke free household     Alcohol use No      Comment: Quit 2010. Chemical dependency treatment in 1989. ...31 yrs of 1.5 tins weekly.  sober for 7 months..     Family History   Problem Relation Age of Onset     HEART DISEASE Father 45     heart attack     Myocardial Infarction Father      Arthritis Sister      HEART DISEASE Mother 51     pneumonia and enlarged heart     Myocardial Infarction Other      Glaucoma No family hx of      Macular Degeneration No family hx of      Cancer No family hx of      Diabetes No family hx of      Hypertension No family hx of      Cerebrovascular Disease No family hx of      Thyroid Disease No family hx of          Current Outpatient Prescriptions   Medication Sig Dispense Refill     calcium carbonate (OS-HANNAH 500 MG [place]. [place]) 500 MG tablet Take 500 mg by mouth daily       Cholecalciferol (VITAMIN D3 PO) Take 1,000 Units by mouth daily       glucosamine-chondroitin 500-400 MG CAPS Take 1 capsule by mouth daily       lisinopril (PRINIVIL/ZESTRIL) 20 MG tablet Take 1 tablet (20 mg) by mouth daily 90 tablet 1     Multiple Vitamins-Minerals (MULTIVITAMIN OR) Take 1 tablet by mouth daily.       naproxen (NAPROSYN) 500 MG tablet TAKE 1 TABLET BY MOUTH TWICE DAILY AS NEEDED FOR MODERATE PAIN 60 tablet 0     Omega-3 Fatty Acids (OMEGA-3 FISH OIL  PO) Take 2 g by mouth daily       oxybutynin (DITROPAN XL) 10 MG 24 hr tablet Take 1 tablet (10 mg) by mouth daily 90 tablet 1     oxybutynin (DITROPAN-XL) 5 MG 24 hr tablet Take 1 tablet (5 mg) by mouth daily 90 tablet 3     VITAMIN E NATURAL PO Take 100 Units by mouth       [DISCONTINUED] lisinopril (PRINIVIL/ZESTRIL) 20 MG tablet Take 1 tablet (20 mg) by mouth daily You need to be seen in the pharmacy for a blood pressure check before further refills. 30 tablet 0     No Known Allergies    Reviewed and updated as needed this visit by clinical staff  Tobacco  Allergies  Meds  Problems  Med Hx  Surg Hx  Fam Hx  Soc Hx          Reviewed and updated as needed this visit by Provider  Allergies  Meds  Problems          Past Medical History:   Diagnosis Date     Anxiety      Cervical spondylosis with myelopathy 2/4/2013     Cervicalgia      Chronic low back pain 9/13/2011     Chronic low back pain      Degeneration of cervical intervertebral disc      Gout      Hyperlipidemia LDL goal <160 11/18/2009     Hypertension goal BP (blood pressure) < 140/90 2/10/2015     Insomnia 11/18/2009     Nondependent alcohol abuse 11/14/2012     Pain in shoulder 9/13/2011     Peroneal nerve palsy Feb 2012    rt     Radiculopathy, cervical 9/13/2011     Shoulder impingement     right     Vocal cord paralysis     LEFT      Past Surgical History:   Procedure Laterality Date     BACK SURGERY  1986-1988    x 3. Lower back surgery     DISCECTOMY LUMBAR POSTERIOR MICROSCOPIC ONE LEVEL  5/17/2013    Procedure: DISCECTOMY LUMBAR POSTERIOR MICROSCOPIC ONE LEVEL;  BILATERAL LUMBAR L4-5 EXTENDED HEMILAMINECTOMY, MICROFORAMINOTOMY AND MICRODISCECTOMY WITH METRIX II;  Surgeon: Andrew Alejandre MD;  Location: [facility]     FUSION CERVICAL ANTERIOR ONE LEVEL  3/5/2013    Procedure: FUSION CERVICAL ANTERIOR ONE LEVEL;  C 6-7 ANTERIOR DISCECTOMY AND FUSION WITH NEURO MONITORING ;  Surgeon: Andrew Alejandre MD;  Location:  OR      "KNEE SURGERY  2009    Right     LARYNGOSCOPY WITH MICROSCOPE  5/28/2013    Procedure: LARYNGOSCOPY WITH MICROSCOPE;  MICRODIRECT LARYNGOSCOPY, WITH LEFT VOCAL CORD INJECTION ;  Surgeon: Jorge Vergara MD;  Location: [facility]     NECK SURGERY  2/2010    [facility]       ROS:  CONSTITUTIONAL: NEGATIVE for fever, chills, change in weight  INTEGUMENTARY/SKIN: NEGATIVE for worrisome rashes, moles or lesions  EYES: NEGATIVE for vision changes or irritation  ENT: NEGATIVE for ear, mouth and throat problems  RESP: NEGATIVE for significant cough or SOB  CV: NEGATIVE for chest pain, palpitations or peripheral edema  GI: NEGATIVE for nausea, abdominal pain, heartburn, or change in bowel habits   male: negative for dysuria, hematuria, decreased urinary stream, erectile dysfunction, urethral discharge  MUSCULOSKELETAL: NEGATIVE for significant arthralgias or myalgia  NEURO: NEGATIVE for weakness, dizziness or paresthesias  PSYCHIATRIC: NEGATIVE for changes in mood or affect    OBJECTIVE:   /86  Pulse 77  Temp 97.7  F (36.5  C) (Oral)  Ht 5' 10\" (1.778 m)  Wt 201 lb (91.2 kg)  SpO2 97%  BMI 28.84 kg/m2  EXAM:  GENERAL: healthy, alert and no distress  EYES: Eyes grossly normal to inspection, PERRL and conjunctivae and sclerae normal  HENT: ear canals and TM's normal, nose and mouth without ulcers or lesions  NECK: no adenopathy, no asymmetry, masses, or scars and thyroid normal to palpation  RESP: lungs clear to auscultation - no rales, rhonchi or wheezes  CV: regular rate and rhythm, normal S1 S2, no S3 or S4, no murmur, click or rub, no peripheral edema and peripheral pulses strong  ABDOMEN: soft, nontender, no hepatosplenomegaly, no masses and bowel sounds normal   (male): normal male genitalia without lesions or urethral discharge, no hernia  MS: no gross musculoskeletal defects noted, no edema  SKIN: no suspicious lesions or rashes  NEURO: Normal strength and tone, mentation intact and speech " normal  PSYCH: mentation appears normal, affect normal/bright    Diagnostic Test Results:  Results for orders placed or performed in visit on 09/25/18 (from the past 24 hour(s))   CBC with platelets differential   Result Value Ref Range    WBC 5.5 4.0 - 11.0 10e9/L    RBC Count 4.96 4.4 - 5.9 10e12/L    Hemoglobin 16.0 13.3 - 17.7 g/dL    Hematocrit 46.2 40.0 - 53.0 %    MCV 93 78 - 100 fl    MCH 32.3 26.5 - 33.0 pg    MCHC 34.6 31.5 - 36.5 g/dL    RDW 12.4 10.0 - 15.0 %    Platelet Count 305 150 - 450 10e9/L    % Neutrophils 49.0 %    % Lymphocytes 35.0 %    % Monocytes 12.0 %    % Eosinophils 3.0 %    % Basophils 1.0 %    Absolute Neutrophil 2.6 1.6 - 8.3 10e9/L    Absolute Lymphocytes 1.9 0.8 - 5.3 10e9/L    Absolute Monocytes 0.7 0.0 - 1.3 10e9/L    Absolute Eosinophils 0.2 0.0 - 0.7 10e9/L    Absolute Basophils 0.1 0.0 - 0.2 10e9/L    Reactive Lymphs Present     RBC Morphology Normal     Platelet Estimate       Automated count confirmed.  Platelet morphology is normal.    Diff Method Automated Method        ASSESSMENT/PLAN:       ICD-10-CM    1. Routine general medical examination at a health care facility Z00.00 Lipid panel reflex to direct LDL Fasting     Comprehensive metabolic panel     TSH with free T4 reflex     CBC with platelets differential   2. Hypertension goal BP (blood pressure) < 140/90 I10 lisinopril (PRINIVIL/ZESTRIL) 20 MG tablet   3. Heart palpitations R00.2    4. Benign non-nodular prostatic hyperplasia with lower urinary tract symptoms N40.1 PSA, screen     oxybutynin (DITROPAN XL) 10 MG 24 hr tablet     OFFICE/OUTPT VISIT,EST,LEVL II   5. OAB (overactive bladder) N32.81 OFFICE/OUTPT VISIT,EST,LEVL II   6. Urinary frequency R35.0 oxybutynin (DITROPAN XL) 10 MG 24 hr tablet     OFFICE/OUTPT VISIT,EST,LEVL II   7. Tobacco abuse Z72.0    8. Screen for colon cancer Z12.11 GASTROENTEROLOGY ADULT REF PROCEDURE ONLY   1. Work on Healthy diet and exercise. Getting heart rate elevated for 30 mins  "most days of week.  Lab pending  2. Stable ok for refills.  3. con't care with cardiology  4-6 increase oxybutynin to 10mg daily. warning signs discussed. side effects discussed follow up  4 wks as needed. Consider follow up  With Urology is no relief.   7. Discussed over the counter nicotine gum.     COUNSELING:  Reviewed preventive health counseling, as reflected in patient instructions       Regular exercise       Healthy diet/nutrition    BP Readings from Last 1 Encounters:   09/25/18 122/86     Estimated body mass index is 28.84 kg/(m^2) as calculated from the following:    Height as of this encounter: 5' 10\" (1.778 m).    Weight as of this encounter: 201 lb (91.2 kg).      Weight management plan: Discussed healthy diet and exercise guidelines and patient will follow up in 12 months in clinic to re-evaluate.     reports that he has never smoked. His smokeless tobacco use includes Chew.      Counseling Resources:  ATP IV Guidelines  Pooled Cohorts Equation Calculator  FRAX Risk Assessment  ICSI Preventive Guidelines  Dietary Guidelines for Americans, 2010  USDA's MyPlate  ASA Prophylaxis  Lung CA Screening    Mono Chapman PA-C  [facility]ROS submitted by the patient on 9/25/2018     "

## 2018-09-25 ENCOUNTER — OFFICE VISIT (OUTPATIENT)
Dept: FAMILY MEDICINE | Facility: CLINIC | Age: 53
End: 2018-09-25
Payer: COMMERCIAL

## 2018-09-25 VITALS
BODY MASS INDEX: 28.77 KG/M2 | TEMPERATURE: 97.7 F | WEIGHT: 201 LBS | DIASTOLIC BLOOD PRESSURE: 86 MMHG | HEART RATE: 77 BPM | HEIGHT: 70 IN | SYSTOLIC BLOOD PRESSURE: 122 MMHG | OXYGEN SATURATION: 97 %

## 2018-09-25 DIAGNOSIS — Z12.11 SCREEN FOR COLON CANCER: ICD-10-CM

## 2018-09-25 DIAGNOSIS — R00.2 HEART PALPITATIONS: ICD-10-CM

## 2018-09-25 DIAGNOSIS — Z00.00 ROUTINE GENERAL MEDICAL EXAMINATION AT A HEALTH CARE FACILITY: Primary | ICD-10-CM

## 2018-09-25 DIAGNOSIS — R35.0 URINARY FREQUENCY: ICD-10-CM

## 2018-09-25 DIAGNOSIS — N32.81 OAB (OVERACTIVE BLADDER): ICD-10-CM

## 2018-09-25 DIAGNOSIS — N40.1 BENIGN NON-NODULAR PROSTATIC HYPERPLASIA WITH LOWER URINARY TRACT SYMPTOMS: ICD-10-CM

## 2018-09-25 DIAGNOSIS — I10 HYPERTENSION GOAL BP (BLOOD PRESSURE) < 140/90: ICD-10-CM

## 2018-09-25 DIAGNOSIS — Z72.0 TOBACCO ABUSE: ICD-10-CM

## 2018-09-25 LAB
ALBUMIN SERPL-MCNC: 4.4 G/DL (ref 3.4–5)
ALP SERPL-CCNC: 53 U/L (ref 40–150)
ALT SERPL W P-5'-P-CCNC: 48 U/L (ref 0–70)
ANION GAP SERPL CALCULATED.3IONS-SCNC: 8 MMOL/L (ref 3–14)
AST SERPL W P-5'-P-CCNC: 27 U/L (ref 0–45)
BASOPHILS # BLD AUTO: 0.1 10E9/L (ref 0–0.2)
BASOPHILS NFR BLD AUTO: 1 %
BILIRUB SERPL-MCNC: 0.5 MG/DL (ref 0.2–1.3)
BUN SERPL-MCNC: 13 MG/DL (ref 7–30)
CALCIUM SERPL-MCNC: 9 MG/DL (ref 8.5–10.1)
CHLORIDE SERPL-SCNC: 105 MMOL/L (ref 94–109)
CHOLEST SERPL-MCNC: 236 MG/DL
CO2 SERPL-SCNC: 27 MMOL/L (ref 20–32)
CREAT SERPL-MCNC: 1.03 MG/DL (ref 0.66–1.25)
DIFFERENTIAL METHOD BLD: NORMAL
EOSINOPHIL # BLD AUTO: 0.2 10E9/L (ref 0–0.7)
EOSINOPHIL NFR BLD AUTO: 3 %
ERYTHROCYTE [DISTWIDTH] IN BLOOD BY AUTOMATED COUNT: 12.4 % (ref 10–15)
GFR SERPL CREATININE-BSD FRML MDRD: 76 ML/MIN/1.7M2
GLUCOSE SERPL-MCNC: 101 MG/DL (ref 70–99)
HCT VFR BLD AUTO: 46.2 % (ref 40–53)
HDLC SERPL-MCNC: 40 MG/DL
HGB BLD-MCNC: 16 G/DL (ref 13.3–17.7)
LDLC SERPL CALC-MCNC: 162 MG/DL
LYMPHOCYTES # BLD AUTO: 1.9 10E9/L (ref 0.8–5.3)
LYMPHOCYTES NFR BLD AUTO: 35 %
MCH RBC QN AUTO: 32.3 PG (ref 26.5–33)
MCHC RBC AUTO-ENTMCNC: 34.6 G/DL (ref 31.5–36.5)
MCV RBC AUTO: 93 FL (ref 78–100)
MONOCYTES # BLD AUTO: 0.7 10E9/L (ref 0–1.3)
MONOCYTES NFR BLD AUTO: 12 %
NEUTROPHILS # BLD AUTO: 2.6 10E9/L (ref 1.6–8.3)
NEUTROPHILS NFR BLD AUTO: 49 %
NONHDLC SERPL-MCNC: 196 MG/DL
PLATELET # BLD AUTO: 305 10E9/L (ref 150–450)
PLATELET # BLD EST: NORMAL 10*3/UL
POTASSIUM SERPL-SCNC: 4.2 MMOL/L (ref 3.4–5.3)
PROT SERPL-MCNC: 7.4 G/DL (ref 6.8–8.8)
PSA SERPL-ACNC: 1.66 UG/L (ref 0–4)
RBC # BLD AUTO: 4.96 10E12/L (ref 4.4–5.9)
RBC MORPH BLD: NORMAL
SODIUM SERPL-SCNC: 140 MMOL/L (ref 133–144)
TRIGL SERPL-MCNC: 168 MG/DL
TSH SERPL DL<=0.005 MIU/L-ACNC: 1.07 MU/L (ref 0.4–4)
VARIANT LYMPHS BLD QL SMEAR: PRESENT
WBC # BLD AUTO: 5.5 10E9/L (ref 4–11)

## 2018-09-25 PROCEDURE — 85025 COMPLETE CBC W/AUTO DIFF WBC: CPT | Performed by: PHYSICIAN ASSISTANT

## 2018-09-25 PROCEDURE — 80061 LIPID PANEL: CPT | Performed by: PHYSICIAN ASSISTANT

## 2018-09-25 PROCEDURE — 99212 OFFICE O/P EST SF 10 MIN: CPT | Mod: 25 | Performed by: PHYSICIAN ASSISTANT

## 2018-09-25 PROCEDURE — G0103 PSA SCREENING: HCPCS | Performed by: PHYSICIAN ASSISTANT

## 2018-09-25 PROCEDURE — 80053 COMPREHEN METABOLIC PANEL: CPT | Performed by: PHYSICIAN ASSISTANT

## 2018-09-25 PROCEDURE — 84443 ASSAY THYROID STIM HORMONE: CPT | Performed by: PHYSICIAN ASSISTANT

## 2018-09-25 PROCEDURE — 99396 PREV VISIT EST AGE 40-64: CPT | Mod: 25 | Performed by: PHYSICIAN ASSISTANT

## 2018-09-25 PROCEDURE — 36415 COLL VENOUS BLD VENIPUNCTURE: CPT | Performed by: PHYSICIAN ASSISTANT

## 2018-09-25 RX ORDER — LISINOPRIL 20 MG/1
20 TABLET ORAL DAILY
Qty: 90 TABLET | Refills: 1 | Status: SHIPPED | OUTPATIENT
Start: 2018-09-25 | End: 2019-09-30

## 2018-09-25 RX ORDER — OXYBUTYNIN CHLORIDE 10 MG/1
10 TABLET, EXTENDED RELEASE ORAL DAILY
Qty: 90 TABLET | Refills: 1 | Status: SHIPPED | OUTPATIENT
Start: 2018-09-25 | End: 2019-08-23

## 2018-09-25 ASSESSMENT — ENCOUNTER SYMPTOMS
COUGH: 0
FREQUENCY: 1
DYSURIA: 0
HEMATOCHEZIA: 0
HEADACHES: 0
FEVER: 0
MYALGIAS: 0
SHORTNESS OF BREATH: 0
NERVOUS/ANXIOUS: 0
WEAKNESS: 1
HEARTBURN: 0
HEMATURIA: 0
JOINT SWELLING: 0
SORE THROAT: 0
EYE PAIN: 0
ABDOMINAL PAIN: 0
CHILLS: 0
CONSTIPATION: 0
DIZZINESS: 0
DIARRHEA: 1
PALPITATIONS: 1
ARTHRALGIAS: 0
PARESTHESIAS: 0
NAUSEA: 0

## 2018-09-25 NOTE — MR AVS SNAPSHOT
After Visit Summary   9/25/2018    Bairon Reed    MRN: [id number]           Patient Information     Date Of Birth          1965        Visit Information        Provider Department      9/25/2018 9:20 AM Mono Chapman PA-C [facility]        Today's Diagnoses     Routine general medical examination at a health care facility    -  1    Hypertension goal BP (blood pressure) < 140/90        Heart palpitations        Benign non-nodular prostatic hyperplasia with lower urinary tract symptoms        OAB (overactive bladder)        Urinary frequency        Tobacco abuse        Screen for colon cancer          Care Instructions      Preventive Health Recommendations  Male Ages 50 - 64    Yearly exam:             See your health care provider every year in order to  o   Review health changes.   o   Discuss preventive care.    o   Review your medicines if your doctor has prescribed any.     Have a cholesterol test every 5 years, or more frequently if you are at risk for high cholesterol/heart disease.     Have a diabetes test (fasting glucose) every three years. If you are at risk for diabetes, you should have this test more often.     Have a colonoscopy at age 50, or have a yearly FIT test (stool test). These exams will check for colon cancer.      Talk with your health care provider about whether or not a prostate cancer screening test (PSA) is right for you.    You should be tested each year for STDs (sexually transmitted diseases), if you re at risk.     Shots: Get a flu shot each year. Get a tetanus shot every 10 years.     Nutrition:    Eat at least 5 servings of fruits and vegetables daily.     Eat whole-grain bread, whole-wheat pasta and brown rice instead of white grains and rice.     Get adequate Calcium and Vitamin D.     Lifestyle    Exercise for at least 150 minutes a week (30 minutes a day, 5 days a week). This will help you control your weight and prevent disease.      Limit alcohol to one drink per day.     No smoking.     Wear sunscreen to prevent skin cancer.     See your dentist every six months for an exam and cleaning.     See your eye doctor every 1 to 2 years.            Follow-ups after your visit        Additional Services     GASTROENTEROLOGY ADULT REF PROCEDURE ONLY       Last Lab Result: Creatinine (mg/dL)       Date                     Value                 05/10/2017               0.78             ----------  Body mass index is 28.84 kg/(m^2).      Patient will be contacted to schedule procedure.     Please be aware that coverage of these services is subject to the terms and limitations of your health insurance plan.  Call member services at your health plan with any benefit or coverage questions.  Any procedures must be performed at a [facility] facility OR coordinated by your clinic's referral office.    Please bring the following with you to your appointment:    (1) Any X-Rays, CTs or MRIs which have been performed.  Contact the facility where they were done to arrange for  prior to your scheduled appointment.    (2) List of current medications   (3) This referral request   (4) Any documents/labs given to you for this referral                  Your next 10 appointments already scheduled     Nov 06, 2018  8:00 AM CST   Return Visit with YULIA Arce CNP   [facility])    [address] [place] [address] [address] [facility] [address] [phone]              Who to contact     If you have questions or need follow up information about today's clinic visit or your schedule please contact [facility] directly at [phone].  Normal or non-critical lab and imaging results will be communicated to you by MyChart, letter or phone within 4 business days after the clinic has received the results. If you do not hear from us within 7 days, please contact the clinic through  "My [employer] or phone. If you have a critical or abnormal lab result, we will notify you by phone as soon as possible.  Submit refill requests through [employer] or call your pharmacy and they will forward the refill request to us. Please allow 3 business days for your refill to be completed.          Additional Information About Your Visit        Care EveryWhere ID     This is your Care EveryWhere ID. This could be used by other organizations to access your [facility] medical records  [id number]        Your Vitals Were     Pulse Temperature Height Pulse Oximetry BMI (Body Mass Index)       77 97.7  F (36.5  C) (Oral) 5' 10\" (1.778 m) 97% 28.84 kg/m2        Blood Pressure from Last 3 Encounters:   09/25/18 122/86   09/24/18 122/88   09/18/18 (!) 140/92    Weight from Last 3 Encounters:   09/25/18 201 lb (91.2 kg)   09/24/18 202 lb (91.6 kg)   09/18/18 201 lb (91.2 kg)              We Performed the Following     CBC with platelets differential     Comprehensive metabolic panel     GASTROENTEROLOGY ADULT REF PROCEDURE ONLY     Lipid panel reflex to direct LDL Fasting     PSA, screen     TSH with free T4 reflex          Today's Medication Changes          These changes are accurate as of 9/25/18 10:20 AM.  If you have any questions, ask your nurse or doctor.               These medicines have changed or have updated prescriptions.        Dose/Directions    lisinopril 20 MG tablet   Commonly known as:  PRINIVIL/ZESTRIL   This may have changed:  additional instructions   Used for:  Hypertension goal BP (blood pressure) < 140/90   Changed by:  Mono Chapman PA-C        Dose:  20 mg   Take 1 tablet (20 mg) by mouth daily   Quantity:  90 tablet   Refills:  1       * oxybutynin 5 MG 24 hr tablet   Commonly known as:  DITROPAN-XL   This may have changed:  Another medication with the same name was added. Make sure you understand how and when to take each.   Used for:  OAB (overactive bladder)   Changed by:  Mono Chapman " CL Zapata        Dose:  5 mg   Take 1 tablet (5 mg) by mouth daily   Quantity:  90 tablet   Refills:  3       * oxybutynin 10 MG 24 hr tablet   Commonly known as:  DITROPAN XL   This may have changed:  You were already taking a medication with the same name, and this prescription was added. Make sure you understand how and when to take each.   Used for:  Benign non-nodular prostatic hyperplasia with lower urinary tract symptoms, Urinary frequency   Changed by:  Mono Chapman PA-C        Dose:  10 mg   Take 1 tablet (10 mg) by mouth daily   Quantity:  90 tablet   Refills:  1       * Notice:  This list has 2 medication(s) that are the same as other medications prescribed for you. Read the directions carefully, and ask your doctor or other care provider to review them with you.         Where to get your medicines      These medications were sent to [facility] [address] [place] [address] [address] [place] [address]  [address] [facility] [address]     Phone:  [phone]     lisinopril 20 MG tablet    oxybutynin 10 MG 24 hr tablet                Primary Care Provider Office Phone # Fax #    Triston Adame [phone] [address] [facility] [address]        Equal Access to Services     MUSA OCAMPO : Hadii caroline ku hadasho Soomaali, waaxda luqadaha, qaybta kaalmada adeegyada, waxay idiin haykingsleyn nano senior. So [facility] [phone].    ATENCIÓN: Si habla español, tiene a walter disposición servicios gratpriscilaos de asistencia lingüística. Llame al [phone].    We comply with applicable federal civil rights laws and [place] laws. We do not discriminate on the basis of race, color, national origin, age, disability, sex, sexual orientation, or gender identity.            Thank you!     Thank you for choosing [facility]  for your care. Our goal is always to provide you with excellent care. Hearing back from our patients is one way we can continue to  improve our services. Please take a few minutes to complete the written survey that you may receive in the mail after your visit with us. Thank you!             Your Updated Medication List - Protect others around you: Learn how to safely use, store and throw away your medicines at www.disposemymeds.org.          This list is accurate as of 9/25/18 10:20 AM.  Always use your most recent med list.                   Brand Name Dispense Instructions for use Diagnosis    calcium carbonate 500 mg {elemental} 500 MG tablet    OS-HANNAH     Take 500 mg by mouth daily        glucosamine-chondroitin 500-400 MG Caps per capsule      Take 1 capsule by mouth daily        lisinopril 20 MG tablet    PRINIVIL/ZESTRIL    90 tablet    Take 1 tablet (20 mg) by mouth daily    Hypertension goal BP (blood pressure) < 140/90       MULTIVITAMIN PO      Take 1 tablet by mouth daily.        naproxen 500 MG tablet    NAPROSYN    60 tablet    TAKE 1 TABLET BY MOUTH TWICE DAILY AS NEEDED FOR MODERATE PAIN    Acute right-sided low back pain with right-sided sciatica       OMEGA-3 FISH OIL PO      Take 2 g by mouth daily        * oxybutynin 5 MG 24 hr tablet    DITROPAN-XL    90 tablet    Take 1 tablet (5 mg) by mouth daily    OAB (overactive bladder)       * oxybutynin 10 MG 24 hr tablet    DITROPAN XL    90 tablet    Take 1 tablet (10 mg) by mouth daily    Benign non-nodular prostatic hyperplasia with lower urinary tract symptoms, Urinary frequency       VITAMIN D3 PO      Take 1,000 Units by mouth daily        VITAMIN E NATURAL PO      Take 100 Units by mouth        * Notice:  This list has 2 medication(s) that are the same as other medications prescribed for you. Read the directions carefully, and ask your doctor or other care provider to review them with you.

## 2018-09-25 NOTE — LETTER
September 26, 2018    Bairon Reed  [address] [place] [address]          Mr. Reed,     All of your labs were normal for you.   Your LDL (bad cholesterol)  was above goal.  Genetics, diet, weight and low exercise levels can contribute to this. Your HDL (good cholesterol) was normal.  This is good. Your triglycerides were above normal.  Poor diet, genetics and being overweight can contribute to this.  1000mg daily of omega-3 fatty acids may improve this. You need to recheck fasting labs yearly. Maintaining a healthy diet with lean proteins, whole grains and healthy fats such as olive oil as well as regular exercise and maintaining an appropriate weight all contribute to healthier cholesterol levels.     Please contact the clinic if you have additional questions.  Thank you.     Sincerely,     Mono Chapman PA-C/rebecca    Results for orders placed or performed in visit on 09/25/18   Lipid panel reflex to direct LDL Fasting   Result Value Ref Range    Cholesterol 236 (H) <200 mg/dL    Triglycerides 168 (H) <150 mg/dL    HDL Cholesterol 40 >39 mg/dL    LDL Cholesterol Calculated 162 (H) <100 mg/dL    Non HDL Cholesterol 196 (H) <130 mg/dL   Comprehensive metabolic panel   Result Value Ref Range    Sodium 140 133 - 144 mmol/L    Potassium 4.2 3.4 - 5.3 mmol/L    Chloride 105 94 - 109 mmol/L    Carbon Dioxide 27 20 - 32 mmol/L    Anion Gap 8 3 - 14 mmol/L    Glucose 101 (H) 70 - 99 mg/dL    Urea Nitrogen 13 7 - 30 mg/dL    Creatinine 1.03 0.66 - 1.25 mg/dL    GFR Estimate 76 >60 mL/min/1.7m2    GFR Estimate If Black >90 >60 mL/min/1.7m2    Calcium 9.0 8.5 - 10.1 mg/dL    Bilirubin Total 0.5 0.2 - 1.3 mg/dL    Albumin 4.4 3.4 - 5.0 g/dL    Protein Total 7.4 6.8 - 8.8 g/dL    Alkaline Phosphatase 53 40 - 150 U/L    ALT 48 0 - 70 U/L    AST 27 0 - 45 U/L   TSH with free T4 reflex   Result Value Ref Range    TSH 1.07 0.40 - 4.00 mU/L   CBC with platelets differential   Result Value Ref Range    WBC 5.5  4.0 - 11.0 10e9/L    RBC Count 4.96 4.4 - 5.9 10e12/L    Hemoglobin 16.0 13.3 - 17.7 g/dL    Hematocrit 46.2 40.0 - 53.0 %    MCV 93 78 - 100 fl    MCH 32.3 26.5 - 33.0 pg    MCHC 34.6 31.5 - 36.5 g/dL    RDW 12.4 10.0 - 15.0 %    Platelet Count 305 150 - 450 10e9/L    % Neutrophils 49.0 %    % Lymphocytes 35.0 %    % Monocytes 12.0 %    % Eosinophils 3.0 %    % Basophils 1.0 %    Absolute Neutrophil 2.6 1.6 - 8.3 10e9/L    Absolute Lymphocytes 1.9 0.8 - 5.3 10e9/L    Absolute Monocytes 0.7 0.0 - 1.3 10e9/L    Absolute Eosinophils 0.2 0.0 - 0.7 10e9/L    Absolute Basophils 0.1 0.0 - 0.2 10e9/L    Reactive Lymphs Present     RBC Morphology Normal     Platelet Estimate       Automated count confirmed.  Platelet morphology is normal.    Diff Method Automated Method    PSA, screen   Result Value Ref Range    PSA 1.66 0 - 4 ug/L

## 2018-09-28 ENCOUNTER — HOSPITAL ENCOUNTER (OUTPATIENT)
Facility: AMBULATORY SURGERY CENTER | Age: 53
End: 2018-09-28
Attending: SURGERY | Admitting: SURGERY
Payer: COMMERCIAL

## 2018-10-25 DIAGNOSIS — M54.41 ACUTE RIGHT-SIDED LOW BACK PAIN WITH RIGHT-SIDED SCIATICA: ICD-10-CM

## 2018-10-25 RX ORDER — NAPROXEN 500 MG/1
TABLET ORAL
Qty: 60 TABLET | Refills: 1 | Status: SHIPPED | OUTPATIENT
Start: 2018-10-25 | End: 2020-02-21

## 2018-10-31 ENCOUNTER — TRANSFERRED RECORDS (OUTPATIENT)
Dept: HEALTH INFORMATION MANAGEMENT | Facility: CLINIC | Age: 53
End: 2018-10-31

## 2018-11-06 ENCOUNTER — TELEPHONE (OUTPATIENT)
Dept: FAMILY MEDICINE | Facility: CLINIC | Age: 53
End: 2018-11-06

## 2018-11-06 NOTE — TELEPHONE ENCOUNTER
Forms received from: [facility]   Phone number listed: [phone]   Fax listed: [phone]  Date received: 11/06/2018  Form description: Plan/Updated Plan of Progress for Outpatient Rehabilitation  Once forms are completed, please return to [facility] via fax.  Is patient requesting to be contacted when forms are completed: NA    Form placed: IN provider's basket  Елена Blackburn

## 2018-11-14 ENCOUNTER — TELEPHONE (OUTPATIENT)
Dept: PALLIATIVE MEDICINE | Facility: CLINIC | Age: 53
End: 2018-11-14

## 2018-11-14 ENCOUNTER — TRANSFERRED RECORDS (OUTPATIENT)
Dept: HEALTH INFORMATION MANAGEMENT | Facility: CLINIC | Age: 53
End: 2018-11-14

## 2018-11-14 NOTE — TELEPHONE ENCOUNTER
Reviewed documents completed today in clinic.    Last office visit note faxed to [facility].  Verified fax went through     Alessandro El RN  Care Coordinator   [facility] [place]

## 2018-11-14 NOTE — TELEPHONE ENCOUNTER
Writer called pt back.  Review of chart indicates that on 7/13/18 Pt had a Procedure performed: lumbar transforaminal epidural steroid injection at Left L3-4, L4-5,    Pt notified.      Pt would like writer to fax over visit note to [facility] at [phone].  Pt stated that he completed a JOSE in the clinic this morning.  Writer reviewed chart and do not see this JOSE scanned into chart yet and informed Pt that when this is scanned in writer will fax over document.    Alessandro El, RN  Care Coordinator   [facility] [place]

## 2018-12-05 ENCOUNTER — TRANSFERRED RECORDS (OUTPATIENT)
Dept: HEALTH INFORMATION MANAGEMENT | Facility: CLINIC | Age: 53
End: 2018-12-05

## 2018-12-19 ENCOUNTER — TRANSFERRED RECORDS (OUTPATIENT)
Dept: HEALTH INFORMATION MANAGEMENT | Facility: CLINIC | Age: 53
End: 2018-12-19

## 2019-01-24 ENCOUNTER — OFFICE VISIT (OUTPATIENT)
Dept: FAMILY MEDICINE | Facility: CLINIC | Age: 54
End: 2019-01-24
Payer: COMMERCIAL

## 2019-01-24 VITALS
HEIGHT: 70 IN | DIASTOLIC BLOOD PRESSURE: 83 MMHG | SYSTOLIC BLOOD PRESSURE: 137 MMHG | WEIGHT: 215 LBS | HEART RATE: 91 BPM | BODY MASS INDEX: 30.78 KG/M2 | TEMPERATURE: 98 F | OXYGEN SATURATION: 97 %

## 2019-01-24 DIAGNOSIS — R21 RASH AND NONSPECIFIC SKIN ERUPTION: Primary | ICD-10-CM

## 2019-01-24 DIAGNOSIS — L98.9 SKIN LESION: ICD-10-CM

## 2019-01-24 DIAGNOSIS — L91.8 SKIN TAG: ICD-10-CM

## 2019-01-24 LAB
KOH PREP SPEC: NORMAL
SPECIMEN SOURCE: NORMAL

## 2019-01-24 PROCEDURE — 87220 TISSUE EXAM FOR FUNGI: CPT | Performed by: FAMILY MEDICINE

## 2019-01-24 PROCEDURE — 11200 RMVL SKIN TAGS UP TO&INC 15: CPT | Performed by: FAMILY MEDICINE

## 2019-01-24 PROCEDURE — 99213 OFFICE O/P EST LOW 20 MIN: CPT | Mod: 25 | Performed by: FAMILY MEDICINE

## 2019-01-24 RX ORDER — TRIAMCINOLONE ACETONIDE 1 MG/G
CREAM TOPICAL 2 TIMES DAILY
Qty: 80 G | Refills: 0 | Status: SHIPPED | OUTPATIENT
Start: 2019-01-24 | End: 2019-02-01

## 2019-01-24 ASSESSMENT — MIFFLIN-ST. JEOR: SCORE: 1826.48

## 2019-01-24 NOTE — PROGRESS NOTES
"  SUBJECTIVE:   Bairon Reed is a 53 year old male who presents to clinic today for the following health issues:      Rash      Duration: couple months     Description  Location: arm right  Itching: moderate    Intensity:  moderate    Accompanying signs and symptoms: None    History (similar episodes/previous evaluation): None    Precipitating or alleviating factors:  New exposures:  None  Recent travel: no      Therapies tried and outcome: hydrocortisone cream -  not effective    hsa had these faint rashes on right upper arm  Been going on for past 2 months  Itchy  Initially just one spot  Now there's two    He also has a skin tag on his neck that he keeps nicking and irritating when he's shaving and he would like this removed    No new exposures    Accompanying Signs & Symptoms:  Fever: no   Body aches or joint pain: no   Sore throat symptoms: no   Recent cold symptoms: no       Problem list, Medication list, Allergies, and Medical/Social/Surgical histories reviewed in [facility] and updated as appropriate.    ROS:  Constitutional, HEENT, cardiovascular, pulmonary, gi and gu systems are negative, except as otherwise noted.    OBJECTIVE:                                                    /83   Pulse 91   Temp 98  F (36.7  C) (Oral)   Ht 1.778 m (5' 10\")   Wt 97.5 kg (215 lb)   SpO2 97%   BMI 30.85 kg/m    Body mass index is 30.85 kg/m .  GENERAL: healthy, alert and no distress  Eyes: anicteric  Heart: Regular Rate and Rhythm, no murmurs, rubs or gallops  Lungs: Symmetrical Chest expansion, no retractions, clear breath sounds  Neurologic: Cranial nerves intact no gross neurological deficits  Psych: appropriate mood and affect  MS: no gross musculoskeletal defects noted, no edema  Skin:    1. 3 small 1cm and less mildly erythematous blanching dry scaly plaques over right forearm, itchy   2. Small 3mm pedunculated fleshy skin lesion over right anterior neck   3. Multiple irregular nevus over the back "     Diagnostic Test Results:  Results for orders placed or performed in visit on 01/24/19 (from the past 24 hour(s))   KOH prep (skin, hair or nails only)   Result Value Ref Range    Specimen Description Right Arm     SHIKHA Skin Hair Nails Test No fungal elements seen         ASSESSMENT/PLAN:                                                        ICD-10-CM    1. Rash and nonspecific skin eruption R21 KOH prep (skin, hair or nails only)     triamcinolone (KENALOG) 0.1 % external cream   2. Skin tag L91.8 REMOVAL OF SKIN TAGS, FIRST 15   3. 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  Over the counter medications discussed.   Aware to come back in if with worsening symptoms or if no relief despite treatment plan  Patient voiced understanding and had no further questions.     Patient referred to Derm for multiple irregular nevus over the back    Skin tag on neck- irritating and frequently getting shaved   Verbal consent for procedure was obtained. Aware of the risks which include but are not limited to infection, iatrogenic injury, pain, bleeding.  Alternatives offered and discussed and was declined.  Area prepped with alcohol and hibiclens   Sterile equipment used  Using sterile scissors excised skin tag  Minimal bleeding stopped with pressure  Dressed with antibiotic ointment and bandaid  Routine postprocedure care discussed  Signs or symptoms of infection and complications discussed if concerns come in asap.     MD Isha Burt MD  [facility]

## 2019-01-31 NOTE — PROGRESS NOTES
[facility]  [address] [place] [address] [phone]  Dept: [phone]    PRE-OP EVALUATION:  Today's date: 2019    Bairon Reed (: 1965) presents for pre-operative evaluation assessment as requested by Dr. Ji.  He requires evaluation and anesthesia risk assessment prior to undergoing surgery/procedure for treatment of neck surgery .    Fax number for surgical facility: [phone]  Primary Physician: Triston Adame  Type of Anesthesia Anticipated: General    Patient has a Health Care Directive or Living Will:  NO    Preop Questions 2019   Who is doing your surgery? [facility]   What are you having done? neck surgery   Date of Surgery/Procedure:    Facility or Hospital where procedure/surgery will be performed: [facility]   1.  Do you have a history of Heart attack, stroke, stent, coronary bypass surgery, or other heart surgery? No   2.  Do you ever have any pain or discomfort in your chest? No   3.  Do you have a history of  Heart Failure? No   4.   Are you troubled by shortness of breath when:  walking on a level surface, or up a slight hill, or at night? No   5.  Do you currently have a cold, bronchitis or other respiratory infection? No   6.  Do you have a cough, shortness of breath, or wheezing? No   7.  Do you sometimes get pains in the calves of your legs when you walk? No   8. Do you or anyone in your family have previous history of blood clots? No   9.  Do you or does anyone in your family have a serious bleeding problem such as prolonged bleeding following surgeries or cuts? No   10. Have you ever had problems with anemia or been told to take iron pills? No   11. Have you had any abnormal blood loss such as black, tarry or bloody stools? No   12. Have you ever had a blood transfusion? No   13. Have you or any of your relatives ever had problems with anesthesia? No   14. Do you have sleep apnea, excessive snoring or daytime  drowsiness? No   15. Do you have any prosthetic heart valves? No   16. Do you have prosthetic joints? No         HPI:     HPI related to upcoming procedure: history of low back pain from work injury. Needs neck surgery 1st then will need low back surgery due to nerve impingement. History of back surgery in the past.       See problem list for active medical problems.  Problems all longstanding and stable, except as noted/documented.  See ROS for pertinent symptoms related to these conditions.                                                                                                                                                          .    MEDICAL HISTORY:     Patient Active Problem List    Diagnosis Date Noted     Tobacco abuse 09/25/2018     Priority: Medium     Acute left-sided low back pain with left-sided sciatica 04/02/2018     Priority: Medium     Gout, unspecified cause, unspecified chronicity, unspecified site 03/04/2016     Priority: Medium     Benign non-nodular prostatic hyperplasia with lower urinary tract symptoms 01/25/2016     Priority: Medium     Hypertension goal BP (blood pressure) < 140/90 02/10/2015     Priority: Medium     S/P lumbar microdiscectomy 05/21/2013     Priority: Medium     Lumbar stenosis with neurogenic claudication 05/13/2013     Priority: Medium     Lumbar radiculopathy 05/13/2013     Priority: Medium     Lumbar herniated disc 05/13/2013     Priority: Medium     Foot drop, right 04/15/2013     Priority: Medium     S/P spinal fusion 03/22/2013     Priority: Medium     Cervical radiculopathy 03/05/2013     Priority: Medium     Vitamin D deficiency 02/12/2013     Priority: Medium     Problem list name updated by automated process. 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     Priority: Medium     rt       Bilateral arm weakness 09/13/2011     Priority: Medium     Hyperlipidemia LDL goal <160 11/18/2009     Priority: Medium     Insomnia 11/18/2009     Priority: Medium      Past Medical History:   Diagnosis Date     Anxiety      Cervical spondylosis with myelopathy 2/4/2013     Cervicalgia      Chronic low back pain 9/13/2011     Chronic low back pain      Degeneration of cervical intervertebral disc      Gout      Hyperlipidemia LDL goal <160 11/18/2009     Hypertension goal BP (blood pressure) < 140/90 2/10/2015     Insomnia 11/18/2009     Nondependent alcohol abuse 11/14/2012     Pain in shoulder 9/13/2011     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 Lower back surgery     DISCECTOMY LUMBAR POSTERIOR MICROSCOPIC ONE LEVEL  5/17/2013    Procedure: DISCECTOMY LUMBAR POSTERIOR MICROSCOPIC ONE LEVEL;  BILATERAL LUMBAR L4-5 EXTENDED HEMILAMINECTOMY, MICROFORAMINOTOMY AND MICRODISCECTOMY WITH METRIX II;  Surgeon: Andrew Alejandre MD;  Location:  OR     FUSION CERVICAL ANTERIOR ONE LEVEL  3/5/2013    Procedure: FUSION CERVICAL ANTERIOR ONE LEVEL;  C 6-7 ANTERIOR DISCECTOMY AND FUSION WITH NEURO MONITORING ;  Surgeon: Andrew Alejandre MD;  Location:  OR     KNEE SURGERY  2009    Right     LARYNGOSCOPY WITH MICROSCOPE  5/28/2013    Procedure: LARYNGOSCOPY WITH MICROSCOPE;  MICRODIRECT LARYNGOSCOPY, WITH LEFT VOCAL CORD INJECTION ;  Surgeon: Jorge Vergara MD;  Location: [facility]     NECK SURGERY  2/2010    [facility]     Current Outpatient Medications   Medication Sig Dispense Refill      calcium carbonate (OS-HANNAH 500 MG [place]. CA) 500 MG tablet Take 500 mg by mouth daily       Cholecalciferol (VITAMIN D3 PO) Take 1,000 Units by mouth daily       glucosamine-chondroitin 500-400 MG CAPS Take 1 capsule by mouth daily       lisinopril (PRINIVIL/ZESTRIL) 20 MG tablet Take 1 tablet (20 mg) by mouth daily 90 tablet 1     Multiple Vitamins-Minerals (MULTIVITAMIN OR) Take 1 tablet by mouth daily.       naproxen (NAPROSYN) 500 MG tablet TAKE 1 TABLET BY MOUTH TWICE DAILY AS NEEDED FOR  MODETATE  PAIN. 60 tablet 1     Omega-3 Fatty Acids (OMEGA-3 FISH OIL PO) Take 2 g by mouth daily       oxybutynin (DITROPAN XL) 10 MG 24 hr tablet Take 1 tablet (10 mg) by mouth daily 90 tablet 1     VITAMIN E NATURAL PO Take 100 Units by mouth       OTC products: None, except as noted above    No Known Allergies   Latex Allergy: NO    Social History     Tobacco Use     Smoking status: Never Smoker     Smokeless tobacco: Current User     Types: Chew     Tobacco comment: Lives in smoke free household   Substance Use Topics     Alcohol use: No     Alcohol/week: 0.0 oz     Comment: Quit 2010. Chemical dependency treatment in 1989. ...31 yrs of 1.5 tins weekly.  sober for 7 months..     History   Drug Use No       REVIEW OF SYSTEMS:   CONSTITUTIONAL: NEGATIVE for fever, chills, change in weight  INTEGUMENTARY/SKIN: NEGATIVE for worrisome rashes, moles or lesions  EYES: NEGATIVE for vision changes or irritation  ENT/MOUTH: NEGATIVE for ear, mouth and throat problems  RESP: NEGATIVE for significant cough or SOB  BREAST: NEGATIVE for masses, tenderness or discharge  CV: NEGATIVE for chest pain, palpitations or peripheral edema  GI: NEGATIVE for nausea, abdominal pain, heartburn, or change in bowel habits  : NEGATIVE for frequency, dysuria, or hematuria  MUSCULOSKELETAL: NEGATIVE for significant arthralgias or myalgia  NEURO: NEGATIVE for weakness, dizziness or paresthesias  ENDOCRINE: NEGATIVE for temperature intolerance, skin/hair  "changes  HEME: NEGATIVE for bleeding problems  PSYCHIATRIC: NEGATIVE for changes in mood or affect    EXAM:   /90   Pulse 72   Temp 98.1  F (36.7  C) (Oral)   Resp 16   Ht 1.778 m (5' 10\")   Wt 95.3 kg (210 lb)   SpO2 99%   BMI 30.13 kg/m      GENERAL APPEARANCE: healthy, alert and no distress     EYES: EOMI,  PERRL     HENT: ear canals and TM's normal and nose and mouth without ulcers or lesions     NECK: no adenopathy, no asymmetry, masses, or scars and thyroid normal to palpation     RESP: lungs clear to auscultation - no rales, rhonchi or wheezes     CV: regular rates and rhythm, normal S1 S2, no S3 or S4 and no murmur, click or rub     ABDOMEN:  soft, nontender, no HSM or masses and bowel sounds normal     MS: extremities normal- no gross deformities noted, no evidence of inflammation in joints, FROM in all extremities.     SKIN: no suspicious lesions or rashes     NEURO: Normal strength and tone, sensory exam grossly normal, mentation intact and speech normal     PSYCH: mentation appears normal. and affect normal/bright     LYMPHATICS: No cervical adenopathy    DIAGNOSTICS:     EKG: appears normal, NSR, and normal echo from 9/24/18  Labs Drawn and in Process:   Unresulted Labs Ordered in the Past 30 Days of this Admission     Date and Time Order Name Status Description    2/1/2019 0733 BASIC METABOLIC PANEL In process           Recent Labs   Lab Test 09/25/18  1025 05/10/17  1535 02/21/17  1507 08/24/16  1617  02/11/13  1526   HGB 16.0  --   --  14.3   < > 16.1     --   --  276   < > 299     --   --  137   < > 139   POTASSIUM 4.2  --   --  4.2   < > 4.3   CR 1.03 0.78  --  0.87   < > 0.85   A1C  --   --  5.8  --   --  5.5    < > = values in this interval not displayed.        IMPRESSION:   Reason for surgery/procedure: neck and back pain    The proposed surgical procedure is considered INTERMEDIATE risk.    REVISED CARDIAC RISK INDEX  The patient has the following serious " cardiovascular risks for perioperative complications such as (MI, PE, VFib and 3  AV Block):  No serious cardiac risks  INTERPRETATION: 0 risks: Class I (very low risk - 0.4% complication rate)    The patient has the following additional risks for perioperative complications:  No identified additional risks      ICD-10-CM    1. Preop general physical exam Z01.818 CBC with platelets differential     Basic metabolic panel   2. Cervical radiculopathy M54.12        RECOMMENDATIONS:             APPROVAL GIVEN to proceed with proposed procedure, without further diagnostic evaluation       Signed Electronically by: Mono Chapman PA-C  Chart reviewed, agree with evaluation and recommendations above.  Shruthi Bhakta M.D.       Copy of this evaluation report is provided to requesting physician.    [facility] Preop Guidelines    Revised Cardiac Risk Index

## 2019-02-01 ENCOUNTER — OFFICE VISIT (OUTPATIENT)
Dept: FAMILY MEDICINE | Facility: CLINIC | Age: 54
End: 2019-02-01
Payer: COMMERCIAL

## 2019-02-01 VITALS
OXYGEN SATURATION: 99 % | WEIGHT: 210 LBS | TEMPERATURE: 98.1 F | HEIGHT: 70 IN | DIASTOLIC BLOOD PRESSURE: 90 MMHG | BODY MASS INDEX: 30.06 KG/M2 | RESPIRATION RATE: 16 BRPM | HEART RATE: 72 BPM | SYSTOLIC BLOOD PRESSURE: 148 MMHG

## 2019-02-01 DIAGNOSIS — M54.12 CERVICAL RADICULOPATHY: ICD-10-CM

## 2019-02-01 DIAGNOSIS — Z01.818 PREOP GENERAL PHYSICAL EXAM: Primary | ICD-10-CM

## 2019-02-01 LAB
ANION GAP SERPL CALCULATED.3IONS-SCNC: 6 MMOL/L (ref 3–14)
BASOPHILS # BLD AUTO: 0 10E9/L (ref 0–0.2)
BASOPHILS NFR BLD AUTO: 0.6 %
BUN SERPL-MCNC: 10 MG/DL (ref 7–30)
CALCIUM SERPL-MCNC: 8.6 MG/DL (ref 8.5–10.1)
CHLORIDE SERPL-SCNC: 108 MMOL/L (ref 94–109)
CO2 SERPL-SCNC: 26 MMOL/L (ref 20–32)
CREAT SERPL-MCNC: 0.91 MG/DL (ref 0.66–1.25)
DIFFERENTIAL METHOD BLD: NORMAL
EOSINOPHIL # BLD AUTO: 0.3 10E9/L (ref 0–0.7)
EOSINOPHIL NFR BLD AUTO: 5.8 %
ERYTHROCYTE [DISTWIDTH] IN BLOOD BY AUTOMATED COUNT: 12.7 % (ref 10–15)
GFR SERPL CREATININE-BSD FRML MDRD: >90 ML/MIN/{1.73_M2}
GLUCOSE SERPL-MCNC: 139 MG/DL (ref 70–99)
HCT VFR BLD AUTO: 43.3 % (ref 40–53)
HGB BLD-MCNC: 15.4 G/DL (ref 13.3–17.7)
LYMPHOCYTES # BLD AUTO: 1.8 10E9/L (ref 0.8–5.3)
LYMPHOCYTES NFR BLD AUTO: 34.6 %
MCH RBC QN AUTO: 30.8 PG (ref 26.5–33)
MCHC RBC AUTO-ENTMCNC: 35.6 G/DL (ref 31.5–36.5)
MCV RBC AUTO: 87 FL (ref 78–100)
MONOCYTES # BLD AUTO: 0.5 10E9/L (ref 0–1.3)
MONOCYTES NFR BLD AUTO: 10.1 %
NEUTROPHILS # BLD AUTO: 2.5 10E9/L (ref 1.6–8.3)
NEUTROPHILS NFR BLD AUTO: 48.9 %
PLATELET # BLD AUTO: 256 10E9/L (ref 150–450)
POTASSIUM SERPL-SCNC: 4.5 MMOL/L (ref 3.4–5.3)
RBC # BLD AUTO: 5 10E12/L (ref 4.4–5.9)
SODIUM SERPL-SCNC: 140 MMOL/L (ref 133–144)
WBC # BLD AUTO: 5.2 10E9/L (ref 4–11)

## 2019-02-01 PROCEDURE — 85025 COMPLETE CBC W/AUTO DIFF WBC: CPT | Performed by: PHYSICIAN ASSISTANT

## 2019-02-01 PROCEDURE — 80048 BASIC METABOLIC PNL TOTAL CA: CPT | Performed by: PHYSICIAN ASSISTANT

## 2019-02-01 PROCEDURE — 99214 OFFICE O/P EST MOD 30 MIN: CPT | Performed by: PHYSICIAN ASSISTANT

## 2019-02-01 PROCEDURE — 36415 COLL VENOUS BLD VENIPUNCTURE: CPT | Performed by: PHYSICIAN ASSISTANT

## 2019-02-01 ASSESSMENT — ANXIETY QUESTIONNAIRES
3. WORRYING TOO MUCH ABOUT DIFFERENT THINGS: NOT AT ALL
GAD7 TOTAL SCORE: 0
1. FEELING NERVOUS, ANXIOUS, OR ON EDGE: NOT AT ALL
IF YOU CHECKED OFF ANY PROBLEMS ON THIS QUESTIONNAIRE, HOW DIFFICULT HAVE THESE PROBLEMS MADE IT FOR YOU TO DO YOUR WORK, TAKE CARE OF THINGS AT HOME, OR GET ALONG WITH OTHER PEOPLE: NOT DIFFICULT AT ALL
2. NOT BEING ABLE TO STOP OR CONTROL WORRYING: NOT AT ALL
5. BEING SO RESTLESS THAT IT IS HARD TO SIT STILL: NOT AT ALL
6. BECOMING EASILY ANNOYED OR IRRITABLE: NOT AT ALL
7. FEELING AFRAID AS IF SOMETHING AWFUL MIGHT HAPPEN: NOT AT ALL

## 2019-02-01 ASSESSMENT — PATIENT HEALTH QUESTIONNAIRE - PHQ9
5. POOR APPETITE OR OVEREATING: NOT AT ALL
SUM OF ALL RESPONSES TO PHQ QUESTIONS 1-9: 1

## 2019-02-01 ASSESSMENT — MIFFLIN-ST. JEOR: SCORE: 1803.8

## 2019-02-01 NOTE — Clinical Note
Please review and co-sign.He is going to recheck blood pressure in pharmacy in 1 wk. Mono Chapman PA-C

## 2019-02-02 ASSESSMENT — ANXIETY QUESTIONNAIRES: GAD7 TOTAL SCORE: 0

## 2019-02-07 ENCOUNTER — OFFICE VISIT (OUTPATIENT)
Dept: FAMILY MEDICINE | Facility: CLINIC | Age: 54
End: 2019-02-07
Payer: COMMERCIAL

## 2019-02-07 VITALS
OXYGEN SATURATION: 98 % | RESPIRATION RATE: 18 BRPM | HEART RATE: 97 BPM | SYSTOLIC BLOOD PRESSURE: 142 MMHG | DIASTOLIC BLOOD PRESSURE: 98 MMHG | BODY MASS INDEX: 31.14 KG/M2 | TEMPERATURE: 99.1 F | WEIGHT: 217 LBS

## 2019-02-07 DIAGNOSIS — J06.9 VIRAL URI WITH COUGH: Primary | ICD-10-CM

## 2019-02-07 PROCEDURE — 99213 OFFICE O/P EST LOW 20 MIN: CPT | Performed by: NURSE PRACTITIONER

## 2019-02-07 RX ORDER — DEXTROMETHORPHAN HBR. AND GUAIFENESIN 10; 100 MG/5ML; MG/5ML
5 SOLUTION ORAL 4 TIMES DAILY PRN
Qty: 237 ML | Refills: 0 | Status: SHIPPED | OUTPATIENT
Start: 2019-02-07 | End: 2019-02-12

## 2019-02-07 NOTE — PROGRESS NOTES
SUBJECTIVE:   Bairon Reed is a 53 year old male who presents to clinic today for the following health issues:      ENT Symptoms             Symptoms: cc Present Absent Comment   Fever/Chills   x    Fatigue   x    Muscle Aches   x    Eye Irritation   x    Sneezing   x    Nasal Lasha/Drg  x     Sinus Pressure/Pain   x    Loss of smell   x    Dental pain   x    Sore Throat   x    Swollen Glands   x    Ear Pain/Fullness   x    Cough  x     Wheeze   x    Chest Pain   x    Shortness of breath   x    Rash   x    Other   x      Symptom duration:  x2 days   Symptom severity:  moderate   Treatments tried:  none   Contacts:  none     Productive cough in AM, runny nose. Seems like a usual cold but has surgery next Tuesday and wants to be well.      Problem list and histories reviewed & adjusted, as indicated.  Additional history: as documented    Patient Active Problem List   Diagnosis     Hyperlipidemia LDL goal <160     Insomnia     Bilateral arm weakness     Anxiety     Nondependent alcohol abuse     Shoulder impingement - right     Cervical radiculopathy at C7     Cervicalgia     Degeneration of cervical intervertebral disc     Vitamin D deficiency     Cervical radiculopathy     S/P spinal fusion     Peroneal nerve palsy     Foot drop, right     Lumbar stenosis with neurogenic claudication     Lumbar radiculopathy     Lumbar herniated disc     S/P lumbar microdiscectomy     Hypertension goal BP (blood pressure) < 140/90     Benign non-nodular prostatic hyperplasia with lower urinary tract symptoms     Gout, unspecified cause, unspecified chronicity, unspecified site     Acute left-sided low back pain with left-sided sciatica     Tobacco abuse     Past Surgical History:   Procedure Laterality Date     BACK SURGERY  1986-1988    x 3. Lower back surgery     DISCECTOMY LUMBAR POSTERIOR MICROSCOPIC ONE LEVEL  5/17/2013    Procedure: DISCECTOMY LUMBAR POSTERIOR MICROSCOPIC ONE LEVEL;  BILATERAL LUMBAR L4-5 EXTENDED  HEMILAMINECTOMY, MICROFORAMINOTOMY AND MICRODISCECTOMY WITH METRIX II;  Surgeon: Andrew Alejandre MD;  Location: [facility]     FUSION CERVICAL ANTERIOR ONE LEVEL  3/5/2013    Procedure: FUSION CERVICAL ANTERIOR ONE LEVEL;  C 6-7 ANTERIOR DISCECTOMY AND FUSION WITH NEURO MONITORING ;  Surgeon: Andrew Alejandre MD;  Location:  OR     KNEE SURGERY  2009    Right     LARYNGOSCOPY WITH MICROSCOPE  5/28/2013    Procedure: LARYNGOSCOPY WITH MICROSCOPE;  MICRODIRECT LARYNGOSCOPY, WITH LEFT VOCAL CORD INJECTION ;  Surgeon: Jorge Vergara MD;  Location:  [facility]     NECK SURGERY  2/2010    [facility]       Social History     Tobacco Use     Smoking status: Never Smoker     Smokeless tobacco: Current User     Types: Chew     Tobacco comment: Lives in smoke free household   Substance Use Topics     Alcohol use: No     Alcohol/week: 0.0 oz     Comment: Quit 2010. Chemical dependency treatment in 1989. ...31 yrs of 1.5 tins weekly.  sober for 7 months..     Family History   Problem Relation Age of Onset     Heart Disease Father 45        heart attack     Myocardial Infarction Father      Arthritis Sister      Heart Disease Mother 51        pneumonia and enlarged heart     Myocardial Infarction Other      Glaucoma No family hx of      Macular Degeneration No family hx of      Cancer No family hx of      Diabetes No family hx of      Hypertension No family hx of      Cerebrovascular Disease No family hx of      Thyroid Disease No family hx of            Reviewed and updated as needed this visit by clinical staff       Reviewed and updated as needed this visit by Provider         ROS:  Constitutional, HEENT, cardiovascular, pulmonary, gi and gu systems are negative, except as otherwise noted.    OBJECTIVE:     BP (!) 142/98   Pulse 97   Temp 99.1  F (37.3  C) (Oral)   Resp 18   Wt 98.4 kg (217 lb)   SpO2 98%   BMI 31.14 kg/m    Body mass index is 31.14 kg/m .  GENERAL: healthy, alert and no  distress  EYES: Eyes grossly normal to inspection, PERRL and conjunctivae and sclerae normal  HENT: normal cephalic/atraumatic, ear canals and TM's normal, nose and mouth without ulcers or lesions, rhinorrhea clear, oropharynx clear and oral mucous membranes moist  NECK: no adenopathy, no asymmetry, masses, or scars and thyroid normal to palpation  RESP: dry cough, lungs clear to auscultation - no rales, rhonchi or wheezes  CV: regular rate and rhythm, normal S1 S2, no S3 or S4, no murmur, click or rub, no peripheral edema and peripheral pulses strong    Diagnostic Test Results:  none     ASSESSMENT/PLAN:     1. Viral URI with cough  Symptoms consistent with head cold- supportive cares, push fluids. If not improving/still coughing on Monday, should postpone surgery. If symptoms better, then ok to proceed. Follow-up in clinic if unsure or symptoms change/worsen.  - dextromethorphan-guaiFENesin (TUSSIN DM)  MG/5ML liquid; Take 5 mLs by mouth 4 times daily as needed (cough)  Dispense: 237 mL; Refill: 0    See Patient Instructions    YULIA Peters [facility]

## 2019-02-12 ENCOUNTER — OFFICE VISIT (OUTPATIENT)
Dept: CARDIOLOGY | Facility: CLINIC | Age: 54
End: 2019-02-12
Payer: COMMERCIAL

## 2019-02-12 ENCOUNTER — TELEPHONE (OUTPATIENT)
Dept: FAMILY MEDICINE | Facility: CLINIC | Age: 54
End: 2019-02-12

## 2019-02-12 VITALS
BODY MASS INDEX: 30.56 KG/M2 | WEIGHT: 213 LBS | HEART RATE: 97 BPM | DIASTOLIC BLOOD PRESSURE: 91 MMHG | OXYGEN SATURATION: 96 % | SYSTOLIC BLOOD PRESSURE: 128 MMHG

## 2019-02-12 DIAGNOSIS — R05.9 COUGH: ICD-10-CM

## 2019-02-12 DIAGNOSIS — R00.2 PALPITATIONS: Primary | ICD-10-CM

## 2019-02-12 DIAGNOSIS — J06.9 VIRAL URI WITH COUGH: Primary | ICD-10-CM

## 2019-02-12 DIAGNOSIS — J06.9 VIRAL URI WITH COUGH: ICD-10-CM

## 2019-02-12 PROCEDURE — 93000 ELECTROCARDIOGRAM COMPLETE: CPT | Performed by: NURSE PRACTITIONER

## 2019-02-12 PROCEDURE — 99213 OFFICE O/P EST LOW 20 MIN: CPT | Performed by: NURSE PRACTITIONER

## 2019-02-12 RX ORDER — BENZONATATE 200 MG/1
200 CAPSULE ORAL 3 TIMES DAILY PRN
Qty: 30 CAPSULE | Refills: 1 | Status: SHIPPED | OUTPATIENT
Start: 2019-02-12 | End: 2019-07-29

## 2019-02-12 RX ORDER — BENZONATATE 200 MG/1
200 CAPSULE ORAL 3 TIMES DAILY PRN
Qty: 15 CAPSULE | Refills: 1 | Status: CANCELLED | OUTPATIENT
Start: 2019-02-12

## 2019-02-12 NOTE — NURSING NOTE
"Chief Complaint   Patient presents with     RECHECK     Follow up per surgeon to discuss irregular heart rhythm. Surgeon canceeled surgery today. No cardiac symptoms.       Initial BP (!) 128/91 (BP Location: Left arm, Patient Position: Chair, Cuff Size: Adult Large)   Pulse 97   Wt 96.6 kg (213 lb)   SpO2 96%   BMI 30.56 kg/m   Estimated body mass index is 30.56 kg/m  as calculated from the following:    Height as of 2/1/19: 1.778 m (5' 10\").    Weight as of this encounter: 96.6 kg (213 lb)..  BP completed using cuff size: audelia Morgan MA    "

## 2019-02-12 NOTE — TELEPHONE ENCOUNTER
Called pharmacy and medication not covered by insurance, states it's $40 for 30 tablets unless a PA/appeal is done. Pharmacist states no other options available unless bought OTC.   Patient states he has tried 4 different cough syrups and nothing is helping and needs this cough resolved so he can reschedule surgery.   Patient wondering if he can get cough syrup with codeine or if not, he's willing to pay out of pocket for the Tessalon and wondering if a new Rx can be sent for only 15 tablets instead of 30 so it's cheaper. Rx tawny'd up.     Arlen Hernandez, RN

## 2019-02-12 NOTE — PROGRESS NOTES
Heart Care - Clinical Cardiac Electrophysiology       HPI: Bairon Reed is a 53 year old male who presents for palpitations. He has a PMH most remarkable for an non-specified ablation (per patient occurred in 1980's, doesn't know the reason), and non-specified angioplasty (per patient on one of his heart valves that was calcified) anxiety, and essential hypertension.  He had noted brief episodes of palpitations fall of 2018, not associated with syncope or exercise, a ZIO was ordered but was lost in the mail.     Reviewed current interval:  - Echocardiogram 9/24/2018 showed normal LV function, EF 60-65%, no RWMA, and no significant valvular pathologies  - 9/24/2018 EKG personally reviewed tracings: SR with 1 degree AVB, Vent rate 73, IL interval 214 ms, QRS duration 94 ms, QTc 409 ms.  - Labs: 9/25/2018 CBC and TSH normal. 2/1/2019 BMP normal.   - Presenting EKG personally reviewed tracings: SR with 1st degree AVB, Vent rate 85, IL interval 214 ms, QRS duration 92 ms, QTc 426 ms.  - Current cardiac medications: lisinopril 20 mg daily for hypertension     Current Interval history:   He was scheduled for surgery neck surgery this morning which was postponed as the patient has a cold and cough.  He was seen recently by Grace Canchola for cough. Patient states that he rarely misses doses of medication. Patient chews tobacco a couple times/week, no smoking.  Denies drinking alcohol.   Coffee 1 cup/day. States that activity level is moderate to high, he walks 60 minutes daily without chest pain, dyspnea, or palpitations.  He states that in the fall of 2018 he had 3 ten second episodes of palpitations (not associated with activity and no chest pain, syncope, or dyspnea during episodes) in a one month period with none since.  States that he was under a lot of stress during the month he had the episodes due to being out of work and concerns over finances.  Denies snoring, apnea, or daytime sleepiness.  Sleeps with one  pillows under head.  Denies chest pain or pressure, dizziness, syncope, dyspnea at rest or with exertion, orthopnea, PND, abdominal edema, pedal edema, or claudication.      Current Outpatient Medications   Medication Sig Dispense Refill     calcium carbonate (OS-HANNAH 500 MG [place]. CA) 500 MG tablet Take 500 mg by mouth daily       Cholecalciferol (VITAMIN D3 PO) Take 1,000 Units by mouth daily       dextromethorphan-guaiFENesin (TUSSIN DM)  MG/5ML liquid Take 5 mLs by mouth 4 times daily as needed (cough) 237 mL 0     glucosamine-chondroitin 500-400 MG CAPS Take 1 capsule by mouth daily       lisinopril (PRINIVIL/ZESTRIL) 20 MG tablet Take 1 tablet (20 mg) by mouth daily 90 tablet 1     Multiple Vitamins-Minerals (MULTIVITAMIN OR) Take 1 tablet by mouth daily.       naproxen (NAPROSYN) 500 MG tablet TAKE 1 TABLET BY MOUTH TWICE DAILY AS NEEDED FOR  MODETATE  PAIN. 60 tablet 1     Omega-3 Fatty Acids (OMEGA-3 FISH OIL PO) Take 2 g by mouth daily       oxybutynin (DITROPAN XL) 10 MG 24 hr tablet Take 1 tablet (10 mg) by mouth daily 90 tablet 1     VITAMIN E NATURAL PO Take 100 Units by mouth         Past Medical History:   Diagnosis Date     Anxiety      Cervical spondylosis with myelopathy 2/4/2013     Cervicalgia      Chronic low back pain 9/13/2011     Chronic low back pain      Degeneration of cervical intervertebral disc      Gout      Hyperlipidemia LDL goal <160 11/18/2009     Hypertension goal BP (blood pressure) < 140/90 2/10/2015     Insomnia 11/18/2009     Nondependent alcohol abuse 11/14/2012     Pain in shoulder 9/13/2011     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 Lower back surgery     DISCECTOMY LUMBAR POSTERIOR MICROSCOPIC ONE LEVEL  5/17/2013    Procedure: DISCECTOMY LUMBAR POSTERIOR MICROSCOPIC ONE  LEVEL;  BILATERAL LUMBAR L4-5 EXTENDED HEMILAMINECTOMY, MICROFORAMINOTOMY AND MICRODISCECTOMY WITH METRIX II;  Surgeon: Andrew Alejandre MD;  Location: [facility]     FUSION CERVICAL ANTERIOR ONE LEVEL  3/5/2013    Procedure: FUSION CERVICAL ANTERIOR ONE LEVEL;  C 6-7 ANTERIOR DISCECTOMY AND FUSION WITH NEURO MONITORING ;  Surgeon: Andrew Alejandre MD;  Location:  OR     KNEE SURGERY  2009    Right     LARYNGOSCOPY WITH MICROSCOPE  5/28/2013    Procedure: LARYNGOSCOPY WITH MICROSCOPE;  MICRODIRECT LARYNGOSCOPY, WITH LEFT VOCAL CORD INJECTION ;  Surgeon: Jorge Vergara MD;  Location: [facility]     NECK SURGERY  2/2010    [facility]       Family History   Problem Relation Age of Onset     Heart Disease Father 45        heart attack     Myocardial Infarction Father      Arthritis Sister      Heart Disease Mother 51        pneumonia and enlarged heart     Myocardial Infarction Other      Glaucoma No family hx of      Macular Degeneration No family hx of      Cancer No family hx of      Diabetes No family hx of      Hypertension No family hx of      Cerebrovascular Disease No family hx of      Thyroid Disease No family hx of        Social History     Tobacco Use     Smoking status: Never Smoker     Smokeless tobacco: Current User     Types: Chew     Tobacco comment: Lives in smoke free household   Substance Use Topics     Alcohol use: No     Alcohol/week: 0.0 oz     Comment: Quit 2010. Chemical dependency treatment in 1989. ...31 yrs of 1.5 tins weekly.  sober for 7 months..       No Known Allergies      ROS:   A complete review of systems was performed and is negative except as noted in the HPI.     Physical Examination:  Vitals: BP (!) 128/91 (BP Location: Left arm, Patient Position: Chair, Cuff Size: Adult Large)   Pulse 97   Wt 96.6 kg (213 lb)   SpO2 96%   BMI 30.56 kg/m    BMI= Body mass index is 30.56 kg/m .    GENERAL APPEARANCE: healthy, alert, and no acute distress  HEENT: no icterus, no  xanthelasmas, normal pupil size and reaction, no cyanosis.  NECK: no asymmetry, no cervical bruits, no JVD   RESPIRATORY: lungs clear to auscultation - no rales, rhonchi or wheezes, no use of accessory muscles, no retractions, respirations are unlabored, normal respiratory rate  CARDIOVASCULAR: regular rhythm, normal S1 with physiologic split S2, no S3 or S4 and no murmur, click or rub  GI:  no abdominal bruits, soft, non-tender  EXTREMITIES: no clubbing  NEURO: alert and oriented to person/place/time, normal speech, gait and affect  VASC: Radial and posterior tibialis pulses +2 and symmetric bilaterally. No cyanosis or edema.   SKIN: no ecchymoses, no rashes    Assessment and recommendations:    # Palpitations:  Echocardiogram 9/24/2018 showed normal LV function, EF 60-65%, no RWMA, and no significant valvular pathologies. EKGs show no significant abnormalities. Recent BMP, TSH, and CBC normal.   1. Reassurance provided that the cause of palpitations is likely benign as it is not associated with syncope, presyncope, dyspnea, exercise, or chest pain without risk factors for structural heart disease.  2.  No need for further cardiac workup or monitoring at this time.  If he develops palpitations in the future, he may schedule follow up for consideration of heart monitoring at that time.     # Cough:  1. Recommended calling Grace Canchola's office as he was recently seen there for cough.     Patient expresses understanding and agreement with the plan.    I appreciate the chance to help with Bairon Reed's care. Please let me know if you have any questions or concerns.    Jina BENDER, CNP

## 2019-02-12 NOTE — PATIENT INSTRUCTIONS
Thank you for coming to the [facility] @ [facility]; please note the following instructions:    1. Reassurance provided that the cause of palpitations is likely benign as it is not associated with syncope, presyncope, dyspnea, or chest pain without risk factors for structural heart disease.  EKG and echocardiogram look good.     2. Recommended calling Grace Canchola's office as you were recently seen for cough.     Follow up as needed for symptoms or problems.     If you have any questions regarding your visit please contact your care team:     Cardiology  Telephone Number   Rani MILLER, ABDI KASPER, ABDI HORAN, TIFFANIE TAYLOR MA   [phone]    *After hours: [phone]   For scheduling appts:     [phone] or    [phone] (select option 1)    *After hours: [phone]       Normal test result notifications will be released via [employer] or mailed within 7 business days.  All other test results, will be communicated via telephone once reviewed by your cardiologist.    If you need a medication refill please contact your pharmacy.  Please allow 3 business days for your refill to be completed.    As always, thank you for trusting us with your health care needs!

## 2019-02-12 NOTE — TELEPHONE ENCOUNTER
Called and spoke to patient. Stated he went in for neck surgery for this morning at [facility] but they had to cancel it due to his dry cough. Denies SOB and states he feels fine other than cough.  Patient was seen on 2/7/2019 for viral URI and given Tussin DM. Patient states this hasn't helped at all and wondering if he can get a steroid or other prescription to help with coughing. States they are going to try and reschedule surgery for next week but cough needs to be gone.     Please advise.     Arlen Hernandez RN

## 2019-02-12 NOTE — TELEPHONE ENCOUNTER
Reason for call:  Other   Patient called regarding (reason for call): prescription  Additional comments: Patient was in the other day do to a cough and got a script for his cough to take. Patient had surgery scheduled for 02/12/19 and they canceled it do to his cold/ cough he still has. Patient is requesting a different script to help with his cough and cold. Please give patient a call back regarding this concern.     Phone number to reach patient:  Home number on file [phone] (home)    Best Time:  any    Can we leave a detailed message on this number?  YES

## 2019-02-12 NOTE — TELEPHONE ENCOUNTER
Reason for call:  Medication   If this is a refill request, has the caller requested the refill from the pharmacy already? Yes  Will the patient be using a [facility]? No  Name of the pharmacy and phone number for the current request: [facility] [address] [place] [address] [address] [facility]      Name of the medication requested: Patient states the medication prescribed is not covered under his ins. Please contact pharmacy to clear this up.    Other request: see above - please refer to previous message.    Phone number to reach patient:  Home number on file [phone] (home)    Best Time:  ASAP    Can we leave a detailed message on this number?  YES

## 2019-02-12 NOTE — TELEPHONE ENCOUNTER
He has had a cough for 1 week and symptoms are consistent with a viral infection. I do not think steroids are indicated or likely to help. Unfortunately, modern medicine does not have a cure for the common cold yet. We could try some Tessalon perles to help suppress the cough, but no cough medication will take the cough away. I recommend he continue supportive cares and contact me if not better in 1 week. Or if symptoms worsen including onset of fever, SOB, chest pain, etc.    Rx pended for tessalon if he'd like to try.     Grace Canchola, CNP

## 2019-02-12 NOTE — LETTER
2/12/2019      RE: Bairon Reed  [address] [place] [address]       Dear Colleague,    Thank you for the opportunity to participate in the care of your patient, Bairon Reed, at the [facility] HEART AT [facility] at [facility]. Please see a copy of my visit note below.        Heart Care - Clinical Cardiac Electrophysiology       HPI: Bairon Reed is a 53 year old male who presents for palpitations. He has a PMH most remarkable for an non-specified ablation (per patient occurred in 1980's, doesn't know the reason), and non-specified angioplasty (per patient on one of his heart valves that was calcified) anxiety, and essential hypertension.  He had noted brief episodes of palpitations fall of 2018, not associated with syncope or exercise, a ZIO was ordered but was lost in the mail.     Reviewed current interval:  - Echocardiogram 9/24/2018 showed normal LV function, EF 60-65%, no RWMA, and no significant valvular pathologies  - 9/24/2018 EKG personally reviewed tracings: SR with 1 degree AVB, Vent rate 73, TN interval 214 ms, QRS duration 94 ms, QTc 409 ms.  - Labs: 9/25/2018 CBC and TSH normal. 2/1/2019 BMP normal.   - Presenting EKG personally reviewed tracings: SR with 1st degree AVB, Vent rate 85, TN interval 214 ms, QRS duration 92 ms, QTc 426 ms.  - Current cardiac medications: lisinopril 20 mg daily for hypertension     Current Interval history:   He was scheduled for surgery neck surgery this morning which was postponed as the patient has a cold and cough.  He was seen recently by Grace Canchola for cough. Patient states that he rarely misses doses of medication. Patient chews tobacco a couple times/week, no smoking.  Denies drinking alcohol.   Coffee 1 cup/day. States that activity level is moderate to high, he walks 60 minutes daily without chest pain, dyspnea, or palpitations.  He states that in the fall of 2018 he  had 3 ten second episodes of palpitations (not associated with activity and no chest pain, syncope, or dyspnea during episodes) in a one month period with none since.  States that he was under a lot of stress during the month he had the episodes due to being out of work and concerns over finances.  Denies snoring, apnea, or daytime sleepiness.  Sleeps with one pillows under head.  Denies chest pain or pressure, dizziness, syncope, dyspnea at rest or with exertion, orthopnea, PND, abdominal edema, pedal edema, or claudication.      Current Outpatient Medications   Medication Sig Dispense Refill     calcium carbonate (OS-HANNAH 500 MG [place]. CA) 500 MG tablet Take 500 mg by mouth daily       Cholecalciferol (VITAMIN D3 PO) Take 1,000 Units by mouth daily       dextromethorphan-guaiFENesin (TUSSIN DM)  MG/5ML liquid Take 5 mLs by mouth 4 times daily as needed (cough) 237 mL 0     glucosamine-chondroitin 500-400 MG CAPS Take 1 capsule by mouth daily       lisinopril (PRINIVIL/ZESTRIL) 20 MG tablet Take 1 tablet (20 mg) by mouth daily 90 tablet 1     Multiple Vitamins-Minerals (MULTIVITAMIN OR) Take 1 tablet by mouth daily.       naproxen (NAPROSYN) 500 MG tablet TAKE 1 TABLET BY MOUTH TWICE DAILY AS NEEDED FOR  MODETATE  PAIN. 60 tablet 1     Omega-3 Fatty Acids (OMEGA-3 FISH OIL PO) Take 2 g by mouth daily       oxybutynin (DITROPAN XL) 10 MG 24 hr tablet Take 1 tablet (10 mg) by mouth daily 90 tablet 1     VITAMIN E NATURAL PO Take 100 Units by mouth         Past Medical History:   Diagnosis Date     Anxiety      Cervical spondylosis with myelopathy 2/4/2013     Cervicalgia      Chronic low back pain 9/13/2011     Chronic low back pain      Degeneration of cervical intervertebral disc      Gout      Hyperlipidemia LDL goal <160 11/18/2009     Hypertension goal BP (blood pressure) < 140/90 2/10/2015     Insomnia 11/18/2009     Nondependent alcohol abuse 11/14/2012     Pain in shoulder 9/13/2011     Peroneal nerve  palsy Feb 2012    rt     Radiculopathy, cervical 9/13/2011     Shoulder impingement     right     Vocal cord paralysis     LEFT       Past Surgical History:   Procedure Laterality Date     BACK SURGERY  1986-1988    x 3. Lower back surgery     DISCECTOMY LUMBAR POSTERIOR MICROSCOPIC ONE LEVEL  5/17/2013    Procedure: DISCECTOMY LUMBAR POSTERIOR MICROSCOPIC ONE LEVEL;  BILATERAL LUMBAR L4-5 EXTENDED HEMILAMINECTOMY, MICROFORAMINOTOMY AND MICRODISCECTOMY WITH METRIX II;  Surgeon: Andrew Alejandre MD;  Location: [facility] OR     FUSION CERVICAL ANTERIOR ONE LEVEL  3/5/2013    Procedure: FUSION CERVICAL ANTERIOR ONE LEVEL;  C 6-7 ANTERIOR DISCECTOMY AND FUSION WITH NEURO MONITORING ;  Surgeon: Andrew Alejandre MD;  Location:  OR     KNEE SURGERY  2009    Right     LARYNGOSCOPY WITH MICROSCOPE  5/28/2013    Procedure: LARYNGOSCOPY WITH MICROSCOPE;  MICRODIRECT LARYNGOSCOPY, WITH LEFT VOCAL CORD INJECTION ;  Surgeon: Jorge Vergara MD;  Location: [facility]     NECK SURGERY  2/2010    [facility]       Family History   Problem Relation Age of Onset     Heart Disease Father 45        heart attack     Myocardial Infarction Father      Arthritis Sister      Heart Disease Mother 51        pneumonia and enlarged heart     Myocardial Infarction Other      Glaucoma No family hx of      Macular Degeneration No family hx of      Cancer No family hx of      Diabetes No family hx of      Hypertension No family hx of      Cerebrovascular Disease No family hx of      Thyroid Disease No family hx of        Social History     Tobacco Use     Smoking status: Never Smoker     Smokeless tobacco: Current User     Types: Chew     Tobacco comment: Lives in smoke free household   Substance Use Topics     Alcohol use: No     Alcohol/week: 0.0 oz     Comment: Quit 2010. Chemical dependency treatment in 1989. ...31 yrs of 1.5 tins weekly.  sober for 7 months..       No Known Allergies      ROS:   A complete review of systems was  performed and is negative except as noted in the HPI.     Physical Examination:  Vitals: BP (!) 128/91 (BP Location: Left arm, Patient Position: Chair, Cuff Size: Adult Large)   Pulse 97   Wt 96.6 kg (213 lb)   SpO2 96%   BMI 30.56 kg/m     BMI= Body mass index is 30.56 kg/m .    GENERAL APPEARANCE: healthy, alert, and no acute distress  HEENT: no icterus, no xanthelasmas, normal pupil size and reaction, no cyanosis.  NECK: no asymmetry, no cervical bruits, no JVD   RESPIRATORY: lungs clear to auscultation - no rales, rhonchi or wheezes, no use of accessory muscles, no retractions, respirations are unlabored, normal respiratory rate  CARDIOVASCULAR: regular rhythm, normal S1 with physiologic split S2, no S3 or S4 and no murmur, click or rub  GI:  no abdominal bruits, soft, non-tender  EXTREMITIES: no clubbing  NEURO: alert and oriented to person/place/time, normal speech, gait and affect  VASC: Radial and posterior tibialis pulses +2 and symmetric bilaterally. No cyanosis or edema.   SKIN: no ecchymoses, no rashes    Assessment and recommendations:    # Palpitations:  Echocardiogram 9/24/2018 showed normal LV function, EF 60-65%, no RWMA, and no significant valvular pathologies. EKGs show no significant abnormalities. Recent BMP, TSH, and CBC normal.   1. Reassurance provided that the cause of palpitations is likely benign as it is not associated with syncope, presyncope, dyspnea, exercise, or chest pain without risk factors for structural heart disease.  2.  No need for further cardiac workup or monitoring at this time.  If he develops palpitations in the future, he may schedule follow up for consideration of heart monitoring at that time.     # Cough:  1. Recommended calling Grace Canchola's office as he was recently seen there for cough.     Patient expresses understanding and agreement with the plan.    I appreciate the chance to help with Bairon Reed's care. Please let me know if you have any questions  or concerns.    Jina BENDER, CNP

## 2019-02-13 NOTE — TELEPHONE ENCOUNTER
Patient is calling because he needs something asap for his cough as he has to reschedule his surgery because of his cough. Please call him back at [phone].

## 2019-02-13 NOTE — TELEPHONE ENCOUNTER
Spoke with patient. Reports pharmacy is going to fill 1/2 his Rx and does not need a new one sent in for the Tessalon. No further questions.     Arlen Hernandez RN

## 2019-02-15 ENCOUNTER — TELEPHONE (OUTPATIENT)
Dept: CARDIOLOGY | Facility: CLINIC | Age: 54
End: 2019-02-15

## 2019-02-15 NOTE — TELEPHONE ENCOUNTER
Per Patient request last progress note was faxed to [facility], niraj to [facility]. Fax number given by patient is [phone]. Release Of Information form was signed and scanned into patient chart.    Flakita Handley RN

## 2019-03-06 NOTE — PROGRESS NOTES
[facility]  [address] [facility] [address] [phone]  Dept: [phone]    PRE-OP EVALUATION:  Today's date: 3/11/2019    Bairon Reed (: 1965) presents for pre-operative evaluation assessment as requested by Dr. Pham.  He requires evaluation and anesthesia risk assessment prior to undergoing surgery/procedure for treatment of back pain  .    Fax number for surgical facility: [phone]  Primary Physician: Tritson Adame  Type of Anesthesia Anticipated: General    Patient has a Health Care Directive or Living Will:  NO    Preop Questions 3/11/2019   Who is doing your surgery? [facility]   What are you having done? neck   Date of Surgery/Procedure: 19   Facility or Hospital where procedure/surgery will be performed: [facility]   1.  Do you have a history of Heart attack, stroke, stent, coronary bypass surgery, or other heart surgery? No   2.  Do you ever have any pain or discomfort in your chest? No   3.  Do you have a history of  Heart Failure? No   4.   Are you troubled by shortness of breath when:  walking on a level surface, or up a slight hill, or at night? No   5.  Do you currently have a cold, bronchitis or other respiratory infection? No   6.  Do you have a cough, shortness of breath, or wheezing? No   7.  Do you sometimes get pains in the calves of your legs when you walk? No   8. Do you or anyone in your family have previous history of blood clots? No   9.  Do you or does anyone in your family have a serious bleeding problem such as prolonged bleeding following surgeries or cuts? No   10. Have you ever had problems with anemia or been told to take iron pills? No   11. Have you had any abnormal blood loss such as black, tarry or bloody stools? No   12. Have you ever had a blood transfusion? No   13. Have you or any of your relatives ever had problems with anesthesia? No   14. Do you have sleep apnea, excessive snoring or daytime drowsiness? No   15.  Do you have any prosthetic heart valves? No   16. Do you have prosthetic joints? No         HPI:     HPI related to upcoming procedure: Pt needs pre-op for C4-C6 laminoplasty with Dr. Ji and Dr. Castellanos on 3/19/19 due to cervical stenosis.  Pt has failed conservative injection, NSaids and lyrica      HYPERLIPIDEMIA - Patient has a long history of significant Hyperlipidemia requiring medication for treatment with recent good control. Patient reports no problems or side effects with the medication.                                                                                                                                                       .  HYPERTENSION - Patient has longstanding history of HTN , currently denies any symptoms referable to elevated blood pressure. Specifically denies chest pain, palpitations, dyspnea, orthopnea, PND or peripheral edema. Blood pressure readings have been in normal range. Current medication regimen is as listed below. Patient denies any side effects of medication.                                                                                                                                                                                          .    MEDICAL HISTORY:     Patient Active Problem List    Diagnosis Date Noted     Acute left-sided low back pain with left-sided sciatica 04/02/2018     Priority: Medium     Gout, unspecified cause, unspecified chronicity, unspecified site 03/04/2016     Priority: Medium     Benign non-nodular prostatic hyperplasia with lower urinary tract symptoms 01/25/2016     Priority: Medium     Hypertension goal BP (blood pressure) < 140/90 02/10/2015     Priority: Medium     S/P lumbar microdiscectomy 05/21/2013     Priority: Medium     Lumbar stenosis with neurogenic claudication 05/13/2013     Priority: Medium     Lumbar radiculopathy 05/13/2013     Priority: Medium     Lumbar herniated disc 05/13/2013     Priority: Medium     Foot drop,  right 04/15/2013     Priority: Medium     S/P spinal fusion 03/22/2013     Priority: Medium     Cervical radiculopathy 03/05/2013     Priority: Medium     Vitamin D deficiency 02/12/2013     Priority: Medium     Problem list name updated by automated process. 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     Priority: Medium     rt       Bilateral arm weakness 09/13/2011     Priority: Medium     Hyperlipidemia LDL goal <160 11/18/2009     Priority: Medium     Insomnia 11/18/2009     Priority: Medium      Past Medical History:   Diagnosis Date     Anxiety      Cervical spondylosis with myelopathy 2/4/2013     Cervicalgia      Chronic low back pain 9/13/2011     Chronic low back pain      Degeneration of cervical intervertebral disc      Gout      Hyperlipidemia LDL goal <160 11/18/2009     Hypertension goal BP (blood pressure) < 140/90 2/10/2015     Insomnia 11/18/2009     Nondependent alcohol abuse 11/14/2012     Pain in shoulder 9/13/2011     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 Lower back surgery     DISCECTOMY LUMBAR POSTERIOR MICROSCOPIC ONE LEVEL  5/17/2013    Procedure: DISCECTOMY LUMBAR POSTERIOR MICROSCOPIC ONE LEVEL;  BILATERAL LUMBAR L4-5 EXTENDED HEMILAMINECTOMY, MICROFORAMINOTOMY AND MICRODISCECTOMY WITH METRIX II;  Surgeon: Andrew Alejandre MD;  Location: [facility] OR     FUSION CERVICAL ANTERIOR ONE LEVEL  3/5/2013     Procedure: FUSION CERVICAL ANTERIOR ONE LEVEL;  C 6-7 ANTERIOR DISCECTOMY AND FUSION WITH NEURO MONITORING ;  Surgeon: Andrew Alejandre MD;  Location:  OR     KNEE SURGERY  2009    Right     LARYNGOSCOPY WITH MICROSCOPE  5/28/2013    Procedure: LARYNGOSCOPY WITH MICROSCOPE;  MICRODIRECT LARYNGOSCOPY, WITH LEFT VOCAL CORD INJECTION ;  Surgeon: Jorge Vergara MD;  Location:  [facility]     NECK SURGERY  2/2010    [facility]     Current Outpatient Medications   Medication Sig Dispense Refill     calcium carbonate (OS-HANNAH 500 MG [place]. [place]) 500 MG tablet Take 500 mg by mouth daily       Cholecalciferol (VITAMIN D3 PO) Take 1,000 Units by mouth daily       glucosamine-chondroitin 500-400 MG CAPS Take 1 capsule by mouth daily       lisinopril (PRINIVIL/ZESTRIL) 20 MG tablet Take 1 tablet (20 mg) by mouth daily 90 tablet 1     Multiple Vitamins-Minerals (MULTIVITAMIN OR) Take 1 tablet by mouth daily.       naproxen (NAPROSYN) 500 MG tablet TAKE 1 TABLET BY MOUTH TWICE DAILY AS NEEDED FOR  MODETATE  PAIN. 60 tablet 1     Omega-3 Fatty Acids (OMEGA-3 FISH OIL PO) Take 2 g by mouth daily       oxybutynin (DITROPAN XL) 10 MG 24 hr tablet Take 1 tablet (10 mg) by mouth daily 90 tablet 1     VITAMIN E NATURAL PO Take 100 Units by mouth       OTC products: None, except as noted above    No Known Allergies   Latex Allergy: NO    Social History     Tobacco Use     Smoking status: Never Smoker     Smokeless tobacco: Current User     Types: Chew     Tobacco comment: Lives in smoke free household   Substance Use Topics     Alcohol use: No     Alcohol/week: 0.0 oz     Comment: Quit 2010. Chemical dependency treatment in 1989. ...31 yrs of 1.5 tins weekly.  sober for 7 months..     History   Drug Use No       REVIEW OF SYSTEMS:   Constitutional, neuro, ENT, endocrine, pulmonary, cardiac, gastrointestinal, genitourinary, musculoskeletal, integument and psychiatric systems are negative, except as otherwise noted.    EXAM:  "  /86   Pulse 65   Temp 98.3  F (36.8  C) (Oral)   Resp 17   Ht 1.778 m (5' 10\")   Wt 98 kg (216 lb)   SpO2 97%   BMI 30.99 kg/m      GENERAL APPEARANCE: healthy, alert and no distress     EYES: EOMI,  PERRL     HENT: ear canals and TM's normal and nose and mouth without ulcers or lesions     NECK: no adenopathy, no asymmetry, masses, or scars and thyroid normal to palpation     RESP: lungs clear to auscultation - no rales, rhonchi or wheezes     CV: regular rates and rhythm, normal S1 S2, no S3 or S4 and no murmur, click or rub     ABDOMEN:  soft, nontender, no HSM or masses and bowel sounds normal     MS: extremities normal- no gross deformities noted, no evidence of inflammation in joints, FROM in all extremities.     PSYCH: mentation appears normal. and affect normal/bright    DIAGNOSTICS:     EKG: appears normal, NSR, with 1st degree block, normal axis, normal intervals, no acute ST/T changes c/w ischemia, no LVH by voltage criteria, unchanged from previous tracings  Labs Drawn and in Process:   Unresulted Labs Ordered in the Past 30 Days of this Admission     Date and Time Order Name Status Description    3/11/2019 0737 BASIC METABOLIC PANEL In process           Recent Labs   Lab Test 02/01/19  0738 09/25/18  1025  02/21/17  1507  02/11/13  1526   HGB 15.4 16.0  --   --    < > 16.1    305  --   --    < > 299    140  --   --    < > 139   POTASSIUM 4.5 4.2  --   --    < > 4.3   CR 0.91 1.03   < >  --    < > 0.85   A1C  --   --   --  5.8  --  5.5    < > = values in this interval not displayed.      BMP pending  IMPRESSION:   Reason for surgery/procedure: laminoplasty, cervical    The proposed surgical procedure is considered INTERMEDIATE risk.    REVISED CARDIAC RISK INDEX  The patient has the following serious cardiovascular risks for perioperative complications such as (MI, PE, VFib and 3  AV Block):  No serious cardiac risks  INTERPRETATION: 0 risks: Class I (very low risk - 0.4% " complication rate)    The patient has the following additional risks for perioperative complications:  No identified additional risks      ICD-10-CM    1. Preop general physical exam Z01.818 Basic metabolic panel       RECOMMENDATIONS:       Obstructive Sleep Apnea (or suspected sleep apnea)  Hospital staff are advised to monitor for sleep related oxygen desaturations due rish factors for HUNG    Pt with h/o urinary retention, advised to start taking flomax starting 5 days prior to surgery      --Patient is to take all scheduled medications on the day of surgery EXCEPT for modifications listed below.    Anticoagulant or Antiplatelet Medication Use  NSAIDS: Naproxen (Naprosyn):   Stop 2-3 days prior to surgery   Stop fish oil and all over the counter meds, 10 days prior    ACE Inhibitor or Angiotensin Receptor Blocker (ARB) Use  Ace inhibitor or Angiotensin Receptor Blocker (ARB) and will hold morning of surgery      APPROVAL GIVEN to proceed with proposed procedure, without further diagnostic evaluation       Signed Electronically by: Jacklyn Donahue MD    Copy of this evaluation report is provided to requesting physician.    [facility] Preop Guidelines    Revised Cardiac Risk Index

## 2019-03-11 ENCOUNTER — OFFICE VISIT (OUTPATIENT)
Dept: FAMILY MEDICINE | Facility: CLINIC | Age: 54
End: 2019-03-11
Payer: COMMERCIAL

## 2019-03-11 VITALS
BODY MASS INDEX: 30.92 KG/M2 | HEART RATE: 65 BPM | HEIGHT: 70 IN | SYSTOLIC BLOOD PRESSURE: 122 MMHG | WEIGHT: 216 LBS | DIASTOLIC BLOOD PRESSURE: 86 MMHG | RESPIRATION RATE: 17 BRPM | OXYGEN SATURATION: 97 % | TEMPERATURE: 98.3 F

## 2019-03-11 DIAGNOSIS — Z01.818 PREOP GENERAL PHYSICAL EXAM: Primary | ICD-10-CM

## 2019-03-11 PROBLEM — Z72.0 TOBACCO ABUSE: Status: RESOLVED | Noted: 2018-09-25 | Resolved: 2019-03-11

## 2019-03-11 LAB
ANION GAP SERPL CALCULATED.3IONS-SCNC: 7 MMOL/L (ref 3–14)
BUN SERPL-MCNC: 15 MG/DL (ref 7–30)
CALCIUM SERPL-MCNC: 8.7 MG/DL (ref 8.5–10.1)
CHLORIDE SERPL-SCNC: 109 MMOL/L (ref 94–109)
CO2 SERPL-SCNC: 25 MMOL/L (ref 20–32)
CREAT SERPL-MCNC: 0.8 MG/DL (ref 0.66–1.25)
GFR SERPL CREATININE-BSD FRML MDRD: >90 ML/MIN/{1.73_M2}
GLUCOSE SERPL-MCNC: 135 MG/DL (ref 70–99)
POTASSIUM SERPL-SCNC: 4.4 MMOL/L (ref 3.4–5.3)
SODIUM SERPL-SCNC: 141 MMOL/L (ref 133–144)

## 2019-03-11 PROCEDURE — 36415 COLL VENOUS BLD VENIPUNCTURE: CPT | Performed by: FAMILY MEDICINE

## 2019-03-11 PROCEDURE — 80048 BASIC METABOLIC PNL TOTAL CA: CPT | Performed by: FAMILY MEDICINE

## 2019-03-11 PROCEDURE — 99215 OFFICE O/P EST HI 40 MIN: CPT | Performed by: FAMILY MEDICINE

## 2019-03-11 ASSESSMENT — MIFFLIN-ST. JEOR: SCORE: 1831.02

## 2019-03-12 ENCOUNTER — TELEPHONE (OUTPATIENT)
Dept: FAMILY MEDICINE | Facility: CLINIC | Age: 54
End: 2019-03-12

## 2019-03-12 NOTE — TELEPHONE ENCOUNTER
We did not do the EKG here.  I spoke to Jayshree at [place] and informed him that he would need to contact [facility] for the tracing.    Monica Thompson,

## 2019-03-13 ENCOUNTER — TELEPHONE (OUTPATIENT)
Dept: FAMILY MEDICINE | Facility: CLINIC | Age: 54
End: 2019-03-13

## 2019-03-13 NOTE — TELEPHONE ENCOUNTER
Patient read first page reading:  REVISED CARDIAC RISK INDEX  The patient has the following serious cardiovascular risks for perioperative complications such as (MI, PE, VFib and 3  AV Block)      Patient informed of the rest of the sentence:    No serious cardiac risks  INTERPRETATION: 0 risks: Class I (very low risk - 0.4% complication rate)       Patient states he did not read the next page. Patient has no further concerns    Bonita URRUTIA, RN, CPN

## 2019-03-13 NOTE — TELEPHONE ENCOUNTER
Bairon saw Dr Monson for preop on 3/11.  He does not agree with cardiac risk findings.  Please call to discuss.

## 2019-03-13 NOTE — TELEPHONE ENCOUNTER
Per 3/11 pre op -   REVISED CARDIAC RISK INDEX  The patient has the following serious cardiovascular risks for perioperative complications such as (MI, PE, VFib and 3  AV Block):  No serious cardiac risks  INTERPRETATION: 0 risks: Class I (very low risk - 0.4% complication rate)        Left message on answering machine for patient to call back.  [phone]  Bonita EATONN, RN, CPN

## 2019-03-18 ENCOUNTER — TELEPHONE (OUTPATIENT)
Dept: CARDIOLOGY | Facility: CLINIC | Age: 54
End: 2019-03-18

## 2019-03-18 NOTE — TELEPHONE ENCOUNTER
Shruthi from [facility] at [facility] called requesting EKG to be faxed over.  Patient is scheduled for surgery today.      EKG faxed to [phone]., [facility].    Rani Currie RN

## 2019-04-03 ENCOUNTER — TRANSFERRED RECORDS (OUTPATIENT)
Dept: HEALTH INFORMATION MANAGEMENT | Facility: CLINIC | Age: 54
End: 2019-04-03

## 2019-04-05 ENCOUNTER — TELEPHONE (OUTPATIENT)
Dept: FAMILY MEDICINE | Facility: CLINIC | Age: 54
End: 2019-04-05

## 2019-04-05 DIAGNOSIS — M10.9 GOUT, UNSPECIFIED CAUSE, UNSPECIFIED CHRONICITY, UNSPECIFIED SITE: ICD-10-CM

## 2019-04-05 RX ORDER — INDOMETHACIN 50 MG/1
50 CAPSULE ORAL 3 TIMES DAILY PRN
Qty: 30 CAPSULE | Refills: 0 | Status: SHIPPED | OUTPATIENT
Start: 2019-04-05 | End: 2020-03-07

## 2019-04-05 NOTE — TELEPHONE ENCOUNTER
Medications not active on med list. Medications prescribed by other providers in past.   Last refill prednisone 5/7/18 last refill indomethacin 9/18/18 last ov with Dr. Triston Adame 4/18/18.    Pt states he has hx of gout and states he started having sx on his foot yesterday and is having trouble walking today due to it.  Pt had neck surgery two weeks ago and is at home recovering from it and wears a neck brace and can't drive.  He does not have someone today that could drive him to clinic for an appointment.  Pt is concerned about the trouble walking and having it jar his neck or cause fall that could injure his neck.    Pt states Dr. Triston Adame is his primary care provider and is requesting he fill the indomethacin and prednisone.    To provider to advise.  Grace EATONN, RN

## 2019-04-08 NOTE — TELEPHONE ENCOUNTER
Pt notified of provider message as written.  Pt verbalized good understanding.  Grace Steen BSN, RN

## 2019-04-18 ENCOUNTER — ALLIED HEALTH/NURSE VISIT (OUTPATIENT)
Dept: NURSING | Facility: CLINIC | Age: 54
End: 2019-04-18
Payer: COMMERCIAL

## 2019-04-18 DIAGNOSIS — Z48.89 ENCOUNTER FOR POST SURGICAL WOUND CHECK: Primary | ICD-10-CM

## 2019-04-18 PROCEDURE — 99207 ZZC NO CHARGE NURSE ONLY: CPT

## 2019-04-18 NOTE — PROGRESS NOTES
"Bairon Reed is a 53 year old male who presents with request to have his wound checked.    NURSING ASSESSMENT:  Description:  He reports that he is about 1 month post op from a laminectomy completed through [facility] (work comp related). Linear, vertical surgical wound is in the back of his neck. He had a f/u with ortho about 2 weeks ago and was told the wound looked good then. He has a future f/u with them in about 2 more weeks. He noted a darker spot on the surgical wound and just wanted it checked out. Surgical wound assessed. Wound appears to have healed without any s/s of infection. Not sure what \"dark spot\" patient was referring to as there were no concerning dark spots noted. May have just been a few tiny specs of scab which did come off after wiped with a wet gauze.  Onset/duration: patient not sure   Precip. factors:    Associated symptoms:  Denies pain or s/s of infection.   Improves/worsens symptoms:    Pain scale (0-10)    LMP/preg/breast feeding:    Last exam/Treatment:  2/7/19 with primary care, about 2 weeks ago with [facility]  Allergies: No Known Allergies    MEDICATIONS:   Taking medication(s) as prescribed? N/A  Taking over the counter medication(s?) N/A  Any medication side effects? Not Applicable    Any barriers to taking medication(s) as prescribed?  N/A  Medication(s) improving/managing symptoms?  N/A  Medication reconciliation completed: N/A      NURSING PLAN: Nursing advice to patient to continue with care plan as recommended by ortho and keep f/u appt with them  Monitor for s/s of infection such as fever, redness, swelling, warmth, drainage  Report and be seen with any concerns    RECOMMENDED DISPOSITION:  See above.  Will comply with recommendation: Yes  If further questions/concerns or if symptoms do not improve, worsen or new symptoms develop, call your PCP or [facility] as soon as possible.      Guideline used:  Telephone Triage Protocols for Nurses, Fifth " Edition, Iliana Peng, RN

## 2019-05-01 ENCOUNTER — TRANSFERRED RECORDS (OUTPATIENT)
Dept: HEALTH INFORMATION MANAGEMENT | Facility: CLINIC | Age: 54
End: 2019-05-01

## 2019-05-13 ENCOUNTER — APPOINTMENT (OUTPATIENT)
Dept: OPTOMETRY | Facility: CLINIC | Age: 54
End: 2019-05-13
Payer: COMMERCIAL

## 2019-05-13 ENCOUNTER — OFFICE VISIT (OUTPATIENT)
Dept: OPTOMETRY | Facility: CLINIC | Age: 54
End: 2019-05-13
Payer: COMMERCIAL

## 2019-05-13 DIAGNOSIS — H52.223 REGULAR ASTIGMATISM OF BOTH EYES: Primary | ICD-10-CM

## 2019-05-13 DIAGNOSIS — H52.12 MYOPIA, LEFT: ICD-10-CM

## 2019-05-13 DIAGNOSIS — H52.4 PRESBYOPIA: ICD-10-CM

## 2019-05-13 PROCEDURE — 92015 DETERMINE REFRACTIVE STATE: CPT | Performed by: OPTOMETRIST

## 2019-05-13 PROCEDURE — 92341 FIT SPECTACLES BIFOCAL: CPT | Performed by: OPTOMETRIST

## 2019-05-13 PROCEDURE — 92014 COMPRE OPH EXAM EST PT 1/>: CPT | Performed by: OPTOMETRIST

## 2019-05-13 ASSESSMENT — REFRACTION_MANIFEST
OD_CYLINDER: +1.00
OD_AXIS: 085
OD_AXIS: 081
OS_AXIS: 077
METHOD_AUTOREFRACTION: 1
OS_CYLINDER: +0.25
OS_ADD: +2.00
OD_ADD: +2.00
OS_CYLINDER: +0.75
OS_SPHERE: -0.75
OD_CYLINDER: +1.00
OD_SPHERE: -0.75
OS_AXIS: 085
OD_SPHERE: -0.75
OS_SPHERE: -1.50

## 2019-05-13 ASSESSMENT — VISUAL ACUITY
OD_SC: 20/30
OS_SC: 20/30
OS_SC: 20/30
OS_SC+: -1
OD_SC: 20/100
METHOD: SNELLEN - LINEAR
OD_SC+: -1

## 2019-05-13 ASSESSMENT — EXTERNAL EXAM - RIGHT EYE: OD_EXAM: NORMAL

## 2019-05-13 ASSESSMENT — CUP TO DISC RATIO
OS_RATIO: 0.4
OD_RATIO: 0.4

## 2019-05-13 ASSESSMENT — CONF VISUAL FIELD
OD_NORMAL: 1
OS_NORMAL: 1
METHOD: COUNTING FINGERS

## 2019-05-13 ASSESSMENT — EXTERNAL EXAM - LEFT EYE: OS_EXAM: NORMAL

## 2019-05-13 ASSESSMENT — SLIT LAMP EXAM - LIDS
COMMENTS: NORMAL
COMMENTS: NORMAL

## 2019-05-13 ASSESSMENT — KERATOMETRY
OS_K2POWER_DIOPTERS: 44.50
OS_K1POWER_DIOPTERS: 44.00
OS_AXISANGLE2_DEGREES: 178
OD_K2POWER_DIOPTERS: 44.50
OD_K1POWER_DIOPTERS: 43.75
OD_AXISANGLE2_DEGREES: 166

## 2019-05-13 ASSESSMENT — REFRACTION_WEARINGRX
OD_AXIS: 085
OS_SPHERE: -1.25
SPECS_TYPE: BIFOCAL
OS_CYLINDER: +0.75
OS_AXIS: 085
OD_SPHERE: -1.00
OD_ADD: +2.00
OD_CYLINDER: +1.00
OS_ADD: +2.00

## 2019-05-13 ASSESSMENT — TONOMETRY
OD_IOP_MMHG: 20
IOP_METHOD: APPLANATION
OS_IOP_MMHG: 20

## 2019-05-13 NOTE — PATIENT INSTRUCTIONS
Patient was advised of today's exam findings.  Optional to fill new glasses prescription, minimal change  Return in 1 year for eye exam    Taylor Isbell O.D.  [facility]   [address] [place] [address] [phone]

## 2019-05-13 NOTE — PROGRESS NOTES
Chief Complaint   Patient presents with     Annual Eye Exam         Last Eye Exam: 5/24/2017  Dilated Previously: Yes    What are you currently using to see?  Wears Rx glasses to drive at night, and uses OTC readers to do near tasks. Uses from +1.75-+2.00, did not bring them to this appointment       Distance Vision Acuity: Satisfied with vision    Near Vision Acuity: Thinks that there may be some changes, but he seems to do ok with the readers     Eye Comfort: good  Do you use eye drops? : No  Occupation or Hobbies: [employer]           Medical, surgical and family histories reviewed and updated 5/13/2019.       OBJECTIVE: See Ophthalmology exam    ASSESSMENT:    ICD-10-CM    1. Regular astigmatism of both eyes H52.223    2. Presbyopia H52.4       PLAN:     Patient Instructions   Patient was advised of today's exam findings.  Optional to fill new glasses prescription, minimal change  Return in 1 year for eye exam    Taylor Isbell O.D.  [facility]   [address] [place] [address]  [phone]

## 2019-06-10 ENCOUNTER — TRANSFERRED RECORDS (OUTPATIENT)
Dept: HEALTH INFORMATION MANAGEMENT | Facility: CLINIC | Age: 54
End: 2019-06-10

## 2019-07-18 ENCOUNTER — TELEPHONE (OUTPATIENT)
Dept: DERMATOLOGY | Facility: CLINIC | Age: 54
End: 2019-07-18

## 2019-07-18 NOTE — TELEPHONE ENCOUNTER
Left message for patient regarding upcoming appointment on 7/24/19 at 8:00am.  Advised to call [phone] for previsit questions    Rosita PHAM

## 2019-07-29 ENCOUNTER — OFFICE VISIT (OUTPATIENT)
Dept: FAMILY MEDICINE | Facility: CLINIC | Age: 54
End: 2019-07-29
Payer: COMMERCIAL

## 2019-07-29 VITALS
OXYGEN SATURATION: 98 % | DIASTOLIC BLOOD PRESSURE: 84 MMHG | TEMPERATURE: 98 F | SYSTOLIC BLOOD PRESSURE: 120 MMHG | HEIGHT: 70 IN | BODY MASS INDEX: 29.92 KG/M2 | HEART RATE: 77 BPM | WEIGHT: 209 LBS

## 2019-07-29 DIAGNOSIS — M51.26 LUMBAR HERNIATED DISC: ICD-10-CM

## 2019-07-29 DIAGNOSIS — Z01.818 PREOP GENERAL PHYSICAL EXAM: Primary | ICD-10-CM

## 2019-07-29 DIAGNOSIS — M54.16 LUMBAR RADICULOPATHY: ICD-10-CM

## 2019-07-29 LAB
BASOPHILS # BLD AUTO: 0 10E9/L (ref 0–0.2)
BASOPHILS NFR BLD AUTO: 0.4 %
DIFFERENTIAL METHOD BLD: NORMAL
EOSINOPHIL # BLD AUTO: 0.2 10E9/L (ref 0–0.7)
EOSINOPHIL NFR BLD AUTO: 3.3 %
ERYTHROCYTE [DISTWIDTH] IN BLOOD BY AUTOMATED COUNT: 13.3 % (ref 10–15)
HCT VFR BLD AUTO: 42 % (ref 40–53)
HGB BLD-MCNC: 14.7 G/DL (ref 13.3–17.7)
LYMPHOCYTES # BLD AUTO: 2.1 10E9/L (ref 0.8–5.3)
LYMPHOCYTES NFR BLD AUTO: 30 %
MCH RBC QN AUTO: 31 PG (ref 26.5–33)
MCHC RBC AUTO-ENTMCNC: 35 G/DL (ref 31.5–36.5)
MCV RBC AUTO: 89 FL (ref 78–100)
MONOCYTES # BLD AUTO: 0.6 10E9/L (ref 0–1.3)
MONOCYTES NFR BLD AUTO: 9.1 %
NEUTROPHILS # BLD AUTO: 4 10E9/L (ref 1.6–8.3)
NEUTROPHILS NFR BLD AUTO: 57.2 %
PLATELET # BLD AUTO: 327 10E9/L (ref 150–450)
RBC # BLD AUTO: 4.74 10E12/L (ref 4.4–5.9)
WBC # BLD AUTO: 7 10E9/L (ref 4–11)

## 2019-07-29 PROCEDURE — 85025 COMPLETE CBC W/AUTO DIFF WBC: CPT | Performed by: PHYSICIAN ASSISTANT

## 2019-07-29 PROCEDURE — 99214 OFFICE O/P EST MOD 30 MIN: CPT | Performed by: PHYSICIAN ASSISTANT

## 2019-07-29 PROCEDURE — 36415 COLL VENOUS BLD VENIPUNCTURE: CPT | Performed by: PHYSICIAN ASSISTANT

## 2019-07-29 RX ORDER — LAMOTRIGINE 25 MG/1
TABLET ORAL
Refills: 1 | COMMUNITY
Start: 2019-07-03 | End: 2020-02-21

## 2019-07-29 ASSESSMENT — MIFFLIN-ST. JEOR: SCORE: 1799.27

## 2019-07-29 NOTE — PROGRESS NOTES
[address] [address]  [phone]  Dept: [phone]    PRE-OP EVALUATION:  Today's date: 2019    Bairon Reed (: 1965) presents for pre-operative evaluation assessment as requested by Dr. Pham with [facility].  He requires evaluation and anesthesia risk assessment prior to undergoing surgery/procedure for treatment of Laminectomy, Disc Excision.    Fax number for surgical facility: [phone]  Primary Physician: Triston Adame  Type of Anesthesia Anticipated: to be determined    Patient has a Health Care Directive or Living Will:  NO    Preop Questions 3/11/2019   Who is doing your surgery? [facility]   What are you having done? neck   Date of Surgery/Procedure: 19   Facility or Hospital where procedure/surgery will be performed: [facility]   1.  Do you have a history of Heart attack, stroke, stent, coronary bypass surgery, or other heart surgery? No   2.  Do you ever have any pain or discomfort in your chest? No   3.  Do you have a history of  Heart Failure? No   4.   Are you troubled by shortness of breath when:  walking on a level surface, or up a slight hill, or at night? No   5.  Do you currently have a cold, bronchitis or other respiratory infection? No   6.  Do you have a cough, shortness of breath, or wheezing? No   7.  Do you sometimes get pains in the calves of your legs when you walk? No   8. Do you or anyone in your family have previous history of blood clots? No   9.  Do you or does anyone in your family have a serious bleeding problem such as prolonged bleeding following surgeries or cuts? No   10. Have you ever had problems with anemia or been told to take iron pills? No   11. Have you had any abnormal blood loss such as black, tarry or bloody stools? No   12. Have you ever had a blood transfusion? No   13. Have you or any of your relatives ever had problems with anesthesia? No   14. Do you have sleep apnea,  excessive snoring or daytime drowsiness? No   15. Do you have any prosthetic heart valves? No   16. Do you have prosthetic joints? No         HPI:     HPI related to upcoming procedure: Patient is having a laminectomy       See problem list for active medical problems.  Problems all longstanding and stable, except as noted/documented.  See ROS for pertinent symptoms related to these conditions.      MEDICAL HISTORY:     Patient Active Problem List    Diagnosis Date Noted     Acute left-sided low back pain with left-sided sciatica 04/02/2018     Priority: Medium     Gout, unspecified cause, unspecified chronicity, unspecified site 03/04/2016     Priority: Medium     Benign non-nodular prostatic hyperplasia with lower urinary tract symptoms 01/25/2016     Priority: Medium     Hypertension goal BP (blood pressure) < 140/90 02/10/2015     Priority: Medium     S/P lumbar microdiscectomy 05/21/2013     Priority: Medium     Lumbar stenosis with neurogenic claudication 05/13/2013     Priority: Medium     Lumbar radiculopathy 05/13/2013     Priority: Medium     Lumbar herniated disc 05/13/2013     Priority: Medium     Foot drop, right 04/15/2013     Priority: Medium     S/P spinal fusion 03/22/2013     Priority: Medium     Cervical radiculopathy 03/05/2013     Priority: Medium     Vitamin D deficiency 02/12/2013     Priority: Medium     Problem list name updated by automated process. 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     Priority: Medium     rt       Bilateral arm weakness 09/13/2011     Priority: Medium     Hyperlipidemia LDL goal  <160 11/18/2009     Priority: Medium     Insomnia 11/18/2009     Priority: Medium      Past Medical History:   Diagnosis Date     Anxiety      Cervical spondylosis with myelopathy 2/4/2013     Cervicalgia      Chronic low back pain 9/13/2011     Chronic low back pain      Degeneration of cervical intervertebral disc      Gout      Hyperlipidemia LDL goal <160 11/18/2009     Hypertension goal BP (blood pressure) < 140/90 2/10/2015     Insomnia 11/18/2009     Nondependent alcohol abuse 11/14/2012     Pain in shoulder 9/13/2011     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 Lower back surgery     DISCECTOMY LUMBAR POSTERIOR MICROSCOPIC ONE LEVEL  5/17/2013    Procedure: DISCECTOMY LUMBAR POSTERIOR MICROSCOPIC ONE LEVEL;  BILATERAL LUMBAR L4-5 EXTENDED HEMILAMINECTOMY, MICROFORAMINOTOMY AND MICRODISCECTOMY WITH METRIX II;  Surgeon: Andrew Alejandre MD;  Location: [facility]     FUSION CERVICAL ANTERIOR ONE LEVEL  3/5/2013    Procedure: FUSION CERVICAL ANTERIOR ONE LEVEL;  C 6-7 ANTERIOR DISCECTOMY AND FUSION WITH NEURO MONITORING ;  Surgeon: Andrew Alejandre MD;  Location:  OR     KNEE SURGERY  2009    Right     LARYNGOSCOPY WITH MICROSCOPE  5/28/2013    Procedure: LARYNGOSCOPY WITH MICROSCOPE;  MICRODIRECT LARYNGOSCOPY, WITH LEFT VOCAL CORD INJECTION ;  Surgeon: Jorge Vergara MD;  Location:  [facility]     NECK SURGERY  2/2010    [facility]     Current Outpatient Medications   Medication Sig Dispense Refill     calcium carbonate (OS-HANNAH 500 MG [place]. [place]) 500 MG tablet Take 500 mg by mouth daily       Cholecalciferol (VITAMIN D3 PO) Take 1,000 Units by mouth daily       glucosamine-chondroitin 500-400 MG CAPS Take 1 capsule by mouth daily       indomethacin (INDOCIN) 50 MG capsule Take 1 capsule (50 mg) by mouth 3  times daily as needed for moderate pain 30 capsule 0     lamoTRIgine (LAMICTAL) 25 MG tablet TAKE 1 TABLET BY MOUTH TWICE DAILY FOR 2 WEEKS THEN INCREASE TO 2 TABLETS TWICE DAILY UNTIL FOLLOW UP APPOINTMENT  1     lisinopril (PRINIVIL/ZESTRIL) 20 MG tablet Take 1 tablet (20 mg) by mouth daily 90 tablet 1     Multiple Vitamins-Minerals (MULTIVITAMIN OR) Take 1 tablet by mouth daily.       naproxen (NAPROSYN) 500 MG tablet TAKE 1 TABLET BY MOUTH TWICE DAILY AS NEEDED FOR  MODETATE  PAIN. 60 tablet 1     Omega-3 Fatty Acids (OMEGA-3 FISH OIL PO) Take 2 g by mouth daily       oxybutynin (DITROPAN XL) 10 MG 24 hr tablet Take 1 tablet (10 mg) by mouth daily 90 tablet 1     VITAMIN E NATURAL PO Take 100 Units by mouth       OTC products: None, except as noted above    No Known Allergies   Latex Allergy: NO    Social History     Tobacco Use     Smoking status: Never Smoker     Smokeless tobacco: Current User     Types: Chew     Tobacco comment: Lives in smoke free household   Substance Use Topics     Alcohol use: No     Alcohol/week: 0.0 oz     Comment: Quit 2010. Chemical dependency treatment in 1989. ...31 yrs of 1.5 tins weekly.  sober for 7 months..     History   Drug Use No       REVIEW OF SYSTEMS:   CONSTITUTIONAL: NEGATIVE for fever, chills, change in weight  INTEGUMENTARY/SKIN: NEGATIVE for worrisome rashes, moles or lesions  EYES: NEGATIVE for vision changes or irritation  ENT/MOUTH: NEGATIVE for ear, mouth and throat problems  RESP: NEGATIVE for significant cough or SOB  RESP:mild dry cough  BREAST: NEGATIVE for masses, tenderness or discharge  CV: NEGATIVE for chest pain, palpitations or peripheral edema  GI: NEGATIVE for nausea, abdominal pain, heartburn, or change in bowel habits  : NEGATIVE for frequency, dysuria, or hematuria  MUSCULOSKELETAL:back pain   NEURO: NEGATIVE for weakness, dizziness or paresthesias  ENDOCRINE: NEGATIVE for temperature intolerance, skin/hair changes  HEME: NEGATIVE for bleeding  "problems  PSYCHIATRIC: NEGATIVE for changes in mood or affect    EXAM:   /84 (BP Location: Left arm, Patient Position: Chair, Cuff Size: Adult Large)   Pulse 77   Temp 98  F (36.7  C) (Oral)   Ht 1.778 m (5' 10\")   Wt 94.8 kg (209 lb)   SpO2 98%   BMI 29.99 kg/m      GENERAL APPEARANCE: healthy, alert and no distress     EYES: EOMI,  PERRL     HENT: ear canals and TM's normal and nose and mouth without ulcers or lesions     NECK: no adenopathy, no asymmetry, masses, or scars and thyroid normal to palpation     RESP: lungs clear to auscultation - no rales, rhonchi or wheezes     CV: regular rates and rhythm, normal S1 S2, no S3 or S4 and no murmur, click or rub     ABDOMEN:  soft, nontender, no HSM or masses and bowel sounds normal     MS: extremities normal- no gross deformities noted     SKIN: no suspicious lesions or rashes     NEURO: Normal strength and tone, sensory exam grossly normal, mentation intact and speech normal     PSYCH: mentation appears normal. and affect normal/bright     LYMPHATICS: No cervical adenopathy    DIAGNOSTICS:     EKG: Not indicated due to non-vascular surgery and low risk of event (age <65 and without cardiac risk factors)  Labs Resulted Today:   Results for orders placed or performed in visit on 07/29/19   CBC with platelets and differential   Result Value Ref Range    WBC 7.0 4.0 - 11.0 10e9/L    RBC Count 4.74 4.4 - 5.9 10e12/L    Hemoglobin 14.7 13.3 - 17.7 g/dL    Hematocrit 42.0 40.0 - 53.0 %    MCV 89 78 - 100 fl    MCH 31.0 26.5 - 33.0 pg    MCHC 35.0 31.5 - 36.5 g/dL    RDW 13.3 10.0 - 15.0 %    Platelet Count 327 150 - 450 10e9/L    % Neutrophils 57.2 %    % Lymphocytes 30.0 %    % Monocytes 9.1 %    % Eosinophils 3.3 %    % Basophils 0.4 %    Absolute Neutrophil 4.0 1.6 - 8.3 10e9/L    Absolute Lymphocytes 2.1 0.8 - 5.3 10e9/L    Absolute Monocytes 0.6 0.0 - 1.3 10e9/L    Absolute Eosinophils 0.2 0.0 - 0.7 10e9/L    Absolute Basophils 0.0 0.0 - 0.2 10e9/L    Diff " Method Automated Method        Recent Labs   Lab Test 03/11/19  0740 02/01/19  0738 09/25/18  1025  02/21/17  1507  02/11/13  1526   HGB  --  15.4 16.0  --   --    < > 16.1   PLT  --  256 305  --   --    < > 299    140 140  --   --    < > 139   POTASSIUM 4.4 4.5 4.2  --   --    < > 4.3   CR 0.80 0.91 1.03   < >  --    < > 0.85   A1C  --   --   --   --  5.8  --  5.5    < > = values in this interval not displayed.        IMPRESSION:   Reason for surgery/procedure: Laminectomy   Diagnosis/reason for consult: Pre Op    The proposed surgical procedure is considered INTERMEDIATE risk.    REVISED CARDIAC RISK INDEX  The patient has the following serious cardiovascular risks for perioperative complications such as (MI, PE, VFib and 3  AV Block):  No serious cardiac risks  INTERPRETATION: 0 risks: Class I (very low risk - 0.4% complication rate)    The patient has the following additional risks for perioperative complications:  The 10-year ASCVD risk score ([place] SADIE Jr., et al., 2013) is: 7.3%    Values used to calculate the score:      Age: 53 years      Sex: Male      Is Non- : No      Diabetic: No      Tobacco smoker: No      Systolic Blood Pressure: 120 mmHg      Is BP treated: Yes      HDL Cholesterol: 40 mg/dL      Total Cholesterol: 236 mg/dL      ICD-10-CM    1. Preop general physical exam Z01.818 CBC with platelets and differential   2. Lumbar radiculopathy M54.16    3. Lumbar herniated disc M51.26        RECOMMENDATIONS:     --Patient is to take all scheduled medications on the day of surgery EXCEPT for modifications listed below.    Anticoagulant or Antiplatelet Medication Use  ASPIRIN: Discontinue ASA 7-10 days prior to procedure to reduce bleeding risk.  It should be resumed post-operatively.  NSAIDS: Naproxen (Naprosyn):   Stop 2-3 days prior to surgery        APPROVAL GIVEN to proceed with proposed procedure, without further diagnostic evaluation       Signed Electronically by:  Marcelina Coleman PA-C    Copy of this evaluation report is provided to requesting physician.    [place] Preop Guidelines    Revised Cardiac Risk Index

## 2019-08-05 ENCOUNTER — TRANSFERRED RECORDS (OUTPATIENT)
Dept: HEALTH INFORMATION MANAGEMENT | Facility: CLINIC | Age: 54
End: 2019-08-05

## 2019-08-21 ENCOUNTER — TRANSFERRED RECORDS (OUTPATIENT)
Dept: HEALTH INFORMATION MANAGEMENT | Facility: CLINIC | Age: 54
End: 2019-08-21

## 2019-08-23 ENCOUNTER — OFFICE VISIT (OUTPATIENT)
Dept: UROLOGY | Facility: CLINIC | Age: 54
End: 2019-08-23
Payer: COMMERCIAL

## 2019-08-23 VITALS — SYSTOLIC BLOOD PRESSURE: 122 MMHG | DIASTOLIC BLOOD PRESSURE: 86 MMHG | RESPIRATION RATE: 16 BRPM | HEART RATE: 87 BPM

## 2019-08-23 DIAGNOSIS — R35.0 URINARY FREQUENCY: Primary | ICD-10-CM

## 2019-08-23 LAB
ALBUMIN UR-MCNC: NEGATIVE MG/DL
APPEARANCE UR: CLEAR
BILIRUB UR QL STRIP: NEGATIVE
COLOR UR AUTO: YELLOW
GLUCOSE UR STRIP-MCNC: NEGATIVE MG/DL
HGB UR QL STRIP: NEGATIVE
KETONES UR STRIP-MCNC: NEGATIVE MG/DL
LEUKOCYTE ESTERASE UR QL STRIP: NEGATIVE
NITRATE UR QL: NEGATIVE
PH UR STRIP: 5.5 PH (ref 5–7)
SOURCE: NORMAL
SP GR UR STRIP: 1.01 (ref 1–1.03)
UROBILINOGEN UR STRIP-ACNC: 0.2 EU/DL (ref 0.2–1)

## 2019-08-23 PROCEDURE — 99204 OFFICE O/P NEW MOD 45 MIN: CPT | Mod: 25 | Performed by: UROLOGY

## 2019-08-23 PROCEDURE — 81003 URINALYSIS AUTO W/O SCOPE: CPT | Performed by: UROLOGY

## 2019-08-23 PROCEDURE — 51798 US URINE CAPACITY MEASURE: CPT | Performed by: UROLOGY

## 2019-08-23 RX ORDER — ALFUZOSIN HYDROCHLORIDE 10 MG/1
10 TABLET, EXTENDED RELEASE ORAL AT BEDTIME
Qty: 30 TABLET | Refills: 1 | Status: SHIPPED | OUTPATIENT
Start: 2019-08-23 | End: 2019-12-03

## 2019-08-23 RX ORDER — SILDENAFIL CITRATE 20 MG/1
TABLET ORAL
Qty: 20 TABLET | Refills: 3 | Status: SHIPPED | OUTPATIENT
Start: 2019-08-23 | End: 2020-04-24

## 2019-08-23 NOTE — PROGRESS NOTES
S: Bairon Reed is a pleasant  53 year old male who was requested to be seen by  Triston Adame for a consult with regard to patient's urinary complaints.  Patient complains of Frequency and and post void dribble.  He has no history of elevated PSA.  Symptoms have been on going for   many month(s).  Seems to be worsened over time.  His recent PSA was found to be   PSA   Date Value Ref Range Status   09/25/2018 1.66 0 - 4 ug/L Final     Comment:     Assay Method:  Chemiluminescence using Siemens Vista analyzer   .  His AUA Symptom Score:  17.  His QOL score:  2-3.  He also has ED and is interested in treatments.  He has soft erections (3/10).  His libido is good.    Current Outpatient Medications   Medication Sig Dispense Refill     alfuzosin ER (UROXATRAL) 10 MG 24 hr tablet Take 1 tablet (10 mg) by mouth At Bedtime 30 tablet 1     calcium carbonate (OS-HANNAH 500 MG [place]. [place]) 500 MG tablet Take 500 mg by mouth daily       Cholecalciferol (VITAMIN D3 PO) Take 1,000 Units by mouth daily       glucosamine-chondroitin 500-400 MG CAPS Take 1 capsule by mouth daily       indomethacin (INDOCIN) 50 MG capsule Take 1 capsule (50 mg) by mouth 3 times daily as needed for moderate pain 30 capsule 0     lamoTRIgine (LAMICTAL) 25 MG tablet TAKE 1 TABLET BY MOUTH TWICE DAILY FOR 2 WEEKS THEN INCREASE TO 2 TABLETS TWICE DAILY UNTIL FOLLOW UP APPOINTMENT  1     lisinopril (PRINIVIL/ZESTRIL) 20 MG tablet Take 1 tablet (20 mg) by mouth daily 90 tablet 1     Multiple Vitamins-Minerals (MULTIVITAMIN OR) Take 1 tablet by mouth daily.       naproxen (NAPROSYN) 500 MG tablet TAKE 1 TABLET BY MOUTH TWICE DAILY AS NEEDED FOR  MODETATE  PAIN. 60 tablet 1     Omega-3 Fatty Acids (OMEGA-3 FISH OIL PO) Take 2 g by mouth daily       sildenafil (REVATIO) 20 MG tablet Take 2-5 tabs one hour before sex 20 tablet 3     VITAMIN E NATURAL PO Take 100 Units by mouth       No Known Allergies  Past Medical History:   Diagnosis Date     Anxiety       Cervical spondylosis with myelopathy 2/4/2013     Cervicalgia      Chronic low back pain 9/13/2011     Chronic low back pain      Degeneration of cervical intervertebral disc      Gout      Hyperlipidemia LDL goal <160 11/18/2009     Hypertension goal BP (blood pressure) < 140/90 2/10/2015     Insomnia 11/18/2009     Nondependent alcohol abuse 11/14/2012     Pain in shoulder 9/13/2011     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 Lower back surgery     DISCECTOMY LUMBAR POSTERIOR MICROSCOPIC ONE LEVEL  5/17/2013    Procedure: DISCECTOMY LUMBAR POSTERIOR MICROSCOPIC ONE LEVEL;  BILATERAL LUMBAR L4-5 EXTENDED HEMILAMINECTOMY, MICROFORAMINOTOMY AND MICRODISCECTOMY WITH METRIX II;  Surgeon: Andrew Alejandre MD;  Location[facility]     FUSION CERVICAL ANTERIOR ONE LEVEL  3/5/2013    Procedure: FUSION CERVICAL ANTERIOR ONE LEVEL;  C 6-7 ANTERIOR DISCECTOMY AND FUSION WITH NEURO MONITORING ;  Surgeon: Andrew Alejandre MD;  Location:  OR     KNEE SURGERY  2009    Right     LARYNGOSCOPY WITH MICROSCOPE  5/28/2013    Procedure: LARYNGOSCOPY WITH MICROSCOPE;  MICRODIRECT LARYNGOSCOPY, WITH LEFT VOCAL CORD INJECTION ;  Surgeon: Jorge Vergara MD;  Location: [facility]     NECK SURGERY  2/2010    [facility]      Family History   Problem Relation Age of Onset     Heart Disease Father 45        heart attack     Myocardial Infarction Father      Arthritis Sister      Heart Disease Mother 51        pneumonia and enlarged heart     Myocardial Infarction Other      Glaucoma No family hx of      Macular Degeneration No family hx of      Cancer No family hx of      Diabetes No family hx of      Hypertension No family hx of      Cerebrovascular Disease No family hx of      Thyroid Disease No family hx of      He does not have a  family history of prostate cancer.  Social History     Socioeconomic History     Marital status:      Spouse name: None     Number of children: 2     Years of education: 13     Highest education level: None   Occupational History     Occupation: Manufacturing     Employer: [employer]     Comment: Last time worked in Nov. 2, 2012   Social Needs     Financial resource strain: None     Food insecurity:     Worry: None     Inability: None     Transportation needs:     Medical: None     Non-medical: None   Tobacco Use     Smoking status: Never Smoker     Smokeless tobacco: Current User     Types: Chew     Tobacco comment: Lives in smoke free household   Substance and Sexual Activity     Alcohol use: No     Alcohol/week: 0.0 oz     Comment: Quit 2010. Chemical dependency treatment in 1989. ...31 yrs of 1.5 tins weekly.  sober for 7 months..     Drug use: No     Sexual activity: Yes     Partners: Female   Lifestyle     Physical activity:     Days per week: None     Minutes per session: None     Stress: None   Relationships     Social connections:     Talks on phone: None     Gets together: None     Attends [employer] service: None     Active member of club or organization: None     Attends meetings of clubs or organizations: None     Relationship status: None     Intimate partner violence:     Fear of current or ex partner: None     Emotionally abused: None     Physically abused: None     Forced sexual activity: None   Other Topics Concern     Parent/sibling w/ CABG, MI or angioplasty before 65F 55M? Yes     Comment: father   Social History Narrative     None        REVIEW OF SYSTEMS  =================  C: NEGATIVE for fever, chills, change in weight  I: NEGATIVE for worrisome rashes, moles or lesions  E/M: NEGATIVE for ear, mouth and throat problems  R: NEGATIVE for significant cough or SHORTNESS OF BREATH  CV:  NEGATIVE for chest pain, palpitations or peripheral edema  GI: NEGATIVE for nausea, abdominal pain,  heartburn, or change in bowel habits  NEURO: NEGATIVE numbness/weakness  : see HPI  PSYCH: NEGATIVE depression/anxiety  LYmph: no new enlarged lymph nodes  Ortho: no new trauma/movements           O: Exam:/86 (BP Location: Right arm, Patient Position: Chair, Cuff Size: Adult Large)   Pulse 87   Resp 16    Constitutional: healthy, alert and no distress  Cardiovascular: negative, PMI normal.   Respiratory: negative, no evidence of respiratory distress  Gastrointestinal: Abdomen soft, non-tender. BS normal. No masses, organomegaly  : penis on discharge. Testis no masses.  No scrotal skin lesion.  Prostate 40 gm smooth no nodule  Musculoskeletal: extremities normal- no gross deformities noted, gait normal and normal muscle tone  Skin: no suspicious lesions or rashes  Neurologic: Alert and oriented  Psychiatric: mentation appears normal. and affect normal/bright  Hematologic/Lymphatic/Immunologic: normal ant/post cervical, axillary, supraclavicular and inguinal nodes    Assessment/Plan:   (R35.0) Urinary frequency  (primary encounter diagnosis)  Comment: bladder scan 104 ml.  UA neg.  Plan: trial of alfuzosin           Info on Rezum provided.    (N52.9) Male impotence  Comment: Injection with alfuzosin discussed.  Patient was told not to take the alfuzosin if he takes Viagra same night.  Plan: sildenafil (REVATIO) 20 MG tablet               Recommendations:   Medical management versus surgical management versus office treatments, were discussed with patient at length. Pros and Cons discussed.

## 2019-09-24 ENCOUNTER — TELEPHONE (OUTPATIENT)
Dept: FAMILY MEDICINE | Facility: CLINIC | Age: 54
End: 2019-09-24

## 2019-09-24 NOTE — LETTER
[facility]  [address] [facility] [address]  Phone: [phone]    09/24/19    Bairon Reed  [address] [place] [address]      Dear [facility] Patient,     Your provider reviewed your medical record and found that you are due for colorectal cancer screening. Having regular screenings helps detect cancer early.   In the past, you have completed a FIT test (Fecal Immunochemical Test) for your colorectal cancer screening. This test looks for blood in your stool and is done once a year.   You have a few options available, the FIT testing you have done in the past or the following below:   A Cologuard test may be completed at home. Cologuard uses a DNA marker in your stool to detect colon cancer and some precancerous polyps. This test is to be completed every three years.   Another option for colorectal cancer screening is a colonoscopy test. A colonoscopy is a procedure that is performed to see the inside of the and rectum by using a long, thin, and flexible tube with a tiny video camera and light at the end. This test is done every 10 years if it is normal.   Please choose one of these options. If you would like to have one of these tests ordered, please call us at [phone] or send us a message via [employer].   If you have had a colorectal cancer screening done outside of [place] please let us know so we can update your medical record.   Please let us know if you have any questions.       Sincerely,   [facility], [place]

## 2019-09-30 DIAGNOSIS — I10 HYPERTENSION GOAL BP (BLOOD PRESSURE) < 140/90: ICD-10-CM

## 2019-09-30 RX ORDER — LISINOPRIL 20 MG/1
TABLET ORAL
Qty: 90 TABLET | Refills: 1 | Status: SHIPPED | OUTPATIENT
Start: 2019-09-30 | End: 2019-12-17

## 2019-12-03 DIAGNOSIS — R35.0 URINARY FREQUENCY: ICD-10-CM

## 2019-12-03 RX ORDER — ALFUZOSIN HYDROCHLORIDE 10 MG/1
10 TABLET, EXTENDED RELEASE ORAL AT BEDTIME
Qty: 30 TABLET | Refills: 1 | Status: SHIPPED | OUTPATIENT
Start: 2019-12-03 | End: 2019-12-17

## 2019-12-10 ENCOUNTER — ANCILLARY PROCEDURE (OUTPATIENT)
Dept: GENERAL RADIOLOGY | Facility: CLINIC | Age: 54
End: 2019-12-10
Attending: PHYSICIAN ASSISTANT
Payer: COMMERCIAL

## 2019-12-10 ENCOUNTER — OFFICE VISIT (OUTPATIENT)
Dept: URGENT CARE | Facility: URGENT CARE | Age: 54
End: 2019-12-10
Payer: COMMERCIAL

## 2019-12-10 VITALS
OXYGEN SATURATION: 95 % | WEIGHT: 200 LBS | DIASTOLIC BLOOD PRESSURE: 88 MMHG | BODY MASS INDEX: 28.7 KG/M2 | SYSTOLIC BLOOD PRESSURE: 134 MMHG | HEART RATE: 101 BPM | TEMPERATURE: 97.2 F

## 2019-12-10 DIAGNOSIS — S79.911A HIP INJURY, RIGHT, INITIAL ENCOUNTER: ICD-10-CM

## 2019-12-10 DIAGNOSIS — Z23 NEED FOR VACCINATION: ICD-10-CM

## 2019-12-10 DIAGNOSIS — S70.01XA CONTUSION OF RIGHT HIP, INITIAL ENCOUNTER: ICD-10-CM
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
## 2019-12-10 DIAGNOSIS — S00.91XA ABRASION OF HEAD, INITIAL ENCOUNTER: ICD-10-CM

## 2019-12-10 PROCEDURE — 99214 OFFICE O/P EST MOD 30 MIN: CPT | Mod: 25 | Performed by: PHYSICIAN ASSISTANT

## 2019-12-10 PROCEDURE — 90715 TDAP VACCINE 7 YRS/> IM: CPT | Performed by: PHYSICIAN ASSISTANT

## 2019-12-10 PROCEDURE — 73502 X-RAY EXAM HIP UNI 2-3 VIEWS: CPT

## 2019-12-10 PROCEDURE — 90471 IMMUNIZATION ADMIN: CPT | Performed by: PHYSICIAN ASSISTANT

## 2019-12-10 RX ORDER — IBUPROFEN 800 MG/1
800 TABLET, FILM COATED ORAL EVERY 8 HOURS PRN
Qty: 30 TABLET | Refills: 0 | Status: SHIPPED | OUTPATIENT
Start: 2019-12-10 | End: 2019-12-20

## 2019-12-10 RX ORDER — CYCLOBENZAPRINE HCL 10 MG
TABLET ORAL
Qty: 20 TABLET | Refills: 0 | Status: SHIPPED | OUTPATIENT
Start: 2019-12-10 | End: 2020-02-21

## 2019-12-10 NOTE — PROGRESS NOTES
S: 53 yo male is here for right hip pain and head abrasion.  On 12/7/2019 he was hauling his collapsible fish house on the ice and fell backwards onto it.  The back of his head hit a bracket on it and caused a laceration.   No loss of consciousness. He cleaned it out very well.  He denies any vision changes.  No nausea or vomiting.  No current neck pain.  No headaches.  Since the injury he has right hip pain.  He does not really have any pain with sitting.  However when he stands he has a tremendous amount of pain that will subside a little if he continues to walk with it.  He has been using a leftover walker from prior cervical and lumbar laminectomies/decompressions.  He denies any low back pain.  He denies any upper or lower extremity radicular pain, numbness, tingling, weakness.  No fever.  His last tetanus shot was 2011.      No Known Allergies    Past Medical History:   Diagnosis Date     Anxiety      Cervical spondylosis with myelopathy 2/4/2013     Cervicalgia      Chronic low back pain 9/13/2011     Chronic low back pain      Degeneration of cervical intervertebral disc      Gout      Hyperlipidemia LDL goal <160 11/18/2009     Hypertension goal BP (blood pressure) < 140/90 2/10/2015     Insomnia 11/18/2009     Nondependent alcohol abuse 11/14/2012     Pain in shoulder 9/13/2011     Peroneal nerve palsy Feb 2012    rt     Radiculopathy, cervical 9/13/2011     Shoulder impingement     right     Vocal cord paralysis     LEFT       alfuzosin ER (UROXATRAL) 10 MG 24 hr tablet, Take 1 tablet (10 mg) by mouth At Bedtime  calcium carbonate (OS-HANNAH 500 MG [place]. [place]) 500 MG tablet, Take 500 mg by mouth daily  Cholecalciferol (VITAMIN D3 PO), Take 1,000 Units by mouth daily  glucosamine-chondroitin 500-400 MG CAPS, Take 1 capsule by mouth daily  indomethacin (INDOCIN) 50 MG capsule, Take 1 capsule (50 mg) by mouth 3 times daily as needed for moderate pain  lisinopril (PRINIVIL/ZESTRIL) 20 MG tablet, TAKE 1 TABLET BY  MOUTH ONCE DAILY  Multiple Vitamins-Minerals (MULTIVITAMIN OR), Take 1 tablet by mouth daily.  Omega-3 Fatty Acids (OMEGA-3 FISH OIL PO), Take 2 g by mouth daily  sildenafil (REVATIO) 20 MG tablet, Take 2-5 tabs one hour before sex  VITAMIN E NATURAL PO, Take 100 Units by mouth  lamoTRIgine (LAMICTAL) 25 MG tablet, TAKE 1 TABLET BY MOUTH TWICE DAILY FOR 2 WEEKS THEN INCREASE TO 2 TABLETS TWICE DAILY UNTIL FOLLOW UP APPOINTMENT  naproxen (NAPROSYN) 500 MG tablet, TAKE 1 TABLET BY MOUTH TWICE DAILY AS NEEDED FOR  MODETATE  PAIN. (Patient not taking: Reported on 12/10/2019)    No current facility-administered medications on file prior to visit.       Social History     Tobacco Use     Smoking status: Never Smoker     Smokeless tobacco: Current User     Types: Chew     Tobacco comment: Lives in smoke free household   Substance Use Topics     Alcohol use: No     Alcohol/week: 0.0 standard drinks     Comment: Quit 2010. Chemical dependency treatment in 1989. ...31 yrs of 1.5 tins weekly.  sober for 7 months..       ROS:  CONSTITUTIONAL: Negative for fatigue or fever.  EYES: Negative for eye problems.  ENT: neg for ST.  RESP: neg for cough.  CV: Negative for chest pains.  GI: Negative for vomiting.  MUSCULOSKELETAL:  As above.  NEUROLOGIC: Negative for headaches.  SKIN: Negative for rash.  PSYCH: Normal mentation for age.    OBJECTIVE:  /88   Pulse 101   Temp 97.2  F (36.2  C)   Wt 90.7 kg (200 lb)   SpO2 95%   BMI 28.70 kg/m    GENERAL APPEARANCE: Healthy, slow gait.  Using walker.    EYES:Conjunctiva/sclera clear.  Pupils equal reactive to light and accommodation.  EOMs intact.  THROAT: No erythema w/o tonsillar enlargement . No exudates.  NECK: Supple, nontender, no lymphadenopathy.  Full range of motion of neck without pain  Posterior crown of head is with a $0.50 piece size excoriation with a vertical central scabbed laceration.  No surrounding redness.  Healing well.  RESP: Lungs clear to auscultation - no  rales, rhonchi or wheezes  CV: Regular rate and rhythm, normal S1 S2, no murmur noted.  NEURO: Awake, alert    SKIN: No rashes  Lumbar spine is nontender.  Right hip-trochanteric tenderness.  Pain with internal and external range of motion of the hip.    Study Result     Examination:  XR PELVIS AND HIP RIGHT 2 VIEWS     Date:  12/10/2019 6:04 PM      Clinical Information: Fall, initial encounter; Hip injury, right,  initial encounter      Additional Information: none     Comparison: none                                                                      Impression:  1.  Right hip and pelvis negative for fracture.  2.  Normal hip joint alignment.  3.  Mild degenerative changes in both hips with hypertrophic spurring  but maintained joint spacing.     JEB CHRISTY MD         ASSESSMENT:     ICD-10-CM    1. Contusion of right hip, initial encounter S70.01XA cyclobenzaprine (FLEXERIL) 10 MG tablet     ibuprofen (ADVIL/MOTRIN) 800 MG tablet   2. Hip injury, right, initial encounter S79.911A XR Pelvis w Hip Right G/E 2 Views     ORTHO  REFERRAL     cyclobenzaprine (FLEXERIL) 10 MG tablet     ibuprofen (ADVIL/MOTRIN) 800 MG tablet   3. Fall, initial encounter W19.XXXA XR Pelvis w Hip Right G/E 2 Views     ORTHO  REFERRAL     cyclobenzaprine (FLEXERIL) 10 MG tablet     ibuprofen (ADVIL/MOTRIN) 800 MG tablet   4. Abrasion of head, initial encounter S00.91XA TDAP VACCINE   5. Need for vaccination Z23 TDAP VACCINE           PLAN:Ice bid. See Ortho.  Ibuprofen.  Muscle relaxant at night.  I have discussed clinical findings with patient.  Side effects of medications discussed.  Symptomatic care is discussed.  I have discussed the possibility of  worsening symptoms and to RTC or ER if they occur.  All questions are answered and patient is in agreement with plan.   Patient care instructions are given to at the end of visit.   Lots of rest and fluids.    Darcy Goel PA-C

## 2019-12-17 ENCOUNTER — OFFICE VISIT (OUTPATIENT)
Dept: FAMILY MEDICINE | Facility: CLINIC | Age: 54
End: 2019-12-17
Payer: COMMERCIAL

## 2019-12-17 VITALS
DIASTOLIC BLOOD PRESSURE: 83 MMHG | SYSTOLIC BLOOD PRESSURE: 127 MMHG | BODY MASS INDEX: 31.17 KG/M2 | WEIGHT: 217.25 LBS | HEART RATE: 82 BPM | TEMPERATURE: 98.1 F

## 2019-12-17 DIAGNOSIS — R73.01 IMPAIRED FASTING GLUCOSE: ICD-10-CM

## 2019-12-17 DIAGNOSIS — Z12.5 SCREENING FOR PROSTATE CANCER: ICD-10-CM

## 2019-12-17 DIAGNOSIS — Z12.11 SCREEN FOR COLON CANCER: ICD-10-CM

## 2019-12-17 DIAGNOSIS — R42 LIGHTHEADEDNESS: ICD-10-CM

## 2019-12-17 DIAGNOSIS — R53.83 FATIGUE, UNSPECIFIED TYPE: ICD-10-CM

## 2019-12-17 DIAGNOSIS — N32.81 OVERACTIVE BLADDER: ICD-10-CM

## 2019-12-17 DIAGNOSIS — I10 HYPERTENSION GOAL BP (BLOOD PRESSURE) < 140/90: ICD-10-CM

## 2019-12-17 DIAGNOSIS — E78.5 HYPERLIPIDEMIA LDL GOAL <100: ICD-10-CM

## 2019-12-17 DIAGNOSIS — R06.09 DYSPNEA ON EXERTION: ICD-10-CM

## 2019-12-17 DIAGNOSIS — Z00.00 ENCOUNTER FOR PREVENTATIVE ADULT HEALTH CARE EXAMINATION: Primary | ICD-10-CM

## 2019-12-17 LAB
ALBUMIN SERPL-MCNC: 4.2 G/DL (ref 3.4–5)
ALP SERPL-CCNC: 74 U/L (ref 40–150)
ALT SERPL W P-5'-P-CCNC: 35 U/L (ref 0–70)
ANION GAP SERPL CALCULATED.3IONS-SCNC: 5 MMOL/L (ref 3–14)
AST SERPL W P-5'-P-CCNC: 20 U/L (ref 0–45)
BASOPHILS # BLD AUTO: 0 10E9/L (ref 0–0.2)
BASOPHILS NFR BLD AUTO: 0.3 %
BILIRUB SERPL-MCNC: 0.4 MG/DL (ref 0.2–1.3)
BUN SERPL-MCNC: 12 MG/DL (ref 7–30)
CALCIUM SERPL-MCNC: 9.2 MG/DL (ref 8.5–10.1)
CHLORIDE SERPL-SCNC: 108 MMOL/L (ref 94–109)
CHOLEST SERPL-MCNC: 225 MG/DL
CO2 SERPL-SCNC: 25 MMOL/L (ref 20–32)
CREAT SERPL-MCNC: 0.99 MG/DL (ref 0.66–1.25)
DIFFERENTIAL METHOD BLD: NORMAL
EOSINOPHIL # BLD AUTO: 0.3 10E9/L (ref 0–0.7)
EOSINOPHIL NFR BLD AUTO: 5.3 %
ERYTHROCYTE [DISTWIDTH] IN BLOOD BY AUTOMATED COUNT: 12.4 % (ref 10–15)
GFR SERPL CREATININE-BSD FRML MDRD: 86 ML/MIN/{1.73_M2}
GLUCOSE SERPL-MCNC: 121 MG/DL (ref 70–99)
HBA1C MFR BLD: 6 % (ref 0–5.6)
HCT VFR BLD AUTO: 44.7 % (ref 40–53)
HDLC SERPL-MCNC: 40 MG/DL
HGB BLD-MCNC: 15.6 G/DL (ref 13.3–17.7)
LDLC SERPL CALC-MCNC: 152 MG/DL
LYMPHOCYTES # BLD AUTO: 2.1 10E9/L (ref 0.8–5.3)
LYMPHOCYTES NFR BLD AUTO: 33.5 %
MCH RBC QN AUTO: 30.4 PG (ref 26.5–33)
MCHC RBC AUTO-ENTMCNC: 34.9 G/DL (ref 31.5–36.5)
MCV RBC AUTO: 87 FL (ref 78–100)
MONOCYTES # BLD AUTO: 0.5 10E9/L (ref 0–1.3)
MONOCYTES NFR BLD AUTO: 8 %
NEUTROPHILS # BLD AUTO: 3.3 10E9/L (ref 1.6–8.3)
NEUTROPHILS NFR BLD AUTO: 52.9 %
NONHDLC SERPL-MCNC: 185 MG/DL
PLATELET # BLD AUTO: 285 10E9/L (ref 150–450)
POTASSIUM SERPL-SCNC: 4.3 MMOL/L (ref 3.4–5.3)
PROT SERPL-MCNC: 7 G/DL (ref 6.8–8.8)
PSA SERPL-ACNC: 2.37 UG/L (ref 0–4)
RBC # BLD AUTO: 5.13 10E12/L (ref 4.4–5.9)
SODIUM SERPL-SCNC: 138 MMOL/L (ref 133–144)
TRIGL SERPL-MCNC: 164 MG/DL
TSH SERPL DL<=0.005 MIU/L-ACNC: 1.25 MU/L (ref 0.4–4)
WBC # BLD AUTO: 6.2 10E9/L (ref 4–11)

## 2019-12-17 PROCEDURE — 80050 GENERAL HEALTH PANEL: CPT | Performed by: FAMILY MEDICINE

## 2019-12-17 PROCEDURE — 83036 HEMOGLOBIN GLYCOSYLATED A1C: CPT | Performed by: FAMILY MEDICINE

## 2019-12-17 PROCEDURE — 36415 COLL VENOUS BLD VENIPUNCTURE: CPT | Performed by: FAMILY MEDICINE

## 2019-12-17 PROCEDURE — G0103 PSA SCREENING: HCPCS | Performed by: FAMILY MEDICINE

## 2019-12-17 PROCEDURE — 99213 OFFICE O/P EST LOW 20 MIN: CPT | Mod: 25 | Performed by: FAMILY MEDICINE

## 2019-12-17 PROCEDURE — 80061 LIPID PANEL: CPT | Performed by: FAMILY MEDICINE

## 2019-12-17 PROCEDURE — 99396 PREV VISIT EST AGE 40-64: CPT | Performed by: FAMILY MEDICINE

## 2019-12-17 RX ORDER — LISINOPRIL 20 MG/1
20 TABLET ORAL DAILY
Qty: 90 TABLET | Refills: 3 | Status: SHIPPED | OUTPATIENT
Start: 2019-12-17 | End: 2021-01-14

## 2019-12-17 RX ORDER — OXYBUTYNIN CHLORIDE 5 MG/1
5 TABLET ORAL 3 TIMES DAILY PRN
Qty: 90 TABLET | Refills: 1 | Status: SHIPPED | OUTPATIENT
Start: 2019-12-17 | End: 2020-02-21

## 2019-12-17 ASSESSMENT — ENCOUNTER SYMPTOMS
CHILLS: 0
ABDOMINAL PAIN: 0
NERVOUS/ANXIOUS: 0
HEMATOCHEZIA: 0
DIARRHEA: 0
FEVER: 0
HEMATURIA: 0
COUGH: 0
DIZZINESS: 0
EYE PAIN: 0
CONSTIPATION: 0

## 2019-12-17 ASSESSMENT — PAIN SCALES - GENERAL: PAINLEVEL: NO PAIN (0)

## 2019-12-17 NOTE — LETTER
"[facility]   [address] [place]  [address]  [phone]                                   December 19, 2019    Bairon Reed  [address] [place] [address]        Dear Bairon,    Cholesterol is high, but a little better than last year.    The hemoglobin a1c is in the \"prediabetes\" range.    Other labs are okay.    Results for orders placed or performed in visit on 12/17/19   Hemoglobin A1c     Status: Abnormal   Result Value Ref Range    Hemoglobin A1C 6.0 (H) 0 - 5.6 %   TSH with free T4 reflex     Status: None   Result Value Ref Range    TSH 1.25 0.40 - 4.00 mU/L   Comprehensive metabolic panel     Status: Abnormal   Result Value Ref Range    Sodium 138 133 - 144 mmol/L    Potassium 4.3 3.4 - 5.3 mmol/L    Chloride 108 94 - 109 mmol/L    Carbon Dioxide 25 20 - 32 mmol/L    Anion Gap 5 3 - 14 mmol/L    Glucose 121 (H) 70 - 99 mg/dL    Urea Nitrogen 12 7 - 30 mg/dL    Creatinine 0.99 0.66 - 1.25 mg/dL    GFR Estimate 86 >60 mL/min/[1.73_m2]    GFR Estimate If Black >90 >60 mL/min/[1.73_m2]    Calcium 9.2 8.5 - 10.1 mg/dL    Bilirubin Total 0.4 0.2 - 1.3 mg/dL    Albumin 4.2 3.4 - 5.0 g/dL    Protein Total 7.0 6.8 - 8.8 g/dL    Alkaline Phosphatase 74 40 - 150 U/L    ALT 35 0 - 70 U/L    AST 20 0 - 45 U/L   CBC with platelets differential     Status: None   Result Value Ref Range    WBC 6.2 4.0 - 11.0 10e9/L    RBC Count 5.13 4.4 - 5.9 10e12/L    Hemoglobin 15.6 13.3 - 17.7 g/dL    Hematocrit 44.7 40.0 - 53.0 %    MCV 87 78 - 100 fl    MCH 30.4 26.5 - 33.0 pg    MCHC 34.9 31.5 - 36.5 g/dL    RDW 12.4 10.0 - 15.0 %    Platelet Count 285 150 - 450 10e9/L    % Neutrophils 52.9 %    % Lymphocytes 33.5 %    % Monocytes 8.0 %    % Eosinophils 5.3 %    % Basophils 0.3 %    Absolute Neutrophil 3.3 1.6 - 8.3 10e9/L    Absolute Lymphocytes 2.1 0.8 - 5.3 10e9/L    Absolute Monocytes 0.5 0.0 - 1.3 10e9/L    Absolute Eosinophils 0.3 0.0 - 0.7 10e9/L    Absolute " Basophils 0.0 0.0 - 0.2 10e9/L    Diff Method Automated Method    Prostate spec antigen screen     Status: None   Result Value Ref Range    PSA 2.37 0 - 4 ug/L   Lipid panel reflex to direct LDL Fasting     Status: Abnormal   Result Value Ref Range    Cholesterol 225 (H) <200 mg/dL    Triglycerides 164 (H) <150 mg/dL    HDL Cholesterol 40 >39 mg/dL    LDL Cholesterol Calculated 152 (H) <100 mg/dL    Non HDL Cholesterol 185 (H) <130 mg/dL       If you have any questions please call the clinic at [phone]    Sincerely,    Greg Salas MD  bmd

## 2019-12-17 NOTE — PROGRESS NOTES
SUBJECTIVE:   CC: Bairon Reed is an 54 year old male who presents for preventative health visit.     Healthy Habits:     Getting at least 3 servings of Calcium per day:  NO    Bi-annual eye exam:  Yes    Dental care twice a year:  NO    Sleep apnea or symptoms of sleep apnea:  None    Diet:  Regular (no restrictions)    Frequency of exercise:  2-3 days/week    Duration of exercise:  15-30 minutes    Taking medications regularly:  Yes    Medication side effects:  None    PHQ-2 Total Score: 0    Additional concerns today:  Yes               Today's PHQ-2 Score:   PHQ-2 ( 1999 Pfizer) 12/17/2019   Q1: Little interest or pleasure in doing things 0   Q2: Feeling down, depressed or hopeless 0   PHQ-2 Score 0   Q1: Little interest or pleasure in doing things Not at all   Q2: Feeling down, depressed or hopeless Not at all   PHQ-2 Score 0       Abuse: Current or Past(Physical, Sexual or Emotional)- No  Do you feel safe in your environment? Yes        Social History     Tobacco Use     Smoking status: Never Smoker     Smokeless tobacco: Current User     Types: Chew     Tobacco comment: Lives in smoke free household   Substance Use Topics     Alcohol use: No     Alcohol/week: 0.0 standard drinks     Comment: Quit 2010. Chemical dependency treatment in 1989. ...31 yrs of 1.5 tins weekly.  sober for 7 months..     If you drink alcohol do you typically have >3 drinks per day or >7 drinks per week? Not applicable    Alcohol Use 12/17/2019   Prescreen: >3 drinks/day or >7 drinks/week? Not Applicable   Prescreen: >3 drinks/day or >7 drinks/week? -   No flowsheet data found.    Last PSA:   PSA   Date Value Ref Range Status   09/25/2018 1.66 0 - 4 ug/L Final     Comment:     Assay Method:  Chemiluminescence using Siemens Vista analyzer       Reviewed orders with patient. Reviewed health maintenance and updated orders accordingly - Yes       Reviewed and updated as needed this visit by clinical staff  Tobacco  Allergies  Meds          Reviewed and updated as needed this visit by Provider            Review of Systems   Constitutional: Negative for chills and fever.   HENT: Negative for congestion and ear pain.    Eyes: Negative for pain.   Respiratory: Negative for cough.    Cardiovascular: Negative for chest pain.   Gastrointestinal: Negative for abdominal pain, constipation, diarrhea and hematochezia.   Genitourinary: Negative for hematuria.   Neurological: Negative for dizziness.   Psychiatric/Behavioral: The patient is not nervous/anxious.       a couple time a week or so gets lightheaded sensation for minute or so then resolves; heart rate goes up, light headed then goes away    Started drinking more water    Fell on ice a couple weeks ago, cut scalp  Seen a few days later  No loc     Not taking the lamictal for adhd ( gets cotton mouth from it )    Out of work for over a year now        Plan to go back to work soon    Walking some for exercise    Ablation 19 years old    Kids in good health  [    OBJECTIVE:   /83 (BP Location: Left arm, Patient Position: Chair, Cuff Size: Adult Regular)   Pulse 82   Temp 98.1  F (36.7  C) (Oral)   Wt 98.5 kg (217 lb 4 oz)   BMI 31.17 kg/m      Physical Exam  GENERAL: healthy, alert and no distress  EYES: Eyes grossly normal to inspection, PERRL and conjunctivae and sclerae normal  HENT: ear canals and TM's normal, nose and mouth without ulcers or lesions  NECK: no adenopathy, no asymmetry, masses, or scars and thyroid normal to palpation  RESP: lungs clear to auscultation - no rales, rhonchi or wheezes  CV: regular rate and rhythm, normal S1 S2, no S3 or S4, no murmur, click or rub, no peripheral edema and peripheral pulses strong  ABDOMEN: soft, nontender, no hepatosplenomegaly, no masses and bowel sounds normal  MS: no gross musculoskeletal defects noted, no edema  SKIN: no suspicious lesions or rashes  NEURO: Normal strength and tone, mentation intact and speech normal  PSYCH:  mentation appears normal, affect normal/bright    Diagnostic Test Results:  Labs reviewed in Epic    ASSESSMENT/PLAN:   Bairon was seen today for physical and health maintenance.    Diagnoses and all orders for this visit:    Encounter for preventative adult health care examination    Hypertension goal BP (blood pressure) < 140/90  -     lisinopril (PRINIVIL/ZESTRIL) 20 MG tablet; Take 1 tablet (20 mg) by mouth daily    Overactive bladder  -     oxybutynin (DITROPAN) 5 MG tablet; Take 1 tablet (5 mg) by mouth 3 times daily as needed for bladder spasms    Dyspnea on exertion  -     Echocardiogram Exercise Stress; Future    Lightheadedness  -     US Carotid Bilateral; Future    Screen for colon cancer  -     GASTROENTEROLOGY ADULT REF PROCEDURE ONLY [facility] [phone]; [facility]    Screening for prostate cancer  -     Prostate spec antigen screen    Impaired fasting glucose  -     Hemoglobin A1c    Fatigue, unspecified type  -     TSH with free T4 reflex  -     CBC with platelets differential    Hyperlipidemia LDL goal <100  -     Comprehensive metabolic panel  -     Lipid panel reflex to direct LDL Fasting    discussed multiple issues with patient  He wants to exercise more but prudent to make sure heart okay  Schedule stress echo  Also with the unusual episodes, prudent to do carotid ultrasound; scheduled this also  Patient can later call and schedule colonoscopy  Check labs  Keep working on healthy diet/exercise and wt loss  No std concern  Patient does not think the uroxatrol helped at all, he wants to stop  Trial of oxybutynin but warned of side effects           COUNSELING:   Reviewed preventive health counseling, as reflected in patient instructions       Regular exercise       Healthy diet/nutrition       Vision screening       Safe sex practices/STD prevention       Colon cancer screening       Prostate cancer screening    Estimated body mass index is 31.17 kg/m  as calculated from  "the following:    Height as of 7/29/19: 1.778 m (5' 10\").    Weight as of this encounter: 98.5 kg (217 lb 4 oz).     Weight management plan: Discussed healthy diet and exercise guidelines     reports that he has never smoked. His smokeless tobacco use includes chew.      Counseling Resources:  ATP IV Guidelines  Pooled Cohorts Equation Calculator  FRAX Risk Assessment  ICSI Preventive Guidelines  Dietary Guidelines for Americans, 2010  USDA's MyPlate  ASA Prophylaxis  Lung CA Screening    Greg Salas MD  [facility]  "

## 2019-12-17 NOTE — PATIENT INSTRUCTIONS
Stress echocardiogram    Neck artery ultrasound    We will send you lab results    Call to carly herrera

## 2019-12-18 ENCOUNTER — ANCILLARY PROCEDURE (OUTPATIENT)
Dept: CARDIOLOGY | Facility: CLINIC | Age: 54
End: 2019-12-18
Attending: FAMILY MEDICINE
Payer: COMMERCIAL

## 2019-12-18 ENCOUNTER — ANCILLARY PROCEDURE (OUTPATIENT)
Dept: ULTRASOUND IMAGING | Facility: CLINIC | Age: 54
End: 2019-12-18
Attending: FAMILY MEDICINE
Payer: COMMERCIAL

## 2019-12-18 DIAGNOSIS — R42 LIGHTHEADEDNESS: ICD-10-CM

## 2019-12-18 DIAGNOSIS — R06.09 DYSPNEA ON EXERTION: ICD-10-CM

## 2019-12-18 PROCEDURE — 93016 CV STRESS TEST SUPVJ ONLY: CPT | Performed by: INTERNAL MEDICINE

## 2019-12-18 PROCEDURE — 93321 DOPPLER ECHO F-UP/LMTD STD: CPT | Mod: 26 | Performed by: INTERNAL MEDICINE

## 2019-12-18 PROCEDURE — 93325 DOPPLER ECHO COLOR FLOW MAPG: CPT | Mod: TC | Performed by: INTERNAL MEDICINE

## 2019-12-18 PROCEDURE — 93350 STRESS TTE ONLY: CPT | Mod: 26 | Performed by: INTERNAL MEDICINE

## 2019-12-18 PROCEDURE — 93325 DOPPLER ECHO COLOR FLOW MAPG: CPT | Mod: 26 | Performed by: INTERNAL MEDICINE

## 2019-12-18 PROCEDURE — 93880 EXTRACRANIAL BILAT STUDY: CPT

## 2019-12-18 PROCEDURE — 93321 DOPPLER ECHO F-UP/LMTD STD: CPT | Mod: TC | Performed by: INTERNAL MEDICINE

## 2019-12-18 PROCEDURE — 93018 CV STRESS TEST I&R ONLY: CPT | Performed by: INTERNAL MEDICINE

## 2019-12-18 PROCEDURE — 93352 ADMIN ECG CONTRAST AGENT: CPT | Performed by: INTERNAL MEDICINE

## 2019-12-18 PROCEDURE — 93350 STRESS TTE ONLY: CPT | Mod: TC | Performed by: INTERNAL MEDICINE

## 2019-12-18 PROCEDURE — 93017 CV STRESS TEST TRACING ONLY: CPT | Performed by: INTERNAL MEDICINE

## 2019-12-18 PROCEDURE — 40000264 ZZHC STATISTIC IV PUSH SINGLE INITIAL SUBSTANCE: Performed by: INTERNAL MEDICINE

## 2019-12-18 RX ADMIN — Medication 3 ML: at 09:15

## 2019-12-18 NOTE — LETTER
[facility]   [address] [place]  [address]  [phone]                                   December 20, 2019    Bairon Reed  [address]  [place] [address]        Dear Bairon,    Here is the ultrasound result I called about.  Not worrisome.    Sincerely,    Greg Salas MD  bmd

## 2019-12-18 NOTE — LETTER
[facility]   [address] [place]  [address]  [phone]                                   December 20, 2019    [address]airon Reed  [address] [place] [address]        Dear Bairon,    Here is the stress echo result I called about.    Looks good.  Gives you a green light to exercise.    See us late spring/ early summer to recheck some of the labs.    Call or be seen sooner if needed.    Sincerely,      Greg Salas MD  bmd

## 2019-12-19 ENCOUNTER — TELEPHONE (OUTPATIENT)
Dept: FAMILY MEDICINE | Facility: CLINIC | Age: 54
End: 2019-12-19

## 2019-12-19 NOTE — RESULT ENCOUNTER NOTE
"Cholesterol is high, but a little better than last year.    The hemoglobin a1c is in the \"prediabetes\" range.    Other labs are okay.    Greg Salas MD      "

## 2019-12-20 NOTE — TELEPHONE ENCOUNTER
I called patient with stress echo and carotid ultrasound results.  Not worrisome.    Green light to exercise more.    Discussed in detail the lab results also.    See us in late spring / early summer and recheck cholesterol and hemoglobin a1c then.    Call/ be seen sooner if needed.    Greg Salas MD

## 2019-12-20 NOTE — RESULT ENCOUNTER NOTE
Here is the stress echo result I called about.    Looks good.  Gives you a green light to exercise.    See us late spring/ early summer to recheck some of the labs.    Call or be seen sooner if needed.    Greg Salas MD

## 2019-12-26 ENCOUNTER — TRANSFERRED RECORDS (OUTPATIENT)
Dept: HEALTH INFORMATION MANAGEMENT | Facility: CLINIC | Age: 54
End: 2019-12-26

## 2019-12-30 ENCOUNTER — OFFICE VISIT (OUTPATIENT)
Dept: FAMILY MEDICINE | Facility: CLINIC | Age: 54
End: 2019-12-30
Payer: COMMERCIAL

## 2019-12-30 VITALS
TEMPERATURE: 98.4 F | HEIGHT: 70 IN | DIASTOLIC BLOOD PRESSURE: 60 MMHG | OXYGEN SATURATION: 97 % | SYSTOLIC BLOOD PRESSURE: 110 MMHG | RESPIRATION RATE: 16 BRPM | BODY MASS INDEX: 31.21 KG/M2 | HEART RATE: 74 BPM | WEIGHT: 218 LBS

## 2019-12-30 DIAGNOSIS — K29.60 OTHER GASTRITIS WITHOUT HEMORRHAGE, UNSPECIFIED CHRONICITY: Primary | ICD-10-CM

## 2019-12-30 PROCEDURE — 99214 OFFICE O/P EST MOD 30 MIN: CPT | Performed by: FAMILY MEDICINE

## 2019-12-30 ASSESSMENT — PAIN SCALES - GENERAL: PAINLEVEL: MILD PAIN (2)

## 2019-12-30 ASSESSMENT — MIFFLIN-ST. JEOR: SCORE: 1835.09

## 2019-12-30 NOTE — PROGRESS NOTES
"Subjective     Bairon Reed is a 54 year old male who presents to clinic today for the following health issues:    HPI   ABDOMINAL PAIN     Onset: months     Description:   Character: \"feels blah\"   Location: epigastric region  Radiation: None    Intensity: mild    Progression of Symptoms:  same and constant    Accompanying Signs & Symptoms:    Not much of an appeitie   Fever/Chills?: no   Gas/Bloating: no   Nausea: no   Vomitting: no   Diarrhea?: no   Constipation:no   Dysuria or Hematuria: no    History:   Trauma: no   Previous similar pain: no    Previous tests done: none    Precipitating factors:   Does the pain change with:     Food: no      BM: no     Urination: no     Alleviating factors:  Nothing     Therapies Tried and outcome: no    LMP:  not applicable       Patient Active Problem List   Diagnosis     Hyperlipidemia LDL goal <160     Insomnia     Bilateral arm weakness     Anxiety     Nondependent alcohol abuse     Shoulder impingement - right     Cervical radiculopathy at C7     Cervicalgia     Degeneration of cervical intervertebral disc     Vitamin D deficiency     Cervical radiculopathy     S/P spinal fusion     Peroneal nerve palsy     Foot drop, right     Lumbar stenosis with neurogenic claudication     Lumbar radiculopathy     Lumbar herniated disc     S/P lumbar microdiscectomy     Hypertension goal BP (blood pressure) < 140/90     Benign non-nodular prostatic hyperplasia with lower urinary tract symptoms     Gout, unspecified cause, unspecified chronicity, unspecified site     Acute left-sided low back pain with left-sided sciatica     Past Surgical History:   Procedure Laterality Date     BACK SURGERY  1986-1988    x 3. Lower back surgery     BACK SURGERY      another lumbar procedure spine 2019     DISCECTOMY LUMBAR POSTERIOR MICROSCOPIC ONE LEVEL  5/17/2013    Procedure: DISCECTOMY LUMBAR POSTERIOR MICROSCOPIC ONE LEVEL;  BILATERAL LUMBAR L4-5 EXTENDED HEMILAMINECTOMY, MICROFORAMINOTOMY " "AND MICRODISCECTOMY WITH METRIX II;  Surgeon: Andrew Alejandre MD;  Location: [facility] OR     EP ABLATION      age 19 heart ablation     FUSION CERVICAL ANTERIOR ONE LEVEL  3/5/2013    Procedure: FUSION CERVICAL ANTERIOR ONE LEVEL;  C 6-7 ANTERIOR DISCECTOMY AND FUSION WITH NEURO MONITORING ;  Surgeon: Andrew Alejandre MD;  Location:  OR     KNEE SURGERY  2009    Right     LARYNGOSCOPY WITH MICROSCOPE  5/28/2013    Procedure: LARYNGOSCOPY WITH MICROSCOPE;  MICRODIRECT LARYNGOSCOPY, WITH LEFT VOCAL CORD INJECTION ;  Surgeon: Jorge Vergara MD;  Location:  [facility]     neck sugery      2019 spine surgery cleaned up     NECK SURGERY  2/2010    [facility]       Social History     Tobacco Use     Smoking status: Never Smoker     Smokeless tobacco: Current User     Types: Chew     Tobacco comment: Lives in smoke free household   Substance Use Topics     Alcohol use: No     Alcohol/week: 0.0 standard drinks     Comment: Quit 2010. Chemical dependency treatment in 1989. ...31 yrs of 1.5 tins weekly.  sober for 7 months..     Family History   Problem Relation Age of Onset     Heart Disease Father 45        heart attack     Myocardial Infarction Father      Arthritis Sister      Heart Disease Mother 51        pneumonia and enlarged heart     Myocardial Infarction Other      Glaucoma No family hx of      Macular Degeneration No family hx of      Cancer No family hx of      Diabetes No family hx of      Hypertension No family hx of      Cerebrovascular Disease No family hx of      Thyroid Disease No family hx of          Review of Systems   ROS COMP: Constitutional, HEENT, cardiovascular, pulmonary, gi and gu systems are negative, except as otherwise noted.      Objective    /60 (BP Location: Right arm, Patient Position: Chair, Cuff Size: Adult Large)   Pulse 74   Temp 98.4  F (36.9  C)   Resp 16   Ht 1.778 m (5' 10\")   Wt 98.9 kg (218 lb)   SpO2 97%   BMI 31.28 kg/m    Body mass index is 31.28 " kg/m .  Physical Exam   GENERAL: healthy, alert and no distress  RESP: lungs clear to auscultation - no rales, rhonchi or wheezes  CV: regular rates and rhythm, normal S1 S2, no S3 or S4 and no murmur, click or rub  ABDOMEN: soft, nontender, no hepatosplenomegaly, no masses and bowel sounds normal          Assessment & Plan     1. Other gastritis without hemorrhage, unspecified chronicity  - Symptoms are vague, but sound most like gastritis   - Advised trial of Prilosec for 2-4 weeks  - Reminded him to schedule his colonoscopy ASAP   - If symptoms don't resolve with Prilosec then consider H pylori testing vs abdominal US   - omeprazole (PRILOSEC) 20 MG DR capsule; Take 1 capsule (20 mg) by mouth daily  Dispense: 30 capsule; Refill: 0       Return in about 4 weeks (around 1/27/2020) for abdominal discomfort if symptoms persist.    Elham Almaguer,   [facility]

## 2019-12-30 NOTE — PATIENT INSTRUCTIONS
Patient Education     Treating Gastritis        A medical evaluation will be done to find out the cause of your symptoms. The evaluation may include your health history, a physical exam, and some tests. Once your evaluation is done, treatment can begin. It may include taking certain medicines and making some lifestyle changes. Follow your healthcare provider s advice.  Taking medicines  Your healthcare providers may prescribe some medicines to neutralize or reduce excess stomach acids. If tests show that H. pylori are in your stomach lining, antibiotics may be prescribed. H.pylori are a type of bacteria that can cause gastritis.  Avoiding certain things  Be sure to avoid:    Aspirin. Avoid taking aspirin and other anti-inflammatory medicines, such as ibuprofen. They can irritate your stomach lining. Also, check with your healthcare provider before taking or stopping any medicines.    Spicy foods and caffeine. Stay away from foods prepared with spices, especially black pepper. Caffeine can also make your symptoms worse. So, avoid coffee, tea, cola drinks, and chocolate. Be sure to tell your healthcare provider about any other foods or liquids that bother your stomach.    Tobacco and alcohol. Don t use tobacco or drink alcohol. Tobacco and alcohol can increase stomach acids and worsen your gastritis symptoms.  Reducing your stress  Stress may make your gastritis symptoms worse. Whenever you can, reduce the stress in your life. One way to do this is to start an exercise program--talk to your healthcare provider first. Also, try to get enough sleep, at least 8 hours a night.  Date Last Reviewed: 7/1/2016 2000-2018 [employer]. [address] [place] [address], [place] [address]. All rights reserved. This information is not intended as a substitute for professional medical care. Always follow your healthcare professional's instructions.         [facility]     Discharged by : Chelle Awan  CMA  If you have any questions regarding your visit please contact your care team:     Team [place]              Clinic Hours Telephone Number     Dr. Ole Robles, CNP   7am-7pm  Monday - Thursday   7am-5pm  Fridays  [phone]   (Appointment scheduling available 24/7)     RN Line  [phone] option 2     Urgent Care - [place] - 11am-9pm Monday-Friday Saturday-Sunday- 9am-5pm     [place] -   5pm-9pm Monday-Friday Saturday-Sunday- 9am-5pm    [phone] - [place]    [phone] - [place]     For a Price Quote for your services, please call our Consumer Price Line at [phone].     What options do I have for visits at the clinic other than the traditional office visit?     To expand how we care for you, many of our providers are utilizing electronic visits (e-visits) and telephone visits, when medically appropriate, for interactions with their patients rather than a visit in the clinic. We also offer nurse visits for many medical concerns. Just like any other service, we will bill your insurance company for this type of visit based on time spent on the phone with your provider. Not all insurance companies cover these visits. Please check with your medical insurance if this type of visit is covered. You will be responsible for any charges that are not paid by your insurance.     E-visits via ServiceBench: generally incur a $45.00 fee.     Telephone visits:  Time spent on the phone: *charged based on time that is spent on the phone in increments of 10 minutes. Estimated cost:   5-10 mins $30.00   11-20 mins. $59.00   21-30 mins. $85.00       Use [employer] (secure email communication and access to your chart) to send your primary care provider a message or make an appointment. Ask someone on your Team how to sign up for ServiceBench.     As always, Thank you for trusting us with your health care needs!      [facility]  Services:    Scheduling Appointments  Rogelio James, [facility]  Call: [phone]    [facility], [place], [facility]  Call: [phone]    [facility]  Call: [phone]    For Gastroenterology referrals   [facility]   Clinics and Surgery Center, 4th Floor   [address] [place] [address]   Appointments: [phone]    WHERE TO GO FOR CARE?    Clinic    Make an appointment if you:       Are sick (cold, cough, flu, sore throat, earache or in pain).       Have a small injury (sprain, small cut, burn or broken bone).       Need a physical exam, Pap smear, vaccine or prescription refill.       Have questions about your health or medicines.    To reach us:      Call [phone] ([phone]). Open 24 hours every day. (For counseling services, call [phone].)    Log into [employer]. (Visit [employer] to create an account.) Hospital emergency room    An emergency is a serious or life- threatening problem that must be treated right away.    Call 858 or get to the hospital if you have:      Very bad or sudden:            - Chest pain or pressure         - Bleeding         - Head or belly pain         - Dizziness or trouble seeing, walking or                          Speaking      Problems breathing      Blood in your vomit or you are coughing up blood      A major injury (knocked out, loss of a finger or limb, rape, broken bone protruding from skin)    A mental health crisis. (Or call the Mental Health Crisis line at 1-938.848.8989 or Suicide Prevention Hotline at 1-988.460.1178.)    Open 24 hours every day. You don't need an appointment.     Urgent care    Visit urgent care for sickness or small injuries when the clinic is closed. You don't need an appointment. To check hours or find an urgent care near you, visit [email or web address]. Online care    Get online care from [facility] for more than 70 common problems, like colds,  allergies and infections. Open 24 hours every day at:   [email or web address]   Need help deciding?    For advice about where to be seen, you may call your clinic and ask to speak with a nurse. We're here for you 24 hours every day.         If you are deaf or hard of hearing, please let us know. We provide many free services including sign language interpreters, oral interpreters, TTYs, telephone amplifiers, note takers and written materials.

## 2020-01-14 PROBLEM — M54.42 ACUTE LEFT-SIDED LOW BACK PAIN WITH LEFT-SIDED SCIATICA: Status: RESOLVED | Noted: 2018-04-02 | Resolved: 2020-01-14

## 2020-01-14 NOTE — PROGRESS NOTES
Discharge Note    Progress reporting period is from initial eval to Jul 11, 2018.     Bairon failed to return for next follow up visit and current status is unknown.  Please see information below for last relevant information on current status.  Patient seen for Rxs Used: 5 visits.  SUBJECTIVE  Subjective changes noted by patient:  Subjective: Pt reports pain has been about the same, however does get relief short term after performing FISit stretch anywhere from 15 min to 1 hr depending on what he is doing afterwards.  Less relief if he is standing/walking.  He stopped performing bridging exercise due to noticing more pain in back and down left leg.  Scheduled for [facility] on Friday.  .  Current pain level is Current Pain level: 0/10.     Previous pain level was  Initial Pain level: 5/10.   Changes in function:  Yes (See Goal flowsheet attached for changes in current functional level)  Adverse reaction to treatment or activity: None    OBJECTIVE  Changes noted in objective findings: Objective: See ROM below.  Myotomes:  Hip Flex 4-/5; Quad 4/5; H-S 5/5; DF 4/5; PF 5/5.     ASSESSMENT/PLAN  Diagnosis: LBP   DIAGP:  The encounter diagnosis was Acute left-sided low back pain with left-sided sciatica.  Updated problem list and treatment plan:   Pain - HEP  Decreased ROM/flexibility - HEP  Decreased function - HEP  Decreased strength - HEP  Impaired muscle performance - HEP  Decreased joint mobility - HEP  STG/LTGs have been met or progress has been made towards goals:  Yes, please see goal flowsheet for most current information  Assessment of Progress: current status is unknown.  Last current status: Assessment of progress: Pt is progressing slower than anticipated   Self Management Plans:  HEP  I have re-evaluated this patient and find that the nature, scope, duration and intensity of the therapy is appropriate for the medical condition of the patient.  Bairon continues to require the following intervention to meet STG  and LTG's:  HEP.    Recommendations:  Discharge with current home program.  Patient to follow up with MD as needed.    Please refer to the daily flowsheet for treatment today, total treatment time and time spent performing 1:1 timed codes.

## 2020-02-17 ASSESSMENT — MIFFLIN-ST. JEOR: SCORE: 1753.44

## 2020-02-21 ENCOUNTER — HOSPITAL ENCOUNTER (OUTPATIENT)
Facility: AMBULATORY SURGERY CENTER | Age: 55
Discharge: HOME | End: 2020-02-21
Attending: SURGERY | Admitting: SURGERY
Payer: COMMERCIAL

## 2020-02-21 VITALS
HEIGHT: 70 IN | RESPIRATION RATE: 16 BRPM | TEMPERATURE: 97.8 F | OXYGEN SATURATION: 95 % | HEART RATE: 92 BPM | BODY MASS INDEX: 28.63 KG/M2 | DIASTOLIC BLOOD PRESSURE: 85 MMHG | SYSTOLIC BLOOD PRESSURE: 128 MMHG | WEIGHT: 200 LBS

## 2020-02-21 LAB — COLONOSCOPY: NORMAL

## 2020-02-21 PROCEDURE — 99152 MOD SED SAME PHYS/QHP 5/>YRS: CPT | Mod: 59 | Performed by: SURGERY

## 2020-02-21 PROCEDURE — 45385 COLONOSCOPY W/LESION REMOVAL: CPT

## 2020-02-21 PROCEDURE — G8907 PT DOC NO EVENTS ON DISCHARG: HCPCS

## 2020-02-21 PROCEDURE — 45381 COLONOSCOPY SUBMUCOUS NJX: CPT

## 2020-02-21 PROCEDURE — G8918 PT W/O PREOP ORDER IV AB PRO: HCPCS

## 2020-02-21 PROCEDURE — 45381 COLONOSCOPY SUBMUCOUS NJX: CPT | Mod: PT | Performed by: SURGERY

## 2020-02-21 PROCEDURE — 88305 TISSUE EXAM BY PATHOLOGIST: CPT | Performed by: PATHOLOGY

## 2020-02-21 PROCEDURE — 45385 COLONOSCOPY W/LESION REMOVAL: CPT | Mod: PT | Performed by: SURGERY

## 2020-02-21 RX ORDER — ONDANSETRON 2 MG/ML
4 INJECTION INTRAMUSCULAR; INTRAVENOUS EVERY 6 HOURS PRN
Status: CANCELLED | OUTPATIENT
Start: 2020-02-21

## 2020-02-21 RX ORDER — METOCLOPRAMIDE HYDROCHLORIDE 5 MG/ML
10 INJECTION INTRAMUSCULAR; INTRAVENOUS EVERY 6 HOURS PRN
Status: CANCELLED | OUTPATIENT
Start: 2020-02-21

## 2020-02-21 RX ORDER — NALOXONE HYDROCHLORIDE 0.4 MG/ML
.1-.4 INJECTION, SOLUTION INTRAMUSCULAR; INTRAVENOUS; SUBCUTANEOUS
Status: CANCELLED | OUTPATIENT
Start: 2020-02-21 | End: 2020-02-22

## 2020-02-21 RX ORDER — ONDANSETRON 2 MG/ML
4 INJECTION INTRAMUSCULAR; INTRAVENOUS
Status: DISCONTINUED | OUTPATIENT
Start: 2020-02-21 | End: 2020-02-22 | Stop reason: HOSPADM

## 2020-02-21 RX ORDER — NAPROXEN 500 MG/1
TABLET ORAL
COMMUNITY
Start: 2019-12-12 | End: 2020-03-07

## 2020-02-21 RX ORDER — METOCLOPRAMIDE 10 MG/1
10 TABLET ORAL EVERY 6 HOURS PRN
Status: CANCELLED | OUTPATIENT
Start: 2020-02-21

## 2020-02-21 RX ORDER — FLUMAZENIL 0.1 MG/ML
0.2 INJECTION, SOLUTION INTRAVENOUS
Status: CANCELLED | OUTPATIENT
Start: 2020-02-21 | End: 2020-02-21

## 2020-02-21 RX ORDER — FENTANYL CITRATE 50 UG/ML
INJECTION, SOLUTION INTRAMUSCULAR; INTRAVENOUS PRN
Status: DISCONTINUED | OUTPATIENT
Start: 2020-02-21 | End: 2020-02-21 | Stop reason: HOSPADM

## 2020-02-21 RX ORDER — LIDOCAINE 40 MG/G
CREAM TOPICAL
Status: DISCONTINUED | OUTPATIENT
Start: 2020-02-21 | End: 2020-02-22 | Stop reason: HOSPADM

## 2020-02-21 RX ORDER — ONDANSETRON 4 MG/1
4 TABLET, ORALLY DISINTEGRATING ORAL EVERY 6 HOURS PRN
Status: CANCELLED | OUTPATIENT
Start: 2020-02-21

## 2020-02-21 RX ORDER — PROCHLORPERAZINE MALEATE 10 MG
10 TABLET ORAL EVERY 6 HOURS PRN
Status: CANCELLED | OUTPATIENT
Start: 2020-02-21

## 2020-02-25 LAB — COPATH REPORT: NORMAL

## 2020-03-07 ENCOUNTER — OFFICE VISIT (OUTPATIENT)
Dept: URGENT CARE | Facility: URGENT CARE | Age: 55
End: 2020-03-07
Payer: COMMERCIAL

## 2020-03-07 VITALS
OXYGEN SATURATION: 99 % | TEMPERATURE: 98 F | HEART RATE: 79 BPM | HEIGHT: 70 IN | WEIGHT: 216.5 LBS | RESPIRATION RATE: 18 BRPM | DIASTOLIC BLOOD PRESSURE: 82 MMHG | SYSTOLIC BLOOD PRESSURE: 141 MMHG | BODY MASS INDEX: 30.99 KG/M2

## 2020-03-07 DIAGNOSIS — M10.9 GOUT, UNSPECIFIED CAUSE, UNSPECIFIED CHRONICITY, UNSPECIFIED SITE: ICD-10-CM

## 2020-03-07 PROCEDURE — 99213 OFFICE O/P EST LOW 20 MIN: CPT | Performed by: FAMILY MEDICINE

## 2020-03-07 RX ORDER — INDOMETHACIN 50 MG/1
50 CAPSULE ORAL 3 TIMES DAILY PRN
Qty: 30 CAPSULE | Refills: 4 | Status: SHIPPED | OUTPATIENT
Start: 2020-03-07 | End: 2020-06-03

## 2020-03-07 RX ORDER — PREDNISONE 20 MG/1
TABLET ORAL
Qty: 15 TABLET | Refills: 0 | Status: SHIPPED | OUTPATIENT
Start: 2020-03-07 | End: 2020-05-14

## 2020-03-07 ASSESSMENT — MIFFLIN-ST. JEOR: SCORE: 1828.29

## 2020-03-07 NOTE — PROGRESS NOTES
Subjective     Bairon Reed is a 54 year old male who presents to clinic today for the following health issues:    HPI   History of gout, he reports he gets flareups every few years.  Previously he was on prednisone, indomethacin as needed.  For the past 2 days his right big toe being inflamed, red tender swollen.    Patient Active Problem List   Diagnosis     Hyperlipidemia LDL goal <160     Insomnia     Bilateral arm weakness     Anxiety     Nondependent alcohol abuse     Shoulder impingement - right     Cervical radiculopathy at C7     Cervicalgia     Degeneration of cervical intervertebral disc     Vitamin D deficiency     Cervical radiculopathy     S/P spinal fusion     Peroneal nerve palsy     Foot drop, right     Lumbar stenosis with neurogenic claudication     Lumbar radiculopathy     Lumbar herniated disc     S/P lumbar microdiscectomy     Hypertension goal BP (blood pressure) < 140/90     Benign non-nodular prostatic hyperplasia with lower urinary tract symptoms     Gout, unspecified cause, unspecified chronicity, unspecified site     Past Surgical History:   Procedure Laterality Date     BACK SURGERY  1986-1988    x 3. Lower back surgery     BACK SURGERY      another lumbar procedure spine 2019     COLONOSCOPY WITH CO2 INSUFFLATION N/A 2/21/2020    Procedure: COLONOSCOPY, WITH CO2 INSUFFLATION;  Surgeon: Yasmani Constantino MD;  Location: [facility] OR     DISCECTOMY LUMBAR POSTERIOR MICROSCOPIC ONE LEVEL  5/17/2013    Procedure: DISCECTOMY LUMBAR POSTERIOR MICROSCOPIC ONE LEVEL;  BILATERAL LUMBAR L4-5 EXTENDED HEMILAMINECTOMY, MICROFORAMINOTOMY AND MICRODISCECTOMY WITH METRIX II;  Surgeon: Andrew Alejandre MD;  Location:  OR     EP ABLATION      age 19 heart ablation     FUSION CERVICAL ANTERIOR ONE LEVEL  3/5/2013    Procedure: FUSION CERVICAL ANTERIOR ONE LEVEL;  C 6-7 ANTERIOR DISCECTOMY AND FUSION WITH NEURO MONITORING ;  Surgeon: Andrew Alejandre MD;  Location:  OR     KNEE  SURGERY  2009    Right     LARYNGOSCOPY WITH MICROSCOPE  5/28/2013    Procedure: LARYNGOSCOPY WITH MICROSCOPE;  MICRODIRECT LARYNGOSCOPY, WITH LEFT VOCAL CORD INJECTION ;  Surgeon: Jorge Vergara MD;  [facility]      [facility] cleaned up     NECK SURGERY  2/2010    [facility]       Social History     Tobacco Use     Smoking status: Never Smoker     Smokeless tobacco: Current User     Types: Chew     Tobacco comment: Lives in smoke free household   Substance Use Topics     Alcohol use: No     Alcohol/week: 0.0 standard drinks     Comment: Quit 2010. Chemical dependency treatment in 1989. ...31 yrs of 1.5 tins weekly.  sober for 7 months..     Family History   Problem Relation Age of Onset     Heart Disease Father 45        heart attack     Myocardial Infarction Father      Arthritis Sister      Heart Disease Mother 51        pneumonia and enlarged heart     Myocardial Infarction Other      Glaucoma No family hx of      Macular Degeneration No family hx of      Cancer No family hx of      Diabetes No family hx of      Hypertension No family hx of      Cerebrovascular Disease No family hx of      Thyroid Disease No family hx of          Current Outpatient Medications   Medication Sig Dispense Refill     indomethacin (INDOCIN) 50 MG capsule Take 1 capsule (50 mg) by mouth 3 times daily as needed for moderate pain 30 capsule 4     predniSONE (DELTASONE) 20 MG tablet 2 tab daily for 5 days, then one tab daily until gone 15 tablet 0     calcium carbonate (OS-HANNAH 500 MG [place]. CA) 500 MG tablet Take 500 mg by mouth daily       Cholecalciferol (VITAMIN D3 PO) Take 1,000 Units by mouth daily       glucosamine-chondroitin 500-400 MG CAPS Take 1 capsule by mouth daily       lisinopril (PRINIVIL/ZESTRIL) 20 MG tablet Take 1 tablet (20 mg) by mouth daily 90 tablet 3     Multiple Vitamins-Minerals (MULTIVITAMIN OR) Take 1 tablet by mouth daily.       Omega-3 Fatty Acids (OMEGA-3 FISH OIL PO)  "Take 2 g by mouth daily       sildenafil (REVATIO) 20 MG tablet Take 2-5 tabs one hour before sex 20 tablet 3     VITAMIN E NATURAL PO Take 100 Units by mouth       Reviewed and updated as needed this visit by Provider         Review of Systems   ROS COMP: Constitutional, HEENT, cardiovascular, pulmonary, gi and gu systems are negative, except as otherwise noted.      Objective    BP (!) 141/82   Pulse 79   Temp 98  F (36.7  C) (Oral)   Resp 18   Ht 1.778 m (5' 10\")   Wt 98.2 kg (216 lb 8 oz)   SpO2 99%   BMI 31.06 kg/m    Body mass index is 31.06 kg/m .  Physical Exam   GENERAL: healthy, alert and no distress  Right big toe, red    Diagnostic Test Results:  Labs reviewed in Epic  none         Assessment & Plan       ICD-10-CM    1. Gout, unspecified cause, unspecified chronicity, unspecified site M10.9 indomethacin (INDOCIN) 50 MG capsule     predniSONE (DELTASONE) 20 MG tablet         No follow-ups on file.    Bryan De Leon MD  [facility]      "

## 2020-04-23 ENCOUNTER — TELEPHONE (OUTPATIENT)
Dept: UROLOGY | Facility: CLINIC | Age: 55
End: 2020-04-23

## 2020-04-23 NOTE — TELEPHONE ENCOUNTER
Reason for call:  Medication   If this is a refill request, has the caller requested the refill from the pharmacy already? No  Will the patient be using a [place] [facility]? No  Name of the pharmacy and phone number for the current request: [facility] [address] [place] [place] [address] [address] [facility]  [phone]    Name of the medication requested: Sildenafil is not working and would like for you to prescribe a different medication.     Other request: Call if any questions    Phone number to reach patient:  Home number on file [phone] (home)    Best Time:  any    Can we leave a detailed message on this number?  YES    Travel screening: Not Applicable

## 2020-04-24 RX ORDER — VARDENAFIL HYDROCHLORIDE 20 MG/1
20 TABLET ORAL DAILY PRN
Qty: 10 TABLET | Refills: 11 | Status: SHIPPED | OUTPATIENT
Start: 2020-04-24 | End: 2020-06-03

## 2020-04-27 NOTE — TELEPHONE ENCOUNTER
Called and spoke to patient.   Updated patient that new medication was sent.   Candace Riley RN

## 2020-04-28 NOTE — TELEPHONE ENCOUNTER
Prior Authorization Retail Medication Request    Medication/Dose: Vardenafil 20 mg  ICD code (if different than what is on RX):  N52.9 Male impotence   Previously Tried and Failed:  Sildenafil not effective   Rationale:  NA    Insurance Name:  Cover my meds   Insurance ID:  [id number]      Pharmacy Information (if different than what is on RX)  Name:  [facility]  Phone:  [facility]

## 2020-04-28 NOTE — TELEPHONE ENCOUNTER
PRIOR AUTHORIZATION DENIED    Medication: vardenafil 20mg    Denial Date: 4/28/2020    Denial Rational: Medication is excluded from patient's benefit. Patient may receive medication, but would have to pay out of pocket.    Appeal Information: N/A- no appeal can be done for plan exclusion. For more information, please reach out to [employer] directly at [phone]       Complex Repair And O-L Flap Text: The defect edges were debeveled with a #15 scalpel blade.  The primary defect was closed partially with a complex linear closure.  Given the location of the remaining defect, shape of the defect and the proximity to free margins an O-L flap was deemed most appropriate for complete closure of the defect.  Using a sterile surgical marker, an appropriate flap was drawn incorporating the defect and placing the expected incisions within the relaxed skin tension lines where possible.    The area thus outlined was incised deep to adipose tissue with a #15 scalpel blade.  The skin margins were undermined to an appropriate distance in all directions utilizing iris scissors.

## 2020-04-28 NOTE — TELEPHONE ENCOUNTER
PA Initiation    Medication: vardenafil 20mg  Insurance Company: [employer] - Phone [phone] Fax [phone]  Pharmacy Filling the Rx: [facility] [address]  Filling Pharmacy Phone: [phone]  Filling Pharmacy Fax: [phone]  Start Date: 4/28/2020    Bairon Reed Pennington: [id number]

## 2020-05-14 DIAGNOSIS — M10.9 GOUT, UNSPECIFIED CAUSE, UNSPECIFIED CHRONICITY, UNSPECIFIED SITE: ICD-10-CM

## 2020-05-14 RX ORDER — PREDNISONE 20 MG/1
TABLET ORAL
Qty: 15 TABLET | Refills: 0 | Status: SHIPPED | OUTPATIENT
Start: 2020-05-14 | End: 2020-06-03

## 2020-05-14 NOTE — TELEPHONE ENCOUNTER
Requested Prescriptions   Pending Prescriptions Disp Refills     predniSONE (DELTASONE) 20 MG tablet 15 tablet 0     Si tab daily for 5 days, then one tab daily until gone       There is no refill protocol information for this order        Last Written Prescription Date:  3/7/2020  Last Fill Quantity: 15,  # refills: 0   Last office visit: 2019 with prescribing provider:     Future Office Visit:          Routing refill request to provider for review/approval because:  Drug not on the G refill protocol           Prabha Martinez RN  [facility]

## 2020-05-14 NOTE — TELEPHONE ENCOUNTER
Reason for Call:  Medication or medication refill:    Do you use a [facility]?  No    Name of the pharmacy and phone number for the current request:  [facility] [address] [place] [address] [address] [facility]      Name of the medication requested: Prednisone     Other request: Patient is having a gout flare-up and is requesting a refill on his prednisone    Can we leave a detailed message on this number? YES    Phone number patient can be reached at:  [phone] (home)    Best Time:  Any    Call taken on 5/14/2020 at 8:09 AM by Corinne Montanez

## 2020-06-03 ENCOUNTER — OFFICE VISIT (OUTPATIENT)
Dept: FAMILY MEDICINE | Facility: CLINIC | Age: 55
End: 2020-06-03
Payer: COMMERCIAL

## 2020-06-03 ENCOUNTER — ANCILLARY PROCEDURE (OUTPATIENT)
Dept: GENERAL RADIOLOGY | Facility: CLINIC | Age: 55
End: 2020-06-03
Attending: FAMILY MEDICINE
Payer: COMMERCIAL

## 2020-06-03 VITALS
DIASTOLIC BLOOD PRESSURE: 89 MMHG | HEART RATE: 71 BPM | OXYGEN SATURATION: 99 % | SYSTOLIC BLOOD PRESSURE: 129 MMHG | BODY MASS INDEX: 30.06 KG/M2 | WEIGHT: 210 LBS | HEIGHT: 70 IN | TEMPERATURE: 97.9 F

## 2020-06-03 DIAGNOSIS — M79.674 PAIN OF TOE OF RIGHT FOOT: Primary | ICD-10-CM

## 2020-06-03 DIAGNOSIS — M79.674 PAIN OF TOE OF RIGHT FOOT: ICD-10-CM

## 2020-06-03 DIAGNOSIS — M10.9 GOUT, UNSPECIFIED CAUSE, UNSPECIFIED CHRONICITY, UNSPECIFIED SITE: ICD-10-CM

## 2020-06-03 DIAGNOSIS — N52.9 ERECTILE DYSFUNCTION, UNSPECIFIED ERECTILE DYSFUNCTION TYPE: ICD-10-CM

## 2020-06-03 PROCEDURE — 99214 OFFICE O/P EST MOD 30 MIN: CPT | Performed by: FAMILY MEDICINE

## 2020-06-03 PROCEDURE — 73660 X-RAY EXAM OF TOE(S): CPT | Mod: RT

## 2020-06-03 RX ORDER — PREDNISONE 20 MG/1
TABLET ORAL
Qty: 10 TABLET | Refills: 0 | Status: SHIPPED | OUTPATIENT
Start: 2020-06-03 | End: 2020-09-24

## 2020-06-03 RX ORDER — INDOMETHACIN 50 MG/1
50 CAPSULE ORAL 3 TIMES DAILY PRN
Qty: 30 CAPSULE | Refills: 4 | Status: SHIPPED | OUTPATIENT
Start: 2020-06-03 | End: 2020-11-16

## 2020-06-03 RX ORDER — SILDENAFIL 100 MG/1
100 TABLET, FILM COATED ORAL DAILY PRN
Qty: 10 TABLET | Refills: 3 | Status: SHIPPED | OUTPATIENT
Start: 2020-06-03 | End: 2021-01-14

## 2020-06-03 ASSESSMENT — PAIN SCALES - GENERAL: PAINLEVEL: SEVERE PAIN (7)

## 2020-06-03 ASSESSMENT — MIFFLIN-ST. JEOR: SCORE: 1800.05

## 2020-06-03 NOTE — LETTER
Shruti 3, 2020      Bairon Reed  [address] [place] [address]        To Whom It May Concern:    Bairon Phanmack  was seen on 06/03/2020.    May return to work today, 06/03/2020, with light duties, with limited standing and walking  Standing and walking as tolerated.   Sitting as needed      Sincerely,        Bryan De Leon MD

## 2020-06-03 NOTE — PROGRESS NOTES
Subjective     Bairon Reed is a 54 year old male who presents to clinic today for the following health issues:    HPI   Patient is present today for possible glass in right foot. He stated that it happened about three weeks ago.  Patient with history of gout.  Comes in today reports pain in his right great toe for the past 3 weeks.  He reports 3 weeks ago he dropped a glass bottle on the floor, bottle broke on the  floor and then some of the glasses piece hit his foot.  He does not think anything got into his skin, or toe .  He reports he had minimal bleeding but no discharges.  He reports since that time every time he stands to walk his foot, toe right great toe started bothering him become painful.  Has no redness, no discharges, no bleeding. Mild swelling. No pain at night.    Patient Active Problem List   Diagnosis     Hyperlipidemia LDL goal <160     Insomnia     Bilateral arm weakness     Anxiety     Nondependent alcohol abuse     Shoulder impingement - right     Cervical radiculopathy at C7     Cervicalgia     Degeneration of cervical intervertebral disc     Vitamin D deficiency     Cervical radiculopathy     S/P spinal fusion     Peroneal nerve palsy     Foot drop, right     Lumbar stenosis with neurogenic claudication     Lumbar radiculopathy     Lumbar herniated disc     S/P lumbar microdiscectomy     Hypertension goal BP (blood pressure) < 140/90     Benign non-nodular prostatic hyperplasia with lower urinary tract symptoms     Gout, unspecified cause, unspecified chronicity, unspecified site     Past Surgical History:   Procedure Laterality Date     BACK SURGERY  1986-1988    x 3. Lower back surgery     BACK SURGERY      another lumbar procedure spine 2019     COLONOSCOPY WITH CO2 INSUFFLATION N/A 2/21/2020    Procedure: COLONOSCOPY, WITH CO2 INSUFFLATION;  Surgeon: Yasmani Constantino MD;  Location: [facility]     DISCECTOMY LUMBAR POSTERIOR MICROSCOPIC ONE LEVEL  5/17/2013    Procedure: DISCECTOMY  LUMBAR POSTERIOR MICROSCOPIC ONE LEVEL;  BILATERAL LUMBAR L4-5 EXTENDED HEMILAMINECTOMY, MICROFORAMINOTOMY AND MICRODISCECTOMY WITH METRIX II;  Surgeon: Andrew Alejandre MD;  Location: [facility] OR     EP ABLATION      age 19 heart ablation     FUSION CERVICAL ANTERIOR ONE LEVEL  3/5/2013    Procedure: FUSION CERVICAL ANTERIOR ONE LEVEL;  C 6-7 ANTERIOR DISCECTOMY AND FUSION WITH NEURO MONITORING ;  Surgeon: Andrew Alejandre MD;  Location:  OR     KNEE SURGERY  2009    Right     LARYNGOSCOPY WITH MICROSCOPE  5/28/2013    Procedure: LARYNGOSCOPY WITH MICROSCOPE;  MICRODIRECT LARYNGOSCOPY, WITH LEFT VOCAL CORD INJECTION ;  Surgeon: Jorge Vergara MD;  Location:  [facility]     neck sugery      2019 spine surgery cleaned up     NECK SURGERY  2/2010    [facility]       Social History     Tobacco Use     Smoking status: Never Smoker     Smokeless tobacco: Current User     Types: Chew     Tobacco comment: Lives in smoke free household   Substance Use Topics     Alcohol use: No     Alcohol/week: 0.0 standard drinks     Comment: Quit 2010. Chemical dependency treatment in 1989. ...31 yrs of 1.5 tins weekly.  sober for 7 months..     Family History   Problem Relation Age of Onset     Heart Disease Father 45        heart attack     Myocardial Infarction Father      Arthritis Sister      Heart Disease Mother 51        pneumonia and enlarged heart     Myocardial Infarction Other      Glaucoma No family hx of      Macular Degeneration No family hx of      Cancer No family hx of      Diabetes No family hx of      Hypertension No family hx of      Cerebrovascular Disease No family hx of      Thyroid Disease No family hx of          Current Outpatient Medications   Medication Sig Dispense Refill     calcium carbonate (OS-HANNAH 500 MG [place]. [place]) 500 MG tablet Take 500 mg by mouth daily       Cholecalciferol (VITAMIN D3 PO) Take 1,000 Units by mouth daily       glucosamine-chondroitin 500-400 MG CAPS Take 1 capsule by  mouth daily       indomethacin (INDOCIN) 50 MG capsule Take 1 capsule (50 mg) by mouth 3 times daily as needed for moderate pain 30 capsule 4     lisinopril (PRINIVIL/ZESTRIL) 20 MG tablet Take 1 tablet (20 mg) by mouth daily 90 tablet 3     Multiple Vitamins-Minerals (MULTIVITAMIN OR) Take 1 tablet by mouth daily.       Omega-3 Fatty Acids (OMEGA-3 FISH OIL PO) Take 2 g by mouth daily       predniSONE (DELTASONE) 20 MG tablet 2 tab daily for 5 days, then one tab daily until gone 15 tablet 0     vardenafil (LEVITRA) 20 MG tablet Take 1 tablet (20 mg) by mouth daily as needed (sex) 10 tablet 11     VITAMIN E NATURAL PO Take 100 Units by mouth       No Known Allergies    Reviewed and updated as needed this visit by Provider         Review of Systems   Constitutional, HEENT, cardiovascular, pulmonary, gi and gu systems are negative, except as otherwise noted.      Objective    There were no vitals taken for this visit.  There is no height or weight on file to calculate BMI.  Physical Exam   GENERAL: healthy, alert and no distress  MS: Right great toe, bunion, mild swelling and mild tenderness, healing superficial wound, no redness and no sign of infections.  SKIN: no suspicious lesions or rashes    Diagnostic Test Results:  Labs reviewed in Epic  Xray - moderate to severe arthritis, tophi body and bone spurs.        Assessment & Plan       ICD-10-CM    1. Pain of toe of right foot  M79.674 XR Toe Right G/E 2 Views     predniSONE (DELTASONE) 20 MG tablet     indomethacin (INDOCIN) 50 MG capsule     Orthopedic & Spine  Referral   2. Gout, unspecified cause, unspecified chronicity, unspecified site  M10.9 predniSONE (DELTASONE) 20 MG tablet     indomethacin (INDOCIN) 50 MG capsule   3. Erectile dysfunction, unspecified erectile dysfunction type  N52.9 sildenafil (VIAGRA) 100 MG tablet     Reviewed x-ray results with patient showed severe arthritis with tophi gouty bony spur.  Discussed options of treatment.   Patient was advised to wear wider shoes, use good shoes insert.  An additional,  A referral  to podiatry, if no improvement in symtpms.  In addition , he was given a prescription prednisone, indomethacin use as been prescribed.    There are no Patient Instructions on file for this visit.    Return in about 3 months (around 9/3/2020) for Physical Exam.    Bryan De Leon MD  [facility]

## 2020-06-09 ENCOUNTER — OFFICE VISIT (OUTPATIENT)
Dept: PODIATRY | Facility: CLINIC | Age: 55
End: 2020-06-09
Payer: COMMERCIAL

## 2020-06-09 VITALS — SYSTOLIC BLOOD PRESSURE: 110 MMHG | DIASTOLIC BLOOD PRESSURE: 76 MMHG | BODY MASS INDEX: 30.06 KG/M2 | WEIGHT: 210 LBS

## 2020-06-09 DIAGNOSIS — M10.9 GOUT, UNSPECIFIED CAUSE, UNSPECIFIED CHRONICITY, UNSPECIFIED SITE: ICD-10-CM

## 2020-06-09 DIAGNOSIS — M20.5X1 HALLUX LIMITUS OF RIGHT FOOT: Primary | ICD-10-CM

## 2020-06-09 PROCEDURE — 99243 OFF/OP CNSLTJ NEW/EST LOW 30: CPT | Mod: 25 | Performed by: PODIATRIST

## 2020-06-09 PROCEDURE — 20600 DRAIN/INJ JOINT/BURSA W/O US: CPT | Mod: T5 | Performed by: PODIATRIST

## 2020-06-09 RX ADMIN — LIDOCAINE HYDROCHLORIDE 0.5 ML: 10 INJECTION, SOLUTION INFILTRATION; PERINEURAL at 08:55

## 2020-06-09 NOTE — PATIENT INSTRUCTIONS
We wish you continued good healing. If you have any questions or concerns, please do not hesitate to contact us at [phone]    Please remember to call and schedule a follow up appointment if one was recommended at your earliest convenience.   PODIATRY CLINIC HOURS  TELEPHONE NUMBER    Dr. Harshil Pemberton D.P.M [facility]    Clinics:  [facility]    Rachael Patel [facility]   Tuesday 1PM-6PM  [place]  Wednesday 7AM-2PM  [facility]  Thursday 10AM-6PM  [place]  Friday 7AM-3PM  [place]  Specialty schedulers:   [phone] to make an appointment with any Specialty Provider.        Urgent Care locations:    [facility] Monday-Friday 5 pm - 9 pm. Saturday-Sunday 9 am -5pm    Monday-Friday 11 am - 9 pm Saturday 9 am - 5 pm     Monday-Sunday 12 noon-8PM [phone]     If you need a medication refill, please contact us you may need lab work and/or a follow up visit prior to your refill (i.e. Antifungal medications).    [employer] (secure e-mail communication and access to your chart) to send a message or to make an appointment.    If MRI needed please call Jacob Villar at [phone]

## 2020-06-09 NOTE — LETTER
[facility] [place]  [address]  [place] [address]  Phone: [phone]  Fax: [phone]    June 9, 2020        Bairon BRITTON Reed  [address] [place] [address]          To whom it may concern:    RE: Bairon Phanmack    Patient was seen and treated today at our clinic.  Patient may return to work  with the following:  When in pain no standing for more than 4 hours. Seated work only.  Due to procedure done in clinic. 06/09/20      Please contact me for questions or concerns.      Sincerely,        Dr. Harshil Pemberton D.P.M [facility]ILSH[facility]/renetta

## 2020-06-09 NOTE — LETTER
6/9/2020         RE: Bairon Reed  [address]  [place] [address]        Dear Colleague,    Thank you for referring your patient, Bairon Reed, to the [facility] [place]. Please see a copy of my visit note below.    Small Joint Injection/Arthrocentesis: R great MTP    Date/Time: 6/9/2020 8:55 AM  Performed by: Harshil Pemberton DPM  Authorized by: Harshil Pemberton DPM   Indications:  Joint swelling and pain  Needle Size:  27 G  Guidance: landmark     Approach:  Dorsal  Location:  Great toe    Site:  R great MTP                    Medications:  0.5 mL lidocaine 1 %; 10 mg triamcinolone acetonide 10 MG/ML        Outcome:  Tolerated well, no immediate complications  Procedure discussed: discussed risks, benefits, and alternatives    Consent Given by:  Patient  Timeout: timeout called immediately prior to procedure    Prep: patient was prepped and draped in usual sterile fashion           Subjective:    Pt is seen today in consult from Dr. De Leon with the c/c of painful right first metatarsal phalangeal joint.  3 weeks ago the patient dropped a glass.  He states it did not hit the top of his foot.  The glass broke and did cut the medial portion of the right first MTPJ.  The patient did not think he got any glass in his skin in his toe.  He had minimal bleeding.  He states the laceration healed up.  He continues to have pain in the dorsal medial right first MTPJ.  The pain is aggravated by activity and relieved by rest.  The patient does have a history of gout.  He denies any ecchymosis erythema.  He is noted no weakness or increased toe deformity.  He denies any fever or chills.    ROS:  A 10-point review of systems was performed and is positive for that noted in the HPI and as seen above.  All other areas are negative.        No Known Allergies    Current Outpatient Medications   Medication Sig Dispense Refill     calcium carbonate (OS-HANNAH 500 MG [place]. [place]) 500 MG  tablet Take 500 mg by mouth daily       Cholecalciferol (VITAMIN D3 PO) Take 1,000 Units by mouth daily       glucosamine-chondroitin 500-400 MG CAPS Take 1 capsule by mouth daily       indomethacin (INDOCIN) 50 MG capsule Take 1 capsule (50 mg) by mouth 3 times daily as needed for moderate pain 30 capsule 4     lisinopril (PRINIVIL/ZESTRIL) 20 MG tablet Take 1 tablet (20 mg) by mouth daily 90 tablet 3     Multiple Vitamins-Minerals (MULTIVITAMIN OR) Take 1 tablet by mouth daily.       Omega-3 Fatty Acids (OMEGA-3 FISH OIL PO) Take 2 g by mouth daily       predniSONE (DELTASONE) 20 MG tablet 2 tab daily for 5 days 10 tablet 0     sildenafil (VIAGRA) 100 MG tablet Take 1 tablet (100 mg) by mouth daily as needed (daily) 10 tablet 3     VITAMIN E NATURAL PO Take 100 Units by mouth         Patient Active Problem List   Diagnosis     Hyperlipidemia LDL goal <160     Insomnia     Bilateral arm weakness     Anxiety     Nondependent alcohol abuse     Shoulder impingement - right     Cervical radiculopathy at C7     Cervicalgia     Degeneration of cervical intervertebral disc     Vitamin D deficiency     Cervical radiculopathy     S/P spinal fusion     Peroneal nerve palsy     Foot drop, right     Lumbar stenosis with neurogenic claudication     Lumbar radiculopathy     Lumbar herniated disc     S/P lumbar microdiscectomy     Hypertension goal BP (blood pressure) < 140/90     Benign non-nodular prostatic hyperplasia with lower urinary tract symptoms     Gout, unspecified cause, unspecified chronicity, unspecified site       Past Medical History:   Diagnosis Date     Anxiety      Cervical spondylosis with myelopathy 2/4/2013     Cervicalgia      Chronic low back pain 9/13/2011     Chronic low back pain      Degeneration of cervical intervertebral disc      Gout      Hyperlipidemia LDL goal <160 11/18/2009     Hypertension goal BP (blood pressure) < 140/90 2/10/2015     Insomnia 11/18/2009     Nondependent alcohol abuse  11/14/2012     Pain in shoulder 9/13/2011     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 Lower back surgery     BACK SURGERY      another lumbar procedure spine 2019     COLONOSCOPY WITH CO2 INSUFFLATION N/A 2/21/2020    Procedure: COLONOSCOPY, WITH CO2 INSUFFLATION;  Surgeon: Yasmani Constantino MD;  Location: [facility]     DISCECTOMY LUMBAR POSTERIOR MICROSCOPIC ONE LEVEL  5/17/2013    Procedure: DISCECTOMY LUMBAR POSTERIOR MICROSCOPIC ONE LEVEL;  BILATERAL LUMBAR L4-5 EXTENDED HEMILAMINECTOMY, MICROFORAMINOTOMY AND MICRODISCECTOMY WITH METRIX II;  Surgeon: Andrew Alejandre MD;  Location: [facility] OR     EP ABLATION      age 19 heart ablation     FUSION CERVICAL ANTERIOR ONE LEVEL  3/5/2013    Procedure: FUSION CERVICAL ANTERIOR ONE LEVEL;  C 6-7 ANTERIOR DISCECTOMY AND FUSION WITH NEURO MONITORING ;  Surgeon: Andrew Alejandre MD;  Location: [facility] OR     KNEE SURGERY  2009    Right     LARYNGOSCOPY WITH MICROSCOPE  5/28/2013    Procedure: LARYNGOSCOPY WITH MICROSCOPE;  MICRODIRECT LARYNGOSCOPY, WITH LEFT VOCAL CORD INJECTION ;  Surgeon: Jorge Vergara MD;  Location:  [facility]     neck sugery      2019 spine surgery cleaned up     NECK SURGERY  2/2010    [facility]       Family History   Problem Relation Age of Onset     Heart Disease Father 45        heart attack     Myocardial Infarction Father      Arthritis Sister      Heart Disease Mother 51        pneumonia and enlarged heart     Myocardial Infarction Other      Glaucoma No family hx of      Macular Degeneration No family hx of      Cancer No family hx of      Diabetes No family hx of      Hypertension No family hx of      Cerebrovascular Disease No family hx of      Thyroid Disease No family hx of        Social History     Tobacco Use     Smoking status:  Never Smoker     Smokeless tobacco: Current User     Types: Chew     Tobacco comment: Lives in smoke free household   Substance Use Topics     Alcohol use: No     Alcohol/week: 0.0 standard drinks     Comment: Quit 2010. Chemical dependency treatment in 1989. ...31 yrs of 1.5 tins weekly.  sober for 7 months..         Exam:    Vitals: /76   Wt 95.3 kg (210 lb)   BMI 30.06 kg/m    BMI: Body mass index is 30.06 kg/m .  Height: Data Unavailable    Constitutional/ general:  Pt is in no apparent distress, appears well-nourished.  Cooperative with history and physical exam.     Psych:  The patient answered questions appropriately.  Normal affect.  Seems to have reasonable expectations, in terms of treatment.     Eyes:  Visual scanning/ tracking without deficit.     Ears:  Response to auditory stimuli is normal.  negative hearing aid devices.  Auricles in proper alignment.     Lymphatic:  Popliteal lymph nodes not enlarged.     Lungs:  Non labored breathing, non labored speech. No cough.  No audible wheezing. Even, quiet breathing.       Vascular:  positive pedal pulses bilaterally for both the DP and PT arteries.  CFT < 3 sec.  negative ankle edema.  positive pedal hair growth.    Neuro:  Alert and oriented x 3. Coordinated gait.  Light touch sensation is intact to the L4, L5, S1 distributions. No obvious deficits.  No evidence of neurological-based weakness, spasticity, or contracture in the lower extremities.      Derm: Normal texture and turgor.  No erythema, ecchymosis, or cyanosis.      Musculoskeletal:    Lower extremity muscle strength is normal.  Patient is ambulatory without an assistive device or brace.  No gross deformities.  Normal arch.   Normal ROM all forefoot and rearfoot joints except [id number] MTPJ.  Patient has pain with range of motion.   Wisam proliferation dorsally.   Patient's range of motion is 20% normal.  There is no open wounds around this joint.  There is no pain plantarly or laterally.   Pain dorsal medial.  Slight edema and skin discoloration is noted dorsal medial.  No echymosis, signs of infection or trauma. No open lesions, increased warmth or ascending cellulitis.          Uric Acid  3.5 - 7.2 mg/dL  6.4     Resulting Agency   [facility]          Specimen Collected: 05/10/17  3:35 PM  Last Resulted: 05/10/17  7:00 PM                RIGHT TOE TWO OR MORE VIEWS   6/3/2020 9:33 AM      HISTORY: Pain of toe of right foot.     COMPARISON: 3/28/2013 x-ray.                                                                      IMPRESSION: Advanced first MTP joint degenerative changes with bony  proliferation about the medial aspect of the joint more so than on the  prior study. This has the appearance of someone with chronic gout.     There is no evidence of radiopaque foreign body or fracture involving patient's range of motion is about 20% normal the great toe. There is a clinical suspicion of glass in this region.      Assessment:    Right first MTPJ history of laceration   hallux Limitus right foot  Patient with history of gout    Plan:  X-rays from past personally reviewed.  We reviewed patient's most recent uric acid.  Explained to patient that he has significant arthritis in this joint.  Discussed etiology could be mixed between gout and arthritis of this joint which is common.  Discussed etiology and treatment options in detail w/ the pt.  Xrays reviewed with patient.  Recommended wearing a stiff soled shoe, icing, limiting activities, not going barefoot, and taking ibuprofen prn for discomfort.  Patient would like injection to relieve his pain.  See above note.  We also briefly discussed surgical correction of this if bothersome in the future with fusion.  Return to clinic prn.   Thank you for allowing me participate in the care of this patient.        Harshil Pemberton, JUVENAL DPILYA, FACFAS         Again, thank you for allowing me to participate in the care of your patient.        Sincerely,        Harshil FRANCO  JUVENAL Pemberton

## 2020-06-11 RX ORDER — LIDOCAINE HYDROCHLORIDE 10 MG/ML
0.5 INJECTION, SOLUTION INFILTRATION; PERINEURAL
Status: SHIPPED | OUTPATIENT
Start: 2020-06-09

## 2020-08-01 ENCOUNTER — OFFICE VISIT (OUTPATIENT)
Dept: URGENT CARE | Facility: URGENT CARE | Age: 55
End: 2020-08-01
Payer: COMMERCIAL

## 2020-08-01 VITALS
DIASTOLIC BLOOD PRESSURE: 78 MMHG | OXYGEN SATURATION: 96 % | HEART RATE: 78 BPM | TEMPERATURE: 96.3 F | SYSTOLIC BLOOD PRESSURE: 124 MMHG

## 2020-08-01 DIAGNOSIS — H92.02 LEFT EAR PAIN: Primary | ICD-10-CM

## 2020-08-01 PROCEDURE — 99213 OFFICE O/P EST LOW 20 MIN: CPT | Performed by: NURSE PRACTITIONER

## 2020-08-01 NOTE — PROGRESS NOTES
SUBJECTIVE:  Bairon Reed is a 54 year old male who presents with left ear pain for 4 day(s).   Severity: mild   Timing:gradual onset  Additional symptoms include : cough  Has had back and neck issues  Denies teeth pain fever jaw pain or swelling outer ear pain or swimming    History of recurrent otitis: no    Past Medical History:   Diagnosis Date     Anxiety      Cervical spondylosis with myelopathy 2/4/2013     Cervicalgia      Chronic low back pain 9/13/2011     Chronic low back pain      Degeneration of cervical intervertebral disc      Gout      Hyperlipidemia LDL goal <160 11/18/2009     Hypertension goal BP (blood pressure) < 140/90 2/10/2015     Insomnia 11/18/2009     Nondependent alcohol abuse 11/14/2012     Pain in shoulder 9/13/2011     Peroneal nerve palsy Feb 2012    rt     Radiculopathy, cervical 9/13/2011     Shoulder impingement     right     Vocal cord paralysis     LEFT     Current Outpatient Medications   Medication Sig Dispense Refill     calcium carbonate (OS-HANNAH 500 MG [place]. [place]) 500 MG tablet Take 500 mg by mouth daily       Cholecalciferol (VITAMIN D3 PO) Take 1,000 Units by mouth daily       glucosamine-chondroitin 500-400 MG CAPS Take 1 capsule by mouth daily       indomethacin (INDOCIN) 50 MG capsule Take 1 capsule (50 mg) by mouth 3 times daily as needed for moderate pain 30 capsule 4     lisinopril (PRINIVIL/ZESTRIL) 20 MG tablet Take 1 tablet (20 mg) by mouth daily 90 tablet 3     Multiple Vitamins-Minerals (MULTIVITAMIN OR) Take 1 tablet by mouth daily.       Omega-3 Fatty Acids (OMEGA-3 FISH OIL PO) Take 2 g by mouth daily       predniSONE (DELTASONE) 20 MG tablet 2 tab daily for 5 days 10 tablet 0     sildenafil (VIAGRA) 100 MG tablet Take 1 tablet (100 mg) by mouth daily as needed (daily) 10 tablet 3     VITAMIN E NATURAL PO Take 100 Units by mouth       Social History     Tobacco Use     Smoking status: Never Smoker     Smokeless tobacco: Current User     Types: Chew      Tobacco comment: Lives in smoke free household   Substance Use Topics     Alcohol use: No     Alcohol/week: 0.0 standard drinks     Comment: Quit 2010. Chemical dependency treatment in 1989. ...31 yrs of 1.5 tins weekly.  sober for 7 months..       ROS:   Review of systems negative except as stated above.    OBJECTIVE:  /78   Pulse 78   Temp 96.3  F (35.7  C) (Tympanic)   SpO2 96%     EXAM:  The right TM is normal: no effusions, no erythema, and normal landmarks     The right auditory canal is normal and without drainage, edema or erythema  The left TM is normal: no effusions, no erythema, and normal landmarks  The left auditory canal is normal and without drainage, edema or erythema  Oropharynx exam is normal: no lesions, erythema, adenopathy or exudate.  GENERAL: no acute distress  EYES: EOMI,  PERRL, conjunctiva clear  NECK: supple, non-tender to palpation, no adenopathy noted  RESP: lungs clear to auscultation - no rales, rhonchi or wheezes  CV: regular rates and rhythm, normal S1 S2, no murmur noted  SKIN: no suspicious lesions or rashes     ASSESSMENT:  (H92.02) Left ear pain  (primary encounter diagnosis)      PLAN:  Discussed no infection could be radiating pain from neck/back  Can take ibuprofen, ice/heat, stretch  Will follow up with specialist    YULIA Kelsey CNP

## 2020-09-24 ENCOUNTER — OFFICE VISIT (OUTPATIENT)
Dept: PODIATRY | Facility: CLINIC | Age: 55
End: 2020-09-24
Payer: COMMERCIAL

## 2020-09-24 VITALS — BODY MASS INDEX: 27.92 KG/M2 | HEIGHT: 70 IN | WEIGHT: 195 LBS | HEART RATE: 94 BPM

## 2020-09-24 DIAGNOSIS — M20.5X1 HALLUX LIMITUS OF RIGHT FOOT: Primary | ICD-10-CM

## 2020-09-24 DIAGNOSIS — M10.9 GOUT, UNSPECIFIED CAUSE, UNSPECIFIED CHRONICITY, UNSPECIFIED SITE: ICD-10-CM

## 2020-09-24 PROCEDURE — 99213 OFFICE O/P EST LOW 20 MIN: CPT | Mod: 25 | Performed by: PODIATRIST

## 2020-09-24 PROCEDURE — 20600 DRAIN/INJ JOINT/BURSA W/O US: CPT | Mod: RT | Performed by: PODIATRIST

## 2020-09-24 RX ORDER — NAPROXEN 500 MG/1
TABLET ORAL
COMMUNITY
Start: 2020-09-11 | End: 2020-11-16

## 2020-09-24 RX ORDER — LIDOCAINE HYDROCHLORIDE 10 MG/ML
0.5 INJECTION, SOLUTION INFILTRATION; PERINEURAL
Status: SHIPPED | OUTPATIENT
Start: 2020-09-24

## 2020-09-24 RX ADMIN — LIDOCAINE HYDROCHLORIDE 0.5 ML: 10 INJECTION, SOLUTION INFILTRATION; PERINEURAL at 08:10

## 2020-09-24 ASSESSMENT — MIFFLIN-ST. JEOR: SCORE: 1730.76

## 2020-09-24 NOTE — LETTER
[address] [address]  Phone: [phone]    September 24, 2020        Bairon Reed  [address] [place] [address]          To whom it may concern:    RE: Bairon Reed    Patient was seen and treated today at our clinic and missed work.    He had an injection in his foot.    Please contact me for questions or concerns.      Sincerely,        Dr. Harshil Pemberton D.P.M FAC FAS/lld

## 2020-09-24 NOTE — PATIENT INSTRUCTIONS
We wish you continued good healing. If you have any questions or concerns, please do not hesitate to contact us at [phone]    Please remember to call and schedule a follow up appointment if one was recommended at your earliest convenience.   PODIATRY CLINIC HOURS  TELEPHONE NUMBER    Dr. Harshil Pemberton D.P.M [facility]    Clinics:  [facility]    Rachael Patel [facility]   Tuesday 1PM-6PM  [place]  Wednesday 7AM-2PM  [facility]  Thursday 10AM-6PM  [place]  Friday 7AM-3PM  [place]  Specialty schedulers:   [phone] to make an appointment with any Specialty Provider.        Urgent Care locations:    [facility] Monday-Friday 5 pm - 9 pm. Saturday-Sunday 9 am -5pm    Monday-Friday 11 am - 9 pm Saturday 9 am - 5 pm     Monday-Sunday 12 noon-8PM [phone]     If you need a medication refill, please contact us you may need lab work and/or a follow up visit prior to your refill (i.e. Antifungal medications).    [employer] (secure e-mail communication and access to your chart) to send a message or to make an appointment.    If MRI needed please call Jacob Villar at [phone]

## 2020-09-24 NOTE — PROGRESS NOTES
Subjective:    6/9/20   Pt is seen today in consult from Dr. De Leon with the c/c of painful right first metatarsal phalangeal joint.  3 weeks ago the patient dropped a glass.  He states it did not hit the top of his foot.  The glass broke and did cut the medial portion of the right first MTPJ.  The patient did not think he got any glass in his skin in his toe.  He had minimal bleeding.  He states the laceration healed up.  He continues to have pain in the dorsal medial right first MTPJ.  The pain is aggravated by activity and relieved by rest.  The patient does have a history of gout.  He denies any ecchymosis erythema.  He is noted no weakness or increased toe deformity.  He denies any fever or chills.    9/24/20 patient returns for evaluation of his right first MTPJ.  We injected his joint last visit and no pain until just recently.  He works as a  and is on his feet.  We did give him a carbon plate but he broke this by accident he would like another 1.  Patient would also like to discuss surgical options for this.    ROS:   See above     No Known Allergies    Current Outpatient Medications   Medication Sig Dispense Refill     calcium carbonate (OS-HANNAH 500 MG [place]. CA) 500 MG tablet Take 500 mg by mouth daily       Cholecalciferol (VITAMIN D3 PO) Take 1,000 Units by mouth daily       glucosamine-chondroitin 500-400 MG CAPS Take 1 capsule by mouth daily       indomethacin (INDOCIN) 50 MG capsule Take 1 capsule (50 mg) by mouth 3 times daily as needed for moderate pain 30 capsule 4     lisinopril (PRINIVIL/ZESTRIL) 20 MG tablet Take 1 tablet (20 mg) by mouth daily 90 tablet 3     Multiple Vitamins-Minerals (MULTIVITAMIN OR) Take 1 tablet by mouth daily.       naproxen (NAPROSYN) 500 MG tablet        Omega-3 Fatty Acids (OMEGA-3 FISH OIL PO) Take 2 g by mouth daily       sildenafil (VIAGRA) 100 MG tablet Take 1 tablet (100 mg) by mouth daily as needed (daily) 10 tablet 3     VITAMIN E NATURAL PO Take 100  Units by mouth         Patient Active Problem List   Diagnosis     Hyperlipidemia LDL goal <160     Insomnia     Bilateral arm weakness     Anxiety     Nondependent alcohol abuse     Shoulder impingement - right     Cervical radiculopathy at C7     Cervicalgia     Degeneration of cervical intervertebral disc     Vitamin D deficiency     Cervical radiculopathy     S/P spinal fusion     Peroneal nerve palsy     Foot drop, right     Lumbar stenosis with neurogenic claudication     Lumbar radiculopathy     Lumbar herniated disc     S/P lumbar microdiscectomy     Hypertension goal BP (blood pressure) < 140/90     Benign non-nodular prostatic hyperplasia with lower urinary tract symptoms     Gout, unspecified cause, unspecified chronicity, unspecified site       Past Medical History:   Diagnosis Date     Anxiety      Cervical spondylosis with myelopathy 2/4/2013     Cervicalgia      Chronic low back pain 9/13/2011     Chronic low back pain      Degeneration of cervical intervertebral disc      Gout      Hyperlipidemia LDL goal <160 11/18/2009     Hypertension goal BP (blood pressure) < 140/90 2/10/2015     Insomnia 11/18/2009     Nondependent alcohol abuse 11/14/2012     Pain in shoulder 9/13/2011     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 Lower back surgery     BACK SURGERY      another lumbar procedure spine 2019     COLONOSCOPY WITH CO2 INSUFFLATION N/A 2/21/2020    Procedure: COLONOSCOPY, WITH CO2 INSUFFLATION;  Surgeon: Yasmani Constantino MD;  Location: [facility]     DISCECTOMY LUMBAR POSTERIOR MICROSCOPIC ONE LEVEL  5/17/2013    Procedure: DISCECTOMY LUMBAR POSTERIOR MICROSCOPIC ONE LEVEL;  BILATERAL LUMBAR L4-5 EXTENDED HEMILAMINECTOMY, MICROFORAMINOTOMY AND MICRODISCECTOMY WITH METRIX II;  Surgeon: Andrew Alejandre  "MD Jackson;  Location: [facility] OR     EP ABLATION      age 19 heart ablation     FUSION CERVICAL ANTERIOR ONE LEVEL  3/5/2013    Procedure: FUSION CERVICAL ANTERIOR ONE LEVEL;  C 6-7 ANTERIOR DISCECTOMY AND FUSION WITH NEURO MONITORING ;  Surgeon: Andrew Alejandre MD;  Location:  OR     KNEE SURGERY  2009    Right     LARYNGOSCOPY WITH MICROSCOPE  5/28/2013    Procedure: LARYNGOSCOPY WITH MICROSCOPE;  MICRODIRECT LARYNGOSCOPY, WITH LEFT VOCAL CORD INJECTION ;  Surgeon: Jorge Vergara MD;  Location:  [facility]     neck sugery      2019 spine surgery cleaned up     NECK SURGERY  2/2010    [facility]       Family History   Problem Relation Age of Onset     Heart Disease Father 45        heart attack     Myocardial Infarction Father      Arthritis Sister      Heart Disease Mother 51        pneumonia and enlarged heart     Myocardial Infarction Other      Glaucoma No family hx of      Macular Degeneration No family hx of      Cancer No family hx of      Diabetes No family hx of      Hypertension No family hx of      Cerebrovascular Disease No family hx of      Thyroid Disease No family hx of        Social History     Tobacco Use     Smoking status: Never Smoker     Smokeless tobacco: Current User     Types: Chew     Tobacco comment: Lives in smoke free household   Substance Use Topics     Alcohol use: No     Alcohol/week: 0.0 standard drinks     Comment: Quit 2010. Chemical dependency treatment in 1989. ...31 yrs of 1.5 tins weekly.  sober for 7 months..         Exam:    Vitals: Pulse 94   Ht 1.778 m (5' 10\")   Wt 88.5 kg (195 lb)   BMI 27.98 kg/m    BMI: Body mass index is 27.98 kg/m .  Height: 5' 10\"    Constitutional/ general:  Pt is in no apparent distress, appears well-nourished.  Cooperative with history and physical exam.     Psych:  The patient answered questions appropriately.  Normal affect.  Seems to have reasonable expectations, in terms of treatment.     Eyes:  Visual scanning/ tracking " without deficit.     Ears:  Response to auditory stimuli is normal.  negative hearing aid devices.  Auricles in proper alignment.     Lymphatic:  Popliteal lymph nodes not enlarged.     Lungs:  Non labored breathing, non labored speech. No cough.  No audible wheezing. Even, quiet breathing.       Vascular:  positive pedal pulses bilaterally for both the DP and PT arteries.  CFT < 3 sec.  negative ankle edema.  positive pedal hair growth.    Neuro:  Alert and oriented x 3. Coordinated gait.  Light touch sensation is intact to the L4, L5, S1 distributions. No obvious deficits.  No evidence of neurological-based weakness, spasticity, or contracture in the lower extremities.      Derm: Normal texture and turgor.  No erythema, ecchymosis, or cyanosis.      Musculoskeletal:    Lower extremity muscle strength is normal.  Patient is ambulatory without an assistive device or brace.  No gross deformities.  Normal arch.   Normal ROM all forefoot and rearfoot joints except apylp2to MTPJ.  Patient has pain with range of motion.   Wisam proliferation dorsally.   Patient's range of motion is 20% normal.  Some pain with palpation around this joint.  Slight edema and skin discoloration is noted dorsal medial.  No echymosis, signs of infection or trauma. No open lesions, increased warmth or ascending cellulitis.          Uric Acid  3.5 - 7.2 mg/dL  6.4     Resulting Agency   [facility]          Specimen Collected: 05/10/17  3:35 PM  Last Resulted: 05/10/17  7:00 PM                RIGHT TOE TWO OR MORE VIEWS   6/3/2020 9:33 AM      HISTORY: Pain of toe of right foot.     COMPARISON: 3/28/2013 x-ray.                                                                      IMPRESSION: Advanced first MTP joint degenerative changes with bony  proliferation about the medial aspect of the joint more so than on the  prior study. This has the appearance of someone with chronic gout.     There is no evidence of radiopaque foreign body or fracture  involving patient's range of motion is about 20% normal the great toe. There is a clinical suspicion of glass in this region.      Assessment:    Right first MTPJ history of laceration   hallux Limitus right foot  Patient with history of gout    Plan:  X-rays from past personally reviewed.  We reviewed patient's most recent uric acid.  Patient would like another injection.  Please see procedure note.  He would like another carbon plate and we got one for the patient instructed him how to use it in his shoe.  Discussed good stiff shoes at all at all times both at home and work.  He will avoid activities of bother this and discussed what will bother this.  We discussed surgery.  Explained we could do a cheilectomy.  Explained a third of the time patients have pain afterwards from the arthritis there.  If we perform a cheilectomy he should think of this as a staged procedure.  If pain continues next procedure would be fusion.  We discussed that if his gout is too severe and bone soft and metatarsal head we may not be able to fuse this.  We discussed recovery of both cheilectomy and fusion.  Discussed time off of work he would need.  Discussed possible complications.  This be same-day surgery MAC anesthesia.  He will think about this for now.  He will try to stretch out time between injections as long as possible.  We discussed he could have 3 injections here year.  RTC PRN.  25 minutes spent in total time caring for this patient and at least 20 minutes spent  face to face with patient regarding care.          Harshil Pemberton, JUVENAL SANFORD, FACFAS

## 2020-09-24 NOTE — LETTER
9/24/2020         RE: Bairon Reed  [address] [place]  [place] [address]        Dear Colleague,    Thank you for referring your patient, Bairon Reed, to the [facility]. Please see a copy of my visit note below.       Subjective:    6/9/20   Pt is seen today in consult from Dr. De Leon with the c/c of painful right first metatarsal phalangeal joint.  3 weeks ago the patient dropped a glass.  He states it did not hit the top of his foot.  The glass broke and did cut the medial portion of the right first MTPJ.  The patient did not think he got any glass in his skin in his toe.  He had minimal bleeding.  He states the laceration healed up.  He continues to have pain in the dorsal medial right first MTPJ.  The pain is aggravated by activity and relieved by rest.  The patient does have a history of gout.  He denies any ecchymosis erythema.  He is noted no weakness or increased toe deformity.  He denies any fever or chills.    9/24/20 patient returns for evaluation of his right first MTPJ.  We injected his joint last visit and no pain until just recently.  He works as a  and is on his feet.  We did give him a carbon plate but he broke this by accident he would like another 1.  Patient would also like to discuss surgical options for this.    ROS:   See above     No Known Allergies    Current Outpatient Medications   Medication Sig Dispense Refill     calcium carbonate (OS-HANNAH 500 MG [place]. CA) 500 MG tablet Take 500 mg by mouth daily       Cholecalciferol (VITAMIN D3 PO) Take 1,000 Units by mouth daily       glucosamine-chondroitin 500-400 MG CAPS Take 1 capsule by mouth daily       indomethacin (INDOCIN) 50 MG capsule Take 1 capsule (50 mg) by mouth 3 times daily as needed for moderate pain 30 capsule 4     lisinopril (PRINIVIL/ZESTRIL) 20 MG tablet Take 1 tablet (20 mg) by mouth daily 90 tablet 3     Multiple Vitamins-Minerals (MULTIVITAMIN OR) Take 1 tablet by mouth daily.        naproxen (NAPROSYN) 500 MG tablet        Omega-3 Fatty Acids (OMEGA-3 FISH OIL PO) Take 2 g by mouth daily       sildenafil (VIAGRA) 100 MG tablet Take 1 tablet (100 mg) by mouth daily as needed (daily) 10 tablet 3     VITAMIN E NATURAL PO Take 100 Units by mouth         Patient Active Problem List   Diagnosis     Hyperlipidemia LDL goal <160     Insomnia     Bilateral arm weakness     Anxiety     Nondependent alcohol abuse     Shoulder impingement - right     Cervical radiculopathy at C7     Cervicalgia     Degeneration of cervical intervertebral disc     Vitamin D deficiency     Cervical radiculopathy     S/P spinal fusion     Peroneal nerve palsy     Foot drop, right     Lumbar stenosis with neurogenic claudication     Lumbar radiculopathy     Lumbar herniated disc     S/P lumbar microdiscectomy     Hypertension goal BP (blood pressure) < 140/90     Benign non-nodular prostatic hyperplasia with lower urinary tract symptoms     Gout, unspecified cause, unspecified chronicity, unspecified site       Past Medical History:   Diagnosis Date     Anxiety      Cervical spondylosis with myelopathy 2/4/2013     Cervicalgia      Chronic low back pain 9/13/2011     Chronic low back pain      Degeneration of cervical intervertebral disc      Gout      Hyperlipidemia LDL goal <160 11/18/2009     Hypertension goal BP (blood pressure) < 140/90 2/10/2015     Insomnia 11/18/2009     Nondependent alcohol abuse 11/14/2012     Pain in shoulder 9/13/2011     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 Lower back surgery     BACK SURGERY      another lumbar procedure spine 2019     COLONOSCOPY WITH CO2 INSUFFLATION N/A 2/21/2020    Procedure: COLONOSCOPY, WITH CO2 INSUFFLATION;  Surgeon: Yasmani Constantino MD;  Location: [facility]     DISCECTOMY  "LUMBAR POSTERIOR MICROSCOPIC ONE LEVEL  5/17/2013    Procedure: DISCECTOMY LUMBAR POSTERIOR MICROSCOPIC ONE LEVEL;  BILATERAL LUMBAR L4-5 EXTENDED HEMILAMINECTOMY, MICROFORAMINOTOMY AND MICRODISCECTOMY WITH METRIX II;  Surgeon: Andrew Alejandre MD;  Location: [facility]     EP ABLATION      age 19 heart ablation     FUSION CERVICAL ANTERIOR ONE LEVEL  3/5/2013    Procedure: FUSION CERVICAL ANTERIOR ONE LEVEL;  C 6-7 ANTERIOR DISCECTOMY AND FUSION WITH NEURO MONITORING ;  Surgeon: Andrew Alejandre MD;  Location:  OR     KNEE SURGERY  2009    Right     LARYNGOSCOPY WITH MICROSCOPE  5/28/2013    Procedure: LARYNGOSCOPY WITH MICROSCOPE;  MICRODIRECT LARYNGOSCOPY, WITH LEFT VOCAL CORD INJECTION ;  Surgeon: Jorge Vergara MD;  Location:  [facility]     neck sugery      2019 spine surgery cleaned up     NECK SURGERY  2/2010    [facility]       Family History   Problem Relation Age of Onset     Heart Disease Father 45        heart attack     Myocardial Infarction Father      Arthritis Sister      Heart Disease Mother 51        pneumonia and enlarged heart     Myocardial Infarction Other      Glaucoma No family hx of      Macular Degeneration No family hx of      Cancer No family hx of      Diabetes No family hx of      Hypertension No family hx of      Cerebrovascular Disease No family hx of      Thyroid Disease No family hx of        Social History     Tobacco Use     Smoking status: Never Smoker     Smokeless tobacco: Current User     Types: Chew     Tobacco comment: Lives in smoke free household   Substance Use Topics     Alcohol use: No     Alcohol/week: 0.0 standard drinks     Comment: Quit 2010. Chemical dependency treatment in 1989. ...31 yrs of 1.5 tins weekly.  sober for 7 months..         Exam:    Vitals: Pulse 94   Ht 1.778 m (5' 10\")   Wt 88.5 kg (195 lb)   BMI 27.98 kg/m    BMI: Body mass index is 27.98 kg/m .  Height: 5' 10\"    Constitutional/ general:  Pt is in no apparent distress, " appears well-nourished.  Cooperative with history and physical exam.     Psych:  The patient answered questions appropriately.  Normal affect.  Seems to have reasonable expectations, in terms of treatment.     Eyes:  Visual scanning/ tracking without deficit.     Ears:  Response to auditory stimuli is normal.  negative hearing aid devices.  Auricles in proper alignment.     Lymphatic:  Popliteal lymph nodes not enlarged.     Lungs:  Non labored breathing, non labored speech. No cough.  No audible wheezing. Even, quiet breathing.       Vascular:  positive pedal pulses bilaterally for both the DP and PT arteries.  CFT < 3 sec.  negative ankle edema.  positive pedal hair growth.    Neuro:  Alert and oriented x 3. Coordinated gait.  Light touch sensation is intact to the L4, L5, S1 distributions. No obvious deficits.  No evidence of neurological-based weakness, spasticity, or contracture in the lower extremities.      Derm: Normal texture and turgor.  No erythema, ecchymosis, or cyanosis.      Musculoskeletal:    Lower extremity muscle strength is normal.  Patient is ambulatory without an assistive device or brace.  No gross deformities.  Normal arch.   Normal ROM all forefoot and rearfoot joints except [id number] MTPJ.  Patient has pain with range of motion.   Wisam proliferation dorsally.   Patient's range of motion is 20% normal.  Some pain with palpation around this joint.  Slight edema and skin discoloration is noted dorsal medial.  No echymosis, signs of infection or trauma. No open lesions, increased warmth or ascending cellulitis.          Uric Acid  3.5 - 7.2 mg/dL  6.4     Resulting Agency   [facility]          Specimen Collected: 05/10/17  3:35 PM  Last Resulted: 05/10/17  7:00 PM                RIGHT TOE TWO OR MORE VIEWS   6/3/2020 9:33 AM      HISTORY: Pain of toe of right foot.     COMPARISON: 3/28/2013 x-ray.                                                                      IMPRESSION: Advanced first MTP joint  degenerative changes with bony  proliferation about the medial aspect of the joint more so than on the  prior study. This has the appearance of someone with chronic gout.     There is no evidence of radiopaque foreign body or fracture involving patient's range of motion is about 20% normal the great toe. There is a clinical suspicion of glass in this region.      Assessment:    Right first MTPJ history of laceration   hallux Limitus right foot  Patient with history of gout    Plan:  X-rays from past personally reviewed.  We reviewed patient's most recent uric acid.  Patient would like another injection.  Please see procedure note.  He would like another carbon plate and we got one for the patient instructed him how to use it in his shoe.  Discussed good stiff shoes at all at all times both at home and work.  He will avoid activities of bother this and discussed what will bother this.  We discussed surgery.  Explained we could do a cheilectomy.  Explained a third of the time patients have pain afterwards from the arthritis there.  If we perform a cheilectomy he should think of this as a staged procedure.  If pain continues next procedure would be fusion.  We discussed that if his gout is too severe and bone soft and metatarsal head we may not be able to fuse this.  We discussed recovery of both cheilectomy and fusion.  Discussed time off of work he would need.  Discussed possible complications.  This be same-day surgery MAC anesthesia.  He will think about this for now.  He will try to stretch out time between injections as long as possible.  We discussed he could have 3 injections here year.  RTC PRN.  25 minutes spent in total time caring for this patient and at least 20 minutes spent  face to face with patient regarding care.          Harshil Pemberton DPM DPM, FACFAS         Small Joint Injection/Arthrocentesis: R great MTP    Date/Time: 9/24/2020 8:10 AM  Performed by: Harshil Pemberton DPM  Authorized by:  Harshil Pemberton DPM   Indications:  Pain  Needle Size:  27 G  Guidance: landmark     Approach:  Dorsal  Location:  Great toe    Site:  R great MTP                    Medications:  10 mg triamcinolone acetonide 10 MG/ML; 0.5 mL lidocaine 1 %            Consent Given by:  Patient  Timeout: timeout called immediately prior to procedure    Prep: patient was prepped and draped in usual sterile fashion              Again, thank you for allowing me to participate in the care of your patient.        Sincerely,        Harshil Pemberton DPM

## 2020-09-24 NOTE — PROGRESS NOTES
Small Joint Injection/Arthrocentesis: R great MTP    Date/Time: 9/24/2020 8:10 AM  Performed by: Harshil Pemberton DPM  Authorized by: Harshil Pemberton DPM   Indications:  Pain  Needle Size:  27 G  Guidance: landmark     Approach:  Dorsal  Location:  Great toe    Site:  R great MTP                    Medications:  10 mg triamcinolone acetonide 10 MG/ML; 0.5 mL lidocaine 1 %            Consent Given by:  Patient  Timeout: timeout called immediately prior to procedure    Prep: patient was prepped and draped in usual sterile fashion

## 2020-11-02 ENCOUNTER — NURSE TRIAGE (OUTPATIENT)
Dept: NURSING | Facility: CLINIC | Age: 55
End: 2020-11-02

## 2020-11-02 NOTE — TELEPHONE ENCOUNTER
Hold 40 minutes.    Two co-workers were exposed to covid.  Gunnar has to be tested with negative outcome before he can return to work.  He's unsure about fever.  Otherwise no symptoms.    I recommend a virtual visit, online, video or telephone.  He preferred telephone.  Questions answered.  Transferred to scheduling.    COVID 19 Nurse Triage Plan/Patient Instructions    Please be aware that novel coronavirus (COVID-19) may be circulating in the community. If you develop symptoms such as fever, cough, or SOB or if you have concerns about the presence of another infection including coronavirus (COVID-19), please contact your health care provider or visit [email or web address].     Disposition/Instructions    Virtual Visit with provider recommended. Reference Visit Selection Guide.    Thank you for taking steps to prevent the spread of this virus.  o Limit your contact with others.  o Wear a simple mask to cover your cough.  o Wash your hands well and often.    Resources    [facility]: About COVID-19: [email or web address]    CDC: What to Do If You're Sick: www.cdc.gov/coronavirus/2019-ncov/about/steps-when-sick.html    CDC: Ending Home Isolation: www.cdc.gov/coronavirus/2019-ncov/hcp/disposition-in-home-patients.html     CDC: Caring for Someone: www.cdc.gov/coronavirus/2019-ncov/if-you-are-sick/care-for-someone.html     [facility]: Interim Guidance for Hospital Discharge to Home: www.health.[email or web address].[email or web address]/diseases/coronavirus/hcp/hospdischarge.pdf    [facility] clinical trials (COVID-19 research studies): [email or web address]-clinical-trials     Below are the COVID-19 hotlines at the [facility] ([facility]). Interpreters are available.   o For health questions: Call 046-485-8885 or 1-987.222.5499 (7 a.m. to 7 p.m.)  o For questions about schools and childcare: Call 739-065-4080 or 1-971.827.1247 (7 a.m. to 7 p.m.)     Reason for Disposition    [1] COVID-19 EXPOSURE (Close Contact)  within last 14 days AND [2] needs COVID-19 lab test to return to work AND [3] NO symptoms    Protocols used: CORONAVIRUS (COVID-19) EXPOSURE-A- 8.4.20

## 2020-11-04 DIAGNOSIS — Z20.822 SUSPECTED COVID-19 VIRUS INFECTION: Primary | ICD-10-CM

## 2020-11-04 PROCEDURE — U0003 INFECTIOUS AGENT DETECTION BY NUCLEIC ACID (DNA OR RNA); SEVERE ACUTE RESPIRATORY SYNDROME CORONAVIRUS 2 (SARS-COV-2) (CORONAVIRUS DISEASE [COVID-19]), AMPLIFIED PROBE TECHNIQUE, MAKING USE OF HIGH THROUGHPUT TECHNOLOGIES AS DESCRIBED BY CMS-2020-01-R: HCPCS | Performed by: PHYSICIAN ASSISTANT

## 2020-11-05 LAB
SARS-COV-2 RNA SPEC QL NAA+PROBE: NOT DETECTED
SPECIMEN SOURCE: NORMAL

## 2020-11-10 NOTE — NURSING NOTE
"Chief Complaint   Patient presents with     Consult     urinary frequency       Initial BP 91/66 (BP Location: Right arm, Cuff Size: Adult Large)  Pulse 120  Temp 97.9  F (36.6  C) (Oral)  Wt 199 lb (90.3 kg)  BMI 28.15 kg/m2 Estimated body mass index is 28.15 kg/(m^2) as calculated from the following:    Height as of 11/16/16: 5' 10.5\" (1.791 m).    Weight as of this encounter: 199 lb (90.3 kg).  Medication Reconciliation: complete   Mable Teague M.A.      " not applicable

## 2020-11-14 DIAGNOSIS — M10.9 GOUT, UNSPECIFIED CAUSE, UNSPECIFIED CHRONICITY, UNSPECIFIED SITE: ICD-10-CM

## 2020-11-15 NOTE — TELEPHONE ENCOUNTER
Requested Prescriptions   Pending Prescriptions Disp Refills     predniSONE (DELTASONE) 20 MG tablet [Pharmacy Med Name: predniSONE 20 MG Oral Tablet] 15 tablet 0     Sig: TAKE 2 TABLETS BY MOUTH ONCE DAILY FOR 5 DAYS, THEN TAKE 1 TABLET DAILY UNTIL GONE.       There is no refill protocol information for this order        Routing refill request to provider for review/approval because:  Drug not on the [facility] refill protocol

## 2020-11-16 ENCOUNTER — OFFICE VISIT (OUTPATIENT)
Dept: FAMILY MEDICINE | Facility: CLINIC | Age: 55
End: 2020-11-16
Payer: COMMERCIAL

## 2020-11-16 VITALS
HEART RATE: 104 BPM | HEIGHT: 70 IN | WEIGHT: 212 LBS | BODY MASS INDEX: 30.35 KG/M2 | OXYGEN SATURATION: 98 % | SYSTOLIC BLOOD PRESSURE: 127 MMHG | TEMPERATURE: 98.2 F | DIASTOLIC BLOOD PRESSURE: 85 MMHG

## 2020-11-16 DIAGNOSIS — M10.9 GOUT, UNSPECIFIED CAUSE, UNSPECIFIED CHRONICITY, UNSPECIFIED SITE: Primary | ICD-10-CM

## 2020-11-16 DIAGNOSIS — M79.674 PAIN OF TOE OF RIGHT FOOT: ICD-10-CM

## 2020-11-16 DIAGNOSIS — M10.9 GOUT, UNSPECIFIED CAUSE, UNSPECIFIED CHRONICITY, UNSPECIFIED SITE: ICD-10-CM

## 2020-11-16 PROCEDURE — 99214 OFFICE O/P EST MOD 30 MIN: CPT | Performed by: FAMILY MEDICINE

## 2020-11-16 RX ORDER — PREDNISONE 20 MG/1
TABLET ORAL
Qty: 10 TABLET | Refills: 0 | Status: SHIPPED | OUTPATIENT
Start: 2020-11-16 | End: 2021-01-14

## 2020-11-16 RX ORDER — ALLOPURINOL 100 MG/1
100 TABLET ORAL DAILY
Qty: 90 TABLET | Refills: 1 | Status: SHIPPED | OUTPATIENT
Start: 2020-11-16 | End: 2020-11-16

## 2020-11-16 RX ORDER — ALLOPURINOL 100 MG/1
100 TABLET ORAL DAILY
Qty: 90 TABLET | Refills: 1 | Status: SHIPPED | OUTPATIENT
Start: 2020-11-16 | End: 2021-01-14

## 2020-11-16 RX ORDER — INDOMETHACIN 50 MG/1
50 CAPSULE ORAL 3 TIMES DAILY PRN
Qty: 30 CAPSULE | Refills: 4 | Status: SHIPPED | OUTPATIENT
Start: 2020-11-16 | End: 2020-11-16

## 2020-11-16 RX ORDER — PREDNISONE 20 MG/1
TABLET ORAL
Qty: 10 TABLET | Refills: 0 | Status: SHIPPED | OUTPATIENT
Start: 2020-11-16 | End: 2020-11-16

## 2020-11-16 RX ORDER — INDOMETHACIN 50 MG/1
50 CAPSULE ORAL 3 TIMES DAILY PRN
Qty: 30 CAPSULE | Refills: 4 | Status: SHIPPED | OUTPATIENT
Start: 2020-11-16 | End: 2023-01-27

## 2020-11-16 ASSESSMENT — MIFFLIN-ST. JEOR: SCORE: 1802.88

## 2020-11-16 ASSESSMENT — PAIN SCALES - GENERAL: PAINLEVEL: SEVERE PAIN (7)

## 2020-11-16 NOTE — PATIENT INSTRUCTIONS
Patient Education     Eating to Prevent Gout  Gout is a painful form of arthritis caused by an excess of uric acid. This is a waste product made by the body. It builds up in the body and forms crystals that collect in the joints, causing a gout attack. Alcohol and certain foods can trigger a gout attack. Below are some guidelines for changing your diet to help you manage gout. Your healthcare provider can work with you to determine the best eating plan for you. Know that diet is only one part of managing gout. Take your medicines as prescribed and follow the other guidelines your healthcare provider has given you.  Foods to limit  Eating too many foods containing purines may increase the levels of uric acid in your body and increase your risk for a gout attack. It may be best to limit these high-purine foods:    Alcohol (beer and red wine). You may be told to avoid alcohol completely.    Certain fish (anchovies, sardines, fish roes, herring, tuna, mussels, codfish, scallops, trout, and ramos)    Certain meats (red meat, processed meat, monteiro, turkey, wild game, and goose)    Sauces and gravies made with meat    Organ meats (such as liver, kidneys, sweetbreads, and tripe)    Legumes (such as dried beans and peas)    Mushrooms, spinach, asparagus, and cauliflower    Yeast and yeast extract supplements  Foods to try  Some foods may be helpful for people with gout. You may want to try adding some of the following foods to your diet:    Dark berries. These include blueberries, blackberries, and cherries. These berries contain chemicals that may lower uric acid.    Tofu. Tofu, which is made from soy, is a good source of protein. Studies have shown that it may be a better choice than meat for people with gout.    Omega fatty acids. These acids are found in fatty fish (such as salmon), certain oils (such as flax, olive, or nut oils), or nuts. They may help prevent inflammation due to gout.  The following guidelines are  recommended by the American Medical Association for people with gout. Your diet should be:    High in fiber, whole grains, fruits, and vegetables.    Low in protein (15% of calories should come from protein. Choose lean sources, such as soy, lean meats, and poultry).    Low in fat (no more than 30% of calories should come from fat, with only 10% coming from animal, or saturated, fat).  [employer] last reviewed this educational content on 11/1/2017 2000-2020 [employer]. [address] [place] [address]. All rights reserved. This information is not intended as a substitute for professional medical care. Always follow your healthcare professional's instructions.

## 2020-11-16 NOTE — PROGRESS NOTES
Subjective     Bairon Reed is a 55 year old male who presents to clinic today for the following health issues:    HPI   Patient with history of gout, he has a flareup to his left great toe for the past 4 days.  Currently he is out of his medication.  Previously was treated with prednisone, indomethacin.    This is his third flareup this year.  He reports no injury, his left great toe is warm to touch, painful, and tender.  He has no other joint pain.    His last uric acid level was 2016 at that time it was normal.    Gout/ Single Inflamed Joint  Onset/Duration: Thursday  Description:   Location: big toe - left  Joint Swelling: YES  Redness: YES  Pain: YES  Intensity: moderate, severe  Progression of Symptoms: same and constant  Accompanying Signs & Symptoms:  Fevers: no  History:   Trauma to the area: no  Previous history of gout: YES  Recent illness: no  Alcohol use: no  Diuretic use: no  Precipitating or alleviating factors: None  Therapies tried and outcome: Ibuprofen, did not work    Review of Systems   Constitutional, HEENT, cardiovascular, pulmonary, gi and gu systems are negative, except as otherwise noted.      Objective    There were no vitals taken for this visit.  There is no height or weight on file to calculate BMI.  Physical Exam   GENERAL: healthy, alert and no distress  MS: left great toe: pain, red and warm to touch.    No orders of the defined types were placed in this encounter.  .declined Uric acid level today.          Assessment & Plan     Gout, unspecified cause, unspecified chronicity, unspecified site  Patient declined to have a uric acid level today.  He was given prednisone, indomethacin to use for acute flareup.  Advised with elevation, ice.  In addition, since this is his third flareup this year he was started on allopurinol 100 mg daily, as a prophylaxis measure.    He was advised to follow-up with his primary care physician in the next 2 to weeks to obtain a basic metabolic panel  and possibly a uric acid level.    - indomethacin (INDOCIN) 50 MG capsule; Take 1 capsule (50 mg) by mouth 3 times daily as needed for moderate pain  - predniSONE (DELTASONE) 20 MG tablet; 2 tab daily for 5 days  - allopurinol (ZYLOPRIM) 100 MG tablet; Take 1 tablet (100 mg) by mouth daily    Pain of toe of right foot    - indomethacin (INDOCIN) 50 MG capsule; Take 1 capsule (50 mg) by mouth 3 times daily as needed for moderate pain  - predniSONE (DELTASONE) 20 MG tablet; 2 tab daily for 5 days  - allopurinol (ZYLOPRIM) 100 MG tablet; Take 1 tablet (100 mg) by mouth daily        Patient Instructions     Patient Education     Eating to Prevent Gout  Gout is a painful form of arthritis caused by an excess of uric acid. This is a waste product made by the body. It builds up in the body and forms crystals that collect in the joints, causing a gout attack. Alcohol and certain foods can trigger a gout attack. Below are some guidelines for changing your diet to help you manage gout. Your healthcare provider can work with you to determine the best eating plan for you. Know that diet is only one part of managing gout. Take your medicines as prescribed and follow the other guidelines your healthcare provider has given you.  Foods to limit  Eating too many foods containing purines may increase the levels of uric acid in your body and increase your risk for a gout attack. It may be best to limit these high-purine foods:    Alcohol (beer and red wine). You may be told to avoid alcohol completely.    Certain fish (anchovies, sardines, fish roes, herring, tuna, mussels, codfish, scallops, trout, and ramos)    Certain meats (red meat, processed meat, monteiro, turkey, wild game, and goose)    Sauces and gravies made with meat    Organ meats (such as liver, kidneys, sweetbreads, and tripe)    Legumes (such as dried beans and peas)    Mushrooms, spinach, asparagus, and cauliflower    Yeast and yeast extract supplements  Foods to  try  Some foods may be helpful for people with gout. You may want to try adding some of the following foods to your diet:    Dark berries. These include blueberries, blackberries, and cherries. These berries contain chemicals that may lower uric acid.    Tofu. Tofu, which is made from soy, is a good source of protein. Studies have shown that it may be a better choice than meat for people with gout.    Omega fatty acids. These acids are found in fatty fish (such as salmon), certain oils (such as flax, olive, or nut oils), or nuts. They may help prevent inflammation due to gout.  The following guidelines are recommended by the American Medical Association for people with gout. Your diet should be:    High in fiber, whole grains, fruits, and vegetables.    Low in protein (15% of calories should come from protein. Choose lean sources, such as soy, lean meats, and poultry).    Low in fat (no more than 30% of calories should come from fat, with only 10% coming from animal, or saturated, fat).  [employer] last reviewed this educational content on 11/1/2017 2000-2020 The [employer]. [address] [place] [address], [place] [address]. All rights reserved. This information is not intended as a substitute for professional medical care. Always follow your healthcare professional's instructions.               No follow-ups on file.    Bryan De Leon MD  [facility]

## 2020-11-17 RX ORDER — PREDNISONE 20 MG/1
TABLET ORAL
COMMUNITY
Start: 2020-11-17 | End: 2020-11-17

## 2020-12-13 ENCOUNTER — HEALTH MAINTENANCE LETTER (OUTPATIENT)
Age: 55
End: 2020-12-13

## 2021-01-14 ENCOUNTER — OFFICE VISIT (OUTPATIENT)
Dept: FAMILY MEDICINE | Facility: CLINIC | Age: 56
End: 2021-01-14
Payer: COMMERCIAL

## 2021-01-14 VITALS
SYSTOLIC BLOOD PRESSURE: 134 MMHG | HEART RATE: 79 BPM | WEIGHT: 210 LBS | HEIGHT: 70 IN | DIASTOLIC BLOOD PRESSURE: 88 MMHG | BODY MASS INDEX: 30.06 KG/M2 | TEMPERATURE: 98 F

## 2021-01-14 DIAGNOSIS — Z72.0 TOBACCO ABUSE: ICD-10-CM

## 2021-01-14 DIAGNOSIS — Z12.5 SCREENING FOR PROSTATE CANCER: ICD-10-CM

## 2021-01-14 DIAGNOSIS — M79.674 PAIN OF TOE OF RIGHT FOOT: ICD-10-CM

## 2021-01-14 DIAGNOSIS — M10.9 GOUT, UNSPECIFIED CAUSE, UNSPECIFIED CHRONICITY, UNSPECIFIED SITE: ICD-10-CM

## 2021-01-14 DIAGNOSIS — N52.9 ERECTILE DYSFUNCTION, UNSPECIFIED ERECTILE DYSFUNCTION TYPE: ICD-10-CM

## 2021-01-14 DIAGNOSIS — R73.01 IMPAIRED FASTING GLUCOSE: ICD-10-CM

## 2021-01-14 DIAGNOSIS — I10 HYPERTENSION GOAL BP (BLOOD PRESSURE) < 140/90: ICD-10-CM

## 2021-01-14 DIAGNOSIS — R53.83 FATIGUE, UNSPECIFIED TYPE: ICD-10-CM

## 2021-01-14 DIAGNOSIS — E78.5 HYPERLIPIDEMIA LDL GOAL <100: ICD-10-CM

## 2021-01-14 DIAGNOSIS — Z00.00 ROUTINE GENERAL MEDICAL EXAMINATION AT A HEALTH CARE FACILITY: Primary | ICD-10-CM

## 2021-01-14 LAB
ALBUMIN SERPL-MCNC: 4.2 G/DL (ref 3.4–5)
ALP SERPL-CCNC: 62 U/L (ref 40–150)
ALT SERPL W P-5'-P-CCNC: 34 U/L (ref 0–70)
ANION GAP SERPL CALCULATED.3IONS-SCNC: 4 MMOL/L (ref 3–14)
AST SERPL W P-5'-P-CCNC: 12 U/L (ref 0–45)
BASOPHILS # BLD AUTO: 0 10E9/L (ref 0–0.2)
BASOPHILS NFR BLD AUTO: 0.5 %
BILIRUB SERPL-MCNC: 0.6 MG/DL (ref 0.2–1.3)
BUN SERPL-MCNC: 14 MG/DL (ref 7–30)
CALCIUM SERPL-MCNC: 9.1 MG/DL (ref 8.5–10.1)
CHLORIDE SERPL-SCNC: 108 MMOL/L (ref 94–109)
CHOLEST SERPL-MCNC: 214 MG/DL
CO2 SERPL-SCNC: 28 MMOL/L (ref 20–32)
CREAT SERPL-MCNC: 0.92 MG/DL (ref 0.66–1.25)
DIFFERENTIAL METHOD BLD: NORMAL
EOSINOPHIL # BLD AUTO: 0.1 10E9/L (ref 0–0.7)
EOSINOPHIL NFR BLD AUTO: 1 %
ERYTHROCYTE [DISTWIDTH] IN BLOOD BY AUTOMATED COUNT: 13.2 % (ref 10–15)
GFR SERPL CREATININE-BSD FRML MDRD: >90 ML/MIN/{1.73_M2}
GLUCOSE SERPL-MCNC: 111 MG/DL (ref 70–99)
HBA1C MFR BLD: 6.1 % (ref 0–5.6)
HCT VFR BLD AUTO: 42.9 % (ref 40–53)
HDLC SERPL-MCNC: 46 MG/DL
HGB BLD-MCNC: 15.1 G/DL (ref 13.3–17.7)
LDLC SERPL CALC-MCNC: 118 MG/DL
LYMPHOCYTES # BLD AUTO: 2.7 10E9/L (ref 0.8–5.3)
LYMPHOCYTES NFR BLD AUTO: 32.7 %
MCH RBC QN AUTO: 31 PG (ref 26.5–33)
MCHC RBC AUTO-ENTMCNC: 35.2 G/DL (ref 31.5–36.5)
MCV RBC AUTO: 88 FL (ref 78–100)
MONOCYTES # BLD AUTO: 0.7 10E9/L (ref 0–1.3)
MONOCYTES NFR BLD AUTO: 8.3 %
NEUTROPHILS # BLD AUTO: 4.7 10E9/L (ref 1.6–8.3)
NEUTROPHILS NFR BLD AUTO: 57.5 %
NONHDLC SERPL-MCNC: 168 MG/DL
PLATELET # BLD AUTO: 322 10E9/L (ref 150–450)
POTASSIUM SERPL-SCNC: 4 MMOL/L (ref 3.4–5.3)
PROT SERPL-MCNC: 7 G/DL (ref 6.8–8.8)
PSA SERPL-ACNC: 1.96 UG/L (ref 0–4)
RBC # BLD AUTO: 4.87 10E12/L (ref 4.4–5.9)
SODIUM SERPL-SCNC: 140 MMOL/L (ref 133–144)
TRIGL SERPL-MCNC: 248 MG/DL
TSH SERPL DL<=0.005 MIU/L-ACNC: 0.98 MU/L (ref 0.4–4)
WBC # BLD AUTO: 8.1 10E9/L (ref 4–11)

## 2021-01-14 PROCEDURE — 83036 HEMOGLOBIN GLYCOSYLATED A1C: CPT | Performed by: FAMILY MEDICINE

## 2021-01-14 PROCEDURE — 99214 OFFICE O/P EST MOD 30 MIN: CPT | Mod: 25 | Performed by: FAMILY MEDICINE

## 2021-01-14 PROCEDURE — 99396 PREV VISIT EST AGE 40-64: CPT | Performed by: FAMILY MEDICINE

## 2021-01-14 PROCEDURE — 36415 COLL VENOUS BLD VENIPUNCTURE: CPT | Performed by: FAMILY MEDICINE

## 2021-01-14 PROCEDURE — G0103 PSA SCREENING: HCPCS | Performed by: FAMILY MEDICINE

## 2021-01-14 PROCEDURE — 80050 GENERAL HEALTH PANEL: CPT | Performed by: FAMILY MEDICINE

## 2021-01-14 PROCEDURE — 80061 LIPID PANEL: CPT | Performed by: FAMILY MEDICINE

## 2021-01-14 RX ORDER — TADALAFIL 20 MG/1
TABLET ORAL
Qty: 6 TABLET | Refills: 1 | Status: SHIPPED | OUTPATIENT
Start: 2021-01-14 | End: 2021-12-23

## 2021-01-14 RX ORDER — ALLOPURINOL 100 MG/1
100 TABLET ORAL DAILY
Qty: 90 TABLET | Refills: 3 | Status: SHIPPED | OUTPATIENT
Start: 2021-01-14 | End: 2022-07-28

## 2021-01-14 ASSESSMENT — ENCOUNTER SYMPTOMS
CONSTIPATION: 0
SHORTNESS OF BREATH: 0
FEVER: 0
PALPITATIONS: 0
JOINT SWELLING: 0
HEADACHES: 0
COUGH: 0
NAUSEA: 0
DIARRHEA: 0
PARESTHESIAS: 0
FREQUENCY: 1
HEARTBURN: 0
DIZZINESS: 0
SORE THROAT: 0
EYE PAIN: 0
NERVOUS/ANXIOUS: 0
DYSURIA: 0
CHILLS: 0
HEMATOCHEZIA: 0
ARTHRALGIAS: 0
WEAKNESS: 0
ABDOMINAL PAIN: 0
HEMATURIA: 0
MYALGIAS: 0

## 2021-01-14 ASSESSMENT — PAIN SCALES - GENERAL: PAINLEVEL: NO PAIN (0)

## 2021-01-14 ASSESSMENT — MIFFLIN-ST. JEOR: SCORE: 1793.8

## 2021-01-14 NOTE — LETTER
[facility]  [address]  [place] [address]  [phone]                  To whom it may concern:    Bairon connor [birth year] was seen here today for physical            Sincerely,                    Greg Salas MD

## 2021-01-14 NOTE — PATIENT INSTRUCTIONS
Try over the counter nicotine gum or lozenge    Keep working on healthy diet/exercise and wt loss    Try cialis, call with problems/ questions    We will send you lab results    Could start cholesterol med if needed

## 2021-01-14 NOTE — LETTER
"January 15, 2021      Bairon Reed  [address] [place] [address]        Dear ,    We are writing to inform you of your test results.    The LDL \"bad\" cholesterol is quite a bit better.   No prescription medication needed.     The hemoglobin a1c is still in the \"prediabetes\" range.  No medication needed for this.     Keep working on healthy diet/exercise.     Other labs are fine.      Resulted Orders   Lipid panel reflex to direct LDL Fasting   Result Value Ref Range    Cholesterol 214 (H) <200 mg/dL      Comment:      Desirable:       <200 mg/dl    Triglycerides 248 (H) <150 mg/dL      Comment:      Borderline high:  150-199 mg/dl  High:             200-499 mg/dl  Very high:       >499 mg/dl  Fasting specimen      HDL Cholesterol 46 >39 mg/dL    LDL Cholesterol Calculated 118 (H) <100 mg/dL      Comment:      Above desirable:  100-129 mg/dl  Borderline High:  130-159 mg/dL  High:             160-189 mg/dL  Very high:       >189 mg/dl      Non HDL Cholesterol 168 (H) <130 mg/dL      Comment:      Above Desirable:  130-159 mg/dl  Borderline high:  160-189 mg/dl  High:             190-219 mg/dl  Very high:       >219 mg/dl     Comprehensive metabolic panel   Result Value Ref Range    Sodium 140 133 - 144 mmol/L    Potassium 4.0 3.4 - 5.3 mmol/L    Chloride 108 94 - 109 mmol/L    Carbon Dioxide 28 20 - 32 mmol/L    Anion Gap 4 3 - 14 mmol/L    Glucose 111 (H) 70 - 99 mg/dL      Comment:      Fasting specimen    Urea Nitrogen 14 7 - 30 mg/dL    Creatinine 0.92 0.66 - 1.25 mg/dL    GFR Estimate >90 >60 mL/min/[1.73_m2]      Comment:      Non  GFR Calc  Starting 12/18/2018, serum creatinine based estimated GFR (eGFR) will be   calculated using the Chronic Kidney Disease Epidemiology Collaboration   (CKD-EPI) equation.      GFR Estimate If Black >90 >60 mL/min/[1.73_m2]      Comment:       GFR Calc  Starting 12/18/2018, serum creatinine based estimated GFR " (eGFR) will be   calculated using the Chronic Kidney Disease Epidemiology Collaboration   (CKD-EPI) equation.      Calcium 9.1 8.5 - 10.1 mg/dL    Bilirubin Total 0.6 0.2 - 1.3 mg/dL    Albumin 4.2 3.4 - 5.0 g/dL    Protein Total 7.0 6.8 - 8.8 g/dL    Alkaline Phosphatase 62 40 - 150 U/L    ALT 34 0 - 70 U/L    AST 12 0 - 45 U/L   Hemoglobin A1c   Result Value Ref Range    Hemoglobin A1C 6.1 (H) 0 - 5.6 %      Comment:      Normal <5.7% Prediabetes 5.7-6.4%  Diabetes 6.5% or higher - adopted from ADA   consensus guidelines.     TSH with free T4 reflex   Result Value Ref Range    TSH 0.98 0.40 - 4.00 mU/L   Prostate spec antigen screen   Result Value Ref Range    PSA 1.96 0 - 4 ug/L      Comment:      Assay Method:  Chemiluminescence using Siemens Vista analyzer   CBC with platelets differential   Result Value Ref Range    WBC 8.1 4.0 - 11.0 10e9/L    RBC Count 4.87 4.4 - 5.9 10e12/L    Hemoglobin 15.1 13.3 - 17.7 g/dL    Hematocrit 42.9 40.0 - 53.0 %    MCV 88 78 - 100 fl    MCH 31.0 26.5 - 33.0 pg    MCHC 35.2 31.5 - 36.5 g/dL    RDW 13.2 10.0 - 15.0 %    Platelet Count 322 150 - 450 10e9/L    % Neutrophils 57.5 %    % Lymphocytes 32.7 %    % Monocytes 8.3 %    % Eosinophils 1.0 %    % Basophils 0.5 %    Absolute Neutrophil 4.7 1.6 - 8.3 10e9/L    Absolute Lymphocytes 2.7 0.8 - 5.3 10e9/L    Absolute Monocytes 0.7 0.0 - 1.3 10e9/L    Absolute Eosinophils 0.1 0.0 - 0.7 10e9/L    Absolute Basophils 0.0 0.0 - 0.2 10e9/L    Diff Method Automated Method        If you have any questions or concerns, please call the clinic at the number listed above.       Sincerely,      Greg Salas MD/james

## 2021-01-14 NOTE — PROGRESS NOTES
SUBJECTIVE:   CC: Bairon Reed is an 55 year old male who presents for preventative health visit.        Patient has been advised of split billing requirements and indicates understanding: Yes  Healthy Habits:     Getting at least 3 servings of Calcium per day:  NO    Bi-annual eye exam:  Yes    Dental care twice a year:  NO    Sleep apnea or symptoms of sleep apnea:  None    Diet:  Regular (no restrictions)    Frequency of exercise:  2-3 days/week    Duration of exercise:  15-30 minutes    Taking medications regularly:  Yes    Medication side effects:  Lightheadedness    PHQ-2 Total Score: 0    Additional concerns today:  No          Got lightheaded on lisinopril, went off about a month ago    Has not checked blood pressure since then         Today's PHQ-2 Score:   PHQ-2 ( 1999 Pfizer) 1/14/2021   Q1: Little interest or pleasure in doing things 0   Q2: Feeling down, depressed or hopeless 0   PHQ-2 Score 0   Q1: Little interest or pleasure in doing things Not at all   Q2: Feeling down, depressed or hopeless Not at all   PHQ-2 Score 0       Abuse: Current or Past(Physical, Sexual or Emotional)- No  Do you feel safe in your environment? Yes    Have you ever done Advance Care Planning? (For example, a Health Directive, POLST, or a discussion with a medical provider or your loved ones about your wishes): No, advance care planning information given to patient to review.  Patient plans to discuss their wishes with loved ones or provider.      Social History     Tobacco Use     Smoking status: Never Smoker     Smokeless tobacco: Current User     Types: Chew     Tobacco comment: Lives in smoke free household   Substance Use Topics     Alcohol use: No     Alcohol/week: 0.0 standard drinks     Comment: Quit 2010. Chemical dependency treatment in 1989. ...31 yrs of 1.5 tins weekly.  sober for 7 months..     If you drink alcohol do you typically have >3 drinks per day or >7 drinks per week? No    Alcohol Use 1/14/2021    Prescreen: >3 drinks/day or >7 drinks/week? Not Applicable   Prescreen: >3 drinks/day or >7 drinks/week? -   No flowsheet data found.    Last PSA:   PSA   Date Value Ref Range Status   12/17/2019 2.37 0 - 4 ug/L Final     Comment:     Assay Method:  Chemiluminescence using Siemens Vista analyzer       Reviewed orders with patient. Reviewed health maintenance and updated orders accordingly - Yes       Reviewed and updated as needed this visit by clinical staff  Tobacco  Allergies  Meds   Med Hx  Surg Hx  Fam Hx  Soc Hx        Reviewed and updated as needed this visit by Provider                    Review of Systems   Constitutional: Negative for chills and fever.   HENT: Negative for congestion, ear pain, hearing loss and sore throat.    Eyes: Negative for pain and visual disturbance.   Respiratory: Negative for cough and shortness of breath.    Cardiovascular: Negative for chest pain, palpitations and peripheral edema.   Gastrointestinal: Negative for abdominal pain, constipation, diarrhea, heartburn, hematochezia and nausea.   Genitourinary: Positive for frequency, impotence and urgency. Negative for discharge, dysuria, genital sores and hematuria.   Musculoskeletal: Negative for arthralgias, joint swelling and myalgias.   Skin: Negative for rash.   Neurological: Negative for dizziness, weakness, headaches and paresthesias.   Psychiatric/Behavioral: Negative for mood changes. The patient is not nervous/anxious.      Patient thinking he is having more arthritis than gout    No redness/swelling    Urinary symptoms about the same    Stream okay    Not getting up at night    If moving around, goes more    No pain or burning    Urine normal  wellhydrated    Heart pumps fast when tried viagra    Walking for exercise physical job     Manufacturing      OBJECTIVE:   BP (!) 147/97 (BP Location: Left arm, Patient Position: Chair, Cuff Size: Adult Regular)   Pulse 79   Temp 98  F (36.7  C) (Oral)   Ht 1.778 m (5'  "10\")   Wt 95.3 kg (210 lb)   BMI 30.13 kg/m      Physical Exam  GENERAL: healthy, alert and no distress  EYES: Eyes grossly normal to inspection, PERRL and conjunctivae and sclerae normal  HENT: ear canals and TM's normal, nose and mouth without ulcers or lesions  NECK: no adenopathy, no asymmetry, masses, or scars and thyroid normal to palpation  RESP: lungs clear to auscultation - no rales, rhonchi or wheezes  CV: regular rate and rhythm, normal S1 S2, no S3 or S4, no murmur, click or rub, no peripheral edema and peripheral pulses strong  ABDOMEN: soft, nontender, no hepatosplenomegaly, no masses and bowel sounds normal  MS: no gross musculoskeletal defects noted, no edema  SKIN: no suspicious lesions or rashes  NEURO: Normal strength and tone, mentation intact and speech normal  PSYCH: mentation appears normal, affect normal/bright    Diagnostic Test Results:  Labs reviewed in Epic    ASSESSMENT/PLAN:   Bairon was seen today for physical and health maintenance.    Diagnoses and all orders for this visit:    Routine general medical examination at a health care facility    Hypertension goal BP (blood pressure) < 140/90    Erectile dysfunction, unspecified erectile dysfunction type  -     tadalafil (CIALIS) 20 MG tablet; 1/2 po as needed for intercourse    Gout, unspecified cause, unspecified chronicity, unspecified site  -     allopurinol (ZYLOPRIM) 100 MG tablet; Take 1 tablet (100 mg) by mouth daily    Pain of toe of right foot  -     allopurinol (ZYLOPRIM) 100 MG tablet; Take 1 tablet (100 mg) by mouth daily    Hyperlipidemia LDL goal <100  -     Lipid panel reflex to direct LDL Fasting  -     Comprehensive metabolic panel    Impaired fasting glucose  -     Hemoglobin A1c    Screening for prostate cancer  -     Prostate spec antigen screen    Fatigue, unspecified type  -     TSH with free T4 reflex  -     CBC with platelets differential    discussed multiple issues with patient  Trial of alternative ED med;  " "Call with problems/ questions  Check labs fasting  He has had no cardiac type symptoms and feels good on exertion, had normal stress test a bit over a year ago  If LDL is still high, could send in prescription statin  Up to date on colonoscopy  Use over the counter nicotine gum/ lozenge to help get off chewing tobacco  No std concern  Blood pressure okay here on recheck and he had side effects on lisinopril, so stay off that     Patient has been advised of split billing requirements and indicates understanding: Yes  COUNSELING:   Reviewed preventive health counseling, as reflected in patient instructions       Regular exercise       Healthy diet/nutrition       Vision screening       Safe sex practices/STD prevention       Colon cancer screening       Prostate cancer screening    Estimated body mass index is 30.13 kg/m  as calculated from the following:    Height as of this encounter: 1.778 m (5' 10\").    Weight as of this encounter: 95.3 kg (210 lb).     Weight management plan: Discussed healthy diet and exercise guidelines    He reports that he has never smoked. His smokeless tobacco use includes chew.  Tobacco Cessation Action Plan:   Pharmacotherapies : other Nicotine replacement      Counseling Resources:  ATP IV Guidelines  Pooled Cohorts Equation Calculator  FRAX Risk Assessment  ICSI Preventive Guidelines  Dietary Guidelines for Americans, 2010  USDA's MyPlate  ASA Prophylaxis  Lung CA Screening    Greg Salas MD  [facility]  "

## 2021-01-15 NOTE — RESULT ENCOUNTER NOTE
"The LDL \"bad\" cholesterol is quite a bit better.   No prescription medication needed.    The hemoglobin a1c is still in the \"prediabetes\" range.  No medication needed for this.    Keep working on healthy diet/exercise.     Other labs are fine.    Greg Salas MD  "

## 2021-03-11 ENCOUNTER — OFFICE VISIT (OUTPATIENT)
Dept: OPTOMETRY | Facility: CLINIC | Age: 56
End: 2021-03-11
Payer: COMMERCIAL

## 2021-03-11 DIAGNOSIS — H52.4 PRESBYOPIA: ICD-10-CM

## 2021-03-11 DIAGNOSIS — H52.223 REGULAR ASTIGMATISM OF BOTH EYES: Primary | ICD-10-CM

## 2021-03-11 DIAGNOSIS — H25.041 POSTERIOR SUBCAPSULAR POLAR AGE-RELATED CATARACT OF RIGHT EYE: ICD-10-CM

## 2021-03-11 PROCEDURE — 92015 DETERMINE REFRACTIVE STATE: CPT | Performed by: OPTOMETRIST

## 2021-03-11 PROCEDURE — 92014 COMPRE OPH EXAM EST PT 1/>: CPT | Performed by: OPTOMETRIST

## 2021-03-11 ASSESSMENT — REFRACTION_WEARINGRX
OS_CYLINDER: +0.75
OS_AXIS: 085
OD_SPHERE: -0.75
OS_SPHERE: -1.50
OD_CYLINDER: +1.00
OD_ADD: +2.00
OD_AXIS: 085
SPECS_TYPE: BIFOCAL
OS_ADD: +2.00

## 2021-03-11 ASSESSMENT — REFRACTION_MANIFEST
OD_SPHERE: -1.75
OS_ADD: +2.25
OS_AXIS: 088
OS_AXIS: 085
OD_SPHERE: -1.50
OS_SPHERE: -1.00
OS_CYLINDER: +0.75
OD_AXIS: 078
OD_CYLINDER: +1.00
METHOD_AUTOREFRACTION: 1
OS_CYLINDER: +0.75
OD_CYLINDER: +0.50
OD_ADD: +2.25
OD_AXIS: 090
OS_SPHERE: -1.00

## 2021-03-11 ASSESSMENT — CONF VISUAL FIELD
OS_NORMAL: 1
OD_NORMAL: 1
METHOD: COUNTING FINGERS

## 2021-03-11 ASSESSMENT — SLIT LAMP EXAM - LIDS
COMMENTS: NORMAL
COMMENTS: NORMAL

## 2021-03-11 ASSESSMENT — KERATOMETRY
OD_K1POWER_DIOPTERS: 43.75
OS_K2POWER_DIOPTERS: 44.75
OS_K1POWER_DIOPTERS: 44.00
OS_AXISANGLE2_DEGREES: 3
OD_K2POWER_DIOPTERS: 44.50
OD_AXISANGLE2_DEGREES: 167

## 2021-03-11 ASSESSMENT — VISUAL ACUITY
OS_SC: 20/50
OD_PH_SC+: -1
METHOD: SNELLEN - LINEAR
OD_SC: 20/40
OD_PH_SC: 20/30
OS_SC+: -2
OS_SC: 20/30
OD_SC+: -3
OD_SC: 20/70

## 2021-03-11 ASSESSMENT — TONOMETRY
IOP_METHOD: APPLANATION
OD_IOP_MMHG: 20
OS_IOP_MMHG: 20

## 2021-03-11 ASSESSMENT — EXTERNAL EXAM - LEFT EYE: OS_EXAM: NORMAL

## 2021-03-11 ASSESSMENT — EXTERNAL EXAM - RIGHT EYE: OD_EXAM: NORMAL

## 2021-03-11 ASSESSMENT — CUP TO DISC RATIO
OD_RATIO: 0.4
OS_RATIO: 0.4

## 2021-03-11 NOTE — PATIENT INSTRUCTIONS
Fill glasses prescription  Monitor mild cataract in right eye   Return in 1 year for eye exam    Taylor Isbell, OD  [phone]

## 2021-03-11 NOTE — PROGRESS NOTES
Chief Complaint   Patient presents with     COMPREHENSIVE EYE EXAM       wants safety glasses with no line   Last Eye Exam: 5/13/2019  Dilated Previously: Yes    What are you currently using to see?  Glasses, wears them at night to drive, also has OTC readers for near tasks        Distance Vision Acuity: Noticed gradual change in right eye- more hazy    Near Vision Acuity: Satisfied with vision while reading and using computer with readers, he thinks that they are +2.00    Eye Comfort: good  Do you use eye drops? 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       OBJECTIVE: See Ophthalmology exam    ASSESSMENT:    ICD-10-CM    1. Regular astigmatism of both eyes  H52.223 EYE EXAM (SIMPLE-NONBILLABLE)     REFRACTION   2. Presbyopia  H52.4 EYE EXAM (SIMPLE-NONBILLABLE)     REFRACTION   3. Posterior subcapsular polar age-related cataract of right eye  H25.041 EYE EXAM (SIMPLE-NONBILLABLE)     REFRACTION      PLAN:     Patient Instructions   Fill glasses prescription  Monitor mild cataract in right eye   Return in 1 year for eye exam    Taylor Isbell, OD  [phone]

## 2021-03-19 ENCOUNTER — APPOINTMENT (OUTPATIENT)
Dept: OPTOMETRY | Facility: CLINIC | Age: 56
End: 2021-03-19
Payer: COMMERCIAL

## 2021-03-19 PROCEDURE — 92341 FIT SPECTACLES BIFOCAL: CPT | Performed by: OPTOMETRIST

## 2021-03-23 ENCOUNTER — OFFICE VISIT (OUTPATIENT)
Dept: FAMILY MEDICINE | Facility: CLINIC | Age: 56
End: 2021-03-23
Payer: COMMERCIAL

## 2021-03-23 VITALS
RESPIRATION RATE: 16 BRPM | HEART RATE: 95 BPM | DIASTOLIC BLOOD PRESSURE: 112 MMHG | OXYGEN SATURATION: 96 % | SYSTOLIC BLOOD PRESSURE: 153 MMHG | TEMPERATURE: 99.4 F

## 2021-03-23 DIAGNOSIS — J06.9 VIRAL UPPER RESPIRATORY TRACT INFECTION: Primary | ICD-10-CM

## 2021-03-23 LAB
DEPRECATED S PYO AG THROAT QL EIA: NEGATIVE
SARS-COV-2 RNA RESP QL NAA+PROBE: NORMAL
SPECIMEN SOURCE: NORMAL
STREP GROUP A PCR: NOT DETECTED

## 2021-03-23 PROCEDURE — 87651 STREP A DNA AMP PROBE: CPT | Performed by: NURSE PRACTITIONER

## 2021-03-23 PROCEDURE — 99N1174 PR STATISTIC STREP A RAPID: Performed by: NURSE PRACTITIONER

## 2021-03-23 PROCEDURE — U0005 INFEC AGEN DETEC AMPLI PROBE: HCPCS | Performed by: NURSE PRACTITIONER

## 2021-03-23 PROCEDURE — 99213 OFFICE O/P EST LOW 20 MIN: CPT | Performed by: NURSE PRACTITIONER

## 2021-03-23 PROCEDURE — U0003 INFECTIOUS AGENT DETECTION BY NUCLEIC ACID (DNA OR RNA); SEVERE ACUTE RESPIRATORY SYNDROME CORONAVIRUS 2 (SARS-COV-2) (CORONAVIRUS DISEASE [COVID-19]), AMPLIFIED PROBE TECHNIQUE, MAKING USE OF HIGH THROUGHPUT TECHNOLOGIES AS DESCRIBED BY CMS-2020-01-R: HCPCS | Performed by: NURSE PRACTITIONER

## 2021-03-23 NOTE — PROGRESS NOTES
Assessment & Plan     Viral upper respiratory tract infection  Counseled on self-care measures including: salt-water gurgles, cough drops, OTC acetaminophen PRN, hydration, rest, self quantine, masking, handwashing, and red flags of when to return including difficulty breathing.  - Symptomatic COVID-19 Virus (Coronavirus) by PCR; Future  - Streptococcus A Rapid Scr w Reflx to PCR                 Return in about 2 weeks (around 4/6/2021), or if symptoms worsen or fail to improve.    Jina Simons, YULIA CNP  [facility] [place]her is a 55 year old who presents for the following health issues     HPI     Acute Illness  Acute illness concerns: runny nose, body aches, sore throat  Onset/Duration: one week  Symptoms:  Fever: YES  Chills/Sweats: YES  Headache (location?): no  Sinus Pressure: no  Conjunctivitis:  no  Ear Pain: no  Rhinorrhea: YES  Congestion: YES  Sore Throat: YES  Cough: no  Wheeze: no  Decreased Appetite: YES  Nausea: no  Vomiting: no  Diarrhea: YES, Loose and watery stools twice daily. Worse with eating.  Fatigue/Achiness: YES  Sick/Strep Exposure: no  Therapies tried and outcome: None. Had covid test last week one day after symtpoms started, was negative.     Review of Systems   Constitutional, HEENT, cardiovascular, pulmonary, gi and gu systems are negative, except as otherwise noted.      Objective    BP (!) 153/112   Pulse 95   Temp 99.4  F (37.4  C)   Resp 16   SpO2 96%   There is no height or weight on file to calculate BMI.  Physical Exam   GENERAL: healthy, alert and no distress  EYES: Eyes grossly normal to inspection, PERRL and conjunctivae and sclerae normal  HENT: normal cephalic/atraumatic, ear canals and TM's normal, nasal mucosa edematous , rhinorrhea clear, oropharynx clear and oral mucous membranes moist  NECK: bilateral anterior cervical adenopathy, no asymmetry, masses, or scars and thyroid normal to palpation  RESP: lungs clear to  auscultation - no rales, rhonchi or wheezes  CV: regular rate and rhythm, normal S1 S2, no S3 or S4, no murmur, click or rub, no peripheral edema and peripheral pulses strong  ABDOMEN: soft, nontender, no hepatosplenomegaly, no masses and bowel sounds normal  PSYCH: mentation appears normal, affect normal/bright

## 2021-03-23 NOTE — PATIENT INSTRUCTIONS
"Instructions for Patients  Patient Education   After Your COVID-19 (Coronavirus) Test  You have been tested for COVID-19 (coronavirus).   If you'll have surgery in the next few days, we'll let you know ahead of time if you have the virus. Please call your surgeon's office with any questions.  For all other patients: Results are usually available in [facility] within 2 to 3 days.   If you do not have a [employer] account, you'll get a letter in the mail in about 7 to 10 days.   [facility] is often the fastest way to get test results. Please sign up if you do not already have a [employer] account. See the handout Getting COVID-19 Test Results in [employer] for help.  What if my test result is positive?  If your test is positive and you have not viewed your result in [facility], you'll get a phone call with your result. (A positive test means that you have the virus.)     Follow the tips under \"How do I self-isolate?\" below for 10 days (20 days if you have a weak immune system).    You don't need to be retested for COVID-19 before going back to school or work. As long as you're fever-free and feeling better, you can go back to school, work and other activities after waiting the 10 or 20 days.  What if I have questions after I get my results?  If you have questions about your results, please visit our testing website at [email or web address].   After 7 to 10 days, if you have not gotten your results:     Call [phone] ([phone]) and ask to speak with our COVID-19 results team.    If you're being treated at an infusion center: Call your infusion center directly.  What are the symptoms of COVID-19?  Cough, fever and trouble breathing are the most common signs of COVID-19.  Other symptoms can include new headaches, new muscle or body aches, new and unexplained fatigue (feeling very tired), chills, sore throat, congestion (stuffy or runny nose), diarrhea (loose poop), loss of taste or smell, belly " "pain, and nausea or vomiting (feeling sick to your stomach or throwing up).  You may already have symptoms of COVID-19, or they may show up later.  What should I do if I have symptoms?  If you're having surgery: Call your surgeon's office.  For all other patients: Stay home and away from others (self-isolate) until ...    You've had no fever--and no medicine that reduces fever--for 1 full day (24 hours), AND    Other symptoms have gotten better. For example, your cough or breathing has improved, AND    At least 10 days have passed since your symptoms first started.  How do I self-isolate?    Stay in your own room, even for meals. Use your own bathroom if you can.    Stay away from others in your home. No hugging, kissing or shaking hands. No visitors.    Don't go to work, school or anywhere else.    Clean \"high touch\" surfaces often (doorknobs, counters, handles). Use household cleaning spray or wipes. You'll find a full list of  on the EPA website: www.epa.gov/pesticide-registration/list-n-disinfectants-use-against-sars-cov-2.    Cover your mouth and nose with a mask or other face covering to avoid spreading germs.    Wash your hands and face often. Use soap and water.    Caregivers in these groups are at risk for severe illness due to COVID-19:  ? People 65 years and older  ? People who live in a nursing home or long-term care facility  ? People with chronic disease (lung, heart, cancer, diabetes, kidney, liver, immunologic)  ? People who have a weakened immune system, including those who:    Are in cancer treatment    Take medicine that weakens the immune system, such as corticosteroids    Had a bone marrow or organ transplant    Have an immune deficiency    Have poorly controlled HIV or AIDS    Are obese (body mass index of 40 or higher)    Smoke regularly    Caregivers should wear gloves while washing dishes, handling laundry and cleaning bedrooms and bathrooms.    Use caution when washing and drying " laundry: Don't shake dirty laundry and use the warmest water setting that you can.    For more tips on managing your health at home, go to www.cdc.gov/coronavirus/2019-ncov/downloads/10Things.pdf.  How can I take care of myself at home?  1. Get lots of rest. Drink extra fluids (unless a doctor has told you not to).  2. Take Tylenol (acetaminophen) for fever or pain. If you have liver or kidney problems, ask your family doctor if it's OK to take Tylenol.   Adults can take either:  ? 650 mg (two 325 mg pills) every 4 to 6 hours, or   ? 1,000 mg (two 500 mg pills) every 8 hours as needed.  ? Note: Don't take more than 3,000 mg in one day. Acetaminophen is found in many medicines (both prescribed and over-the-counter medicines). Read all labels to be sure you don't take too much.   For children, check the Tylenol bottle for the right dose. The dose is based on the child's age or weight.  3. If you have other health problems (like cancer, heart failure, an organ transplant or severe kidney disease): Call your specialty clinic if you don't feel better in the next 2 days.  4. Know when to call 911. Emergency warning signs include:  ? Trouble breathing or shortness of breath  ? Chest pain or pressure that doesn't go away  ? Feeling confused like you haven't felt before, or not being able to wake up  ? Bluish-colored lips or face  5. If your doctor prescribed a blood thinner medicine: Follow their instructions.  Where can I get more information?    [facility] - About COVID-19:   [email or web address]    CDC - If You're Sick: cdc.gov/coronavirus/2019-ncov/about/steps-when-sick.html    CDC - Ending Home Isolation: www.cdc.gov/coronavirus/2019-ncov/hcp/disposition-in-home-patients.html    CDC - Caring for Someone: www.cdc.gov/coronavirus/2019-ncov/if-you-are-sick/care-for-someone.html    [facility] - Interim Guidance for Hospital Discharge to Home:  www.health.[facility].[place]/diseases/coronavirus/hcp/hospdischarge.pdf    [facility] clinical trials (COVID-19 research studies): [email or web address].[facility]/n-clinical-trials    Below are the COVID-19 hotlines at the [place] Department of Health ([facility]). Interpreters are available.  ? For health questions: Call 338-724-1589 or 1-328.533.3426 (7 a.m. to 7 p.m.)  ? For questions about schools and childcare: Call 613-503-4605 or 1-932.867.4382 (7 a.m. to 7 p.m.)    For informational purposes only. Not to replace the advice of your health care provider. Clinically reviewed by Infection Prevention and the [facility] COVID-19 Clinical Team. Copyright   2020 [facility]. All rights reserved. [facility] 820190 - Rev 11/11/20.             Thank you for taking steps to prevent the spread of this virus.  o Limit your contact with others.  o Wear a simple mask to cover your cough.  o Wash your hands well and often.  o If you need medical care, go to [email or web address] or contact your health care provider.     For more about COVID-19 and caring for yourself at home, visit the CDC website at https://www.cdc.gov/coronavirus/2019-ncov/about/steps-when-sick.html.     To learn about care at [facility], please go to [email or web address].     [facility] clinical trials (COVID-19 research studies): clinicalaffairs.[facility]/n-clinical-trials.    Below are the COVID-19 hotlines at the [place] Department of Health ([facility]). Interpreters are available.     For health questions: Call 668-877-1394 or 1-448.504.5575 (7 a.m. to 7 p.m.)    For questions about schools and childcare: Call 703-952-7666 or 1-873.995.7662 (7 a.m. to 7 p.m.)

## 2021-03-23 NOTE — LETTER
March 23, 2021      Bairon Reed  [address] [place] [address]        To Whom It May Concern:    Bairon Reed  was seen on 3/23/2021.  Please excuse him  until he mets CDC guidelines below due to illness.    Stay home and away from others (self-isolate) until ...    You've had no fever--and no medicine that reduces fever--for 1 full day (24 hours), AND    Other symptoms have gotten better. For example, your cough or breathing has improved, AND    At least 10 days have passed since your symptoms first started.    Sincerely,        YULIA Hall CNP

## 2021-03-24 LAB
LABORATORY COMMENT REPORT: NORMAL
SARS-COV-2 RNA RESP QL NAA+PROBE: NEGATIVE
SPECIMEN SOURCE: NORMAL

## 2021-04-08 ENCOUNTER — TELEPHONE (OUTPATIENT)
Dept: FAMILY MEDICINE | Facility: CLINIC | Age: 56
End: 2021-04-08

## 2021-04-08 DIAGNOSIS — M10.9 GOUT, UNSPECIFIED CAUSE, UNSPECIFIED CHRONICITY, UNSPECIFIED SITE: ICD-10-CM

## 2021-04-08 RX ORDER — PREDNISONE 20 MG/1
TABLET ORAL
Qty: 15 TABLET | Refills: 0 | Status: SHIPPED | OUTPATIENT
Start: 2021-04-08 | End: 2021-08-04

## 2021-04-08 NOTE — TELEPHONE ENCOUNTER
Called patient and left a message informing him that a prescription was sent in for him.    Jina Coyle RN

## 2021-04-08 NOTE — TELEPHONE ENCOUNTER
Routing to [facility] (Dr. Salas schedule ended at 12 noon)    Patient requesting prednisone prescription for gout flare up    Filipe Timmons RN, BSN, PHN  [facility]    RN spoke with patient.  Patient called stating he is having a gout flare up and would like a prescription for prednisone sent to pharmacy.  He has been prescribed this before and it has worked well.      Patient stated he was given a prescription for Allopurinol but never started taking it.  Per patient, he will begin taking after this flare up.    Filipe Timmons RN, BSN, PHN  [facility]

## 2021-05-06 ENCOUNTER — VIRTUAL VISIT (OUTPATIENT)
Dept: URGENT CARE | Facility: CLINIC | Age: 56
End: 2021-05-06
Payer: COMMERCIAL

## 2021-05-06 DIAGNOSIS — R05.9 COUGH: Primary | ICD-10-CM

## 2021-05-06 PROCEDURE — 99213 OFFICE O/P EST LOW 20 MIN: CPT | Mod: 95

## 2021-05-06 RX ORDER — BENZONATATE 100 MG/1
100 CAPSULE ORAL 3 TIMES DAILY PRN
Qty: 30 CAPSULE | Refills: 0 | Status: SHIPPED | OUTPATIENT
Start: 2021-05-06 | End: 2021-05-16

## 2021-05-06 NOTE — PROGRESS NOTES
"The patient has been notified of following:     \"This telephone visit will be conducted via a call between you and your physician/provider. We have found that certain health care needs can be provided without the need for a physical exam.  This service lets us provide the care you need with a short phone conversation.  If a prescription is necessary we can send it directly to your pharmacy.  If lab work is needed we can place an order for that and you can then stop by our lab to have the test done at a later time.    Telephone visits are billed at different rates depending on your insurance coverage. During this emergency period, for some insurers they may be billed the same as an in-person visit.  Please reach out to your insurance provider with any questions.    If during the course of the call the physician/provider feels a telephone visit is not appropriate, you will not be charged for this service.\"    COVID positive 4/23.          Assessment & Plan:       See patient instructions below.  At the end of the encounter, I discussed diagnosis & medications. Discussed red flags for being seen in person at clinic/ER, as well as indications for follow up if no improvement. Patient understood and agreed to plan.     Persistent symptoms of cough, loss of sense of smell and taste, loose stools.  Cough worse at bedtime.  Symptoms have been stable without worsening.  Currently no fever, chest pain, shortness of breath, headache.    Phone Call Duration : 14  minutes    Ryan Navarro PA-C , [facility]  -----------------------------------------------------------------------------------------------------------------------------------------------------------------    Subjective:    Bairon Reed is a 55 year old male who is contacted via telephone through scheduled urgent care virtual visit to discuss:    Multiple OTC treatments tried without improvement.  No history of asthma or bronchitis " treated with inhaler. Patient reports no fever/chills, headache, chest pain, shortness of breath, abdominal pain, nausea, vomiting, diarrhea, rash, or any other symptoms.     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      Follow up in UC with chest pain, Shortness of Breath, Headache, new fever    Follow up with primary care provider if symptoms persist beyond 2-3 days      Follow up immediately with severe headache, chest pain, or shortness of breath           Patient Education     Coronavirus Disease 2019 (COVID-19): Caring for Yourself or Others   If you or a household member have symptoms of COVID-19, follow the guidelines below. This will help you manage symptoms and keep the virus from spreading.  If you have symptoms of COVID-19    Stay home and contact your care team. They will tell you what to do.    Don t panic. Keep in mind that other illnesses can cause similar symptoms.    Stay away from work, school, and public places.    Limit physical contact  with others in your home. Limit visitors. No kissing.  Clean surfaces you touch with disinfectant.  If you need to cough or sneeze, do it into a tissue. Then throw the tissue into the trash. If you don't have tissues, cough or sneeze into the bend of your elbow.  Don t share food or personal items with people in your household. This includes items like eating and drinking utensils, towels, and bedding.  Wear a cloth face mask around other people. During a public health emergency, medical face masks may be reserved for healthcare workers. You may need to make a cloth face mask of your own. You can do this using a bandana, T-shirt, or other cloth. The [facility] has instructions on how to make a face mask. Wear the mask so that it covers both your nose and mouth.  If you need to go to a hospital or clinic, call ahead to let them know. Expect the care team to wear masks, gowns, gloves, and eye protection. You may need to come to a different entrance or wait in a separate room.  Follow all instructions from your care team.    If you ve been diagnosed with COVID-19    Stay home and away from others, including other people in your home. (This is called self-isolation.) Don t leave home unless you need to get medical care. Don t go to work, school, or public places. Don t use buses, taxis, or other public transportation.    Follow all instructions from your care team.    If you need to go to a hospital or clinic, call ahead to let them know. Expect the care team to wear masks, gowns, gloves, and eye protection. You may need to come to a different entrance or wait in a separate room.    Wear a face mask over your nose and mouth. This is to protect others from your germs. If you can t wear a mask, your caregivers should wear one. You may need to make your own mask using a bandana, T-shirt, or other cloth. See the CDC s instructions on how to make a face mask.    Have no contact with pets and other animals.    Don t share food or  personal items with people in your household. This includes items like eating and drinking utensils, towels, and bedding.    If you need to cough or sneeze, do it into a tissue. Then throw the tissue into the trash. If you don't have tissues, cough or sneeze into the bend of your elbow.    Wash your hands often.    Self-care at home   At this time, there is no medicine approved to prevent or treat COVID-19. The FDA has approved an antiviral medicine (called remdesivir) for people being treated in the hospital. This is for people 12 years and older who weigh more than about 88 pounds (40 kgs). In certain cases, it may also be used for people younger than 12 years or who weigh less than about 88 pounds (40 kgs)..  Currently, treatment is mostly aimed at helping your body while it fights the virus.    Getting extra rest.    Drink extra fluids 6 to 8 glasses of liquids each day), unless a doctor has told you not to. Ask your care team which fluids are best for you. Avoid fluids that contain caffeine or alcohol.    Taking over-the-counter (OTC) medicine to reduce pain and fever. Your care team will tell you which medicine to use.  If you ve been in the hospital for COVID-19, follow your care team s instructions. They will tell you when to stop self-isolation. They may also have you change positions to help your breathing (such as lying on your belly).  If a test showed that you have COVID-19, you may be asked to donate plasma after you ve recovered. (This is called COVID-19 convalescent plasma donation.) The plasma may contain antibodies to help fight the virus in others. Experts don't know if the plasma will work well as a treatment. Research continues, and the FDA has approved it for emergency use in certain people with serious or life-threatening COVID-19. For more information, talk to your care team.  Caring for a sick person     Follow all instructions from the care team.    Wash your hands often.    Wear protective  clothing as advised.    Make sure the sick person wears a mask. If they can't wear a mask, don t stay in the same room with them. If you must be in the same room, wear a face mask. Make sure the mask covers both the nose and mouth.    Keep track of the sick person s symptoms.    Clean surfaces often with disinfectant. This includes phones, kitchen counters, fridge door handles, bathroom surfaces, and others.    Don t let anyone share household items with the sick person. This includes eating and drinking tools, towels, sheets, or blankets.    Clean fabrics and laundry well.    Keep other people and pets away from the sick person.    When you can stop self-isolation  When you are sick with COVID-19, you should stay away from other people. This is called self-isolation. The rules for ending self-isolation depend on your health in general.  If you are normally healthy:  You can stop self-isolation when all 3 of these are true:    You ve had no fever--and no medicine that reduces fever--for at least 24 hours. And     Your symptoms are better, such as cough or trouble breathing. And     At least 10 days have passed since your symptoms first started.  Talk with your care team before you leave home. They may tell you it s okay to leave, or they may give you different advice. You do not need to be re-tested.  If you have a weak immune system, or you ve had severe COVID-19:  Follow your care team s instructions. You may be asked to self-isolate for 10 days to 20 days after your symptoms first started. Your care team may want to re-test you for COVID-19.  Note: If you re being treated for cancer, have an immune disorder (such as HIV), or have had a transplant (organ or bone marrow), you may have a weak immune system.  If you've already had COVID-19 once:  If you had the virus over 3 months ago, and you ve been exposed again, treat it like you've never had COVID-19. Stay home, limit your contact with others, call your care  team, and watch for symptoms.  If it s been less than 3 months since you had the virus, you re symptom-free, and you ve been exposed again: You don t need to self-isolate or be re-tested. But if you develop new symptoms that can t be linked to another illness, please self-isolate and contact your care team.  When you return to public settings  When you are well enough to go outside your home, follow the CDC s guidance on cloth face masks.    Anyone over age 2 should wear a face mask in public, especially when it's hard to socially distance. This includes public transit, public protests and marches, and crowded stores, bars, and restaurants.    Face masks are most likely to reduce the spread of COVID-19 when they are widely used by people who are out in the public.  Certain people should not wear a face covering. These include:    Children under 2 years old    Anyone with a health, developmental, or mental health condition that can be made worse by wearing a mask    Anyone who is unconscious or unable to remove the face covering without help. See the CDC's guidance on who should not wear a face mask.  When to call your care team  Call your care team right away if a sick person has any of these:    Trouble breathing    Pain or pressure in chest  If a sick person has any of these, call 911:    Trouble breathing that gets worse    Pain or pressure in chest that gets worse    Blue tint to lips or face    Fast or irregular heartbeat    Confusion or trouble waking    Fainting or loss of consciousness    Coughing up blood  Going home from the hospital   If you have COVID-19 and were recently in the hospital:    Follow the instructions above for self-care and isolation.    Follow the hospital care team s instructions.    Ask questions if anything is unclear to you. Write down answers so you remember them.  Date last modified: 11/23/2020  [employer] last reviewed this educational content on 4/1/2020  This information has been  modified by your health care provider with permission from the publisher.    5715-1498 [employer]. [address] [place] [address], [place] [address]. All rights reserved. This information is not intended as a substitute for professional medical care. Always follow your healthcare professional's instructions.           '

## 2021-05-06 NOTE — LETTER
[facility] VIRTUAL URGENT CARE  [address] [address]  Phone: [phone]    May 6, 2021        Bairon Reed  [address] [place] [address]          To whom it may concern:    RE: Bairon Reed    Patient was seen and treated today at our virtual clinic and missed work.  Patient is experiencing persisting symptoms regarding his illness.      Please contact me for questions or concerns.      Sincerely,        [facility]

## 2021-05-06 NOTE — PATIENT INSTRUCTIONS
With positive COVID test, and no improvement of symptoms, please continue rest and hydration.      Follow up in UC with chest pain, Shortness of Breath, Headache, new fever    Follow up with primary care provider if symptoms persist beyond 2-3 days      Follow up immediately with severe headache, chest pain, or shortness of breath           Patient Education     Coronavirus Disease 2019 (COVID-19): Caring for Yourself or Others   If you or a household member have symptoms of COVID-19, follow the guidelines below. This will help you manage symptoms and keep the virus from spreading.  If you have symptoms of COVID-19    Stay home and contact your care team. They will tell you what to do.    Don t panic. Keep in mind that other illnesses can cause similar symptoms.    Stay away from work, school, and public places.    Limit physical contact with others in your home. Limit visitors. No kissing.  Clean surfaces you touch with disinfectant.  If you need to cough or sneeze, do it into a tissue. Then throw the tissue into the trash. If you don't have tissues, cough or sneeze into the bend of your elbow.  Don t share food or personal items with people in your household. This includes items like eating and drinking utensils, towels, and bedding.  Wear a cloth face mask around other people. During a public health emergency, medical face masks may be reserved for healthcare workers. You may need to make a cloth face mask of your own. You can do this using a bandana, T-shirt, or other cloth. The CDC has instructions on how to make a face mask. Wear the mask so that it covers both your nose and mouth.  If you need to go to a hospital or clinic, call ahead to let them know. Expect the care team to wear masks, gowns, gloves, and eye protection. You may need to come to a different entrance or wait in a separate room.  Follow all instructions from your care team.    If you ve been diagnosed with COVID-19    Stay home and away from  others, including other people in your home. (This is called self-isolation.) Don t leave home unless you need to get medical care. Don t go to work, school, or public places. Don t use buses, taxis, or other public transportation.    Follow all instructions from your care team.    If you need to go to a hospital or clinic, call ahead to let them know. Expect the care team to wear masks, gowns, gloves, and eye protection. You may need to come to a different entrance or wait in a separate room.    Wear a face mask over your nose and mouth. This is to protect others from your germs. If you can t wear a mask, your caregivers should wear one. You may need to make your own mask using a bandana, T-shirt, or other cloth. See the CDC s instructions on how to make a face mask.    Have no contact with pets and other animals.    Don t share food or personal items with people in your household. This includes items like eating and drinking utensils, towels, and bedding.    If you need to cough or sneeze, do it into a tissue. Then throw the tissue into the trash. If you don't have tissues, cough or sneeze into the bend of your elbow.    Wash your hands often.    Self-care at home   At this time, there is no medicine approved to prevent or treat COVID-19. The FDA has approved an antiviral medicine (called remdesivir) for people being treated in the hospital. This is for people 12 years and older who weigh more than about 88 pounds (40 kgs). In certain cases, it may also be used for people younger than 12 years or who weigh less than about 88 pounds (40 kgs)..  Currently, treatment is mostly aimed at helping your body while it fights the virus.    Getting extra rest.    Drink extra fluids 6 to 8 glasses of liquids each day), unless a doctor has told you not to. Ask your care team which fluids are best for you. Avoid fluids that contain caffeine or alcohol.    Taking over-the-counter (OTC) medicine to reduce pain and fever. Your  care team will tell you which medicine to use.  If you ve been in the hospital for COVID-19, follow your care team s instructions. They will tell you when to stop self-isolation. They may also have you change positions to help your breathing (such as lying on your belly).  If a test showed that you have COVID-19, you may be asked to donate plasma after you ve recovered. (This is called COVID-19 convalescent plasma donation.) The plasma may contain antibodies to help fight the virus in others. Experts don't know if the plasma will work well as a treatment. Research continues, and the FDA has approved it for emergency use in certain people with serious or life-threatening COVID-19. For more information, talk to your care team.  Caring for a sick person     Follow all instructions from the care team.    Wash your hands often.    Wear protective clothing as advised.    Make sure the sick person wears a mask. If they can't wear a mask, don t stay in the same room with them. If you must be in the same room, wear a face mask. Make sure the mask covers both the nose and mouth.    Keep track of the sick person s symptoms.    Clean surfaces often with disinfectant. This includes phones, kitchen counters, fridge door handles, bathroom surfaces, and others.    Don t let anyone share household items with the sick person. This includes eating and drinking tools, towels, sheets, or blankets.    Clean fabrics and laundry well.    Keep other people and pets away from the sick person.    When you can stop self-isolation  When you are sick with COVID-19, you should stay away from other people. This is called self-isolation. The rules for ending self-isolation depend on your health in general.  If you are normally healthy:  You can stop self-isolation when all 3 of these are true:    You ve had no fever--and no medicine that reduces fever--for at least 24 hours. And     Your symptoms are better, such as cough or trouble breathing.  And     At least 10 days have passed since your symptoms first started.  Talk with your care team before you leave home. They may tell you it s okay to leave, or they may give you different advice. You do not need to be re-tested.  If you have a weak immune system, or you ve had severe COVID-19:  Follow your care team s instructions. You may be asked to self-isolate for 10 days to 20 days after your symptoms first started. Your care team may want to re-test you for COVID-19.  Note: If you re being treated for cancer, have an immune disorder (such as HIV), or have had a transplant (organ or bone marrow), you may have a weak immune system.  If you've already had COVID-19 once:  If you had the virus over 3 months ago, and you ve been exposed again, treat it like you've never had COVID-19. Stay home, limit your contact with others, call your care team, and watch for symptoms.  If it s been less than 3 months since you had the virus, you re symptom-free, and you ve been exposed again: You don t need to self-isolate or be re-tested. But if you develop new symptoms that can t be linked to another illness, please self-isolate and contact your care team.  When you return to public settings  When you are well enough to go outside your home, follow the CDC s guidance on cloth face masks.    Anyone over age 2 should wear a face mask in public, especially when it's hard to socially distance. This includes public transit, public protests and marches, and crowded stores, bars, and restaurants.    Face masks are most likely to reduce the spread of COVID-19 when they are widely used by people who are out in the public.  Certain people should not wear a face covering. These include:    Children under 2 years old    Anyone with a health, developmental, or mental health condition that can be made worse by wearing a mask    Anyone who is unconscious or unable to remove the face covering without help. See the CDC's guidance on who should  not wear a face mask.  When to call your care team  Call your care team right away if a sick person has any of these:    Trouble breathing    Pain or pressure in chest  If a sick person has any of these, call 911:    Trouble breathing that gets worse    Pain or pressure in chest that gets worse    Blue tint to lips or face    Fast or irregular heartbeat    Confusion or trouble waking    Fainting or loss of consciousness    Coughing up blood  Going home from the hospital   If you have COVID-19 and were recently in the hospital:    Follow the instructions above for self-care and isolation.    Follow the hospital care team s instructions.    Ask questions if anything is unclear to you. Write down answers so you remember them.  Date last modified: 11/23/2020  Marly last reviewed this educational content on 4/1/2020  This information has been modified by your health care provider with permission from the publisher.    9025-6330 [employer]. [address] [place] [address], [place] [address]. All rights reserved. This information is not intended as a substitute for professional medical care. Always follow your healthcare professional's instructions.

## 2021-05-26 ENCOUNTER — OFFICE VISIT (OUTPATIENT)
Dept: FAMILY MEDICINE | Facility: CLINIC | Age: 56
End: 2021-05-26
Payer: COMMERCIAL

## 2021-05-26 VITALS
SYSTOLIC BLOOD PRESSURE: 149 MMHG | RESPIRATION RATE: 18 BRPM | HEART RATE: 103 BPM | DIASTOLIC BLOOD PRESSURE: 114 MMHG | OXYGEN SATURATION: 96 %

## 2021-05-26 DIAGNOSIS — R50.9 FEVER AND CHILLS: Primary | ICD-10-CM

## 2021-05-26 PROBLEM — J45.909 ASTHMA: Status: ACTIVE | Noted: 2021-05-26

## 2021-05-26 LAB
DEPRECATED S PYO AG THROAT QL EIA: NEGATIVE
SARS-COV-2 RNA RESP QL NAA+PROBE: NORMAL
SPECIMEN SOURCE: NORMAL
SPECIMEN SOURCE: NORMAL

## 2021-05-26 PROCEDURE — U0005 INFEC AGEN DETEC AMPLI PROBE: HCPCS | Performed by: NURSE PRACTITIONER

## 2021-05-26 PROCEDURE — U0003 INFECTIOUS AGENT DETECTION BY NUCLEIC ACID (DNA OR RNA); SEVERE ACUTE RESPIRATORY SYNDROME CORONAVIRUS 2 (SARS-COV-2) (CORONAVIRUS DISEASE [COVID-19]), AMPLIFIED PROBE TECHNIQUE, MAKING USE OF HIGH THROUGHPUT TECHNOLOGIES AS DESCRIBED BY CMS-2020-01-R: HCPCS | Performed by: NURSE PRACTITIONER

## 2021-05-26 PROCEDURE — 99N1174 PR STATISTIC STREP A RAPID: Performed by: NURSE PRACTITIONER

## 2021-05-26 PROCEDURE — 87651 STREP A DNA AMP PROBE: CPT | Performed by: NURSE PRACTITIONER

## 2021-05-26 PROCEDURE — 99213 OFFICE O/P EST LOW 20 MIN: CPT | Performed by: NURSE PRACTITIONER

## 2021-05-26 ASSESSMENT — PAIN SCALES - GENERAL: PAINLEVEL: MODERATE PAIN (5)

## 2021-05-26 NOTE — PROGRESS NOTES
Assessment & Plan     (R50.9) Fever and chills  (primary encounter diagnosis)  Comment: Counseled on self-care measures including: OTC acetaminophen PRN, hydration, rest, self quarantine, masking, handwashing, and red flags of when to return including difficulty breathing.  Plan: Symptomatic COVID-19 Virus (Coronavirus) by         PCR, Streptococcus A Rapid Scr w Reflx to PCR          See Patient Instructions    Return in about 2 weeks (around 6/9/2021), or if symptoms worsen or fail to improve.    Jina Simons, YULIA CNP  M [facility] FLOR Maher is a 55 year old who presents for the following health issues     HPI     Acute Illness  Acute illness concerns:   Onset/Duration: 5/23/2021  Symptoms:  Fever: YES  Chills/Sweats: YES  Headache (location?): YES  Sinus Pressure: no  Conjunctivitis:  no  Ear Pain: YES: bilateral  Rhinorrhea: YES  Congestion: no  Sore Throat: no  Cough: no  Wheeze: no  Decreased Appetite: YES  Nausea: YES  Vomiting: no  Diarrhea: YES  Dysuria/Freq.: no  Dysuria or Hematuria: no  Fatigue/Achiness: YES  States that this feels the same as when he had covid a month ago.  Sick/Strep Exposure: YES, a friend who fell ill on the fishing trip they went on. Friend had COVID.   Therapies tried and outcome: Tylenol and ibuprofen; helped.     Review of Systems   Constitutional, HEENT, cardiovascular, pulmonary, gi and gu systems are negative, except as otherwise noted.      Objective    BP (!) 149/114   Pulse 103   Resp 18   SpO2 96%   There is no height or weight on file to calculate BMI.  Physical Exam   GENERAL: alert, no distress and fatigued  EYES: Eyes grossly normal to inspection, PERRL and conjunctivae and sclerae normal  HENT: ear canals and TM's normal, nose and mouth without ulcers or lesions  RESP: lungs clear to auscultation - no rales, rhonchi or wheezes  CV: regular rate and rhythm, normal S1 S2, no S3 or S4, no murmur, click or rub, no peripheral  edema and peripheral pulses strong  NEURO: Normal strength and tone, mentation intact and speech normal  PSYCH: mentation appears normal, affect normal/bright

## 2021-05-26 NOTE — PATIENT INSTRUCTIONS
"Instructions for Patients  Patient Education   After Your COVID-19 (Coronavirus) Test  You have been tested for COVID-19 (coronavirus).   If you'll have surgery in the next few days, we'll let you know ahead of time if you have the virus. Please call your surgeon's office with any questions.  For all other patients: Results are usually available in [facility] within 2 to 3 days.   If you do not have a [facility] account, you'll get a letter in the mail in about 7 to 10 days.   [facility] is often the fastest way to get test results. Please sign up if you do not already have a Owlr account. See the handout Getting COVID-19 Test Results in Owlr for help.  What if my test result is positive?  If your test is positive and you have not viewed your result in [facility], you'll get a phone call with your result. (A positive test means that you have the virus.)     Follow the tips under \"How do I self-isolate?\" below for 10 days (20 days if you have a weak immune system).    You don't need to be retested for COVID-19 before going back to school or work. As long as you're fever-free and feeling better, you can go back to school, work and other activities after waiting the 10 or 20 days.  What if I have questions after I get my results?  If you have questions about your results, please visit our testing website at [email or web address].   After 7 to 10 days, if you have not gotten your results:     Call [phone] ([phone]) and ask to speak with our COVID-19 results team.    If you're being treated at an infusion center: Call your infusion center directly.  What are the symptoms of COVID-19?  Cough, fever and trouble breathing are the most common signs of COVID-19.  Other symptoms can include new headaches, new muscle or body aches, new and unexplained fatigue (feeling very tired), chills, sore throat, congestion (stuffy or runny nose), diarrhea (loose poop), loss of taste or smell, belly " "pain, and nausea or vomiting (feeling sick to your stomach or throwing up).  You may already have symptoms of COVID-19, or they may show up later.  What should I do if I have symptoms?  If you're having surgery: Call your surgeon's office.  For all other patients: Stay home and away from others (self-isolate) until ...    You've had no fever--and no medicine that reduces fever--for 1 full day (24 hours), AND    Other symptoms have gotten better. For example, your cough or breathing has improved, AND    At least 10 days have passed since your symptoms first started.  How do I self-isolate?    Stay in your own room, even for meals. Use your own bathroom if you can.    Stay away from others in your home. No hugging, kissing or shaking hands. No visitors.    Don't go to work, school or anywhere else.    Clean \"high touch\" surfaces often (doorknobs, counters, handles). Use household cleaning spray or wipes. You'll find a full list of  on the EPA website: www.epa.gov/pesticide-registration/list-n-disinfectants-use-against-sars-cov-2.    Cover your mouth and nose with a mask or other face covering to avoid spreading germs.    Wash your hands and face often. Use soap and water.    Caregivers in these groups are at risk for severe illness due to COVID-19:  ? People 65 years and older  ? People who live in a nursing home or long-term care facility  ? People with chronic disease (lung, heart, cancer, diabetes, kidney, liver, immunologic)  ? People who have a weakened immune system, including those who:    Are in cancer treatment    Take medicine that weakens the immune system, such as corticosteroids    Had a bone marrow or organ transplant    Have an immune deficiency    Have poorly controlled HIV or AIDS    Are obese (body mass index of 40 or higher)    Smoke regularly    Caregivers should wear gloves while washing dishes, handling laundry and cleaning bedrooms and bathrooms.    Use caution when washing and drying " laundry: Don't shake dirty laundry and use the warmest water setting that you can.    For more tips on managing your health at home, go to www.cdc.gov/coronavirus/2019-ncov/downloads/10Things.pdf.  How can I take care of myself at home?  1. Get lots of rest. Drink extra fluids (unless a doctor has told you not to).  2. Take Tylenol (acetaminophen) for fever or pain. If you have liver or kidney problems, ask your family doctor if it's OK to take Tylenol.   Adults can take either:  ? 650 mg (two 325 mg pills) every 4 to 6 hours, or   ? 1,000 mg (two 500 mg pills) every 8 hours as needed.  ? Note: Don't take more than 3,000 mg in one day. Acetaminophen is found in many medicines (both prescribed and over-the-counter medicines). Read all labels to be sure you don't take too much.   For children, check the Tylenol bottle for the right dose. The dose is based on the child's age or weight.  3. If you have other health problems (like cancer, heart failure, an organ transplant or severe kidney disease): Call your specialty clinic if you don't feel better in the next 2 days.  4. Know when to call 911. Emergency warning signs include:  ? Trouble breathing or shortness of breath  ? Chest pain or pressure that doesn't go away  ? Feeling confused like you haven't felt before, or not being able to wake up  ? Bluish-colored lips or face  5. If your doctor prescribed a blood thinner medicine: Follow their instructions.  Where can I get more information?    [facility] - About COVID-19:   [email or web address]    CDC - If You're Sick: cdc.gov/coronavirus/2019-ncov/about/steps-when-sick.html    CDC - Ending Home Isolation: www.cdc.gov/coronavirus/2019-ncov/hcp/disposition-in-home-patients.html    CDC - Caring for Someone: www.cdc.gov/coronavirus/2019-ncov/if-you-are-sick/care-for-someone.html    [facility] - Interim Guidance for Hospital Discharge to Home:  www.health.[facility].[place]/diseases/coronavirus/hcp/hospdischarge.pdf    [facility] clinical trials (COVID-19 research studies): [email or web address]-clinical-trials    Below are the COVID-19 hotlines at the [place] Department of Health ([facility]). Interpreters are available.  ? For health questions: Call 087-319-7390 or 1-555.890.8486 (7 a.m. to 7 p.m.)  ? For questions about schools and childcare: Call 815-558-9640 or 1-993.370.3382 (7 a.m. to 7 p.m.)    For informational purposes only. Not to replace the advice of your health care provider. Clinically reviewed by Infection Prevention and the [facility] COVID-19 Clinical Team. Copyright   2020 [employer] [facility]. All rights reserved. [employer] 151023 - Rev 11/11/20.             Thank you for taking steps to prevent the spread of this virus.  o Limit your contact with others.  o Wear a simple mask to cover your cough.  o Wash your hands well and often.  o If you need medical care, go to [email or web address] or contact your health care provider.     For more about COVID-19 and caring for yourself at home, visit the CDC website at https://www.cdc.gov/coronavirus/2019-ncov/about/steps-when-sick.html.     To learn about care at [facility], please go to [email or web address].     [facility] clinical trials (COVID-19 research studies): [email or web address].[email or web address]/n-clinical-trials.    Below are the COVID-19 hotlines at the [place] Department of Health ([facility]). Interpreters are available.     For health questions: Call 876-559-4236 or 1-675.493.1936 (7 a.m. to 7 p.m.)    For questions about schools and childcare: Call 495-768-8378 or 1-472.271.4414 (7 a.m. to 7 p.m.)

## 2021-05-26 NOTE — LETTER
May 26, 2021      Bairon Reed  [address] [place] [address]        To Whom It May Concern:    Bairon Reed was seen on 5/26/2021.  Please excuse him from work until at least June 2, 2021 if COVID test is positive.  He may return to work on May 30th if his COVID test is negative and he feels better for at least 24 hours.       Sincerely,        YULIA Hall CNP

## 2021-05-27 ENCOUNTER — TELEPHONE (OUTPATIENT)
Dept: FAMILY MEDICINE | Facility: CLINIC | Age: 56
End: 2021-05-27

## 2021-05-27 LAB
LABORATORY COMMENT REPORT: ABNORMAL
SARS-COV-2 RNA RESP QL NAA+PROBE: POSITIVE
SPECIMEN SOURCE: ABNORMAL
SPECIMEN SOURCE: NORMAL
STREP GROUP A PCR: NOT DETECTED

## 2021-05-27 NOTE — TELEPHONE ENCOUNTER
"Coronavirus (COVID-19) Notification    Caller Name (Patient, parent, daughter/son, grandparent, etc)  patient    Reason for call  Notify of Positive Coronavirus (COVID-19) lab results, assess symptoms,  review [facility] recommendations    Lab Result    Lab test:  2019-nCoV rRt-PCR or SARS-CoV-2 PCR    Oropharyngeal AND/OR nasopharyngeal swabs is POSITIVE for 2019-nCoV RNA/SARS-COV-2 PCR (COVID-19 virus)    RN Recommendations/Instructions per [facility] Coronavirus COVID-19 recommendations    Brief introduction script  Introduce self then review script:  \"I am calling on behalf of [employer].  We were notified that your Coronavirus test (COVID-19) for was POSITIVE for the virus.     Patient refuses education. Reports he just had Covid but continues to have symptoms. Informed patient to continue quarantine and contact his pcp. Patient agrees and has no further questions @ this time.    A Positive COVID-19 letter will be sent via Openet or the mail. (Exception, no letters sent to Presurgerical/Preprocedure Patients)    Maile Mays LPN    " 14-Jan-2021 19:54

## 2021-05-27 NOTE — RESULT ENCOUNTER NOTE
Dear Bairon,    Your recent test results are attached.      No strep on culture.    If you have any questions please feel free to contact [phone] or myself via [employer].    Sincerely,  Letty Gallagher, CNP

## 2021-06-02 ENCOUNTER — RECORDS - HEALTHEAST (OUTPATIENT)
Dept: ADMINISTRATIVE | Facility: CLINIC | Age: 56
End: 2021-06-02

## 2021-06-18 DIAGNOSIS — I10 ESSENTIAL HYPERTENSION WITH GOAL BLOOD PRESSURE LESS THAN 140/90: ICD-10-CM

## 2021-06-18 RX ORDER — LISINOPRIL 5 MG/1
TABLET ORAL
Qty: 30 TABLET | Refills: 0 | Status: SHIPPED | OUTPATIENT
Start: 2021-06-18 | End: 2021-10-12

## 2021-06-18 NOTE — TELEPHONE ENCOUNTER
"Routing refill request to provider for review/approval because:  Requested Prescriptions   Pending Prescriptions Disp Refills    lisinopril (ZESTRIL) 5 MG tablet [Pharmacy Med Name: Lisinopril 5 MG Oral Tablet] 30 tablet 0     Sig: TAKE ONE TABLET BY MOUTH ONCE DAILY       ACE Inhibitors (Including Combos) Protocol Failed - 6/18/2021  9:26 AM        Failed - Blood pressure under 140/90 in past 12 months     BP Readings from Last 3 Encounters:   05/26/21 (!) 149/114   03/23/21 (!) 153/112   01/14/21 134/88                 Failed - Medication is active on med list        Passed - Recent (12 mo) or future (30 days) visit within the authorizing provider's specialty     Patient has had an office visit with the authorizing provider or a provider within the authorizing providers department within the previous 12 mos or has a future within next 30 days. See \"Patient Info\" tab in inbasket, or \"Choose Columns\" in Meds & Orders section of the refill encounter.              Passed - Patient is age 18 or older        Passed - Normal serum creatinine on file in past 12 months     Recent Labs   Lab Test 01/14/21  1201   CR 0.92       Ok to refill medication if creatinine is low          Passed - Normal serum potassium on file in past 12 months     Recent Labs   Lab Test 01/14/21  1201   POTASSIUM 4.0                  Bonita URRUTIA, RN          "

## 2021-07-22 NOTE — LETTER
3/11/2021         RE: Bairon Reed  [address]  [place] [address]        Dear Colleague,    Thank you for referring your patient, Bairon Reed, to the [facility]. Please see a copy of my visit note below.    Chief Complaint   Patient presents with     COMPREHENSIVE EYE EXAM       wants safety glasses with no line   Last Eye Exam: 5/13/2019  Dilated Previously: Yes    What are you currently using to see?  Glasses, wears them at night to drive, also has OTC readers for near tasks        Distance Vision Acuity: Noticed gradual change in right eye- more hazy    Near Vision Acuity: Satisfied with vision while reading and using computer with readers, he thinks that they are +2.00    Eye Comfort: good  Do you use eye drops? 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       OBJECTIVE: See Ophthalmology exam    ASSESSMENT:    ICD-10-CM    1. Regular astigmatism of both eyes  H52.223 EYE EXAM (SIMPLE-NONBILLABLE)     REFRACTION   2. Presbyopia  H52.4 EYE EXAM (SIMPLE-NONBILLABLE)     REFRACTION   3. Posterior subcapsular polar age-related cataract of right eye  H25.041 EYE EXAM (SIMPLE-NONBILLABLE)     REFRACTION      PLAN:     Patient Instructions   Fill glasses prescription  Monitor mild cataract in right eye   Return in 1 year for eye exam    Taylor Isbell, OD  [phone]                       Again, thank you for allowing me to participate in the care of your patient.        Sincerely,        Taylor Isbell, OD     " SLEEP STUDY INTERPRETATION  DIAGNOSTIC POLYSOMNOGRAPHY REPORT      Patient: SUNG PATTON  YOB: 1970  Study Date: 7/19/2021  MRN: [id number]  Referring Provider: MD Shepherd Justin  Ordering Provider: Kyle Dozier    Indications for Polysomnography: The patient is a 50 year old Male who is 5' 8\" and weighs 325.0 lbs. His BMI is 49.8, Stratford sleepiness scale 11 and neck circumference is 46 cm. Relevant medical history includes hypertension & asthma. A diagnostic polysomnogram was performed to evaluate for sleep apnea.    Polysomnogram Data: A full night polysomnogram recorded the standard physiologic parameters including EEG, EOG, EMG, ECG, nasal and oral airflow. Respiratory parameters of chest and abdominal movements were recorded with respiratory inductance plethysmography. Oxygen saturation was recorded by pulse oximetry. Hypopnea scoring rule used: 1B 4%.    Sleep Architecture: Sleep was fragmented. Stage N3 was reduced.   The total recording time of the polysomnogram was 461.5 minutes. The total sleep time was 396.5 minutes. Sleep latency was normal at 21.8 minutes without the use of a sleep aid. REM latency was 208.5 minutes. Arousal index was increased at 58.4 arousals per hour. Sleep efficiency was normal at 85.9%. Wake after sleep onset was 42.5 minutes. The patient spent 18.8% of total sleep time in Stage N1, 56.9% in Stage N2, 3.8% in Stage N3, and 20.6% in REM. Time in REM supine was - minutes.    Respiration: Moderate obstructive sleep apnea.     Events ? The polysomnogram revealed a presence of 23 obstructive, 5 central, and 2 mixed apneas resulting in an apnea index of 4.5 events per hour. There were 107 obstructive hypopneas and - central hypopneas resulting in an obstructive hypopnea index of 16.2 and central hypopnea index of - events per hour. The combined apnea/hypopnea index was 20.7 events per hour (central apnea/hypopnea index was 0.8 events per hour). The REM AHI was " 25.8 events per hour. The supine AHI was 37.8 events per hour. The RERA index was 12.4 events per hour.  The RDI was 33.1 events per hour.    Snoring - was reported as moderate.    Respiratory rate and pattern - was notable for normal respiratory rate and pattern.    Sustained Sleep Associated Hypoventilation - Transcutaneous carbon dioxide monitoring was used, however significant hypoventilation was not present with a maximum change from 37.7 to 51.6 mmHg and 0 minutes at or greater than 55 mmHg.    Sleep Associated Hypoxemia - (Greater than 5 minutes O2 sat at or below 88%) was not present. Baseline oxygen saturation was 96.5%. Lowest oxygen saturation was 86.7%. Time spent less than or equal to 88% was 0.3 minutes. Time spent less than or equal to 89% was 1.0 minutes.    Movement Activity: Negative for significant movement abnormality.     Periodic Limb Activity - There were 92 PLMs during the entire study. The PLM index was 13.9 movements per hour. The PLM Arousal Index was 5.0 per hour.    REM EMG Activity - Excessive transient/sustained muscle activity was not present.    Nocturnal Behavior - Abnormal sleep related behaviors were not noted during/arising out of NREM / REM sleep.     Bruxism - None apparent.    Cardiac Summary: Sinus rhythm.   The average pulse rate was 55.1 bpm. The minimum pulse rate was 43.9 bpm while the maximum pulse rate was 93.3 bpm.  Arrhythmias were not noted.    Assessment:     Moderate obstructive sleep apnea with associated sleep fragmentation. Test is negative for sleep related hypoxemia or significant hypoventilation.     Sleep was fragmented and percentage of stage N3 was low.     Recommendations:    CPAP therapy is the treatment of choice for moderate sleep apnea. Treatment could be empirically initiated with Auto?titrating PAP therapy with a range of 6 to 17 cmH2O. Recommend clinical follow up with sleep management team.    If CPAP is not tolerated, patient may be a candidate  for dental appliance through referral to Sleep Dentistry for the treatment of obstructive sleep apnea and/or socially disruptive snoring.    If devices are not acceptable or effective, patient may benefit from evaluation of possible surgical options. If he is interested, would recommend referral to specialized ENT-Sleep provider.    Weight management.    Diagnostic Codes:   Obstructive Sleep Apnea G47.33  Repetitive Intrusions Into Sleep F51.8    7/19/2021 Mobile Diagnostic Sleep Study (325.0 lbs) - AHI 20.7, RDI 33.1, Supine AHI 37.8, REM AHI 25.8, Low O2 86.7%, Time Spent ?88% 0.3 minutes / Time Spent ?89% 1.0 minutes.     _____________________________________   Electronically Signed By: Ritesh Keating MD 7/21/21

## 2021-07-29 ENCOUNTER — TELEPHONE (OUTPATIENT)
Dept: FAMILY MEDICINE | Facility: CLINIC | Age: 56
End: 2021-07-29

## 2021-07-29 ENCOUNTER — VIRTUAL VISIT (OUTPATIENT)
Dept: FAMILY MEDICINE | Facility: CLINIC | Age: 56
End: 2021-07-29
Payer: COMMERCIAL

## 2021-07-29 ENCOUNTER — NURSE TRIAGE (OUTPATIENT)
Dept: NURSING | Facility: CLINIC | Age: 56
End: 2021-07-29

## 2021-07-29 ENCOUNTER — MYC MEDICAL ADVICE (OUTPATIENT)
Dept: FAMILY MEDICINE | Facility: CLINIC | Age: 56
End: 2021-07-29

## 2021-07-29 DIAGNOSIS — J06.9 VIRAL UPPER RESPIRATORY INFECTION: Primary | ICD-10-CM

## 2021-07-29 PROCEDURE — 99213 OFFICE O/P EST LOW 20 MIN: CPT | Mod: 95 | Performed by: PHYSICIAN ASSISTANT

## 2021-07-29 NOTE — PROGRESS NOTES
Gunnar is a 55 year old who is being evaluated via a billable telephone visit.      What phone number would you like to be contacted at? [phone]  How would you like to obtain your AVS? MyChart    Assessment & Plan     Viral upper respiratory infection  - Symptomatic COVID-19 Virus (Coronavirus) by PCR Nasopharyngeal; Future    Return today (on 7/29/2021) for labs.    Dustin Avelar PA-C  [facility]   Gunnar is a 55 year old who presents for the following health issues  accompanied by his self:    HPI       Concern for COVID-19  About how many days ago did these symptoms start? 3 days   Is this your first visit for this illness? Yes  In the 14 days before your symptoms started, have you had close contact with someone with COVID-19 (Coronavirus)? No  Do you have a fever or chills? Yes, I felt feverish or had chills  Are you having new or worsening difficulty breathing? No  Do you have new or worsening cough? No  Have you had any new or unexplained body aches? No   Have you experienced any of the following NEW symptoms?    Headache: No    Sore throat: No    Loss of taste or smell: No    Chest pain: No    Diarrhea: YES    Rash: No  What treatments have you tried? Tylenol and ibuprofen  Who do you live with? self  Are you, or a household member, a healthcare worker or a ? No  Do you live in a nursing home, group home, or shelter? No  Do you have a way to get food/medications if quarantined? Yes, I have a friend or family member who can help me.    Did review with patient that positive results may still be flagging from previous testing.  Reviewed that we can likely release him to return to work upon resolution of symptoms for a period of appropriate time.     Review of Systems   Constitutional, HEENT, cardiovascular, pulmonary, gi and gu systems are negative, except as otherwise noted.      Objective           Vitals:  No vitals were obtained today due to  virtual visit.    Physical Exam   healthy, alert and no distress  PSYCH: Alert and oriented times 3; coherent speech, normal   rate and volume, able to articulate logical thoughts, able   to abstract reason, no tangential thoughts, no hallucinations   or delusions  His affect is normal  RESP: No cough, no audible wheezing, able to talk in full sentences  Remainder of exam unable to be completed due to telephone visits                Phone call duration: 5 minutes

## 2021-07-29 NOTE — TELEPHONE ENCOUNTER
Patient scheduled for virtual visit today with provider. RN to close encounter.     Glenis Villanueva, RN, BSN, PHN  [facility]: [place]

## 2021-07-29 NOTE — TELEPHONE ENCOUNTER
"Pt reports no vaccination yet for covid.  States \"I'm ready to get the vaccine due to having covid previously, and now again potentially.\"    New episode of cough and generalized body aches -> onset July 25th.  No temps checked.  No chills.  \"Kind of got hot once in awhile.\"  Some nasal congestion.  Cough.    Has been taking ibuprofen 400mg approx twice daily (morning and evening).  Also OTC Robitussin.    \"Doing okay with sleep.\"  \"Coughing kicks in each morning.\"  Pt's employer requires him to seek covid testing prior to returning to work.    Pt agrees to virtual provider visit to request order for covid testing.  Transferred to a  for appointment.    Vivian CARABALLO Health Nurse Advisor     Reason for Disposition    [1] COVID-19 infection suspected by caller or triager AND [2] mild symptoms (cough, fever, or others) AND [3] no complications or SOB    Additional Information    Negative: SEVERE difficulty breathing (e.g., struggling for each breath, speaks in single words)    Negative: Difficult to awaken or acting confused (e.g., disoriented, slurred speech)    Negative: Bluish (or gray) lips or face now    Negative: Shock suspected (e.g., cold/pale/clammy skin, too weak to stand, low BP, rapid pulse)    Negative: Sounds like a life-threatening emergency to the triager    Negative: [1] COVID-19 exposure AND [2] has not completed COVID-19 vaccine series AND [3] no symptoms    Negative: [1] COVID-19 exposure AND [2] completed COVID-19 vaccine series (fully vaccinated) AND [3] no symptoms    Negative: COVID-19 vaccine reaction suspected (e.g., fever, headache, muscle aches) occurring during days 1-3 after getting vaccine    Negative: COVID-19 vaccine, questions about    Negative: [1] COVID-19 vaccine series completed (fully vaccinated) in past 3 months AND [2] new-onset of COVID-19 symptoms BUT [3] no known exposure    Negative: [1] Had lab test confirmed COVID-19 infection within last 3 monthsAND [2] " new-onset of COVID-19 symptoms BUT [3] no known exposure    Negative: [1] Lives with someone known to have influenza (flu test positive) AND [2] flu-like symptoms (e.g., cough, runny nose, sore throat, SOB; with or without fever)    Negative: [1] Adult with possible COVID-19 symptoms AND [2] triager concerned about severity of symptoms or other causes    Negative: COVID-19 and breastfeeding, questions about    Negative: SEVERE or constant chest pain or pressure (Exception: mild central chest pain, present only when coughing)    Negative: MODERATE difficulty breathing (e.g., speaks in phrases, SOB even at rest, pulse 100-120)    Negative: [1] Headache AND [2] stiff neck (can't touch chin to chest)    Negative: MILD difficulty breathing (e.g., minimal/no SOB at rest, SOB with walking, pulse <100)    Negative: Chest pain or pressure    Negative: Patient sounds very sick or weak to the triager    Negative: Fever > 103 F (39.4 C)    Negative: [1] Fever > 101 F (38.3 C) AND [2] age > 60 years    Negative: [1] Fever > 100.0 F (37.8 C) AND [2] bedridden (e.g., nursing home patient, CVA, chronic illness, recovering from surgery)    Negative: [1] HIGH RISK patient (e.g., age > 64 years, diabetes, heart or lung disease, weak immune system) AND [2] new or worsening symptoms    Negative: [1] HIGH RISK patient AND [2] influenza is widespread in the community AND [3] ONE OR MORE respiratory symptoms: cough, sore throat, runny or stuffy nose    Negative: [1] HIGH RISK patient AND [2] influenza exposure within the last 7 days AND [3] ONE OR MORE respiratory symptoms: cough, sore throat, runny or stuffy nose    Negative: Fever present > 3 days (72 hours)    Negative: [1] Fever returns after gone for over 24 hours AND [2] symptoms worse or not improved    Negative: [1] Continuous (nonstop) coughing interferes with work or school AND [2] no improvement using cough treatment per protocol    [facility] Specific Patient  Instructions - [facility] Specific Patient Instructions  COVID 19 Nurse Triage Plan/Patient Instructions    Please be aware that novel coronavirus (COVID-19) may be circulating in the community. If you develop symptoms such as fever, cough, or SOB or if you have concerns about the presence of another infection including coronavirus (COVID-19), please contact your health care provider or visit [email or web address]      Virtual Visit with provider recommended. Reference Visit Selection Guide.    Thank you for taking steps to prevent the spread of this virus.  Limit your contact with others.  Wear a simple mask to cover your cough.  Wash your hands well and often.    Resources  [facility]: About COVID-19: [email or web address]  CDC: What to Do If You're Sick: www.cdc.gov/coronavirus/2019-ncov/about/steps-when-sick.html  CDC: Ending Home Isolation: www.cdc.gov/coronavirus/2019-ncov/hcp/disposition-in-home-patients.html   CDC: Caring for Someone: www.cdc.gov/coronavirus/2019-ncov/if-you-are-sick/care-for-someone.html   [facility]: Interim Guidance for Hospital Discharge to Home: www.health.[facility].[place]/diseases/coronavirus/hcp/hospdischarge.pdf  [facility] clinical trials (COVID-19 research studies): [email or web address]-clinical-trials   Below are the COVID-19 hotlines at the [facility] of Health ([facility]). Interpreters are available.   For health questions: Call 135-293-4666 or 1-921.803.5804 (7 a.m. to 7 p.m.)  For questions about schools and childcare: Call 246-533-4044 or 1-480.277.4817 (7 a.m. to 7 p.m.)                     All patients will be referred to [facility] unless nurse triage assessment indicates an urgent or emergent evaluation is needed    Protocols used: CORONAVIRUS (COVID-19) DIAGNOSED OR [id number]-R-XX 3.25

## 2021-07-29 NOTE — TELEPHONE ENCOUNTER
Attempt #1 to call patient.     RN left voicemail at home number and requested return call to [facility] at [phone].     RN left semi detailed VM to check Mychart (message sent with E-visit and virtual visit instructions). Can return call with questions.     Will hold open until read/response.    Glenis Villanueva RN, BSN, PHN  [facility]: [place]

## 2021-08-02 DIAGNOSIS — J06.9 VIRAL UPPER RESPIRATORY INFECTION: ICD-10-CM

## 2021-08-02 LAB — SARS-COV-2 RNA RESP QL NAA+PROBE: NEGATIVE

## 2021-08-02 PROCEDURE — U0003 INFECTIOUS AGENT DETECTION BY NUCLEIC ACID (DNA OR RNA); SEVERE ACUTE RESPIRATORY SYNDROME CORONAVIRUS 2 (SARS-COV-2) (CORONAVIRUS DISEASE [COVID-19]), AMPLIFIED PROBE TECHNIQUE, MAKING USE OF HIGH THROUGHPUT TECHNOLOGIES AS DESCRIBED BY CMS-2020-01-R: HCPCS

## 2021-08-02 PROCEDURE — U0005 INFEC AGEN DETEC AMPLI PROBE: HCPCS

## 2021-08-04 ENCOUNTER — OFFICE VISIT (OUTPATIENT)
Dept: FAMILY MEDICINE | Facility: CLINIC | Age: 56
End: 2021-08-04
Payer: COMMERCIAL

## 2021-08-04 VITALS
DIASTOLIC BLOOD PRESSURE: 82 MMHG | WEIGHT: 204 LBS | TEMPERATURE: 98.9 F | OXYGEN SATURATION: 97 % | HEART RATE: 94 BPM | BODY MASS INDEX: 29.27 KG/M2 | SYSTOLIC BLOOD PRESSURE: 124 MMHG

## 2021-08-04 DIAGNOSIS — R19.5 LOOSE STOOLS: ICD-10-CM

## 2021-08-04 DIAGNOSIS — J01.10 ACUTE NON-RECURRENT FRONTAL SINUSITIS: Primary | ICD-10-CM

## 2021-08-04 PROCEDURE — 99213 OFFICE O/P EST LOW 20 MIN: CPT | Performed by: FAMILY MEDICINE

## 2021-08-04 RX ORDER — AZITHROMYCIN 250 MG/1
TABLET, FILM COATED ORAL
Qty: 6 TABLET | Refills: 0 | Status: SHIPPED | OUTPATIENT
Start: 2021-08-04 | End: 2021-08-09

## 2021-08-04 ASSESSMENT — PAIN SCALES - GENERAL: PAINLEVEL: NO PAIN (0)

## 2021-08-04 NOTE — PROGRESS NOTES
"    Assessment & Plan     Acute non-recurrent frontal sinusitis  Advised with supportive car  - azithromycin (ZITHROMAX) 250 MG tablet; Take 2 tablets (500 mg) by mouth daily for 1 day, THEN 1 tablet (250 mg) daily for 4 days.    Loose stools  Diet modifications, increase fluid intae  Follow up if needed    Given a note to return to work on Monday, 08/9/2021 ( per his request )       BMI:   Estimated body mass index is 29.27 kg/m  as calculated from the following:    Height as of 1/14/21: 1.778 m (5' 10\").    Weight as of this encounter: 92.5 kg (204 lb).   Weight management plan: Discussed healthy diet and exercise guidelines    There are no Patient Instructions on file for this visit.    Return in about 5 months (around 1/4/2022) for Routine preventive, in person.    Bryan De Leon MD  [facility]    Jazlyn Maher is a 55 year old who presents for the following health issues:  Patient reports for the past 5 days or so he has been having loose stool.  Initially had cough, sinus congestion.  He had negative Covid test.    Now he is continued to have more loose stool.  He had no fever, no chills, has no abdominal pain, no nausea no vomiting.  No weight loss.  Has no blood in the stool or the urine.  Has no back pain, no abdominal pain.    Has no history of travel, no sick contact, has not had any recent antibiotic.  Need a note for work    Diarrhea  Onset/Duration: 10 days   Description:       Consistency of stool: loose       Blood in stool: no       Number of loose stools past 24 hours: 4-5  Progression of Symptoms: improving  Accompanying signs and symptoms:       Fever: no       Nausea/Vomiting: no       Abdominal pain: no       Weight loss: YES       Episodes of constipation: no  History   Ill contacts: no  Recent use of antibiotics: no  Recent travels: no  Recent medication-new or changes(Rx or OTC): no  Precipitating or alleviating factors: Pepto   Therapies tried and outcome: " PeptoBismol    Review of Systems   Constitutional, HEENT, cardiovascular, pulmonary, gi and gu systems are negative, except as otherwise noted.      Objective    There were no vitals taken for this visit.  There is no height or weight on file to calculate BMI.  Physical Exam   GENERAL: healthy, alert and no distress  RESP: lungs clear to auscultation - no rales, rhonchi or wheezes  CV: regular rate and rhythm, normal S1 S2, no S3 or S4, no murmur, click or rub, no peripheral edema and peripheral pulses strong  ABDOMEN: soft, nontender, no hepatosplenomegaly, no masses and bowel sounds normal  MS: no gross musculoskeletal defects noted, no edema  NEURO: Normal strength and tone, mentation intact and speech normal  BACK: no CVA tenderness, no paralumbar tenderness    No orders of the defined types were placed in this encounter.      Bryan De Leon MD

## 2021-08-04 NOTE — LETTER
August 4, 2021      Bairon Reed  [address] [place] [address]        To Whom It May Concern:    Bairon Reed  was seen on 08/4/2021.  Please excuse him  until 08/09/2021 due to illness.  Return to work on Monday, 08/09/2021        Sincerely,        Bryan De Leon MD

## 2021-08-20 ENCOUNTER — TELEPHONE (OUTPATIENT)
Dept: OPTOMETRY | Facility: CLINIC | Age: 56
End: 2021-08-20

## 2021-08-20 NOTE — TELEPHONE ENCOUNTER
His cataract has gotten worse and he wants to make an appointment for. Does he need to see Dr Isbell first or can he schedule with ophthalmology?

## 2021-08-24 ENCOUNTER — OFFICE VISIT (OUTPATIENT)
Dept: FAMILY MEDICINE | Facility: CLINIC | Age: 56
End: 2021-08-24
Payer: COMMERCIAL

## 2021-08-24 VITALS
BODY MASS INDEX: 29.56 KG/M2 | TEMPERATURE: 97.8 F | DIASTOLIC BLOOD PRESSURE: 85 MMHG | HEART RATE: 88 BPM | SYSTOLIC BLOOD PRESSURE: 138 MMHG | WEIGHT: 206 LBS | OXYGEN SATURATION: 99 %

## 2021-08-24 DIAGNOSIS — F41.9 ANXIETY: Primary | ICD-10-CM

## 2021-08-24 DIAGNOSIS — F32.2 SEVERE MAJOR DEPRESSION (H): ICD-10-CM

## 2021-08-24 DIAGNOSIS — F10.11 HISTORY OF ETOH ABUSE: ICD-10-CM

## 2021-08-24 PROCEDURE — 99213 OFFICE O/P EST LOW 20 MIN: CPT | Performed by: FAMILY MEDICINE

## 2021-08-24 ASSESSMENT — ANXIETY QUESTIONNAIRES
2. NOT BEING ABLE TO STOP OR CONTROL WORRYING: MORE THAN HALF THE DAYS
6. BECOMING EASILY ANNOYED OR IRRITABLE: SEVERAL DAYS
5. BEING SO RESTLESS THAT IT IS HARD TO SIT STILL: NOT AT ALL
GAD7 TOTAL SCORE: 11
3. WORRYING TOO MUCH ABOUT DIFFERENT THINGS: MORE THAN HALF THE DAYS
IF YOU CHECKED OFF ANY PROBLEMS ON THIS QUESTIONNAIRE, HOW DIFFICULT HAVE THESE PROBLEMS MADE IT FOR YOU TO DO YOUR WORK, TAKE CARE OF THINGS AT HOME, OR GET ALONG WITH OTHER PEOPLE: SOMEWHAT DIFFICULT
1. FEELING NERVOUS, ANXIOUS, OR ON EDGE: NEARLY EVERY DAY
7. FEELING AFRAID AS IF SOMETHING AWFUL MIGHT HAPPEN: NOT AT ALL

## 2021-08-24 ASSESSMENT — PATIENT HEALTH QUESTIONNAIRE - PHQ9
5. POOR APPETITE OR OVEREATING: NEARLY EVERY DAY
SUM OF ALL RESPONSES TO PHQ QUESTIONS 1-9: 9

## 2021-08-24 NOTE — PROGRESS NOTES
Subjective   Gunnar is a 55 year old who presents for the following health issues    HPI     Discuss ?depression/anxiety       Review of Systems    some neck pain, will go back to therapy for that    Feels depressed    A couple weeks ago woke up, felt like he could not go in to work    Drank for 4 days heavily ( had not drank in 3 years )    History of alcohol problems    Went through treatment in past    Not back to work      [employer] parts manufacturing    [employer] did not work at all    Grown kids    Exercise until about the last month    No trigger for this     Started feeling bad about a month ago    Not talking to many people    Lives by self    No sig other right now          Objective    BP (!) 149/102 (BP Location: Right arm, Patient Position: Chair, Cuff Size: Adult Regular)   Pulse 88   Temp 97.8  F (36.6  C) (Temporal)   Wt 93.4 kg (206 lb)   SpO2 99%   BMI 29.56 kg/m    Body mass index is 29.56 kg/m .  Physical Exam    Full physical not done     Mentation fine    No tremor of speech or extremity    See phq9 and gad7            ASSESSMENT / PLAN:  (F41.9) Anxiety  (primary encounter diagnosis)  Comment: discussed in detail with patient.  Needs both therapy and medication.    Plan: MENTAL HEALTH REFERRAL  - Adult; Outpatient         Treatment; Individual/Couples/Family/Group         Therapy/Health Psychology; [facility] [phone]; We will contact you to         schedule the appointment or please call with         any questions, sertraline (ZOLOFT) 50 MG tablet        See me in 4-6 weeks, sooner if needed.  Call with problems. We also did the [facility] paperwork.     (F32.2) Severe major depression (H)  Comment: as above   Plan: MENTAL HEALTH REFERRAL  - Adult; Outpatient         Treatment; Individual/Couples/Family/Group         Therapy/Health Psychology; [facility] [phone]; We will contact you to         schedule the  appointment or please call with         any questions, sertraline (ZOLOFT) 50 MG tablet        As above     (F10.11) History of ETOH abuse  Comment: patient not on etoh right now. Had been sober for 3 years before recent 4 day episode.    Plan: MENTAL HEALTH REFERRAL  - Adult; Outpatient         Treatment; Individual/Couples/Family/Group         Therapy/Health Psychology; [facility] [phone]; We will contact you to         schedule the appointment or please call with         any questions             Patient has no thoughts of hurting self       I reviewed the patient's medications, allergies, medical history, family history, and social history.    Greg Salas MD

## 2021-08-25 ENCOUNTER — TELEPHONE (OUTPATIENT)
Dept: FAMILY MEDICINE | Facility: CLINIC | Age: 56
End: 2021-08-25

## 2021-08-25 ASSESSMENT — ANXIETY QUESTIONNAIRES: GAD7 TOTAL SCORE: 11

## 2021-08-25 ASSESSMENT — ASTHMA QUESTIONNAIRES: ACT_TOTALSCORE: 25

## 2021-09-02 ENCOUNTER — TELEPHONE (OUTPATIENT)
Dept: FAMILY MEDICINE | Facility: CLINIC | Age: 56
End: 2021-09-02

## 2021-09-02 NOTE — TELEPHONE ENCOUNTER
Patient calling reporting his HR department has not received the completed Attending physician's statement form from 8/30/2021. It was faxed to somewhere, but encounter did not state where. TC could not find it in the media tab. Please fax to his HR Dept. Attn: Sandy Radha. # [phone]. She also needs to speak to the TC in regards to the patient. Sandy Garcia # [phone].Patient asks to please call him when this has been done.  Thank you,  Bassem Eaton

## 2021-09-15 ENCOUNTER — OFFICE VISIT (OUTPATIENT)
Dept: OPHTHALMOLOGY | Facility: CLINIC | Age: 56
End: 2021-09-15
Payer: COMMERCIAL

## 2021-09-15 DIAGNOSIS — H25.041 POSTERIOR SUBCAPSULAR AGE-RELATED CATARACT OF RIGHT EYE: Primary | ICD-10-CM

## 2021-09-15 PROCEDURE — 92004 COMPRE OPH EXAM NEW PT 1/>: CPT | Performed by: STUDENT IN AN ORGANIZED HEALTH CARE EDUCATION/TRAINING PROGRAM

## 2021-09-15 ASSESSMENT — REFRACTION_WEARINGRX
OD_SPHERE: -1.00
OS_ADD: +2.00
OS_CYLINDER: +0.75
OS_SPHERE: -1.00
SPECS_TYPE: BIFOCAL
OS_AXIS: 085
OD_AXIS: 085
OD_CYLINDER: +1.00
OD_ADD: +2.25

## 2021-09-15 ASSESSMENT — REFRACTION_MANIFEST
OD_CYLINDER: +0.75
OS_AXIS: 090
OS_ADD: +2.25
OS_CYLINDER: +0.75
OD_AXIS: 090
OD_SPHERE: -1.75
OS_SPHERE: -1.25
OD_ADD: +2.25

## 2021-09-15 ASSESSMENT — VISUAL ACUITY
CORRECTION_TYPE: GLASSES
OS_CC: 20/20
METHOD: SNELLEN - LINEAR
OD_PH_CC: 20/50
OD_CC: 20/150
OS_CC+: -1

## 2021-09-15 ASSESSMENT — TONOMETRY
OD_IOP_MMHG: 20
OS_IOP_MMHG: 21
IOP_METHOD: APPLANATION

## 2021-09-15 ASSESSMENT — CONF VISUAL FIELD
OS_NORMAL: 1
METHOD: COUNTING FINGERS
OD_NORMAL: 1

## 2021-09-15 ASSESSMENT — SLIT LAMP EXAM - LIDS
COMMENTS: NORMAL
COMMENTS: NORMAL

## 2021-09-15 ASSESSMENT — EXTERNAL EXAM - LEFT EYE: OS_EXAM: NORMAL

## 2021-09-15 ASSESSMENT — CUP TO DISC RATIO: OD_RATIO: 0.45

## 2021-09-15 ASSESSMENT — EXTERNAL EXAM - RIGHT EYE: OD_EXAM: NORMAL

## 2021-09-15 NOTE — PROGRESS NOTES
Current Eye Medications:  none     Subjective:  Decreased vision right eye, slowly getting worse last 3 months. Vision is OK left eye. No eye pain or discomfort in either eye.     No previous eye injuries or surgeries.     Objective:  See Ophthalmology Exam.      Assessment:  Bairon Reed is a 55 year old male who presents with:   Encounter Diagnosis   Name Primary?     Posterior subcapsular age-related cataract of right eye Visually significant right eye. Dil 7 (h/o BPH). Probable Trypan. Anticipate targeting mostly distance right eye.        Plan:  Offered cataract surgery of the right eye at [facility] or the [facility] [place]  We will call you to schedule your appointments.    Pedro Luis Rahman MD  [phone]

## 2021-09-15 NOTE — LETTER
9/15/2021         RE: Bairon Reed  [address] [place]  [place] [address]        Dear Colleague,    Thank you for referring your patient, Bairon Reed, to the [facility]. Please see a copy of my visit note below.     Current Eye Medications:  none     Subjective:  Decreased vision right eye, slowly getting worse last 3 months. Vision is OK left eye. No eye pain or discomfort in either eye.     No previous eye injuries or surgeries.     Objective:  See Ophthalmology Exam.      Assessment:  Bairon Reed is a 55 year old male who presents with:   Encounter Diagnosis   Name Primary?     Posterior subcapsular age-related cataract of right eye Visually significant right eye. Dil 7 (h/o BPH). Probable Trypan. Anticipate targeting mostly distance right eye.        Plan:  Offered cataract surgery of the right eye at [facility] or the [facility] [place]  We will call you to schedule your appointments.    Pedro Luis Rahman MD  [phone]              Again, thank you for allowing me to participate in the care of your patient.        Sincerely,        Pedro Luis Rahman MD

## 2021-09-15 NOTE — PATIENT INSTRUCTIONS
Offered cataract surgery of the right eye at [facility] or the [facility]  We will call you to schedule your appointments    Pedro Luis Rahman MD  [phone]

## 2021-09-20 ENCOUNTER — OFFICE VISIT (OUTPATIENT)
Dept: FAMILY MEDICINE | Facility: CLINIC | Age: 56
End: 2021-09-20
Payer: COMMERCIAL

## 2021-09-20 VITALS
WEIGHT: 207.38 LBS | TEMPERATURE: 97.6 F | DIASTOLIC BLOOD PRESSURE: 86 MMHG | HEART RATE: 116 BPM | BODY MASS INDEX: 29.76 KG/M2 | SYSTOLIC BLOOD PRESSURE: 133 MMHG

## 2021-09-20 DIAGNOSIS — F32.2 SEVERE MAJOR DEPRESSION (H): ICD-10-CM

## 2021-09-20 DIAGNOSIS — F10.11 HISTORY OF ETOH ABUSE: ICD-10-CM

## 2021-09-20 DIAGNOSIS — F41.9 ANXIETY: Primary | ICD-10-CM

## 2021-09-20 PROCEDURE — 99213 OFFICE O/P EST LOW 20 MIN: CPT | Performed by: FAMILY MEDICINE

## 2021-09-20 NOTE — PROGRESS NOTES
Jazlyn Maher is a 55 year old who presents for the following health issues    HPI     Follow up on alcohol treatment and needing forms signed for disability       Review of Systems    had assessments done    Actual treatment for etoh starts this Wed    3x weekly for 6 weeks, then tapers down from there  8am to 1 pm those days    Then willl go down to one day per week and he thinks he could start work at that time      Through April    Seeing therapist / psychologist weekly    Neck okay, some good/ bad days  \  No side effects on sertraline    No etoh recently     No other substances          Objective    BP (!) 152/98 (BP Location: Right arm, Patient Position: Chair, Cuff Size: Adult Regular)   Pulse 116   Temp 97.6  F (36.4  C) (Temporal)   Wt 94.1 kg (207 lb 6 oz)   BMI 29.76 kg/m    Body mass index is 29.76 kg/m .  Physical Exam   Full physical not done     Mentation and affect are fine    No tremor of speech or extremity         ASSESSMENT / PLAN:  (F41.9) Anxiety  (primary encounter diagnosis)  Comment: discussed in detail with patient.  The sertraline is helping some, no side effects.  Stay on same dose.    Plan: sertraline (ZOLOFT) 50 MG tablet             (F32.2) Severe major depression (H)  Comment: as above   Plan: sertraline (ZOLOFT) 50 MG tablet        Also continue seeing therapist     (F10.11) History of ETOH abuse  Comment: patient to start treatment program this week  Plan: as above    See letter ; we will  Fax this to disability number listed on sheet he showed us    Also gave copy to patient    See us a couple weeks prior to cataract procedure        I reviewed the patient's medications, allergies, medical history, family history, and social history.    Greg Salas MD

## 2021-09-20 NOTE — LETTER
[facility]  [address]  [place] [address]  [phone]                    To whom it may concern :    Bairon Reed [birth year] is patient of mine. He is currently on short term disability.    To clarify dates of disability, he now needs to be off work through 2021.    Plan return to work normal duties/ hours on 2021.    He is participating in a treatment program.                  Sincerely,                      Greg Salas MD

## 2021-09-20 NOTE — PATIENT INSTRUCTIONS
Continue sertraline and exercise    See letter    See us for a preop a couple weeks before cataract

## 2021-09-20 NOTE — LETTER
My Depression Action Plan  Name: Bairon Reed   Date of Birth 1965  Date: 9/20/2021    My doctor: Greg Salas   My clinic: [address]  [place] [address]  [phone]          GREEN    ZONE   Good Control    What it looks like:     Things are going generally well. You have normal ups and downs. You may even feel depressed from time to time, but bad moods usually last less than a day.   What you need to do:  1. Continue to care for yourself (see self care plan)  2. Check your depression survival kit and update it as needed  3. Follow your physician s recommendations including any medication.  4. Do not stop taking medication unless you consult with your physician first.           YELLOW         ZONE Getting Worse    What it looks like:     Depression is starting to interfere with your life.     It may be hard to get out of bed; you may be starting to isolate yourself from others.    Symptoms of depression are starting to last most all day and this has happened for several days.     You may have suicidal thoughts but they are not constant.   What you need to do:     1. Call your care team. Your response to treatment will improve if you keep your care team informed of your progress. Yellow periods are signs an adjustment may need to be made.     2. Continue your self-care.  Just get dressed and ready for the day.  Don't give yourself time to talk yourself out of it.    3. Talk to someone in your support network.    4. Open up your Depression Self-Care Plan/Wellness Kit.           RED    ZONE Medical Alert - Get Help    What it looks like:     Depression is seriously interfering with your life.     You may experience these or other symptoms: You can t get out of bed most days, can t work or engage in other necessary activities, you have trouble taking care of basic hygiene, or basic responsibilities, thoughts of suicide or death that will not  go away, self-injurious behavior.     What you need to do:  1. Call your care team and request a same-day appointment. If they are not available (weekends or after hours) call your local crisis line, emergency room or 911.          Depression Self-Care Plan / Wellness Kit    Many people find that medication and therapy are helpful treatments for managing depression. In addition, making small changes to your everyday life can help to boost your mood and improve your wellbeing. Below are some tips for you to consider. Be sure to talk with your medical provider and/or behavioral health consultant if your symptoms are worsening or not improving.     Sleep   Sleep hygiene  means all of the habits that support good, restful sleep. It includes maintaining a consistent bedtime and wake time, using your bedroom only for sleeping or sex, and keeping the bedroom dark and free of distractions like a computer, smartphone, or television.     Develop a Healthy Routine  Maintain good hygiene. Get out of bed in the morning, make your bed, brush your teeth, take a shower, and get dressed. Don t spend too much time viewing media that makes you feel stressed. Find time to relax each day.    Exercise  Get some form of exercise every day. This will help reduce pain and release endorphins, the  feel good  chemicals in your brain. It can be as simple as just going for a walk or doing some gardening, anything that will get you moving.      Diet  Strive to eat healthy foods, including fruits and vegetables. Drink plenty of water. Avoid excessive sugar, caffeine, alcohol, and other mood-altering substances.     Stay Connected with Others  Stay in touch with friends and family members.    Manage Your Mood  Try deep breathing, massage therapy, biofeedback, or meditation. Take part in fun activities when you can. Try to find something to smile about each day.     Psychotherapy  Be open to working with a therapist if your provider recommends it.      Medication  Be sure to take your medication as prescribed. Most anti-depressants need to be taken every day. It usually takes several weeks for medications to work. Not all medicines work for all people. It is important to follow-up with your provider to make sure you have a treatment plan that is working for you. Do not stop your medication abruptly without first discussing it with your provider.    Crisis Resources   These hotlines are for both adults and children. They and are open 24 hours a day, 7 days a week unless noted otherwise.      National Suicide Prevention Lifeline   1-554-158-TALK (8933)      Crisis Text Line    www.crisistextline.org  Text HOME to 811421 from anywhere in the [place], anytime, about any type of crisis. A live, trained crisis counselor will receive the text and respond quickly.      [place] Lifeline for LGBTQ Youth  A national crisis intervention and suicide lifeline for LGBTQ youth under 25. Provides a safe place to talk without judgement. Call 1-497.805.9082; text START to 724240 or visit www.thetrevorproject.org to talk to a trained counselor.      For [facility] crisis numbers, visit the [facility] website at:  https://[place].gov/dhs/people-we-serve/adults/health-care/mental-health/resources/crisis-contacts.jsp

## 2021-09-22 ENCOUNTER — PREP FOR PROCEDURE (OUTPATIENT)
Dept: OPHTHALMOLOGY | Facility: CLINIC | Age: 56
End: 2021-09-22

## 2021-09-22 DIAGNOSIS — H25.811 COMBINED FORM OF AGE-RELATED CATARACT, RIGHT EYE: Primary | ICD-10-CM

## 2021-09-23 ENCOUNTER — TELEPHONE (OUTPATIENT)
Dept: OPHTHALMOLOGY | Facility: CLINIC | Age: 56
End: 2021-09-23

## 2021-09-23 PROBLEM — H25.811 COMBINED FORM OF AGE-RELATED CATARACT, RIGHT EYE: Status: ACTIVE | Noted: 2021-09-23

## 2021-09-23 NOTE — TELEPHONE ENCOUNTER
Type of surgery: Right phacoemulsification cataract with intraocular lens implant right   CPT 94617   Posterior subcapsular age-related cataract of right eye H25.041    Location of surgery: [facility]  Date and time of surgery: 10/25/2021  Surgeon: Josiah  Pre-Op Appt Date: 10/12/2021  Post-Op Appt Date: 10/26/2021   Packet sent out: Yes  Pre-cert/Authorization completed:    No prior auth needed for [facility]  Date: 09/27/21    Thank you,   Katy Cohen   Prior Authorization [facility]  [phone]

## 2021-09-26 ENCOUNTER — HEALTH MAINTENANCE LETTER (OUTPATIENT)
Age: 56
End: 2021-09-26

## 2021-09-29 DIAGNOSIS — Z11.59 ENCOUNTER FOR SCREENING FOR OTHER VIRAL DISEASES: ICD-10-CM

## 2021-10-12 ENCOUNTER — TELEPHONE (OUTPATIENT)
Dept: FAMILY MEDICINE | Facility: CLINIC | Age: 56
End: 2021-10-12

## 2021-10-12 ENCOUNTER — OFFICE VISIT (OUTPATIENT)
Dept: FAMILY MEDICINE | Facility: CLINIC | Age: 56
End: 2021-10-12
Payer: COMMERCIAL

## 2021-10-12 VITALS
BODY MASS INDEX: 28.91 KG/M2 | TEMPERATURE: 97.2 F | SYSTOLIC BLOOD PRESSURE: 115 MMHG | WEIGHT: 201.5 LBS | HEART RATE: 83 BPM | DIASTOLIC BLOOD PRESSURE: 80 MMHG

## 2021-10-12 DIAGNOSIS — H26.9 CATARACT OF RIGHT EYE, UNSPECIFIED CATARACT TYPE: ICD-10-CM

## 2021-10-12 DIAGNOSIS — Z01.818 PREOP GENERAL PHYSICAL EXAM: Primary | ICD-10-CM

## 2021-10-12 DIAGNOSIS — R73.01 IMPAIRED FASTING GLUCOSE: ICD-10-CM

## 2021-10-12 DIAGNOSIS — E78.5 HYPERLIPIDEMIA LDL GOAL <100: ICD-10-CM

## 2021-10-12 LAB
ALBUMIN SERPL-MCNC: 4.1 G/DL (ref 3.4–5)
ALP SERPL-CCNC: 63 U/L (ref 40–150)
ALT SERPL W P-5'-P-CCNC: 104 U/L (ref 0–70)
ANION GAP SERPL CALCULATED.3IONS-SCNC: 5 MMOL/L (ref 3–14)
AST SERPL W P-5'-P-CCNC: 52 U/L (ref 0–45)
BASOPHILS # BLD AUTO: 0 10E3/UL (ref 0–0.2)
BASOPHILS NFR BLD AUTO: 0 %
BILIRUB SERPL-MCNC: 0.6 MG/DL (ref 0.2–1.3)
BUN SERPL-MCNC: 14 MG/DL (ref 7–30)
CALCIUM SERPL-MCNC: 9.2 MG/DL (ref 8.5–10.1)
CHLORIDE BLD-SCNC: 107 MMOL/L (ref 94–109)
CHOLEST SERPL-MCNC: 166 MG/DL
CO2 SERPL-SCNC: 27 MMOL/L (ref 20–32)
CREAT SERPL-MCNC: 0.95 MG/DL (ref 0.66–1.25)
EOSINOPHIL # BLD AUTO: 0.2 10E3/UL (ref 0–0.7)
EOSINOPHIL NFR BLD AUTO: 4 %
ERYTHROCYTE [DISTWIDTH] IN BLOOD BY AUTOMATED COUNT: 12.6 % (ref 10–15)
FASTING STATUS PATIENT QL REPORTED: YES
GFR SERPL CREATININE-BSD FRML MDRD: 90 ML/MIN/1.73M2
GLUCOSE BLD-MCNC: 131 MG/DL (ref 70–99)
HBA1C MFR BLD: 6.3 % (ref 0–5.6)
HCT VFR BLD AUTO: 40.4 % (ref 40–53)
HDLC SERPL-MCNC: 44 MG/DL
HGB BLD-MCNC: 14.3 G/DL (ref 13.3–17.7)
LDLC SERPL CALC-MCNC: 100 MG/DL
LYMPHOCYTES # BLD AUTO: 1.9 10E3/UL (ref 0.8–5.3)
LYMPHOCYTES NFR BLD AUTO: 39 %
MCH RBC QN AUTO: 32.1 PG (ref 26.5–33)
MCHC RBC AUTO-ENTMCNC: 35.4 G/DL (ref 31.5–36.5)
MCV RBC AUTO: 91 FL (ref 78–100)
MONOCYTES # BLD AUTO: 0.6 10E3/UL (ref 0–1.3)
MONOCYTES NFR BLD AUTO: 13 %
NEUTROPHILS # BLD AUTO: 2.1 10E3/UL (ref 1.6–8.3)
NEUTROPHILS NFR BLD AUTO: 44 %
NONHDLC SERPL-MCNC: 122 MG/DL
PLATELET # BLD AUTO: 213 10E3/UL (ref 150–450)
POTASSIUM BLD-SCNC: 4.3 MMOL/L (ref 3.4–5.3)
PROT SERPL-MCNC: 6.9 G/DL (ref 6.8–8.8)
RBC # BLD AUTO: 4.45 10E6/UL (ref 4.4–5.9)
SODIUM SERPL-SCNC: 139 MMOL/L (ref 133–144)
TRIGL SERPL-MCNC: 109 MG/DL
WBC # BLD AUTO: 4.8 10E3/UL (ref 4–11)

## 2021-10-12 PROCEDURE — 99214 OFFICE O/P EST MOD 30 MIN: CPT | Performed by: FAMILY MEDICINE

## 2021-10-12 PROCEDURE — 80053 COMPREHEN METABOLIC PANEL: CPT | Performed by: FAMILY MEDICINE

## 2021-10-12 PROCEDURE — 80061 LIPID PANEL: CPT | Performed by: FAMILY MEDICINE

## 2021-10-12 PROCEDURE — 85025 COMPLETE CBC W/AUTO DIFF WBC: CPT | Performed by: FAMILY MEDICINE

## 2021-10-12 PROCEDURE — 83036 HEMOGLOBIN GLYCOSYLATED A1C: CPT | Performed by: FAMILY MEDICINE

## 2021-10-12 PROCEDURE — 36415 COLL VENOUS BLD VENIPUNCTURE: CPT | Performed by: FAMILY MEDICINE

## 2021-10-12 NOTE — H&P (VIEW-ONLY)
[facility]  [address] [place] [address]  [place] [address]  Phone: [phone]  Primary Provider: Lorenza Montoya  Pre-op Performing Provider: LORENZA MONTOYA       PREOPERATIVE EVALUATION:  Today's date: 10/12/2021    Bairon Reed is a 55 year old male who presents for a preoperative evaluation.    Surgical Information:  Surgery/Procedure: right eye cataract  Surgery Location:   Surgeon: Josiah  Surgery Date: 10/25/2021  Time of Surgery: am  Where patient plans to recover: At home alone  Fax number for surgical facility: Note does not need to be faxed, will be available electronically in Epic.    Type of Anesthesia Anticipated: Local         Subjective     HPI related to upcoming procedure: 55 year old male to have cataract procedure right eye 10-25    Preop Questions 10/12/2021   1. Have you ever had a heart attack or stroke? No   2. Have you ever had surgery on your heart or blood vessels, such as a stent placement, a coronary artery bypass, or surgery on an artery in your head, neck, heart, or legs? No   3. Do you have chest pain with activity? No   4. Do you have a history of  heart failure? No   5. Do you currently have a cold, bronchitis or symptoms of other infection? No   6. Do you have a cough, shortness of breath, or wheezing? No   7. Do you or anyone in your family have previous history of blood clots? No   8. Do you or does anyone in your family have a serious bleeding problem such as prolonged bleeding following surgeries or cuts? No   9. Have you ever had problems with anemia or been told to take iron pills? No   10. Have you had any abnormal blood loss such as black, tarry or bloody stools? No   11. Have you ever had a blood transfusion? No   12. Are you willing to have a blood transfusion if it is medically needed before, during, or after your surgery? Yes   13. Have you or any of your relatives ever had problems with anesthesia? No   14. Do you have sleep apnea,  excessive snoring or daytime drowsiness? No   15. Do you have any artifical heart valves or other implanted medical devices like a pacemaker, defibrillator, or continuous glucose monitor? No   16. Do you have artificial joints? No   17. Are you allergic to latex? No       Health Care Directive:  Patient does not have a Health Care Directive or Living Will:     Preoperative Review of :  Patient not on any controlled substances           Review of Systems  Constitutional, neuro, ENT, endocrine, pulmonary, cardiac, gastrointestinal, genitourinary, musculoskeletal, integument and psychiatric systems are negative, except as otherwise noted.    No recent illnesses    Feeling good overall    Staying off etoh    Exercising    Losing wt on purpose         Patient Active Problem List    Diagnosis Date Noted     Combined form of age-related cataract, right eye 09/23/2021     Priority: Medium     Added automatically from request for surgery [id number]       Asthma 05/26/2021     Priority: Medium     Posterior subcapsular polar age-related cataract of right eye 03/11/2021     Priority: Medium     Gout, unspecified cause, unspecified chronicity, unspecified site 03/04/2016     Priority: Medium     Benign non-nodular prostatic hyperplasia with lower urinary tract symptoms 01/25/2016     Priority: Medium     Hypertension goal BP (blood pressure) < 140/90 02/10/2015     Priority: Medium     S/P lumbar microdiscectomy 05/21/2013     Priority: Medium     Lumbar stenosis with neurogenic claudication 05/13/2013     Priority: Medium     Lumbar radiculopathy 05/13/2013     Priority: Medium     Lumbar herniated disc 05/13/2013     Priority: Medium     Foot drop, right 04/15/2013     Priority: Medium     S/P spinal fusion 03/22/2013     Priority: Medium     Cervical radiculopathy 03/05/2013     Priority: Medium     Vitamin D deficiency 02/12/2013     Priority: Medium     Problem list name updated by automated process. 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     Priority: Medium     rt       Bilateral arm weakness 09/13/2011     Priority: Medium     Hyperlipidemia LDL goal <160 11/18/2009     Priority: Medium     Insomnia 11/18/2009     Priority: Medium      Past Medical History:   Diagnosis Date     Anxiety      Cervical spondylosis with myelopathy 2/4/2013     Cervicalgia      Chronic low back pain 9/13/2011     Chronic low back pain      Degeneration of cervical intervertebral disc      Gout      Hyperlipidemia LDL goal <160 11/18/2009     Hypertension goal BP (blood pressure) < 140/90 2/10/2015     Insomnia 11/18/2009     Nondependent alcohol abuse 11/14/2012     Pain in shoulder 9/13/2011     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 Lower back surgery     BACK SURGERY      another lumbar procedure spine 2019     COLONOSCOPY WITH CO2 INSUFFLATION N/A 2/21/2020    Procedure: COLONOSCOPY, WITH CO2 INSUFFLATION;  Surgeon: Yasmani Constantino MD;  Location: [facility]     DISCECTOMY LUMBAR POSTERIOR MICROSCOPIC ONE LEVEL  5/17/2013    Procedure: DISCECTOMY LUMBAR POSTERIOR MICROSCOPIC ONE LEVEL;  BILATERAL LUMBAR L4-5 EXTENDED HEMILAMINECTOMY, MICROFORAMINOTOMY AND MICRODISCECTOMY WITH METRIX II;  Surgeon: Andrew Alejandre MD;  Location: [facility] OR     EP ABLATION      age 19 heart ablation     FUSION CERVICAL ANTERIOR ONE LEVEL  3/5/2013    Procedure: FUSION CERVICAL  ANTERIOR ONE LEVEL;  C 6-7 ANTERIOR DISCECTOMY AND FUSION WITH NEURO MONITORING ;  Surgeon: Andrew Alejandre MD;  Location: [facility] OR     KNEE SURGERY  2009    Right     LARYNGOSCOPY WITH MICROSCOPE  5/28/2013    Procedure: LARYNGOSCOPY WITH MICROSCOPE;  MICRODIRECT LARYNGOSCOPY, WITH LEFT VOCAL CORD INJECTION ;  Surgeon: Jorge Vergara MD;  Location:  [facility]     neck sugery      2019 spine surgery cleaned up     NECK SURGERY  2/2010    [facility]   knee meniscus procedure also      Current Outpatient Medications   Medication Sig Dispense Refill     allopurinol (ZYLOPRIM) 100 MG tablet Take 1 tablet (100 mg) by mouth daily 90 tablet 3     calcium carbonate (OS-HANNAH 500 MG [place]. [place]) 500 MG tablet Take 500 mg by mouth daily       Cholecalciferol (VITAMIN D3 PO) Take 1,000 Units by mouth daily       glucosamine-chondroitin 500-400 MG CAPS Take 1 capsule by mouth daily       indomethacin (INDOCIN) 50 MG capsule Take 1 capsule (50 mg) by mouth 3 times daily as needed for moderate pain 30 capsule 4     lisinopril (ZESTRIL) 5 MG tablet TAKE ONE TABLET BY MOUTH ONCE DAILY 30 tablet 0     Multiple Vitamins-Minerals (MULTIVITAMIN OR) Take 1 tablet by mouth daily        Multiple Vitamins-Minerals (ZINC PO)        Omega-3 Fatty Acids (OMEGA-3 FISH OIL PO) Take 2 g by mouth daily       sertraline (ZOLOFT) 50 MG tablet 1 po daily 30 tablet 1     tadalafil (CIALIS) 20 MG tablet 1/2 po as needed for intercourse 6 tablet 1     VITAMIN E NATURAL PO Take 100 Units by mouth     head pounds when takes lisinopril so not taking that currently      No Known Allergies     Social History     Tobacco Use     Smoking status: Never Smoker     Smokeless tobacco: Current User     Types: Chew     Tobacco comment: Lives in smoke free household   Substance Use Topics     Alcohol use: No     Alcohol/week: 0.0 standard drinks     Comment: Quit 2010. Chemical dependency treatment in 1989. ...31 yrs of 1.5 tins weekly.  sober for 7 months..          History   Drug Use No         Objective     BP (!) 145/94 (BP Location: Left arm, Patient Position: Chair, Cuff Size: Adult Regular)   Pulse 83   Temp 97.2  F (36.2  C) (Temporal)   Wt 91.4 kg (201 lb 8 oz)   BMI 28.91 kg/m      Physical Exam    GENERAL APPEARANCE: healthy, alert and no distress     EYES: EOMI,  PERRL     HENT: ear canals and TM's normal and nose and mouth without ulcers or lesions     NECK: no adenopathy, no asymmetry, masses, or scars and thyroid normal to palpation     RESP: lungs clear to auscultation - no rales, rhonchi or wheezes     CV: regular rates and rhythm, normal S1 S2, no S3 or S4 and no murmur, click or rub     ABDOMEN:  soft, nontender, no HSM or masses and bowel sounds normal     MS: extremities normal- no gross deformities noted, no evidence of inflammation in joints, FROM in all extremities.     SKIN: no suspicious lesions or rashes     NEURO: Normal strength and tone, sensory exam grossly normal, mentation intact and speech normal     PSYCH: mentation appears normal. and affect normal/bright     LYMPHATICS: No cervical adenopathy    Recent Labs   Lab Test 01/14/21  1201 12/17/19  0912   HGB 15.1 15.6    285    138   POTASSIUM 4.0 4.3   CR 0.92 0.99   A1C 6.1* 6.0*        Diagnostics:  Labs drawn, pending      Patient fasting today      ASSESSMENT / PLAN:  (Z01.818) Preop general physical exam  (primary encounter diagnosis)  Comment: patient in good general  health  Plan: Comprehensive metabolic panel, CBC with         Platelets & Differential        Check labs     (H26.9) Cataract of right eye, unspecified cataract type  Comment: patient to have procedure  Plan: okay for procedure    (E78.5) Hyperlipidemia LDL goal <100  Comment: patient fasting, wants chol checked  Plan: Lipid panel reflex to direct LDL Fasting             (R73.01) Impaired fasting glucose  Comment: 6.1 earlier this year but patient healthier now.  Recheck this.   Plan: Hemoglobin A1c              No history of anesthesia problems    No bleeding or clotting problems      I reviewed the patient's medications, allergies, medical history, family history, and social history.    Greg Salas MD          Revised Cardiac Risk Index (RCRI):  The patient has the following serious cardiovascular risks for perioperative complications:   - No serious cardiac risks = 0 points     Patient had ablation in his 20s, no heart problems since    No chest pain/  Breathing problems    RCRI Interpretation: 0 points: Class I (very low risk - 0.4% complication rate)           Signed Electronically by: Greg Salas MD  Copy of this evaluation report is provided to requesting physician.

## 2021-10-12 NOTE — PROGRESS NOTES
[facility]  [address] [place] [address]  [place] [address]  Phone: [phone]  Primary Provider: Lorenza Montoya  Pre-op Performing Provider: LORENZA MONTOYA       PREOPERATIVE EVALUATION:  Today's date: 10/12/2021    Bairon Reed is a 55 year old male who presents for a preoperative evaluation.    Surgical Information:  Surgery/Procedure: right eye cataract  Surgery Location:   Surgeon: Josiah  Surgery Date: 10/25/2021  Time of Surgery: am  Where patient plans to recover: At home alone  Fax number for surgical facility: Note does not need to be faxed, will be available electronically in Epic.    Type of Anesthesia Anticipated: Local         Subjective     HPI related to upcoming procedure: 55 year old male to have cataract procedure right eye 10-25    Preop Questions 10/12/2021   1. Have you ever had a heart attack or stroke? No   2. Have you ever had surgery on your heart or blood vessels, such as a stent placement, a coronary artery bypass, or surgery on an artery in your head, neck, heart, or legs? No   3. Do you have chest pain with activity? No   4. Do you have a history of  heart failure? No   5. Do you currently have a cold, bronchitis or symptoms of other infection? No   6. Do you have a cough, shortness of breath, or wheezing? No   7. Do you or anyone in your family have previous history of blood clots? No   8. Do you or does anyone in your family have a serious bleeding problem such as prolonged bleeding following surgeries or cuts? No   9. Have you ever had problems with anemia or been told to take iron pills? No   10. Have you had any abnormal blood loss such as black, tarry or bloody stools? No   11. Have you ever had a blood transfusion? No   12. Are you willing to have a blood transfusion if it is medically needed before, during, or after your surgery? Yes   13. Have you or any of your relatives ever had problems with anesthesia? No   14. Do you have sleep apnea,  excessive snoring or daytime drowsiness? No   15. Do you have any artifical heart valves or other implanted medical devices like a pacemaker, defibrillator, or continuous glucose monitor? No   16. Do you have artificial joints? No   17. Are you allergic to latex? No       Health Care Directive:  Patient does not have a Health Care Directive or Living Will:     Preoperative Review of :  Patient not on any controlled substances           Review of Systems  Constitutional, neuro, ENT, endocrine, pulmonary, cardiac, gastrointestinal, genitourinary, musculoskeletal, integument and psychiatric systems are negative, except as otherwise noted.    No recent illnesses    Feeling good overall    Staying off etoh    Exercising    Losing wt on purpose         Patient Active Problem List    Diagnosis Date Noted     Combined form of age-related cataract, right eye 09/23/2021     Priority: Medium     Added automatically from request for surgery [id number]       Asthma 05/26/2021     Priority: Medium     Posterior subcapsular polar age-related cataract of right eye 03/11/2021     Priority: Medium     Gout, unspecified cause, unspecified chronicity, unspecified site 03/04/2016     Priority: Medium     Benign non-nodular prostatic hyperplasia with lower urinary tract symptoms 01/25/2016     Priority: Medium     Hypertension goal BP (blood pressure) < 140/90 02/10/2015     Priority: Medium     S/P lumbar microdiscectomy 05/21/2013     Priority: Medium     Lumbar stenosis with neurogenic claudication 05/13/2013     Priority: Medium     Lumbar radiculopathy 05/13/2013     Priority: Medium     Lumbar herniated disc 05/13/2013     Priority: Medium     Foot drop, right 04/15/2013     Priority: Medium     S/P spinal fusion 03/22/2013     Priority: Medium     Cervical radiculopathy 03/05/2013     Priority: Medium     Vitamin D deficiency 02/12/2013     Priority: Medium     Problem list name updated by automated process. 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     Priority: Medium     rt       Bilateral arm weakness 09/13/2011     Priority: Medium     Hyperlipidemia LDL goal <160 11/18/2009     Priority: Medium     Insomnia 11/18/2009     Priority: Medium      Past Medical History:   Diagnosis Date     Anxiety      Cervical spondylosis with myelopathy 2/4/2013     Cervicalgia      Chronic low back pain 9/13/2011     Chronic low back pain      Degeneration of cervical intervertebral disc      Gout      Hyperlipidemia LDL goal <160 11/18/2009     Hypertension goal BP (blood pressure) < 140/90 2/10/2015     Insomnia 11/18/2009     Nondependent alcohol abuse 11/14/2012     Pain in shoulder 9/13/2011     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 Lower back surgery     BACK SURGERY      another lumbar procedure spine 2019     COLONOSCOPY WITH CO2 INSUFFLATION N/A 2/21/2020    Procedure: COLONOSCOPY, WITH CO2 INSUFFLATION;  Surgeon: Yasmani Constantino MD;  Location: [facility]     DISCECTOMY LUMBAR POSTERIOR MICROSCOPIC ONE LEVEL  5/17/2013    Procedure: DISCECTOMY LUMBAR POSTERIOR MICROSCOPIC ONE LEVEL;  BILATERAL LUMBAR L4-5 EXTENDED HEMILAMINECTOMY, MICROFORAMINOTOMY AND MICRODISCECTOMY WITH METRIX II;  Surgeon: Andrew Alejandre MD;  Location: [facility] OR     EP ABLATION      age 19 heart ablation     FUSION CERVICAL ANTERIOR ONE LEVEL  3/5/2013    Procedure: FUSION CERVICAL  ANTERIOR ONE LEVEL;  C 6-7 ANTERIOR DISCECTOMY AND FUSION WITH NEURO MONITORING ;  Surgeon: Andrew Alejandre MD;  Location: [facility] OR     KNEE SURGERY  2009    Right     LARYNGOSCOPY WITH MICROSCOPE  5/28/2013    Procedure: LARYNGOSCOPY WITH MICROSCOPE;  MICRODIRECT LARYNGOSCOPY, WITH LEFT VOCAL CORD INJECTION ;  Surgeon: Jorge Vergara MD;  Location:  [facility]     neck sugery      2019 spine surgery cleaned up     NECK SURGERY  2/2010    [facility]   knee meniscus procedure also      Current Outpatient Medications   Medication Sig Dispense Refill     allopurinol (ZYLOPRIM) 100 MG tablet Take 1 tablet (100 mg) by mouth daily 90 tablet 3     calcium carbonate (OS-HANNAH 500 MG [employer] [place]) 500 MG tablet Take 500 mg by mouth daily       Cholecalciferol (VITAMIN D3 PO) Take 1,000 Units by mouth daily       glucosamine-chondroitin 500-400 MG CAPS Take 1 capsule by mouth daily       indomethacin (INDOCIN) 50 MG capsule Take 1 capsule (50 mg) by mouth 3 times daily as needed for moderate pain 30 capsule 4     lisinopril (ZESTRIL) 5 MG tablet TAKE ONE TABLET BY MOUTH ONCE DAILY 30 tablet 0     Multiple Vitamins-Minerals (MULTIVITAMIN OR) Take 1 tablet by mouth daily        Multiple Vitamins-Minerals (ZINC PO)        Omega-3 Fatty Acids (OMEGA-3 FISH OIL PO) Take 2 g by mouth daily       sertraline (ZOLOFT) 50 MG tablet 1 po daily 30 tablet 1     tadalafil (CIALIS) 20 MG tablet 1/2 po as needed for intercourse 6 tablet 1     VITAMIN E NATURAL PO Take 100 Units by mouth     head pounds when takes lisinopril so not taking that currently      No Known Allergies     Social History     Tobacco Use     Smoking status: Never Smoker     Smokeless tobacco: Current User     Types: Chew     Tobacco comment: Lives in smoke free household   Substance Use Topics     Alcohol use: No     Alcohol/week: 0.0 standard drinks     Comment: Quit 2010. Chemical dependency treatment in 1989. ...31 yrs of 1.5 tins weekly.  sober for 7 months..          History   Drug Use No         Objective     BP (!) 145/94 (BP Location: Left arm, Patient Position: Chair, Cuff Size: Adult Regular)   Pulse 83   Temp 97.2  F (36.2  C) (Temporal)   Wt 91.4 kg (201 lb 8 oz)   BMI 28.91 kg/m      Physical Exam    GENERAL APPEARANCE: healthy, alert and no distress     EYES: EOMI,  PERRL     HENT: ear canals and TM's normal and nose and mouth without ulcers or lesions     NECK: no adenopathy, no asymmetry, masses, or scars and thyroid normal to palpation     RESP: lungs clear to auscultation - no rales, rhonchi or wheezes     CV: regular rates and rhythm, normal S1 S2, no S3 or S4 and no murmur, click or rub     ABDOMEN:  soft, nontender, no HSM or masses and bowel sounds normal     MS: extremities normal- no gross deformities noted, no evidence of inflammation in joints, FROM in all extremities.     SKIN: no suspicious lesions or rashes     NEURO: Normal strength and tone, sensory exam grossly normal, mentation intact and speech normal     PSYCH: mentation appears normal. and affect normal/bright     LYMPHATICS: No cervical adenopathy    Recent Labs   Lab Test 01/14/21  1201 12/17/19  0912   HGB 15.1 15.6    285    138   POTASSIUM 4.0 4.3   CR 0.92 0.99   A1C 6.1* 6.0*        Diagnostics:  Labs drawn, pending      Patient fasting today      ASSESSMENT / PLAN:  (Z01.818) Preop general physical exam  (primary encounter diagnosis)  Comment: patient in good general  health  Plan: Comprehensive metabolic panel, CBC with         Platelets & Differential        Check labs     (H26.9) Cataract of right eye, unspecified cataract type  Comment: patient to have procedure  Plan: okay for procedure    (E78.5) Hyperlipidemia LDL goal <100  Comment: patient fasting, wants chol checked  Plan: Lipid panel reflex to direct LDL Fasting             (R73.01) Impaired fasting glucose  Comment: 6.1 earlier this year but patient healthier now.  Recheck this.   Plan: Hemoglobin A1c              No history of anesthesia problems    No bleeding or clotting problems      I reviewed the patient's medications, allergies, medical history, family history, and social history.    Greg Salas MD          Revised Cardiac Risk Index (RCRI):  The patient has the following serious cardiovascular risks for perioperative complications:   - No serious cardiac risks = 0 points     Patient had ablation in his 20s, no heart problems since    No chest pain/  Breathing problems    RCRI Interpretation: 0 points: Class I (very low risk - 0.4% complication rate)           Signed Electronically by: Greg Salas MD  Copy of this evaluation report is provided to requesting physician.

## 2021-10-12 NOTE — LETTER
[facility]  [address]  [place] [address]  [phone]              2021    To whom it may concern:    Bairon Reed  [birth year] is a patient of mine.  He is on leave from work currently.    He is improving but we reassessed today and he will return to work Monday November 15, 2021.    Of note, he is having eye surgery soon and needs to recover from that.                Sincerely,                      Greg Salas MD

## 2021-10-12 NOTE — PATIENT INSTRUCTIONS

## 2021-10-13 ENCOUNTER — TRANSFERRED RECORDS (OUTPATIENT)
Dept: HEALTH INFORMATION MANAGEMENT | Facility: CLINIC | Age: 56
End: 2021-10-13

## 2021-10-20 ENCOUNTER — OFFICE VISIT (OUTPATIENT)
Dept: OPHTHALMOLOGY | Facility: CLINIC | Age: 56
End: 2021-10-20
Payer: COMMERCIAL

## 2021-10-20 DIAGNOSIS — H25.811 COMBINED FORM OF AGE-RELATED CATARACT, RIGHT EYE: Primary | ICD-10-CM

## 2021-10-20 PROCEDURE — 92012 INTRM OPH EXAM EST PATIENT: CPT | Performed by: STUDENT IN AN ORGANIZED HEALTH CARE EDUCATION/TRAINING PROGRAM

## 2021-10-20 PROCEDURE — 92136 OPHTHALMIC BIOMETRY: CPT | Mod: TC | Performed by: STUDENT IN AN ORGANIZED HEALTH CARE EDUCATION/TRAINING PROGRAM

## 2021-10-20 RX ORDER — MOXIFLOXACIN 5 MG/ML
1 SOLUTION/ DROPS OPHTHALMIC 4 TIMES DAILY
Qty: 3 ML | Refills: 0 | Status: SHIPPED | OUTPATIENT
Start: 2021-10-24 | End: 2021-11-23

## 2021-10-20 RX ORDER — PREDNISOLONE ACETATE 10 MG/ML
1 SUSPENSION/ DROPS OPHTHALMIC 4 TIMES DAILY
Qty: 5 ML | Refills: 0 | Status: SHIPPED | OUTPATIENT
Start: 2021-10-24 | End: 2021-11-23

## 2021-10-20 RX ORDER — KETOROLAC TROMETHAMINE 5 MG/ML
1 SOLUTION OPHTHALMIC 4 TIMES DAILY
Qty: 5 ML | Refills: 0 | Status: SHIPPED | OUTPATIENT
Start: 2021-10-24 | End: 2021-11-23

## 2021-10-20 ASSESSMENT — SLIT LAMP EXAM - LIDS
COMMENTS: NORMAL
COMMENTS: NORMAL

## 2021-10-20 ASSESSMENT — VISUAL ACUITY
METHOD: SNELLEN - LINEAR
OS_SC+: +1
OD_PH_SC: 20/70
OS_PH_SC+: -1
OS_PH_SC: 20/25
OD_SC: 20/200
OS_SC: 20/50

## 2021-10-20 ASSESSMENT — EXTERNAL EXAM - RIGHT EYE: OD_EXAM: NORMAL

## 2021-10-20 ASSESSMENT — EXTERNAL EXAM - LEFT EYE: OS_EXAM: NORMAL

## 2021-10-20 NOTE — PROGRESS NOTES
Current Eye Medications:  None.     Subjective:  Patient here for pre-op cataract surgery, right eye, scheduled for 10-25-21.  History and Physical done.  Covid test on 10-22-21.     Objective:  See Ophthalmology Exam.      Assessment:  Bairon Reed is a 56 year old male who presents with:   Encounter Diagnosis   Name Primary?     Combined form of age-related cataract, right eye Cataract pre-op right eye. Dil 7 (h/o BPH). Probable Trypan. Target mostly distance right eye. 3 drops.        Plan:  PRE-OP CATARACT INSTRUCTIONS    ** 3 eye drops from the pharmacy at least 3 days before surgery.    *Use the following drops in the right eye 4 times on the day before surgery and once the morning of surgery:                                   Vigamox or Ofloxacin (tan cap)  Ketorolac (gray cap)  Prednisolone (white or pink cap)    *If taking more than one drop, wait five minutes between drops.    *Please bring all your eyedrops to the surgery center.    *No solid food or drink after midnight.     Pedro Luis Rahman MD  [phone]

## 2021-10-20 NOTE — LETTER
10/20/2021         RE: Bairon Reed  [address] [place]  [place] [address]        Dear Colleague,    Thank you for referring your patient, Bairon Reed, to the [facility]. Please see a copy of my visit note below.     Current Eye Medications:  None.     Subjective:  Patient here for pre-op cataract surgery, right eye, scheduled for 10-25-21.  History and Physical done.  Covid test on 10-22-21.     Objective:  See Ophthalmology Exam.      Assessment:  Bairon Reed is a 56 year old male who presents with:   Encounter Diagnosis   Name Primary?     Combined form of age-related cataract, right eye Cataract pre-op right eye. Dil 7 (h/o BPH). Probable Trypan. Target mostly distance right eye. 3 drops.        Plan:  PRE-OP CATARACT INSTRUCTIONS    ** 3 eye drops from the pharmacy at least 3 days before surgery.    *Use the following drops in the right eye 4 times on the day before surgery and once the morning of surgery:                                   Vigamox or Ofloxacin (tan cap)  Ketorolac (gray cap)  Prednisolone (white or pink cap)    *If taking more than one drop, wait five minutes between drops.    *Please bring all your eyedrops to the surgery center.    *No solid food or drink after midnight.     Pedro Luis Rahman MD  [phone]               Again, thank you for allowing me to participate in the care of your patient.        Sincerely,        Pedro Luis Rahman MD

## 2021-10-20 NOTE — PATIENT INSTRUCTIONS
PRE-OP CATARACT INSTRUCTIONS    ** 3 eye drops from the pharmacy at least 3 days before surgery.    *Use the following drops in the right eye 4 times on the day before surgery and once the morning of surgery:                                 Vigamox or Ofloxacin (tan cap)  Ketorolac (gray cap)  Prednisolone (white or pink cap)    *If taking more than one drop, wait five minutes between drops.    *Please bring all your eyedrops to the surgery center.    *No solid food or drink after midnight.     Pedro Luis Rahman MD  [phone]

## 2021-10-22 ENCOUNTER — ANESTHESIA EVENT (OUTPATIENT)
Dept: SURGERY | Facility: AMBULATORY SURGERY CENTER | Age: 56
End: 2021-10-22
Payer: COMMERCIAL

## 2021-10-22 ENCOUNTER — LAB (OUTPATIENT)
Dept: LAB | Facility: CLINIC | Age: 56
End: 2021-10-22
Payer: COMMERCIAL

## 2021-10-22 DIAGNOSIS — Z11.59 ENCOUNTER FOR SCREENING FOR OTHER VIRAL DISEASES: ICD-10-CM

## 2021-10-22 LAB — SARS-COV-2 RNA RESP QL NAA+PROBE: NEGATIVE

## 2021-10-22 PROCEDURE — U0005 INFEC AGEN DETEC AMPLI PROBE: HCPCS

## 2021-10-22 PROCEDURE — U0003 INFECTIOUS AGENT DETECTION BY NUCLEIC ACID (DNA OR RNA); SEVERE ACUTE RESPIRATORY SYNDROME CORONAVIRUS 2 (SARS-COV-2) (CORONAVIRUS DISEASE [COVID-19]), AMPLIFIED PROBE TECHNIQUE, MAKING USE OF HIGH THROUGHPUT TECHNOLOGIES AS DESCRIBED BY CMS-2020-01-R: HCPCS

## 2021-10-25 ENCOUNTER — ANESTHESIA (OUTPATIENT)
Dept: SURGERY | Facility: AMBULATORY SURGERY CENTER | Age: 56
End: 2021-10-25
Payer: COMMERCIAL

## 2021-10-25 ENCOUNTER — HOSPITAL ENCOUNTER (OUTPATIENT)
Facility: AMBULATORY SURGERY CENTER | Age: 56
End: 2021-10-25
Attending: STUDENT IN AN ORGANIZED HEALTH CARE EDUCATION/TRAINING PROGRAM | Admitting: STUDENT IN AN ORGANIZED HEALTH CARE EDUCATION/TRAINING PROGRAM
Payer: COMMERCIAL

## 2021-10-25 VITALS
SYSTOLIC BLOOD PRESSURE: 137 MMHG | OXYGEN SATURATION: 96 % | RESPIRATION RATE: 16 BRPM | BODY MASS INDEX: 28.91 KG/M2 | TEMPERATURE: 98.3 F | DIASTOLIC BLOOD PRESSURE: 78 MMHG | WEIGHT: 201.5 LBS

## 2021-10-25 DIAGNOSIS — H25.811 COMBINED FORM OF AGE-RELATED CATARACT, RIGHT EYE: ICD-10-CM

## 2021-10-25 PROCEDURE — 66984 XCAPSL CTRC RMVL W/O ECP: CPT | Mod: RT

## 2021-10-25 PROCEDURE — G8918 PT W/O PREOP ORDER IV AB PRO: HCPCS

## 2021-10-25 PROCEDURE — G8907 PT DOC NO EVENTS ON DISCHARG: HCPCS

## 2021-10-25 PROCEDURE — 66984 XCAPSL CTRC RMVL W/O ECP: CPT | Mod: RT | Performed by: STUDENT IN AN ORGANIZED HEALTH CARE EDUCATION/TRAINING PROGRAM

## 2021-10-25 DEVICE — EYE IMP IOL AMO PCL TECNIS ZCB00 20.0: Type: IMPLANTABLE DEVICE | Site: EYE | Status: FUNCTIONAL

## 2021-10-25 RX ORDER — TETRACAINE HYDROCHLORIDE 5 MG/ML
SOLUTION OPHTHALMIC PRN
Status: DISCONTINUED | OUTPATIENT
Start: 2021-10-25 | End: 2021-10-25 | Stop reason: HOSPADM

## 2021-10-25 RX ORDER — ACETAMINOPHEN 325 MG/1
975 TABLET ORAL ONCE
Status: COMPLETED | OUTPATIENT
Start: 2021-10-25 | End: 2021-10-25

## 2021-10-25 RX ORDER — LIDOCAINE 40 MG/G
CREAM TOPICAL
Status: DISCONTINUED | OUTPATIENT
Start: 2021-10-25 | End: 2021-10-26 | Stop reason: HOSPADM

## 2021-10-25 RX ORDER — OXYCODONE HYDROCHLORIDE 5 MG/1
5 TABLET ORAL EVERY 4 HOURS PRN
Status: DISCONTINUED | OUTPATIENT
Start: 2021-10-25 | End: 2021-10-26 | Stop reason: HOSPADM

## 2021-10-25 RX ORDER — TROPICAMIDE 10 MG/ML
1 SOLUTION/ DROPS OPHTHALMIC
Status: COMPLETED | OUTPATIENT
Start: 2021-10-25 | End: 2021-10-25

## 2021-10-25 RX ORDER — SODIUM CHLORIDE, SODIUM LACTATE, POTASSIUM CHLORIDE, CALCIUM CHLORIDE 600; 310; 30; 20 MG/100ML; MG/100ML; MG/100ML; MG/100ML
INJECTION, SOLUTION INTRAVENOUS CONTINUOUS
Status: DISCONTINUED | OUTPATIENT
Start: 2021-10-25 | End: 2021-10-26 | Stop reason: HOSPADM

## 2021-10-25 RX ORDER — CYCLOPENTOLATE HYDROCHLORIDE 10 MG/ML
1 SOLUTION/ DROPS OPHTHALMIC
Status: COMPLETED | OUTPATIENT
Start: 2021-10-25 | End: 2021-10-25

## 2021-10-25 RX ORDER — TETRACAINE HYDROCHLORIDE 5 MG/ML
1-2 SOLUTION OPHTHALMIC ONCE
Status: COMPLETED | OUTPATIENT
Start: 2021-10-25 | End: 2021-10-25

## 2021-10-25 RX ORDER — FENTANYL CITRATE 50 UG/ML
25 INJECTION, SOLUTION INTRAMUSCULAR; INTRAVENOUS
Status: DISCONTINUED | OUTPATIENT
Start: 2021-10-25 | End: 2021-10-26 | Stop reason: HOSPADM

## 2021-10-25 RX ORDER — PHENYLEPHRINE HYDROCHLORIDE 25 MG/ML
1 SOLUTION/ DROPS OPHTHALMIC
Status: COMPLETED | OUTPATIENT
Start: 2021-10-25 | End: 2021-10-25

## 2021-10-25 RX ORDER — MOXIFLOXACIN 5 MG/ML
SOLUTION/ DROPS OPHTHALMIC PRN
Status: DISCONTINUED | OUTPATIENT
Start: 2021-10-25 | End: 2021-10-25 | Stop reason: HOSPADM

## 2021-10-25 RX ORDER — ONDANSETRON 2 MG/ML
4 INJECTION INTRAMUSCULAR; INTRAVENOUS EVERY 30 MIN PRN
Status: DISCONTINUED | OUTPATIENT
Start: 2021-10-25 | End: 2021-10-26 | Stop reason: HOSPADM

## 2021-10-25 RX ORDER — KETAMINE HYDROCHLORIDE 10 MG/ML
INJECTION INTRAMUSCULAR; INTRAVENOUS PRN
Status: DISCONTINUED | OUTPATIENT
Start: 2021-10-25 | End: 2021-10-25

## 2021-10-25 RX ORDER — ONDANSETRON 4 MG/1
4 TABLET, ORALLY DISINTEGRATING ORAL EVERY 30 MIN PRN
Status: DISCONTINUED | OUTPATIENT
Start: 2021-10-25 | End: 2021-10-26 | Stop reason: HOSPADM

## 2021-10-25 RX ORDER — METOPROLOL TARTRATE 1 MG/ML
1-2 INJECTION, SOLUTION INTRAVENOUS EVERY 5 MIN PRN
Status: DISCONTINUED | OUTPATIENT
Start: 2021-10-25 | End: 2021-10-26 | Stop reason: HOSPADM

## 2021-10-25 RX ADMIN — CYCLOPENTOLATE HYDROCHLORIDE 1 DROP: 10 SOLUTION/ DROPS OPHTHALMIC at 08:13

## 2021-10-25 RX ADMIN — SODIUM CHLORIDE, SODIUM LACTATE, POTASSIUM CHLORIDE, CALCIUM CHLORIDE: 600; 310; 30; 20 INJECTION, SOLUTION INTRAVENOUS at 08:05

## 2021-10-25 RX ADMIN — ACETAMINOPHEN 975 MG: 325 TABLET ORAL at 08:06

## 2021-10-25 RX ADMIN — PHENYLEPHRINE HYDROCHLORIDE 1 DROP: 25 SOLUTION/ DROPS OPHTHALMIC at 08:13

## 2021-10-25 RX ADMIN — PHENYLEPHRINE HYDROCHLORIDE 1 DROP: 25 SOLUTION/ DROPS OPHTHALMIC at 08:05

## 2021-10-25 RX ADMIN — CYCLOPENTOLATE HYDROCHLORIDE 1 DROP: 10 SOLUTION/ DROPS OPHTHALMIC at 08:08

## 2021-10-25 RX ADMIN — TETRACAINE HYDROCHLORIDE 1 DROP: 5 SOLUTION OPHTHALMIC at 08:04

## 2021-10-25 RX ADMIN — PHENYLEPHRINE HYDROCHLORIDE 1 DROP: 25 SOLUTION/ DROPS OPHTHALMIC at 08:08

## 2021-10-25 RX ADMIN — TROPICAMIDE 1 DROP: 10 SOLUTION/ DROPS OPHTHALMIC at 08:04

## 2021-10-25 RX ADMIN — CYCLOPENTOLATE HYDROCHLORIDE 1 DROP: 10 SOLUTION/ DROPS OPHTHALMIC at 08:04

## 2021-10-25 RX ADMIN — KETAMINE HYDROCHLORIDE 10 MG: 10 INJECTION INTRAMUSCULAR; INTRAVENOUS at 09:24

## 2021-10-25 RX ADMIN — KETAMINE HYDROCHLORIDE 10 MG: 10 INJECTION INTRAMUSCULAR; INTRAVENOUS at 09:18

## 2021-10-25 RX ADMIN — TROPICAMIDE 1 DROP: 10 SOLUTION/ DROPS OPHTHALMIC at 08:13

## 2021-10-25 RX ADMIN — TROPICAMIDE 1 DROP: 10 SOLUTION/ DROPS OPHTHALMIC at 08:08

## 2021-10-25 NOTE — ANESTHESIA POSTPROCEDURE EVALUATION
Patient: Bairon Reed    Procedure: Procedure(s):  RIGHT PHACOEMULSIFICATION, CATARACT, WITH INTRAOCULAR LENS IMPLANT       Diagnosis:Combined form of age-related cataract, right eye [H25.811]  Diagnosis Additional Information: No value filed.    Anesthesia Type:  MAC    Note:  Disposition: Outpatient   Postop Pain Control: Uneventful            Sign Out: Well controlled pain   PONV: No   Neuro/Psych: Uneventful            Sign Out: Acceptable/Baseline neuro status   Airway/Respiratory: Uneventful            Sign Out: Acceptable/Baseline resp. status   CV/Hemodynamics: Uneventful            Sign Out: Acceptable CV status   Other NRE: NONE   DID A NON-ROUTINE EVENT OCCUR? No           Last vitals:  Vitals Value Taken Time   /78 10/25/21 1007   Temp 36.8  C (98.3  F) 10/25/21 1007   Pulse     Resp 16 10/25/21 1007   SpO2 96 % 10/25/21 1007       Electronically Signed By: Soto Castellon MD  October 25, 2021  2:59 PM

## 2021-10-25 NOTE — ANESTHESIA CARE TRANSFER NOTE
Patient: Bairon Reed    Procedure: Procedure(s):  RIGHT PHACOEMULSIFICATION, CATARACT, WITH INTRAOCULAR LENS IMPLANT       Diagnosis: Combined form of age-related cataract, right eye [H25.811]  Diagnosis Additional Information: No value filed.    Anesthesia Type:   MAC     Note:    Oropharynx: oropharynx clear of all foreign objects  Level of Consciousness: awake  Oxygen Supplementation: room air    Independent Airway: airway patency satisfactory and stable  Dentition: dentition unchanged  Vital Signs Stable: post-procedure vital signs reviewed and stable  Report to RN Given: handoff report given  Patient transferred to: Phase II    Handoff Report: Identifed the Patient, Identified the Reponsible Provider, Reviewed the pertinent medical history, Discussed the surgical course, Reviewed Intra-OP anesthesia mangement and issues during anesthesia, Set expectations for post-procedure period and Allowed opportunity for questions and acknowledgement of understanding      Vitals:  Vitals Value Taken Time   BP     Temp     Pulse     Resp     SpO2         Electronically Signed By: YULIA LANGSTON CRNA  October 25, 2021  9:44 AM

## 2021-10-25 NOTE — ANESTHESIA PREPROCEDURE EVALUATION
Anesthesia Pre-Procedure Evaluation    Patient: Bairon Reed   MRN: [id number] : 1965        Preoperative Diagnosis: Combined form of age-related cataract, right eye [H25.811]    Procedure : Procedure(s):  RIGHT PHACOEMULSIFICATION, CATARACT, WITH INTRAOCULAR LENS IMPLANT          Past Medical History:   Diagnosis Date     Anxiety      Cervical spondylosis with myelopathy 2013     Cervicalgia      Chronic low back pain 2011     Chronic low back pain      Degeneration of cervical intervertebral disc      Gout      Hyperlipidemia LDL goal <160 2009     Hypertension goal BP (blood pressure) < 140/90 2/10/2015     Insomnia 2009     Nondependent alcohol abuse 2012     Pain in shoulder 2011     Peroneal nerve palsy 2012    rt     Radiculopathy, cervical 2011     Shoulder impingement     right     Vocal cord paralysis     LEFT      Past Surgical History:   Procedure Laterality Date     BACK SURGERY  -1988    x 3. Lower back surgery     BACK SURGERY      another lumbar procedure spine 2019     COLONOSCOPY WITH CO2 INSUFFLATION N/A 2020    Procedure: COLONOSCOPY, WITH CO2 INSUFFLATION;  Surgeon: Yasmani Constantino MD;  Location: [facility] OR     DISCECTOMY LUMBAR POSTERIOR MICROSCOPIC ONE LEVEL  2013    Procedure: DISCECTOMY LUMBAR POSTERIOR MICROSCOPIC ONE LEVEL;  BILATERAL LUMBAR L4-5 EXTENDED HEMILAMINECTOMY, MICROFORAMINOTOMY AND MICRODISCECTOMY WITH METRIX II;  Surgeon: Andrew Alejandre MD;  Location: [facility] OR     EP ABLATION      age 19 heart ablation     FUSION CERVICAL ANTERIOR ONE LEVEL  3/5/2013    Procedure: FUSION CERVICAL ANTERIOR ONE LEVEL;  C 6-7 ANTERIOR DISCECTOMY AND FUSION WITH NEURO MONITORING ;  Surgeon: Andrew Alejandre MD;  Location:  OR     KNEE SURGERY      Right     LARYNGOSCOPY WITH MICROSCOPE  2013    Procedure: LARYNGOSCOPY WITH MICROSCOPE;  MICRODIRECT LARYNGOSCOPY, WITH LEFT VOCAL CORD INJECTION ;   Surgeon: Jorge Vergara MD;  Location: [facility]     neck [facility]      2019 spine surgery cleaned up     NECK SURGERY  2/2010    [facility]      No Known Allergies   Social History     Tobacco Use     Smoking status: Never Smoker     Smokeless tobacco: Current User     Types: Chew     Tobacco comment: Lives in smoke free household   Substance Use Topics     Alcohol use: No     Alcohol/week: 0.0 standard drinks     Comment: Quit 2010. Chemical dependency treatment in 1989. ...31 yrs of 1.5 tins weekly.  sober for 7 months..      Wt Readings from Last 1 Encounters:   10/25/21 91.4 kg (201 lb 8 oz)        Anesthesia Evaluation            ROS/MED HX  ENT/Pulmonary:     (+) asthma     Neurologic:     (+) peripheral neuropathy,     Cardiovascular:     (+) Dyslipidemia hypertension-----    METS/Exercise Tolerance:     Hematologic:       Musculoskeletal: Comment: Cervical fusion    Lumbar fusion  (+) arthritis,     GI/Hepatic:       Renal/Genitourinary:       Endo:       Psychiatric/Substance Use:     (+) psychiatric history anxiety alcohol abuse     Infectious Disease:       Malignancy:       Other:            Physical Exam    Airway  airway exam normal      Mallampati: II   TM distance: > 3 FB   Neck ROM: full   Mouth opening: > 3 cm    Respiratory Devices and Support         Dental  no notable dental history         Cardiovascular          Rhythm and rate: regular and normal     Pulmonary   pulmonary exam normal        breath sounds clear to auscultation           OUTSIDE LABS:  CBC:   Lab Results   Component Value Date    WBC 4.8 10/12/2021    WBC 8.1 01/14/2021    HGB 14.3 10/12/2021    HGB 15.1 01/14/2021    HCT 40.4 10/12/2021    HCT 42.9 01/14/2021     10/12/2021     01/14/2021     BMP:   Lab Results   Component Value Date     10/12/2021     01/14/2021    POTASSIUM 4.3 10/12/2021    POTASSIUM 4.0 01/14/2021    CHLORIDE 107 10/12/2021    CHLORIDE 108 01/14/2021    CO2 27 10/12/2021     CO2 28 01/14/2021    BUN 14 10/12/2021    BUN 14 01/14/2021    CR 0.95 10/12/2021    CR 0.92 01/14/2021     (H) 10/12/2021     (H) 01/14/2021     COAGS: No results found for: PTT, INR, FIBR  POC: No results found for: BGM, HCG, HCGS  HEPATIC:   Lab Results   Component Value Date    ALBUMIN 4.1 10/12/2021    PROTTOTAL 6.9 10/12/2021     (H) 10/12/2021    AST 52 (H) 10/12/2021    ALKPHOS 63 10/12/2021    BILITOTAL 0.6 10/12/2021     OTHER:   Lab Results   Component Value Date    A1C 6.3 (H) 10/12/2021    HANNAH 9.2 10/12/2021    MAG 1.9 02/04/2015    TSH 0.98 01/14/2021       Anesthesia Plan    ASA Status:  3   NPO Status:  NPO Appropriate    Anesthesia Type: MAC.     - Reason for MAC: immobility needed, straight local not clinically adequate   Induction: Intravenous.           Consents    Anesthesia Plan(s) and associated risks, benefits, and realistic alternatives discussed. Questions answered and patient/representative(s) expressed understanding.     - Discussed with:  Patient      - Extended Intubation/Ventilatory Support Discussed: No.      - Patient is DNR/DNI Status: No    Use of blood products discussed: No .     Postoperative Care    Pain management: IV analgesics, Oral pain medications, Multi-modal analgesia.   PONV prophylaxis: Ondansetron (or other 5HT-3)     Comments:                Soto Castellon MD

## 2021-10-25 NOTE — OP NOTE
PreOp Diagnosis: Visually significant nuclear sclerotic cataract right eye  PostOp Diagnosis: Same  Surgeon: Pedro Luis Rahman MD  Implant: Tecnis [id number] +20.0D    Procedures:   1. Review of intraocular lens calculations, both eyes   2. Phacoemulsification and extraction of lens  right eye   3. Intraocular lens implantation right eye  Anesthesia: MAC/topical  Complications: None  EBL: <1cc    Bairon Reed suffers from a visually significant cataract of the right eye. This has caused problems with distance and reading vision, including glare. After discussing the risks, benefits, and alternatives, the patient wishes to proceed with cataract surgery.    The patient was identified in the pre-op area where the right eye was marked. The patient was then brought to the operating room where a time out was called, identifying the patient, the procedure, and the correct site. Tetracaine drops were applied to the operative eye. The operative eye was then prepped and draped in the usual sterile ophthalmic fashion. An eyelid speculum was placed into the operative eye. Additional tetracaine drops were applied. A paracentesis was made superior with a side port blade. 1% preservative-free lidocaine and epinephrine was injected into the anterior chamber.  Endocoat was injected to deepen the anterior chamber. A 2.4mm clear corneal wound was created with a keratome blade temporally.  A continuous curvilinear capsulorrhexis was started with a bent cystitome and completed with Utrada forceps. Hydrodissection of the lens nucleus was performed with BSS on a cannula. The lens nucleus was rotated. Phacoemulsification of the lens nucleus was accomplished in a phacoemulsification stop and chop technique. Remaining cortex was removed with irrigation and aspiration. The lens capsule was noted to be intact. Healon was used to inflate the capsular bag.  The lens was injected into the capsular bag. Remaining viscoelastic was removed with  irrigation and aspiration. The wounds were checked and found to be watertight after hydration. The eyelid speculum was removed and Vigamox drops were placed into the operative eye. The patient tolerated the procedure well and was in stable condition on the way to the recovery area.     Pedro Luis Rahman MD

## 2021-10-25 NOTE — DISCHARGE INSTRUCTIONS
CATARACT SURGERY POST-OP INSTRUCTIONS  Dr. Pedro Luis Rahman  [phone]        Start using all three eye drops today, including   - Vigamox (tan top)   - Ketolorac (grey top)   - Prednisolone (white or pink top)    You should get 3 doses in today and 4 doses daily starting tomorrow.  Wait a few minutes in between putting each drop in.    Bring your eyedrops to the clinic tomorrow.        Keep the eye shield taped in place unless putting drops in. We will remove it for you in the office tomorrow.      Light sensitivity may be noticed. Sunglasses may be worn for comfort.      Do not rub the operated eye.      Keep the operated eye dry. You may wash your hair, bathe or shower, but keep the operated eye closed while doing so.       No swimming, hot tub, or sauna for 2 weeks.      No make up around eye for 5 days.      No bending at the waist or lifting more than 10 pounds for one week.      May take Tylenol (per directions on bottle) for mild pain.      Call the office at [phone] and ask to speak to the on-call ophthalmologist  if any of the following should occur:  o Any sudden vision changes  o Nausea or severe headache  o Increase in pain not controlled  o Or signs of infection (pus, increasing redness or tenderness)    [facility]  Same-Day Surgery   Adult Discharge Orders & Instructions   For 24 hours after surgery  1. Get plenty of rest.  A responsible adult must stay with you for at least 24 hours after you leave the hospital.   2. Do not drive or use heavy equipment.  If you have weakness or tingling, don't drive or use heavy equipment until this feeling goes away.  3. Do not drink alcohol.  4. Avoid strenuous or risky activities.  Ask for help when climbing stairs.   5. You may feel lightheaded.  IF so, sit for a few minutes before standing.  Have someone help you get up.   6. If you have nausea (feel sick to your stomach): Drink only clear liquids such as apple juice, ginger ale,  broth or 7-Up.  Rest may also help.  Be sure to drink enough fluids.  Move to a regular diet as you feel able.  7. You may have a slight fever. Call the doctor if your fever is over 100 F (37.7 C) (taken under the tongue) or lasts longer than 24 hours.  8. You may have a dry mouth, a sore throat, muscle aches or trouble sleeping.  These should go away after 24 hours.  9. Do not make important or legal decisions.   Call your doctor for any of the followin.  Signs of infection (fever, growing tenderness at the surgery site, a large amount of drainage or bleeding, severe pain, foul-smelling drainage, redness, swelling).  2. It has been over 8 to 10 hours since surgery and you are still not able to urinate (pass water).  3.  Headache for over 24 hours.

## 2021-10-26 ENCOUNTER — OFFICE VISIT (OUTPATIENT)
Dept: OPHTHALMOLOGY | Facility: CLINIC | Age: 56
End: 2021-10-26
Payer: COMMERCIAL

## 2021-10-26 DIAGNOSIS — Z96.1 PSEUDOPHAKIA: Primary | ICD-10-CM

## 2021-10-26 PROCEDURE — 99024 POSTOP FOLLOW-UP VISIT: CPT | Performed by: STUDENT IN AN ORGANIZED HEALTH CARE EDUCATION/TRAINING PROGRAM

## 2021-10-26 RX ORDER — DORZOLAMIDE HYDROCHLORIDE AND TIMOLOL MALEATE 20; 5 MG/ML; MG/ML
1 SOLUTION/ DROPS OPHTHALMIC 2 TIMES DAILY
Qty: 10 ML | Refills: 0 | Status: SHIPPED | OUTPATIENT
Start: 2021-10-26 | End: 2021-11-23

## 2021-10-26 RX ORDER — PREDNISOLONE ACETATE 10 MG/ML
1 SUSPENSION/ DROPS OPHTHALMIC 4 TIMES DAILY
Qty: 5 ML | Refills: 0 | Status: SHIPPED | OUTPATIENT
Start: 2021-10-26 | End: 2021-11-23

## 2021-10-26 ASSESSMENT — TONOMETRY
OD_IOP_MMHG: 38
IOP_METHOD: APPLANATION

## 2021-10-26 ASSESSMENT — SLIT LAMP EXAM - LIDS
COMMENTS: NORMAL
COMMENTS: NORMAL

## 2021-10-26 ASSESSMENT — VISUAL ACUITY
METHOD: SNELLEN - LINEAR
OD_SC: 20/30

## 2021-10-26 ASSESSMENT — EXTERNAL EXAM - RIGHT EYE: OD_EXAM: NORMAL

## 2021-10-26 ASSESSMENT — EXTERNAL EXAM - LEFT EYE: OS_EXAM: NORMAL

## 2021-10-26 NOTE — PATIENT INSTRUCTIONS
POST-OP CATARACT INSTRUCTIONS    *   Use the following drop(s) in the RIGHT EYE four times a day:        Vigamox (tan top), ketorolac (grey top), and prednisolone (white or pink top)     *  Start Cosopt (dorzolamide-timolol -- dark blue top) twice a day in the right eye     *   Wear eye shield when sleeping for one week. Do not rub the operated eye.     *   No bending or lifting more than 10 pounds for one week.    *   Keep water out of eye for two weeks.    *   OK to resume aspirin and/or other blood thinners if you stopped.     *   Return as scheduled in about one week.    *   If your vision worsens, eye becomes increasingly red, or becomes painful, call [phone].     Pedro Luis Rahman M.D.

## 2021-10-26 NOTE — LETTER
"    10/26/2021         RE: Bairon Reed  [address]  [place] [address]        Dear Colleague,    Thank you for referring your patient, Bairon Reed, to the [facility]. Please see a copy of my visit note below.     Current Eye Medications:  Vigamox (tan top), ketorolac (grey top), and prednisolone (white or pink top) four times a day right eye.      Subjective:  Here for 1 day post op cataract surgery right eye. No eye pain. Vision is \"about the same as it was before surgery\".      Objective:  See Ophthalmology Exam.      Assessment:  Bairon Reed is a 56 year old male who presents with:   Encounter Diagnosis   Name Primary?     Pseudophakia - Right Eye POD1 s/p CE/IOL right eye. Intraocular pressure 38 - start Cosopt twice a day right eye.        Plan:  POST-OP CATARACT INSTRUCTIONS    *   Use the following drop(s) in the RIGHT EYE four times a day:        Vigamox (tan top), ketorolac (grey top), and prednisolone (white or pink top)     *  Start Cosopt (dorzolamide-timolol -- dark blue top) twice a day in the right eye     *   Wear eye shield when sleeping for one week. Do not rub the operated eye.     *   No bending or lifting more than 10 pounds for one week.    *   Keep water out of eye for two weeks.    *   OK to resume aspirin and/or other blood thinners if you stopped.     *   Return as scheduled in about one week.    *   If your vision worsens, eye becomes increasingly red, or becomes painful, call [phone].     Pedro Luis Rahman M.D.                 Again, thank you for allowing me to participate in the care of your patient.        Sincerely,        Pedro Luis Rahman MD    "

## 2021-10-26 NOTE — PROGRESS NOTES
" Current Eye Medications:  Vigamox (tan top), ketorolac (grey top), and prednisolone (white or pink top) four times a day right eye.      Subjective:  Here for 1 day post op cataract surgery right eye. No eye pain. Vision is \"about the same as it was before surgery\".      Objective:  See Ophthalmology Exam.      Assessment:  Bairon Reed is a 56 year old male who presents with:   Encounter Diagnosis   Name Primary?     Pseudophakia - Right Eye POD1 s/p CE/IOL right eye. Intraocular pressure 38 - start Cosopt twice a day right eye.        Plan:  POST-OP CATARACT INSTRUCTIONS    *   Use the following drop(s) in the RIGHT EYE four times a day:        Vigamox (tan top), ketorolac (grey top), and prednisolone (white or pink top)     *  Start Cosopt (dorzolamide-timolol -- dark blue top) twice a day in the right eye     *   Wear eye shield when sleeping for one week. Do not rub the operated eye.     *   No bending or lifting more than 10 pounds for one week.    *   Keep water out of eye for two weeks.    *   OK to resume aspirin and/or other blood thinners if you stopped.     *   Return as scheduled in about one week.    *   If your vision worsens, eye becomes increasingly red, or becomes painful, call [phone].     Pedro Luis Rahman M.D.           "

## 2021-11-02 ENCOUNTER — OFFICE VISIT (OUTPATIENT)
Dept: OPHTHALMOLOGY | Facility: CLINIC | Age: 56
End: 2021-11-02
Payer: COMMERCIAL

## 2021-11-02 DIAGNOSIS — Z96.1 PSEUDOPHAKIA: Primary | ICD-10-CM

## 2021-11-02 PROCEDURE — 99024 POSTOP FOLLOW-UP VISIT: CPT | Performed by: STUDENT IN AN ORGANIZED HEALTH CARE EDUCATION/TRAINING PROGRAM

## 2021-11-02 ASSESSMENT — REFRACTION_MANIFEST
OD_SPHERE: PLANO
OD_CYLINDER: +0.75
OD_AXIS: 095
OD_ADD: +2.50

## 2021-11-02 ASSESSMENT — VISUAL ACUITY
OD_SC: 20/25
OS_SC: 20/40
METHOD: SNELLEN - LINEAR
OD_SC+: -2
OS_SC+: -1

## 2021-11-02 ASSESSMENT — SLIT LAMP EXAM - LIDS
COMMENTS: NORMAL
COMMENTS: NORMAL

## 2021-11-02 ASSESSMENT — TONOMETRY
OS_IOP_MMHG: 25
IOP_METHOD: APPLANATION
OD_IOP_MMHG: 26

## 2021-11-02 ASSESSMENT — EXTERNAL EXAM - LEFT EYE: OS_EXAM: NORMAL

## 2021-11-02 ASSESSMENT — EXTERNAL EXAM - RIGHT EYE: OD_EXAM: NORMAL

## 2021-11-02 NOTE — LETTER
11/2/2021         RE: Bairon Reed  [address]  [place] [address]        Dear Colleague,    Thank you for referring your patient, Bairon Reed, to the [facility]. Please see a copy of my visit note below.     Current Eye Medications:  Vigamox (tan top), ketorolac (grey top), and prednisolone (white or pink top) four times a day right eye. Dorz/timolol twice a day right eye.      Subjective:  Po2, KPE/IOL right eye 10/25/21. Vision is doing very well right eye. Vision is much improved and colors more vivid. Vision is fine left eye. No eye pain or discomfort in either eye.        Objective:  See Ophthalmology Exam.      Assessment:  Bairon Reed is a 56 year old male who presents with:   Encounter Diagnosis   Name Primary?     Pseudophakia - Right Eye Postoperative week 1 s/p CE/IOL right eye - doing well. Intraocular pressure 26 right eye today on Cosopt.        Plan:  *   For the right eye, continue Vigamox (tan top), ketorolac (grey top), and prednisolone (white or pink top) four times a day right eye until the bottles run out. Also Continue Cosopt (dorzolamide-timolol -- dark blue top) twice a day in the right eye until the bottle runs out.    *   Stop wearing eye shield.    *   No eye rubbing. May resume heavy lifting.    *   Return in about one month for final exam with glasses check (as scheduled).    *   If your vision worsens, eye becomes increasingly red, or becomes painful, call [phone].    Pedro Luis Rahman M.D.                 Again, thank you for allowing me to participate in the care of your patient.        Sincerely,        Pedro Luis Rahman MD

## 2021-11-02 NOTE — LETTER
November 2, 2021      Bairon Reed  [address] [place] [address]        To Whom It May Concern:    Bairon Reed was seen on November 2, 2021.  Please excuse him from time missed due to his surgery follow up appointment.        Sincerely,        Pedro Luis Rahman MD

## 2021-11-02 NOTE — PROGRESS NOTES
Current Eye Medications:  Vigamox (tan top), ketorolac (grey top), and prednisolone (white or pink top) four times a day right eye. Dorz/timolol twice a day right eye.      Subjective:  Po2, KPE/IOL right eye 10/25/21. Vision is doing very well right eye. Vision is much improved and colors more vivid. Vision is fine left eye. No eye pain or discomfort in either eye.        Objective:  See Ophthalmology Exam.      Assessment:  Bairon Reed is a 56 year old male who presents with:   Encounter Diagnosis   Name Primary?     Pseudophakia - Right Eye Postoperative week 1 s/p CE/IOL right eye - doing well. Intraocular pressure 26 right eye today on Cosopt.        Plan:  *   For the right eye, continue Vigamox (tan top), ketorolac (grey top), and prednisolone (white or pink top) four times a day right eye until the bottles run out. Also Continue Cosopt (dorzolamide-timolol -- dark blue top) twice a day in the right eye until the bottle runs out.    *   Stop wearing eye shield.    *   No eye rubbing. May resume heavy lifting.    *   Return in about one month for final exam with glasses check (as scheduled).    *   If your vision worsens, eye becomes increasingly red, or becomes painful, call [phone].    Pedro Luis Rahman M.D.

## 2021-11-02 NOTE — PATIENT INSTRUCTIONS
*   For the right eye, continue Vigamox (tan top), ketorolac (grey top), and prednisolone (white or pink top) four times a day right eye until the bottles run out. Also Continue Cosopt (dorzolamide-timolol -- dark blue top) twice a day in the right eye until the bottle runs out.    *   Stop wearing eye shield.    *   No eye rubbing. May resume heavy lifting.    *   Return in about one month for final exam with glasses check (as scheduled).    *   If your vision worsens, eye becomes increasingly red, or becomes painful, call [phone].    Pedro Luis Rahman M.D.

## 2021-11-23 ENCOUNTER — OFFICE VISIT (OUTPATIENT)
Dept: OPHTHALMOLOGY | Facility: CLINIC | Age: 56
End: 2021-11-23
Payer: COMMERCIAL

## 2021-11-23 DIAGNOSIS — Z96.1 PSEUDOPHAKIA: Primary | ICD-10-CM

## 2021-11-23 PROCEDURE — 99024 POSTOP FOLLOW-UP VISIT: CPT | Performed by: STUDENT IN AN ORGANIZED HEALTH CARE EDUCATION/TRAINING PROGRAM

## 2021-11-23 ASSESSMENT — REFRACTION_MANIFEST
OD_SPHERE: PLANO
OS_SPHERE: -1.25
OD_CYLINDER: +1.00
OS_CYLINDER: +1.00
OS_ADD: +2.50
OS_AXIS: 085
OD_ADD: +2.50
OD_AXIS: 090

## 2021-11-23 ASSESSMENT — VISUAL ACUITY
OD_SC+: -3
OS_SC: 20/50
OS_PH_SC+: -1
OS_SC+: -1
OS_PH_SC: 20/25
OD_SC: 20/25
METHOD: SNELLEN - LINEAR

## 2021-11-23 ASSESSMENT — CUP TO DISC RATIO: OD_RATIO: 0.45

## 2021-11-23 ASSESSMENT — EXTERNAL EXAM - RIGHT EYE: OD_EXAM: NORMAL

## 2021-11-23 ASSESSMENT — SLIT LAMP EXAM - LIDS
COMMENTS: NORMAL
COMMENTS: NORMAL

## 2021-11-23 ASSESSMENT — TONOMETRY
OD_IOP_MMHG: 17
IOP_METHOD: APPLANATION

## 2021-11-23 ASSESSMENT — EXTERNAL EXAM - LEFT EYE: OS_EXAM: NORMAL

## 2021-11-23 NOTE — PROGRESS NOTES
" Current Eye Medications:  Finished all drops about a week ago     Subjective:  Here for final MR right eye. Right eye is a little itchy, but not painful.  Needs a note today for work this am     Objective:  See Ophthalmology Exam.      Assessment:  Bairon Reed is a 56 year old male who presents with:   Encounter Diagnosis   Name Primary?     Pseudophakia - Right Eye Final MR right eye - doing well. Discussed possible YAG cap right eye down the line.       Plan:  Glasses prescription given - optional     Use artificial tears up to four times a day (like Refresh Optive, Systane Balance, TheraTears, or generic artificial tears are ok. Avoid \"get the red out\" drops).     Pedro Luis Rahman MD  [phone]        "

## 2021-11-23 NOTE — PATIENT INSTRUCTIONS
"Glasses prescription given - optional     Use artificial tears up to four times a day (like Refresh Optive, Systane Balance, TheraTears, or generic artificial tears are ok. Avoid \"get the red out\" drops).     Pedro Luis Rahman MD  [phone]    "

## 2021-11-23 NOTE — LETTER
"    11/23/2021         RE: Bairon Reed  [address]  [place] [address]        Dear Colleague,    Thank you for referring your patient, Bairon Reed, to the [facility]. Please see a copy of my visit note below.     Current Eye Medications:  Finished all drops about a week ago     Subjective:  Here for final MR right eye. Right eye is a little itchy, but not painful.  Needs a note today for work this am     Objective:  See Ophthalmology Exam.      Assessment:  Bairon Reed is a 56 year old male who presents with:   Encounter Diagnosis   Name Primary?     Pseudophakia - Right Eye Final MR right eye - doing well. Discussed possible YAG cap right eye down the line.       Plan:  Glasses prescription given - optional     Use artificial tears up to four times a day (like Refresh Optive, Systane Balance, TheraTears, or generic artificial tears are ok. Avoid \"get the red out\" drops).     Pedro Luis Rahman MD  [phone]            Again, thank you for allowing me to participate in the care of your patient.        Sincerely,        Pedro Luis Rahman MD    "

## 2021-12-09 ENCOUNTER — TRANSFERRED RECORDS (OUTPATIENT)
Dept: HEALTH INFORMATION MANAGEMENT | Facility: CLINIC | Age: 56
End: 2021-12-09
Payer: COMMERCIAL

## 2021-12-17 ENCOUNTER — TRANSFERRED RECORDS (OUTPATIENT)
Dept: HEALTH INFORMATION MANAGEMENT | Facility: CLINIC | Age: 56
End: 2021-12-17
Payer: COMMERCIAL

## 2021-12-23 ENCOUNTER — OFFICE VISIT (OUTPATIENT)
Dept: FAMILY MEDICINE | Facility: CLINIC | Age: 56
End: 2021-12-23
Payer: COMMERCIAL

## 2021-12-23 VITALS
BODY MASS INDEX: 29.64 KG/M2 | OXYGEN SATURATION: 97 % | RESPIRATION RATE: 28 BRPM | WEIGHT: 206.6 LBS | TEMPERATURE: 98.1 F | DIASTOLIC BLOOD PRESSURE: 88 MMHG | HEART RATE: 96 BPM | SYSTOLIC BLOOD PRESSURE: 140 MMHG

## 2021-12-23 DIAGNOSIS — R29.898 BILATERAL ARM WEAKNESS: ICD-10-CM

## 2021-12-23 DIAGNOSIS — M54.12 CERVICAL RADICULOPATHY: ICD-10-CM

## 2021-12-23 DIAGNOSIS — Z01.818 PREOP GENERAL PHYSICAL EXAM: Primary | ICD-10-CM

## 2021-12-23 LAB
BASOPHILS # BLD AUTO: 0 10E3/UL (ref 0–0.2)
BASOPHILS NFR BLD AUTO: 1 %
EOSINOPHIL # BLD AUTO: 0.2 10E3/UL (ref 0–0.7)
EOSINOPHIL NFR BLD AUTO: 4 %
ERYTHROCYTE [DISTWIDTH] IN BLOOD BY AUTOMATED COUNT: 12.9 % (ref 10–15)
HCT VFR BLD AUTO: 45 % (ref 40–53)
HGB BLD-MCNC: 15.6 G/DL (ref 13.3–17.7)
LYMPHOCYTES # BLD AUTO: 1.2 10E3/UL (ref 0.8–5.3)
LYMPHOCYTES NFR BLD AUTO: 20 %
MCH RBC QN AUTO: 32.2 PG (ref 26.5–33)
MCHC RBC AUTO-ENTMCNC: 34.7 G/DL (ref 31.5–36.5)
MCV RBC AUTO: 93 FL (ref 78–100)
MONOCYTES # BLD AUTO: 0.8 10E3/UL (ref 0–1.3)
MONOCYTES NFR BLD AUTO: 14 %
NEUTROPHILS # BLD AUTO: 3.6 10E3/UL (ref 1.6–8.3)
NEUTROPHILS NFR BLD AUTO: 61 %
PLATELET # BLD AUTO: 265 10E3/UL (ref 150–450)
RBC # BLD AUTO: 4.85 10E6/UL (ref 4.4–5.9)
WBC # BLD AUTO: 5.9 10E3/UL (ref 4–11)

## 2021-12-23 PROCEDURE — 99214 OFFICE O/P EST MOD 30 MIN: CPT | Performed by: FAMILY MEDICINE

## 2021-12-23 PROCEDURE — 85025 COMPLETE CBC W/AUTO DIFF WBC: CPT | Performed by: FAMILY MEDICINE

## 2021-12-23 PROCEDURE — 36415 COLL VENOUS BLD VENIPUNCTURE: CPT | Performed by: FAMILY MEDICINE

## 2021-12-23 RX ORDER — NAPROXEN 500 MG/1
TABLET ORAL
COMMUNITY
Start: 2021-12-09 | End: 2021-12-23

## 2021-12-23 ASSESSMENT — PAIN SCALES - GENERAL: PAINLEVEL: NO PAIN (0)

## 2021-12-23 NOTE — PROGRESS NOTES
[address] [address]  Phone: [phone]  Primary Provider: Greg Salas  Pre-op Performing Provider: MARY OVERTON    PREOPERATIVE EVALUATION:  Today's date: 12/23/2021    Bairon Reed is a 56 year old male who presents for a preoperative evaluation.    Surgical Information:  Surgery/Procedure: C2-C3 Laminoplasty  Surgery Location: [facility]  Surgeon: Dr. Castellanos  Surgery Date: 01/06/22  Time of Surgery: TBD  Where patient plans to recover: At home with family  Fax number for surgical facility: [phone]    Type of Anesthesia Anticipated: to be determined    Assessment & Plan     The proposed surgical procedure is considered INTERMEDIATE risk.    Preop general physical exam  Nothing to eat or drink after midnight the morning of the surgery.  No onset at least 7 days before surgery.  Not Covid vaccinated.  Covid test is pending  - Asymptomatic COVID-19 Virus (Coronavirus) by PCR; Future  - CBC with platelets and differential; Future  - Asymptomatic COVID-19 Virus (Coronavirus) by PCR  - CBC with platelets and differential    Cervical radiculopathy  Surgery, posterior , C2- C3 Laminoplasty    Bilateral arm weakness  Related to Cervical radiculopathy.      Risks and Recommendations:  The patient has the following additional risks and recommendations for perioperative complications:   - No identified additional risk factors other than previously addressed    Medication Instructions:  Patient is to take all scheduled medications on the day of surgery EXCEPT for modifications listed below:    RECOMMENDATION:  APPROVAL GIVEN to proceed with proposed procedure, without further diagnostic evaluation.    Subjective     HPI related to upcoming procedure: History of cervical radiculopathy, upper extremity weakness and numbness.  Patient is scheduled to have C2-C3 laminectomy.  No history of heart disease, non-smoker.  No history of chest  pain or short of breath.  No history of bleeding, blood transfusion.  No history of anesthesia reaction.    Preop Questions 12/23/2021   1. Have you ever had a heart attack or stroke? No   2. Have you ever had surgery on your heart or blood vessels, such as a stent placement, a coronary artery bypass, or surgery on an artery in your head, neck, heart, or legs? No   3. Do you have chest pain with activity? No   4. Do you have a history of  heart failure? No   5. Do you currently have a cold, bronchitis or symptoms of other infection? No   6. Do you have a cough, shortness of breath, or wheezing? No   7. Do you or anyone in your family have previous history of blood clots? No   8. Do you or does anyone in your family have a serious bleeding problem such as prolonged bleeding following surgeries or cuts? No   9. Have you ever had problems with anemia or been told to take iron pills? No   10. Have you had any abnormal blood loss such as black, tarry or bloody stools? No   11. Have you ever had a blood transfusion? No   12. Are you willing to have a blood transfusion if it is medically needed before, during, or after your surgery? Yes   13. Have you or any of your relatives ever had problems with anesthesia? No   14. Do you have sleep apnea, excessive snoring or daytime drowsiness? No   15. Do you have any artifical heart valves or other implanted medical devices like a pacemaker, defibrillator, or continuous glucose monitor? No   16. Do you have artificial joints? No   17. Are you allergic to latex? No       Health Care Directive:  Patient does not have a Health Care Directive or Living Will:     Preoperative Review of :   reviewed - no record of controlled substances prescribed.      Review of Systems  CONSTITUTIONAL: NEGATIVE for fever, chills, change in weight  INTEGUMENTARY/SKIN: NEGATIVE for worrisome rashes, moles or lesions  EYES: NEGATIVE for vision changes or irritation  ENT/MOUTH: NEGATIVE for ear, mouth  and throat problems  RESP: NEGATIVE for significant cough or SOB  CV: NEGATIVE for chest pain, palpitations or peripheral edema  GI: NEGATIVE for nausea, abdominal pain, heartburn, or change in bowel habits  : NEGATIVE for frequency, dysuria, or hematuria  MUSCULOSKELETAL: NEGATIVE for significant arthralgias or myalgia  NEURO: NEGATIVE for weakness, dizziness or paresthesias  ENDOCRINE: NEGATIVE for temperature intolerance, skin/hair changes  HEME: NEGATIVE for bleeding problems  PSYCHIATRIC: NEGATIVE for changes in mood or affect    Patient Active Problem List    Diagnosis Date Noted     Combined form of age-related cataract, right eye 09/23/2021     Priority: Medium     Added automatically from request for surgery [id number]       Asthma 05/26/2021     Priority: Medium     Posterior subcapsular polar age-related cataract of right eye 03/11/2021     Priority: Medium     Gout, unspecified cause, unspecified chronicity, unspecified site 03/04/2016     Priority: Medium     Benign non-nodular prostatic hyperplasia with lower urinary tract symptoms 01/25/2016     Priority: Medium     Hypertension goal BP (blood pressure) < 140/90 02/10/2015     Priority: Medium     S/P lumbar microdiscectomy 05/21/2013     Priority: Medium     Lumbar stenosis with neurogenic claudication 05/13/2013     Priority: Medium     Lumbar radiculopathy 05/13/2013     Priority: Medium     Lumbar herniated disc 05/13/2013     Priority: Medium     Foot drop, right 04/15/2013     Priority: Medium     S/P spinal fusion 03/22/2013     Priority: Medium     Cervical radiculopathy 03/05/2013     Priority: Medium     Vitamin D deficiency 02/12/2013     Priority: Medium     Problem list name updated by automated process. 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     Priority: Medium     rt       Bilateral arm weakness 09/13/2011     Priority: Medium     Hyperlipidemia LDL goal <160 11/18/2009     Priority: Medium     Insomnia 11/18/2009     Priority: Medium      Past Medical History:   Diagnosis Date     Anxiety      Cervical spondylosis with myelopathy 2/4/2013     Cervicalgia      Chronic low back pain 9/13/2011     Chronic low back pain      Degeneration of cervical intervertebral disc      Gout      Hyperlipidemia LDL goal <160 11/18/2009     Hypertension goal BP (blood pressure) < 140/90 2/10/2015     Insomnia 11/18/2009     Nondependent alcohol abuse 11/14/2012     Nonsenile cataract      Pain in shoulder 9/13/2011     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 Lower back surgery     BACK SURGERY      another lumbar procedure spine 2019     CATARACT IOL, RT/LT       COLONOSCOPY WITH CO2 INSUFFLATION N/A 2/21/2020    Procedure: COLONOSCOPY, WITH CO2 INSUFFLATION;  Surgeon: Yasmani Constantino MD;  Location: [facility] OR     DISCECTOMY LUMBAR POSTERIOR MICROSCOPIC ONE LEVEL  5/17/2013    Procedure: DISCECTOMY LUMBAR POSTERIOR MICROSCOPIC ONE LEVEL;  BILATERAL LUMBAR L4-5 EXTENDED HEMILAMINECTOMY, MICROFORAMINOTOMY AND MICRODISCECTOMY WITH METRIX II;  Surgeon: Andrew Alejandre MD;  Location:  OR     EP ABLATION      age 19 heart ablation     FUSION CERVICAL ANTERIOR ONE LEVEL  3/5/2013    Procedure: FUSION CERVICAL ANTERIOR ONE LEVEL;  C 6-7 ANTERIOR DISCECTOMY AND FUSION WITH NEURO MONITORING ;  Surgeon: Andrew Alejandre MD;  Location:  OR     KNEE SURGERY  2009    Right      LARYNGOSCOPY WITH MICROSCOPE  5/28/2013    Procedure: LARYNGOSCOPY WITH MICROSCOPE;  MICRODIRECT LARYNGOSCOPY, WITH LEFT VOCAL CORD INJECTION ;  Surgeon: Jorge Vergara MD;  Location: [facility]     neck [place] [id number] spine surgery cleaned up     NECK SURGERY  2/2010    [facility]     PHACOEMULSIFICATION CLEAR CORNEA WITH STANDARD INTRAOCULAR LENS IMPLANT Right 10/25/2021    Procedure: RIGHT PHACOEMULSIFICATION, CATARACT, WITH INTRAOCULAR LENS IMPLANT;  Surgeon: Pedro Luis Rahman MD;  Location: [facility] OR     Current Outpatient Medications   Medication Sig Dispense Refill     allopurinol (ZYLOPRIM) 100 MG tablet Take 1 tablet (100 mg) by mouth daily 90 tablet 3     calcium carbonate (OS-HANNAH 500 MG [place]. CA) 500 MG tablet Take 500 mg by mouth daily       Cholecalciferol (VITAMIN D3 PO) Take 1,000 Units by mouth daily       glucosamine-chondroitin 500-400 MG CAPS Take 1 capsule by mouth daily       indomethacin (INDOCIN) 50 MG capsule Take 1 capsule (50 mg) by mouth 3 times daily as needed for moderate pain 30 capsule 4     Multiple Vitamins-Minerals (MULTIVITAMIN OR) Take 1 tablet by mouth daily        Multiple Vitamins-Minerals (ZINC PO)        naproxen (NAPROSYN) 500 MG tablet        Omega-3 Fatty Acids (OMEGA-3 FISH OIL PO) Take 2 g by mouth daily       tadalafil (CIALIS) 20 MG tablet 1/2 po as needed for intercourse 6 tablet 1     VITAMIN E NATURAL PO Take 100 Units by mouth         No Known Allergies     Social History     Tobacco Use     Smoking status: Never Smoker     Smokeless tobacco: Current User     Types: Chew     Tobacco comment: Lives in smoke free household   Substance Use Topics     Alcohol use: No     Alcohol/week: 0.0 standard drinks     Comment: Quit 2010. Chemical dependency treatment in 1989. ...31 yrs of 1.5 tins weekly.  sober for 7 months..     Family History   Problem Relation Age of Onset     Heart Disease Father 45        heart attack     Myocardial Infarction Father      Arthritis  Sister      Heart Disease Mother 51        pneumonia and enlarged heart     Myocardial Infarction Other      Glaucoma No family hx of      Macular Degeneration No family hx of      Cancer No family hx of      Diabetes No family hx of      Hypertension No family hx of      Cerebrovascular Disease No family hx of      Thyroid Disease No family hx of      History   Drug Use No         Objective     BP (!) 158/102 (BP Location: Right arm, Patient Position: Chair, Cuff Size: Adult Regular)   Pulse 96   Temp 98.1  F (36.7  C) (Oral)   Resp 28   Wt 93.7 kg (206 lb 9.6 oz)   SpO2 97%   BMI 29.64 kg/m      Physical Exam    GENERAL APPEARANCE: healthy, alert and no distress     HENT: ear canals and TM's normal and nose and mouth without ulcers or lesions     NECK: no adenopathy, no asymmetry, masses, or scars and thyroid normal to palpation     RESP: lungs clear to auscultation - no rales, rhonchi or wheezes     CV: regular rates and rhythm, normal S1 S2, no S3 or S4 and no murmur, click or rub     ABDOMEN:  soft, nontender, no HSM or masses and bowel sounds normal     MS: extremities normal- no gross deformities noted, no evidence of inflammation in joints, FROM in all extremities.     SKIN: no suspicious lesions or rashes     NEURO: Normal strength and tone, sensory exam grossly normal, mentation intact and speech normal     PSYCH: mentation appears normal. and affect normal/bright     LYMPHATICS: No cervical adenopathy    Recent Labs   Lab Test 10/12/21  1036 01/14/21  1201   HGB 14.3 15.1    322    140   POTASSIUM 4.3 4.0   CR 0.95 0.92   A1C 6.3* 6.1*        Diagnostics:  CBC RESULTS: Recent Labs   Lab Test 12/23/21  1041   WBC 5.9   RBC 4.85   HGB 15.6   HCT 45.0   MCV 93   MCH 32.2   MCHC 34.7   RDW 12.9           No EKG required for low risk surgery (cataract, skin procedure, breast biopsy, etc).    Revised Cardiac Risk Index (RCRI):  The patient has the following serious cardiovascular risks  for perioperative complications:   - No serious cardiac risks = 0 points     RCRI Interpretation: 0 points: Class I (very low risk - 0.4% complication rate)      Signed Electronically by: Bryan De Leon MD  Copy of this evaluation report is provided to requesting physician.

## 2021-12-23 NOTE — PATIENT INSTRUCTIONS
Preparing for Your Surgery  Getting started  A nurse will call you to review your health history and instructions. They will give you an arrival time based on your scheduled surgery time. Please be ready to share:    Your doctor's clinic name and phone number    Your medical, surgical and anesthesia history    A list of allergies and sensitivities    A list of medicines, including herbal treatments and over-the-counter drugs    Whether the patient has a legal guardian (ask how to send us the papers in advance)  Please tell us if you're pregnant--or if there's any chance you might be pregnant. Some surgeries may injure a fetus (unborn baby), so they require a pregnancy test. Surgeries that are safe for a fetus don't always need a test, and you can choose whether to have one.   If you have a child who's having surgery, please ask for a copy of Preparing for Your Child's Surgery.    Preparing for surgery    Within 30 days of surgery: Have a pre-op exam (sometimes called an H&P, or History and Physical). This can be done at a clinic or pre-operative center.  ? If you're having a , you may not need this exam. Talk to your care team.    At your pre-op exam, talk to your care team about all medicines you take. If you need to stop any medicines before surgery, ask when to start taking them again.  ? We do this for your safety. Many medicines can make you bleed too much during surgery. Some change how well surgery (anesthesia) drugs work.    Call your insurance company to let them know you're having surgery. (If you don't have insurance, call [phone].)    Call your clinic if there's any change in your health. This includes signs of a cold or flu (sore throat, runny nose, cough, rash, fever). It also includes a scrape or scratch near the surgery site.    If you have questions on the day of surgery, call your hospital or surgery center.  COVID testing  You may need to be tested for COVID-19 before having  surgery. If so, we will give you instructions.  Eating and drinking guidelines  For your safety: Unless your surgeon tells you otherwise, follow the guidelines below.    Eat and drink as usual until 8 hours before surgery. After that, no food or milk.    Drink clear liquids until 2 hours before surgery. These are liquids you can see through, like water, Gatorade and Propel Water. You may also have black coffee and tea (no cream or milk).    Nothing by mouth within 2 hours of surgery. This includes gum, candy and breath mints.    If you drink alcohol: Stop drinking it the night before surgery.    If your care team tells you to take medicine on the morning of surgery, it's okay to take it with a sip of water.  Preventing infection    Shower or bathe the night before and morning of your surgery. Follow the instructions your clinic gave you. (If no instructions, use regular soap.)    Don't shave or clip hair near your surgery site. We'll remove the hair if needed.    Don't smoke or vape the morning of surgery. You may chew nicotine gum up to 2 hours before surgery. A nicotine patch is okay.  ? Note: Some surgeries require you to completely quit smoking and nicotine. Check with your surgeon.    Your care team will make every effort to keep you safe from infection. We will:  ? Clean our hands often with soap and water (or an alcohol-based hand rub).  ? Clean the skin at your surgery site with a special soap that kills germs.  ? Give you a special gown to keep you warm. (Cold raises the risk of infection.)  ? Wear special hair covers, masks, gowns and gloves during surgery.  ? Give antibiotic medicine, if prescribed. Not all surgeries need antibiotics.  What to bring on the day of surgery    Photo ID and insurance card    Copy of your health care directive, if you have one    Glasses and hearing aides (bring cases)  ? You can't wear contacts during surgery    Inhaler and eye drops, if you use them (tell us about these when  you arrive)    CPAP machine or breathing device, if you use them    A few personal items, if spending the night    If you have . . .  ? A pacemaker, ICD (cardiac defibrillator) or other implant: Bring the ID card.  ? An implanted stimulator: Bring the remote control.  ? A legal guardian: Bring a copy of the certified (court-stamped) guardianship papers.  Please remove any jewelry, including body piercings. Leave jewelry and other valuables at home.  If you're going home the day of surgery    You must have a responsible adult drive you home. They should stay with you overnight as well.    If you don't have someone to stay with you, and you aren't safe to go home alone, we may keep you overnight. Insurance often won't pay for this.  After surgery  If it's hard to control your pain or you need more pain medicine, please call your surgeon's office.  Questions?   If you have any questions for your care team, list them here: _________________________________________________________________________________________________________________________________________________________________________ ____________________________________ ____________________________________ ____________________________________  For informational purposes only. Not to replace the advice of your health care provider. Copyright   2003, 2019 [facility]. All rights reserved. Clinically reviewed by Ruth Chung MD. [employer] [id number] - REV 07/21.

## 2021-12-23 NOTE — LETTER
December 23, 2021      Bairon Reed  [address] [place] [address]        To Whom It May Concern:    Bairon Reed  was seen on 12/23/2021.  Please excuse him  until 12/23/21 due to illness.        Sincerely,        Bryan De Leon MD

## 2022-01-03 ENCOUNTER — LAB (OUTPATIENT)
Dept: LAB | Facility: CLINIC | Age: 57
End: 2022-01-03
Attending: FAMILY MEDICINE
Payer: COMMERCIAL

## 2022-01-03 DIAGNOSIS — Z01.818 PREOP GENERAL PHYSICAL EXAM: ICD-10-CM

## 2022-01-03 PROCEDURE — U0003 INFECTIOUS AGENT DETECTION BY NUCLEIC ACID (DNA OR RNA); SEVERE ACUTE RESPIRATORY SYNDROME CORONAVIRUS 2 (SARS-COV-2) (CORONAVIRUS DISEASE [COVID-19]), AMPLIFIED PROBE TECHNIQUE, MAKING USE OF HIGH THROUGHPUT TECHNOLOGIES AS DESCRIBED BY CMS-2020-01-R: HCPCS

## 2022-01-03 PROCEDURE — U0005 INFEC AGEN DETEC AMPLI PROBE: HCPCS

## 2022-01-04 LAB — SARS-COV-2 RNA RESP QL NAA+PROBE: NEGATIVE

## 2022-01-06 ENCOUNTER — LAB (OUTPATIENT)
Dept: LAB | Facility: CLINIC | Age: 57
End: 2022-01-06
Payer: COMMERCIAL

## 2022-01-06 DIAGNOSIS — Z11.52 ENCOUNTER FOR SCREENING FOR COVID-19: ICD-10-CM

## 2022-01-06 DIAGNOSIS — Z11.52 ENCOUNTER FOR SCREENING FOR COVID-19: Primary | ICD-10-CM

## 2022-01-06 PROCEDURE — U0003 INFECTIOUS AGENT DETECTION BY NUCLEIC ACID (DNA OR RNA); SEVERE ACUTE RESPIRATORY SYNDROME CORONAVIRUS 2 (SARS-COV-2) (CORONAVIRUS DISEASE [COVID-19]), AMPLIFIED PROBE TECHNIQUE, MAKING USE OF HIGH THROUGHPUT TECHNOLOGIES AS DESCRIBED BY CMS-2020-01-R: HCPCS

## 2022-01-06 PROCEDURE — U0005 INFEC AGEN DETEC AMPLI PROBE: HCPCS

## 2022-01-07 LAB — SARS-COV-2 RNA RESP QL NAA+PROBE: NEGATIVE

## 2022-01-17 ENCOUNTER — TRANSFERRED RECORDS (OUTPATIENT)
Dept: HEALTH INFORMATION MANAGEMENT | Facility: CLINIC | Age: 57
End: 2022-01-17
Payer: COMMERCIAL

## 2022-01-17 ENCOUNTER — NURSE TRIAGE (OUTPATIENT)
Dept: FAMILY MEDICINE | Facility: CLINIC | Age: 57
End: 2022-01-17
Payer: COMMERCIAL

## 2022-01-17 NOTE — TELEPHONE ENCOUNTER
Please call patient regarding his cough after his recent neck surgery (01-11-22) per Kristen Kehr PA-C. Pang R., MA

## 2022-01-17 NOTE — TELEPHONE ENCOUNTER
Left message on answering machine for patient/parent to call back.   [phone]  Cathi Pardo RN

## 2022-01-18 ENCOUNTER — OFFICE VISIT (OUTPATIENT)
Dept: FAMILY MEDICINE | Facility: CLINIC | Age: 57
End: 2022-01-18
Payer: COMMERCIAL

## 2022-01-18 VITALS
HEIGHT: 70 IN | TEMPERATURE: 98.4 F | BODY MASS INDEX: 29.63 KG/M2 | HEART RATE: 96 BPM | SYSTOLIC BLOOD PRESSURE: 148 MMHG | WEIGHT: 207 LBS | DIASTOLIC BLOOD PRESSURE: 93 MMHG | OXYGEN SATURATION: 97 %

## 2022-01-18 DIAGNOSIS — R05.9 COUGH: Primary | ICD-10-CM

## 2022-01-18 LAB
FLUAV AG SPEC QL IA: NEGATIVE
FLUBV AG SPEC QL IA: NEGATIVE

## 2022-01-18 PROCEDURE — 99213 OFFICE O/P EST LOW 20 MIN: CPT | Performed by: NURSE PRACTITIONER

## 2022-01-18 PROCEDURE — U0003 INFECTIOUS AGENT DETECTION BY NUCLEIC ACID (DNA OR RNA); SEVERE ACUTE RESPIRATORY SYNDROME CORONAVIRUS 2 (SARS-COV-2) (CORONAVIRUS DISEASE [COVID-19]), AMPLIFIED PROBE TECHNIQUE, MAKING USE OF HIGH THROUGHPUT TECHNOLOGIES AS DESCRIBED BY CMS-2020-01-R: HCPCS | Mod: 90 | Performed by: NURSE PRACTITIONER

## 2022-01-18 PROCEDURE — U0005 INFEC AGEN DETEC AMPLI PROBE: HCPCS | Mod: 90 | Performed by: NURSE PRACTITIONER

## 2022-01-18 PROCEDURE — 87804 INFLUENZA ASSAY W/OPTIC: CPT | Performed by: NURSE PRACTITIONER

## 2022-01-18 PROCEDURE — 99000 SPECIMEN HANDLING OFFICE-LAB: CPT | Performed by: NURSE PRACTITIONER

## 2022-01-18 RX ORDER — BENZONATATE 200 MG/1
200 CAPSULE ORAL 3 TIMES DAILY PRN
Qty: 60 CAPSULE | Refills: 1 | Status: SHIPPED | OUTPATIENT
Start: 2022-01-18 | End: 2022-07-28

## 2022-01-18 RX ORDER — ALBUTEROL SULFATE 90 UG/1
2 AEROSOL, METERED RESPIRATORY (INHALATION) EVERY 6 HOURS
Qty: 18 G | Refills: 0 | Status: SHIPPED | OUTPATIENT
Start: 2022-01-18 | End: 2022-07-28

## 2022-01-18 ASSESSMENT — MIFFLIN-ST. JEOR: SCORE: 1775.2

## 2022-01-18 NOTE — TELEPHONE ENCOUNTER
Left message on answering machine for patient/parent to call back.   [phone]  Cathi Pardo RN

## 2022-01-18 NOTE — PROGRESS NOTES
"  Assessment & Plan     Cough  Lungs are clear and vitals are stable.  Rule out influenza and/or Covid-19.  I have low suspicion for pneumonia, but will order CXR as next step if not improving.  - Influenza A/B antigen  - Symptomatic; Yes; 1/15/2022 COVID-19 Virus (Coronavirus) by PCR Nose  - albuterol (PROAIR HFA/PROVENTIL HFA/VENTOLIN HFA) 108 (90 Base) MCG/ACT inhaler; Inhale 2 puffs into the lungs every 6 hours  - benzonatate (TESSALON) 200 MG capsule; Take 1 capsule (200 mg) by mouth 3 times daily as needed for cough    Ordering of each unique test  Prescription drug management         Tobacco Cessation:   reports that he has never smoked. His smokeless tobacco use includes chew.          No follow-ups on file.    YULIA Molina CNP  [facility] FLOR Maher is a 56 year old who presents for the following health issues     HPI     Acute Illness  Acute illness concerns: coughing  Onset/Duration: 3 days  Symptoms:  Fever: no  Chills/Sweats: no  Headache (location?): no  Sinus Pressure: no  Conjunctivitis:  no  Ear Pain: no  Rhinorrhea: no  Congestion: no  Sore Throat: no  Cough: YES-non-productive  Wheeze: no  Decreased Appetite: no  Nausea: no  Vomiting: no  Diarrhea: no  Dysuria/Freq.: no  Dysuria or Hematuria: no  Fatigue/Achiness: no  Sick/Strep Exposure: no  Therapies tried and outcome: mucinex, cough drops     Recovering from recent cervical spine surgery.  Coughing is worsening pain.  Currently on dilauded.    Review of Systems   Constitutional, HEENT, cardiovascular, pulmonary, gi and gu systems are negative, except as otherwise noted.      Objective    BP (!) 148/93 (BP Location: Right arm, Patient Position: Sitting, Cuff Size: Adult Regular)   Pulse 96   Temp 98.4  F (36.9  C) (Oral)   Ht 1.778 m (5' 10\")   Wt 93.9 kg (207 lb)   SpO2 97%   BMI 29.70 kg/m    Body mass index is 29.7 kg/m .  Physical Exam   GENERAL: alert, no distress and fatigued  RESP: lungs " clear to auscultation - no rales, rhonchi or wheezes  CV: regular rate and rhythm, normal S1 S2, no S3 or S4, no murmur, click or rub, no peripheral edema and peripheral pulses strong  MS: no gross musculoskeletal defects noted, no edema

## 2022-01-18 NOTE — RESULT ENCOUNTER NOTE
Dear Bairon,    Your recent test results are attached.      No influenza noted.    If you have any questions please feel free to contact [phone] or myself via [employer].    Sincerely,  Letty Gallagher, CNP

## 2022-01-18 NOTE — TELEPHONE ENCOUNTER
Gunnar called back and was able to triage him. He is unable to get to this appointment today at 9:30 with Kristen Kehr. His cough is continuous and is wearing him out and is hurting his neck with the coughing spells.     RN warmed transferred patient to central scheduler to help assist him getting a different same day appointment either today or tomorrow to get him checked out and get him something else/more for his cough.     Routing to Provider to review/ FYI. Please sign and close encounter upon review.     Patient looking with central scheduler for something closer to where he lives for an appointment.     Lea Ricks RN BSN        Reason for Disposition    Continuous (nonstop) coughing interferes with work or school and no improvement using cough treatment per Care Advice    Patient wants to be seen    Additional Information    Negative: Bluish (or gray) lips or face    Negative: Severe difficulty breathing (e.g., struggling for each breath, speaks in single words)    Negative: Rapid onset of cough and has hives    Negative: Coughing started suddenly after medicine, an allergic food or bee sting    Negative: Difficulty breathing after exposure to flames, smoke, or fumes    Negative: Sounds like a life-threatening emergency to the triager    Negative: Chest pain present when not coughing    Negative: Difficulty breathing    Negative: Passed out (i.e., fainted, collapsed and was not responding)    Negative: Patient sounds very sick or weak to the triager    Negative: Coughed up > 1 tablespoon (15 ml) blood (Exception: blood-tinged sputum)    Negative: Fever > 103 F (39.4 C)    Negative: Fever > 101 F (38.3 C) and over 60 years of age    Negative: Fever > 100.0 F (37.8 C) and has diabetes mellitus or a weak immune system (e.g., HIV positive, cancer chemotherapy, organ transplant, splenectomy, chronic steroids)    Negative: Fever > 100.0 F (37.8 C) and bedridden (e.g., nursing home patient, stroke, chronic  "illness, recovering from surgery)    Negative: Increasing ankle swelling    Negative: Wheezing is present    Negative: SEVERE coughing spells (e.g., whooping sound after coughing, vomiting after coughing)    Negative: Coughing up ssuie-colored (reddish-brown) or blood-tinged sputum    Negative: Fever present > 3 days (72 hours)    Negative: Fever returns after gone for over 24 hours and symptoms worse or not improved    Negative: Using nasal washes and pain medicine > 24 hours and sinus pain persists    Negative: Known COPD or other severe lung disease (i.e., bronchiectasis, cystic fibrosis, lung surgery) and worsening symptoms (i.e., increased sputum purulence or amount, increased breathing difficulty)    Answer Assessment - Initial Assessment Questions  1. ONSET: \"When did the cough begin?\"       Had a cold 2 days before surgery, then had the surgery, Wednesday started coughing more and has been persistently getting worse.  2. SEVERITY: \"How bad is the cough today?\"       Post surgical neck, coughing seems moderate to severe.   3. RESPIRATORY DISTRESS: \"Describe your breathing.\"       Breathing is fine, but then has these coughing spells.   4. FEVER: \"Do you have a fever?\" If so, ask: \"What is your temperature, how was it measured, and when did it start?\"      No.   5. HEMOPTYSIS: \"Are you coughing up any blood?\" If so ask: \"How much?\" (flecks, streaks, tablespoons, etc.)      Some phlegm. No blood.   6. TREATMENT: \"What have you done so far to treat the cough?\" (e.g., meds, fluids, humidifier)      Fluids, robitussin, cough syrups, losenges, mucinex.  7. CARDIAC HISTORY: \"Do you have any history of heart disease?\" (e.g., heart attack, congestive heart failure)       no  8. LUNG HISTORY: \"Do you have any history of lung disease?\"  (e.g., pulmonary embolus, asthma, emphysema)      no  9. PE RISK FACTORS: \"Do you have a history of blood clots?\" (or: recent major surgery, recent prolonged travel, bedridden)      No, " "was checked yesterday for that.   10. OTHER SYMPTOMS: \"Do you have any other symptoms? (e.g., runny nose, wheezing, chest pain)        Congestion, pain in neck, but to be expected post surgical.  11. PREGNANCY: \"Is there any chance you are pregnant?\" \"When was your last menstrual period?\"        no  12. TRAVEL: \"Have you traveled out of the country in the last month?\" (e.g., travel history, exposures)        no    Protocols used: COUGH-A-OH    "

## 2022-01-19 ENCOUNTER — TELEPHONE (OUTPATIENT)
Dept: FAMILY MEDICINE | Facility: CLINIC | Age: 57
End: 2022-01-19
Payer: COMMERCIAL

## 2022-01-19 LAB
SARS-COV-2 RNA RESP QL NAA+PROBE: DETECTED
SARS-COV-2 RNA RESP QL NAA+PROBE: NORMAL

## 2022-01-19 NOTE — TELEPHONE ENCOUNTER
Please call patient-     His labs show that he is Covid-19 positive.  He should isolate at home for 10 days following the start of symptoms or until symptoms improve, whichever is longest.  He should seek further care for increased shortness of breath.     Letty Gallagher CNP    Left message on answering machine for patient to call back to the nurse at [phone].    Coty Davila RN  [facility]

## 2022-01-20 NOTE — TELEPHONE ENCOUNTER
Pt called back. Gave him provider's message as written. States he is taking Benzonatate and the albuterol inhaler with no improvement of symptoms. States he just had surgery on his neck last Tuesday, and the cough isn't helping this situation. Denies SOB. Cough is constant in the am and when he lays down. During the day, is increasingly getting worse. Is there something else that can be prescribed for the cough? Please advise.    Natali Russell RN  [facility]

## 2022-01-20 NOTE — TELEPHONE ENCOUNTER
Unfortunately, I don't have anything else that I can safely prescribe him for cough.  I would have him continue to use the inhaler every 4 hours as needed and the benzonate.  He should also keep a humidifier close by and treat his pain with the meds from his surgeon and he can take tylenol in addition.    Letty Gallagher, CNP

## 2022-01-20 NOTE — TELEPHONE ENCOUNTER
Left message on voicemail advising patient to return call at [phone].    Danii EATONN, RN  [facility], [place]

## 2022-01-21 ENCOUNTER — TELEPHONE (OUTPATIENT)
Dept: FAMILY MEDICINE | Facility: CLINIC | Age: 57
End: 2022-01-21
Payer: COMMERCIAL

## 2022-01-21 NOTE — TELEPHONE ENCOUNTER
"Coronavirus (COVID-19) Notification    Caller Name (Patient, parent, daughter/son, grandparent, etc)  Patient    Reason for call  Notify of Positive Coronavirus (COVID-19) lab results, assess symptoms,  review  [facility] recommendations    Lab Result    Lab test:  2019-nCoV rRt-PCR or SARS-CoV-2 PCR    Oropharyngeal AND/OR nasopharyngeal swabs is POSITIVE for 2019-nCoV RNA/SARS-COV-2 PCR (COVID-19 virus)    RN Recommendations/Instructions per [facility] Coronavirus COVID-19 recommendations    Brief introduction script  Introduce self then review script:  \"I am calling on behalf of [email or web address].  We were notified that your Coronavirus test (COVID-19) for was POSITIVE for the virus.  I have some information to relay to you but first I wanted to mention that the [place] Dept of Health will be contacting you shortly [it's possible MD already called Patient] to talk to you more about how you are feeling and other people you have had contact with who might now also have the virus.  Also,  [facility] is Partnering with the [facility] for Covid-19 research, you may be contacted directly by research staff.\"      Assessment (Inquire about Patient's current symptoms)   Assessment   Current Symptoms at time of phone call: (if no symptoms, document No symptoms] cough   Symptoms onset (if applicable) 1/13/22     If at time of call, Patients symptoms hare worsened, the Patient should contact 911 or have someone drive them to Emergency Dept promptly:      If Patient calling 911, inform 911 personal that you have tested positive for the Coronavirus (COVID-19).  Place mask on and await 911 to arrive.    If Emergency Dept, If possible, please have another adult drive you to the Emergency Dept but you need to wear mask when in contact with other people.          Treatment Options:   Patient classified as COVID treatment eligible by Epic high risk algorithm: No  Is the patient symptomatic at the time of result " notification? Yes. Was the onset of symptoms within the last 5 days? No. You may be eligible to receive a new treatment with a monoclonal antibody for preventing hospitalization in patients at high risk for complications from COVID-19.   This medication is still experimental and available on a limited basis; it is given through an IV and must be given at an infusion center. Please note that not all people who are eligible will receive the medication since it is in limited supply.  Are you interested in being considered for this medication?  No.     Review information with Patient    Your result was positive. This means you have COVID-19 (coronavirus).  We have sent you a letter that reviews the information that I'll be reviewing with you now.    How can I protect others?    If you have symptoms: stay home and away from others (self-isolate) until:    You've had no fever--and no medicine that reduces fever--for 1 full day (24 hours). And       Your other symptoms have gotten better. For example, your cough or breathing has improved. And     At least 10 days have passed since your symptoms started. (If you've been told by a doctor that you have a weak immune system, wait 20 days.)     If you don't have symptoms: Stay home and away from others (self-isolate) until at least 10 days have passed since your first positive COVID-19 test. (Date test collected)    During this time:    Stay in your own room, including for meals. Use your own bathroom if you can.    Stay away from others in your home. No hugging, kissing or shaking hands. No visitors.     Don't go to work, school or anywhere else.     Clean  high touch  surfaces often (doorknobs, counters, handles, etc.). Use a household cleaning spray or wipes. You'll find a full list on the EPA website at www.epa.gov/pesticide-registration/list-n-disinfectants-use-against-sars-cov-2.     Cover your mouth and nose with a mask, tissue or other face covering to avoid spreading  germs.    Wash your hands and face often with soap and water.    Make a list of people you have been in close contact with recently, even if either of you wore a face covering.   - Start your list from 2 days before you became ill or had a positive test.  - Include anyone that was within 6 feet of you for a cumulative total of 15 minutes or more in 24 hours. (Example: if you sat next to Kehinde for 5 minutes in the morning and 10 minutes in the afternoon, then you were in close contact for 15 minutes total that day. Kehinde would be added to your list.)    A public health worker will call or text you. It is important that you answer. They will ask you questions about possible exposures to COVID-19, such as people you have been in direct contact with and places you have visited.    Tell the people on your list that you have COVID-19; they should stay away from others for 14 days starting from the last time they were in contact with you (unless you are told something different from a public health worker).     Caregivers in these groups are at risk for severe illness due to COVID-19:  o People 65 years and older  o People who live in a nursing home or long-term care facility  o People with chronic disease (lung, heart, cancer, diabetes, kidney, liver, immunologic)  o People who have a weakened immune system, including those who:  - Are in cancer treatment  - Take medicine that weakens the immune system, such as corticosteroids  - Had a bone marrow or organ transplant  - Have an immune deficiency  - Have poorly controlled HIV or AIDS  - Are obese (body mass index of 40 or higher)  - Smoke regularly    Caregivers should wear gloves while washing dishes, handling laundry and cleaning bedrooms and bathrooms.    Wash and dry laundry with special caution. Don't shake dirty laundry, and use the warmest water setting you can.    If you have a weakened immune system, ask your doctor about other actions you should take.    For more  tips, go to www.cdc.gov/coronavirus/2019-ncov/downloads/10Things.pdf.    You should not go back to work until you meet the guidelines above for ending your home isolation. You don't need to be retested for COVID-19 before going back to work--studies show that you won't spread the virus if it's been at least 10 days since your symptoms started (or 20 days, if you have a weak immune system).    Employers: This document serves as formal notice of your employee's medical guidelines for going back to work. They must meet the above guidelines before going back to work in person.    How can I take care of myself?    1. Get lots of rest. Drink extra fluids (unless a doctor has told you not to).    2. Take Tylenol (acetaminophen) for fever or pain. If you have liver or kidney problems, ask your family doctor if it's okay to take Tylenol.     Take either:     650 mg (two 325 mg pills) every 4 to 6 hours, or     1,000 mg (two 500 mg pills) every 8 hours as needed.     Note: Don't take more than 3,000 mg in one day. Acetaminophen is found in many medicines (both prescribed and over-the-counter medicines). Read all labels to be sure you don't take too much.    For children, check the Tylenol bottle for the right dose (based on their age or weight).    3. If you have other health problems (like cancer, heart failure, an organ transplant or severe kidney disease): Call your specialty clinic if you don't feel better in the next 2 days.    4. Know when to call 911: Emergency warning signs include:    Trouble breathing or shortness of breath    Pain or pressure in the chest that doesn't go away    Feeling confused like you haven't felt before, or not being able to wake up    Bluish-colored lips or face    5. Sign up for [employer]. We know it's scary to hear that you have COVID-19. We want to track your symptoms to make sure you're okay over the next 2 weeks. Please look for an email from [employer]--this is a free, online  program that we'll use to keep in touch. To sign up, follow the link in the email. Learn more at [email or web address].    Where can I get more information?    [facility]: [email or web address]    Coronavirus Basics: www.health.[facility]./diseases/coronavirus/basics.html    What to Do If You're Sick: www.cdc.gov/coronavirus/2019-ncov/about/steps-when-sick.html    Ending Home Isolation: www.cdc.gov/coronavirus/2019-ncov/hcp/disposition-in-home-patients.html     Caring for Someone with COVID-19: www.cdc.gov/coronavirus/2019-ncov/if-you-are-sick/care-for-someone.html     [facility] clinical trials (COVID-19 research studies): clinicalaffairs.[facility]/n-clinical-trials     A Positive COVID-19 letter will be sent via [employer] or the mail. (Exception, no letters sent to Presurgerical/Preprocedure Patients)    Margie Shelley LPN

## 2022-01-26 NOTE — TELEPHONE ENCOUNTER
Patient notified of Provider's message as written.  Patient verbalized understanding.    Kevin Crain RN  [facility]

## 2022-02-25 ENCOUNTER — TRANSFERRED RECORDS (OUTPATIENT)
Dept: HEALTH INFORMATION MANAGEMENT | Facility: CLINIC | Age: 57
End: 2022-02-25
Payer: COMMERCIAL

## 2022-03-13 ENCOUNTER — HEALTH MAINTENANCE LETTER (OUTPATIENT)
Age: 57
End: 2022-03-13

## 2022-03-18 ENCOUNTER — TRANSFERRED RECORDS (OUTPATIENT)
Dept: HEALTH INFORMATION MANAGEMENT | Facility: CLINIC | Age: 57
End: 2022-03-18
Payer: COMMERCIAL

## 2022-04-04 NOTE — TELEPHONE ENCOUNTER
Form has been completed and faxed to [phone]    Heike-     
Reason for Call:  Form, our goal is to have forms completed with 72 hours, however, some forms may require a visit or additional information.    Type of letter, form or note:  Attending Physicians Statement    Who is the form from?: Insurance comp    Where did the form come from: form was faxed in    What clinic location was the form placed at?: [facility]    Where the form was placed: Given to physician    What number is listed as a contact on the form?: Fax number [phone]       Additional comments: Please fax once complete     Call taken on 8/25/2021 at 10:27 AM by NILES MCDONOUGH         
Class II - visualization of the soft palate, fauces, and uvula

## 2022-04-15 ENCOUNTER — TRANSFERRED RECORDS (OUTPATIENT)
Dept: HEALTH INFORMATION MANAGEMENT | Facility: CLINIC | Age: 57
End: 2022-04-15
Payer: COMMERCIAL

## 2022-04-29 ENCOUNTER — TRANSFERRED RECORDS (OUTPATIENT)
Dept: HEALTH INFORMATION MANAGEMENT | Facility: CLINIC | Age: 57
End: 2022-04-29
Payer: COMMERCIAL

## 2022-05-31 ENCOUNTER — TRANSFERRED RECORDS (OUTPATIENT)
Dept: HEALTH INFORMATION MANAGEMENT | Facility: CLINIC | Age: 57
End: 2022-05-31
Payer: COMMERCIAL

## 2022-07-28 ENCOUNTER — OFFICE VISIT (OUTPATIENT)
Dept: FAMILY MEDICINE | Facility: CLINIC | Age: 57
End: 2022-07-28
Payer: COMMERCIAL

## 2022-07-28 VITALS
TEMPERATURE: 97.1 F | OXYGEN SATURATION: 100 % | HEIGHT: 70 IN | DIASTOLIC BLOOD PRESSURE: 85 MMHG | HEART RATE: 83 BPM | WEIGHT: 204.25 LBS | SYSTOLIC BLOOD PRESSURE: 136 MMHG | BODY MASS INDEX: 29.24 KG/M2

## 2022-07-28 DIAGNOSIS — R53.83 FATIGUE, UNSPECIFIED TYPE: ICD-10-CM

## 2022-07-28 DIAGNOSIS — R73.01 IMPAIRED FASTING GLUCOSE: ICD-10-CM

## 2022-07-28 DIAGNOSIS — R06.83 SNORING: ICD-10-CM

## 2022-07-28 DIAGNOSIS — M79.674 PAIN OF TOE OF RIGHT FOOT: ICD-10-CM

## 2022-07-28 DIAGNOSIS — N52.9 ERECTILE DYSFUNCTION, UNSPECIFIED ERECTILE DYSFUNCTION TYPE: ICD-10-CM

## 2022-07-28 DIAGNOSIS — R07.9 CHEST PAIN, UNSPECIFIED TYPE: ICD-10-CM

## 2022-07-28 DIAGNOSIS — G47.00 INSOMNIA, UNSPECIFIED TYPE: ICD-10-CM

## 2022-07-28 DIAGNOSIS — Z00.00 ROUTINE GENERAL MEDICAL EXAMINATION AT A HEALTH CARE FACILITY: Primary | ICD-10-CM

## 2022-07-28 DIAGNOSIS — M10.9 GOUT, UNSPECIFIED CAUSE, UNSPECIFIED CHRONICITY, UNSPECIFIED SITE: ICD-10-CM

## 2022-07-28 DIAGNOSIS — Z13.6 CARDIOVASCULAR SCREENING; LDL GOAL LESS THAN 100: ICD-10-CM

## 2022-07-28 DIAGNOSIS — Z12.5 SCREENING FOR PROSTATE CANCER: ICD-10-CM

## 2022-07-28 LAB
ALBUMIN SERPL-MCNC: 4.2 G/DL (ref 3.4–5)
ALP SERPL-CCNC: 62 U/L (ref 40–150)
ALT SERPL W P-5'-P-CCNC: 63 U/L (ref 0–70)
ANION GAP SERPL CALCULATED.3IONS-SCNC: 7 MMOL/L (ref 3–14)
AST SERPL W P-5'-P-CCNC: 32 U/L (ref 0–45)
BASOPHILS # BLD AUTO: 0 10E3/UL (ref 0–0.2)
BASOPHILS NFR BLD AUTO: 1 %
BILIRUB SERPL-MCNC: 0.7 MG/DL (ref 0.2–1.3)
BUN SERPL-MCNC: 10 MG/DL (ref 7–30)
CALCIUM SERPL-MCNC: 9.1 MG/DL (ref 8.5–10.1)
CHLORIDE BLD-SCNC: 108 MMOL/L (ref 94–109)
CHOLEST SERPL-MCNC: 197 MG/DL
CO2 SERPL-SCNC: 25 MMOL/L (ref 20–32)
CREAT SERPL-MCNC: 0.83 MG/DL (ref 0.66–1.25)
EOSINOPHIL # BLD AUTO: 0.1 10E3/UL (ref 0–0.7)
EOSINOPHIL NFR BLD AUTO: 1 %
ERYTHROCYTE [DISTWIDTH] IN BLOOD BY AUTOMATED COUNT: 12.4 % (ref 10–15)
FASTING STATUS PATIENT QL REPORTED: ABNORMAL
GFR SERPL CREATININE-BSD FRML MDRD: >90 ML/MIN/1.73M2
GLUCOSE BLD-MCNC: 152 MG/DL (ref 70–99)
HBA1C MFR BLD: 6.5 % (ref 0–5.6)
HCT VFR BLD AUTO: 43.5 % (ref 40–53)
HDLC SERPL-MCNC: 52 MG/DL
HGB BLD-MCNC: 15.5 G/DL (ref 13.3–17.7)
LDLC SERPL CALC-MCNC: 119 MG/DL
LYMPHOCYTES # BLD AUTO: 1.6 10E3/UL (ref 0.8–5.3)
LYMPHOCYTES NFR BLD AUTO: 31 %
MCH RBC QN AUTO: 32.4 PG (ref 26.5–33)
MCHC RBC AUTO-ENTMCNC: 35.6 G/DL (ref 31.5–36.5)
MCV RBC AUTO: 91 FL (ref 78–100)
MONOCYTES # BLD AUTO: 0.5 10E3/UL (ref 0–1.3)
MONOCYTES NFR BLD AUTO: 9 %
NEUTROPHILS # BLD AUTO: 3.1 10E3/UL (ref 1.6–8.3)
NEUTROPHILS NFR BLD AUTO: 59 %
NONHDLC SERPL-MCNC: 145 MG/DL
PLATELET # BLD AUTO: 261 10E3/UL (ref 150–450)
POTASSIUM BLD-SCNC: 4.1 MMOL/L (ref 3.4–5.3)
PROT SERPL-MCNC: 6.7 G/DL (ref 6.8–8.8)
PSA SERPL-MCNC: 2.86 UG/L (ref 0–4)
RBC # BLD AUTO: 4.78 10E6/UL (ref 4.4–5.9)
SODIUM SERPL-SCNC: 140 MMOL/L (ref 133–144)
TRIGL SERPL-MCNC: 129 MG/DL
TSH SERPL DL<=0.005 MIU/L-ACNC: 0.98 MU/L (ref 0.4–4)
WBC # BLD AUTO: 5.3 10E3/UL (ref 4–11)

## 2022-07-28 PROCEDURE — G0103 PSA SCREENING: HCPCS | Performed by: FAMILY MEDICINE

## 2022-07-28 PROCEDURE — 80050 GENERAL HEALTH PANEL: CPT | Performed by: FAMILY MEDICINE

## 2022-07-28 PROCEDURE — 36415 COLL VENOUS BLD VENIPUNCTURE: CPT | Performed by: FAMILY MEDICINE

## 2022-07-28 PROCEDURE — 80061 LIPID PANEL: CPT | Performed by: FAMILY MEDICINE

## 2022-07-28 PROCEDURE — 99214 OFFICE O/P EST MOD 30 MIN: CPT | Mod: 25 | Performed by: FAMILY MEDICINE

## 2022-07-28 PROCEDURE — 99396 PREV VISIT EST AGE 40-64: CPT | Performed by: FAMILY MEDICINE

## 2022-07-28 PROCEDURE — 83036 HEMOGLOBIN GLYCOSYLATED A1C: CPT | Performed by: FAMILY MEDICINE

## 2022-07-28 PROCEDURE — 84403 ASSAY OF TOTAL TESTOSTERONE: CPT | Performed by: FAMILY MEDICINE

## 2022-07-28 RX ORDER — ALLOPURINOL 100 MG/1
100 TABLET ORAL DAILY
Qty: 90 TABLET | Refills: 3 | Status: SHIPPED | OUTPATIENT
Start: 2022-07-28 | End: 2023-02-17

## 2022-07-28 ASSESSMENT — ASTHMA QUESTIONNAIRES
QUESTION_3 LAST FOUR WEEKS HOW OFTEN DID YOUR ASTHMA SYMPTOMS (WHEEZING, COUGHING, SHORTNESS OF BREATH, CHEST TIGHTNESS OR PAIN) WAKE YOU UP AT NIGHT OR EARLIER THAN USUAL IN THE MORNING: NOT AT ALL
QUESTION_4 LAST FOUR WEEKS HOW OFTEN HAVE YOU USED YOUR RESCUE INHALER OR NEBULIZER MEDICATION (SUCH AS ALBUTEROL): NOT AT ALL
QUESTION_2 LAST FOUR WEEKS HOW OFTEN HAVE YOU HAD SHORTNESS OF BREATH: NOT AT ALL
ACT_TOTALSCORE: 25
QUESTION_1 LAST FOUR WEEKS HOW MUCH OF THE TIME DID YOUR ASTHMA KEEP YOU FROM GETTING AS MUCH DONE AT WORK, SCHOOL OR AT HOME: NONE OF THE TIME
ACT_TOTALSCORE: 25
QUESTION_5 LAST FOUR WEEKS HOW WOULD YOU RATE YOUR ASTHMA CONTROL: COMPLETELY CONTROLLED

## 2022-07-28 ASSESSMENT — ENCOUNTER SYMPTOMS
NERVOUS/ANXIOUS: 0
SHORTNESS OF BREATH: 0
CONSTIPATION: 0
CHILLS: 0
MYALGIAS: 1
SORE THROAT: 0
DYSURIA: 0
WEAKNESS: 1
NAUSEA: 0
HEMATURIA: 0
HEARTBURN: 0
DIZZINESS: 0
PARESTHESIAS: 0
HEADACHES: 0
HEMATOCHEZIA: 0
ARTHRALGIAS: 0
ABDOMINAL PAIN: 0
FEVER: 0
EYE PAIN: 0
DIARRHEA: 0
FREQUENCY: 1
JOINT SWELLING: 0
COUGH: 0

## 2022-07-28 ASSESSMENT — PAIN SCALES - GENERAL: PAINLEVEL: NO PAIN (0)

## 2022-07-28 ASSESSMENT — PATIENT HEALTH QUESTIONNAIRE - PHQ9
SUM OF ALL RESPONSES TO PHQ QUESTIONS 1-9: 1
SUM OF ALL RESPONSES TO PHQ QUESTIONS 1-9: 1
10. IF YOU CHECKED OFF ANY PROBLEMS, HOW DIFFICULT HAVE THESE PROBLEMS MADE IT FOR YOU TO DO YOUR WORK, TAKE CARE OF THINGS AT HOME, OR GET ALONG WITH OTHER PEOPLE: NOT DIFFICULT AT ALL

## 2022-07-28 NOTE — PATIENT INSTRUCTIONS
Call [phone] to schedule heart stress test    See sleep specialist    We will send you lab results            Preventive Health Recommendations  Male Ages 50 - 64    Yearly exam:             See your health care provider every year in order to  o   Review health changes.   o   Discuss preventive care.    o   Review your medicines if your doctor has prescribed any.   Have a cholesterol test every 5 years, or more frequently if you are at risk for high cholesterol/heart disease.   Have a diabetes test (fasting glucose) every three years. If you are at risk for diabetes, you should have this test more often.   Have a colonoscopy at age 50, or have a yearly FIT test (stool test). These exams will check for colon cancer.    Talk with your health care provider about whether or not a prostate cancer screening test (PSA) is right for you.  You should be tested each year for STDs (sexually transmitted diseases), if you re at risk.     Shots: Get a flu shot each year. Get a tetanus shot every 10 years.     Nutrition:  Eat at least 5 servings of fruits and vegetables daily.   Eat whole-grain bread, whole-wheat pasta and brown rice instead of white grains and rice.   Get adequate Calcium and Vitamin D.     Lifestyle  Exercise for at least 150 minutes a week (30 minutes a day, 5 days a week). This will help you control your weight and prevent disease.   Limit alcohol to one drink per day.   No smoking.   Wear sunscreen to prevent skin cancer.   See your dentist every six months for an exam and cleaning.   See your eye doctor every 1 to 2 years.

## 2022-07-28 NOTE — LETTER
[facility]  [address] [id number]  [phone]            2022    To whom it may concern:    Bairon connor [birth year] was seen here in clinic today.                  Sincerely,                    Greg Salas MD

## 2022-07-28 NOTE — PROGRESS NOTES
SUBJECTIVE:   CC: Bairon Reed is an 56 year old male who presents for preventative health visit.        Patient has been advised of split billing requirements and indicates understanding: Yes  Healthy Habits:     Getting at least 3 servings of Calcium per day:  NO    Bi-annual eye exam:  Yes    Dental care twice a year:  NO    Sleep apnea or symptoms of sleep apnea:  None    Diet:  Regular (no restrictions)    Frequency of exercise:  4-5 days/week    Duration of exercise:  30-45 minutes    Taking medications regularly:  Yes    Medication side effects:  None    PHQ-2 Total Score: 0    Additional concerns today:  No               Today's PHQ-2 Score:   PHQ-2 ( 1999 Pfizer) 7/28/2022   Q1: Little interest or pleasure in doing things 0   Q2: Feeling down, depressed or hopeless 0   PHQ-2 Score 0   PHQ-2 Total Score (12-17 Years)- Positive if 3 or more points; Administer PHQ-A if positive -   Q1: Little interest or pleasure in doing things Not at all   Q2: Feeling down, depressed or hopeless Not at all   PHQ-2 Score 0       Abuse: Current or Past(Physical, Sexual or Emotional)- No  Do you feel safe in your environment? Yes        Social History     Tobacco Use     Smoking status: Never Smoker     Smokeless tobacco: Current User     Types: Chew     Last attempt to quit: 4/1/2013     Tobacco comment: Lives in smoke free household   Substance Use Topics     Alcohol use: No     Alcohol/week: 0.0 standard drinks     Comment: Quit 2010. Chemical dependency treatment in 1989. ...31 yrs of 1.5 tins weekly.  sober for 7 months..     If you drink alcohol do you typically have >3 drinks per day or >7 drinks per week? No    Alcohol Use 7/28/2022   Prescreen: >3 drinks/day or >7 drinks/week? Not Applicable   Prescreen: >3 drinks/day or >7 drinks/week? -   No flowsheet data found.    Last PSA:   PSA   Date Value Ref Range Status   01/14/2021 1.96 0 - 4 ug/L Final     Comment:     Assay Method:  Chemiluminescence using Siemens  "Vista analyzer       Reviewed orders with patient. Reviewed health maintenance and updated orders accordingly - Yes       Reviewed and updated as needed this visit by clinical staff   Tobacco  Allergies  Meds   Med Hx  Surg Hx  Fam Hx  Soc Hx          Reviewed and updated as needed this visit by Provider                       Review of Systems   Constitutional: Negative for chills and fever.   HENT: Negative for congestion, ear pain, hearing loss and sore throat.    Eyes: Negative for pain and visual disturbance.   Respiratory: Negative for cough and shortness of breath.    Cardiovascular: Negative for chest pain and peripheral edema.   Gastrointestinal: Negative for abdominal pain, constipation, diarrhea, heartburn, hematochezia and nausea.   Genitourinary: Positive for frequency, impotence and urgency. Negative for dysuria, genital sores, hematuria and penile discharge.   Musculoskeletal: Positive for myalgias. Negative for arthralgias and joint swelling.   Skin: Negative for rash.   Neurological: Positive for weakness. Negative for dizziness, headaches and paresthesias.   Psychiatric/Behavioral: Negative for mood changes. The patient is not nervous/anxious.       had cspine procedure in January, feeling better now    Now with low back issues, going to see specialist tomorrow    No gout flares    Avoid all etoh    Erection issues    Sweaty, heart beats faster    When hungry, gets nauseated    Walking a lot at work    Walking some out of work    Hard to get to sleep    4-5 hours of sleep    OBJECTIVE:   BP (!) 138/96 (BP Location: Right arm, Patient Position: Chair, Cuff Size: Adult Regular)   Pulse 83   Temp 97.1  F (36.2  C) (Temporal)   Ht 1.778 m (5' 10\")   Wt 92.6 kg (204 lb 4 oz)   SpO2 100%   BMI 29.31 kg/m      Physical Exam  GENERAL: healthy, alert and no distress  EYES: Eyes grossly normal to inspection, PERRL and conjunctivae and sclerae normal  HENT: ear canals and TM's normal, nose and " mouth without ulcers or lesions  NECK: no adenopathy, no asymmetry, masses, or scars and thyroid normal to palpation  RESP: lungs clear to auscultation - no rales, rhonchi or wheezes  CV: regular rate and rhythm, normal S1 S2, no S3 or S4, no murmur, click or rub, no peripheral edema and peripheral pulses strong  ABDOMEN: soft, nontender, no hepatosplenomegaly, no masses and bowel sounds normal  MS: no gross musculoskeletal defects noted, no edema  SKIN: no suspicious lesions or rashes  NEURO: Normal strength and tone, mentation intact and speech normal  PSYCH: mentation appears normal, affect normal/bright    Diagnostic Test Results:  Labs reviewed in Epic    ASSESSMENT/PLAN:   Bairon was seen today for physical.    Diagnoses and all orders for this visit:    Routine general medical examination at a health care facility    Gout, unspecified cause, unspecified chronicity, unspecified site  -     allopurinol (ZYLOPRIM) 100 MG tablet; Take 1 tablet (100 mg) by mouth daily    Pain of toe of right foot  -     allopurinol (ZYLOPRIM) 100 MG tablet; Take 1 tablet (100 mg) by mouth daily    Chest pain, unspecified type  -     Echocardiogram Exercise Stress; Future    Impaired fasting glucose  -     Hemoglobin A1c; Future  -     Hemoglobin A1c    Screening for prostate cancer  -     Prostate Specific Antigen Screen; Future  -     Prostate Specific Antigen Screen    Fatigue, unspecified type  -     Comprehensive metabolic panel; Future  -     TSH with free T4 reflex; Future  -     CBC with Platelets & Differential; Future  -     Testosterone total; Future  -     Comprehensive metabolic panel  -     TSH with free T4 reflex  -     CBC with Platelets & Differential  -     Testosterone total    CARDIOVASCULAR SCREENING; LDL GOAL LESS THAN 100  -     Lipid panel reflex to direct LDL Fasting; Future  -     Lipid panel reflex to direct LDL Fasting    Snoring  -     Adult Sleep Eval & Management  Referral;  "Future    Insomnia, unspecified type  -     Adult Sleep Eval & Management  Referral; Future    Erectile dysfunction, unspecified erectile dysfunction type    discussed multiple issues with patient   Hold off on new ED med until we make sure heart okay  Start 81 asa daily due to risk factors  Patient to call and schedule stress echo  If this normal then increase exercise as able  Check labs fasting  Refill gout med; no flares  No std concern  Prudent to see sleep specialist      Patient has been advised of split billing requirements and indicates understanding: Yes    COUNSELING:   Reviewed preventive health counseling, as reflected in patient instructions       Regular exercise       Healthy diet/nutrition       Vision screening       Safe sex practices/STD prevention       Colorectal cancer screening       Prostate cancer screening    Estimated body mass index is 29.31 kg/m  as calculated from the following:    Height as of this encounter: 1.778 m (5' 10\").    Weight as of this encounter: 92.6 kg (204 lb 4 oz).     Weight management plan: Discussed healthy diet and exercise guidelines    He reports that he has never smoked. His smokeless tobacco use includes chew.  Nicotine/Tobacco Cessation Plan:   Information offered: Patient not interested at this time      Counseling Resources:  ATP IV Guidelines  Pooled Cohorts Equation Calculator  FRAX Risk Assessment  ICSI Preventive Guidelines  Dietary Guidelines for Americans, 2010  [employer] MyPlate  ASA Prophylaxis  Lung CA Screening    Greg Salas MD  [facility] [place]  Answers for HPI/ROS submitted by the patient on 7/28/2022  If you checked off any problems, how difficult have these problems made it for you to do your work, take care of things at home, or get along with other people?: Not difficult at all  PHQ9 TOTAL SCORE: 1      "

## 2022-07-29 ENCOUNTER — TRANSFERRED RECORDS (OUTPATIENT)
Dept: HEALTH INFORMATION MANAGEMENT | Facility: CLINIC | Age: 57
End: 2022-07-29

## 2022-07-29 NOTE — RESULT ENCOUNTER NOTE
"Hemoglobin a1c is still in the borderline diabetes range.  Keep working on healthy diet/exercise.  ( Schedule heart stress test )    LDL \"bad\" cholesterol is mildly elevated.     Other labs okay but testosterone level is still pending.    Greg Salas MD  "

## 2022-08-01 LAB — TESTOST SERPL-MCNC: 400 NG/DL (ref 240–950)

## 2022-08-11 ENCOUNTER — OFFICE VISIT (OUTPATIENT)
Dept: OPHTHALMOLOGY | Facility: CLINIC | Age: 57
End: 2022-08-11
Payer: COMMERCIAL

## 2022-08-11 DIAGNOSIS — H52.223 REGULAR ASTIGMATISM OF BOTH EYES: ICD-10-CM

## 2022-08-11 DIAGNOSIS — H52.4 PRESBYOPIA: ICD-10-CM

## 2022-08-11 DIAGNOSIS — Z96.1 PSEUDOPHAKIA: Primary | ICD-10-CM

## 2022-08-11 PROCEDURE — 92012 INTRM OPH EXAM EST PATIENT: CPT | Performed by: STUDENT IN AN ORGANIZED HEALTH CARE EDUCATION/TRAINING PROGRAM

## 2022-08-11 PROCEDURE — 92015 DETERMINE REFRACTIVE STATE: CPT | Performed by: STUDENT IN AN ORGANIZED HEALTH CARE EDUCATION/TRAINING PROGRAM

## 2022-08-11 ASSESSMENT — VISUAL ACUITY
METHOD: SNELLEN - LINEAR
OS_SC: 20/40
OS_PH_SC: 20/40
OD_SC: 20/20
OS_PH_SC+: -1

## 2022-08-11 ASSESSMENT — TONOMETRY
IOP_METHOD: APPLANATION
OD_IOP_MMHG: 21
OS_IOP_MMHG: 23

## 2022-08-11 ASSESSMENT — REFRACTION_MANIFEST
OD_ADD: +2.50
OD_AXIS: 080
OD_CYLINDER: +0.50
OS_SPHERE: -1.00
OS_ADD: +2.50
OS_AXIS: 095
OD_SPHERE: PLANO
OS_CYLINDER: +0.75

## 2022-08-11 ASSESSMENT — SLIT LAMP EXAM - LIDS
COMMENTS: NORMAL
COMMENTS: NORMAL

## 2022-08-11 ASSESSMENT — EXTERNAL EXAM - LEFT EYE: OS_EXAM: NORMAL

## 2022-08-11 ASSESSMENT — EXTERNAL EXAM - RIGHT EYE: OD_EXAM: NORMAL

## 2022-08-11 NOTE — LETTER
8/11/2022         RE: Bairon Reed  [address] [place]  [place] [address]        Dear Colleague,    Thank you for referring your patient, Bairon Reed, to the [facility]. Please see a copy of my visit note below.     Current Eye Medications:  None     Subjective:  Vision is doing OK in the distance. 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 No eye pain or discomfort in either eye.      Objective:  See Ophthalmology Exam.      Assessment:  Bairon Reed is a 56 year old male who presents with:   Encounter Diagnoses   Name Primary?     Pseudophakia - Right Eye Yes     Regular astigmatism of both eyes      Presbyopia        Plan:  Glasses prescription given     Pedro Luis Rahman MD  [phone]          Again, thank you for allowing me to participate in the care of your patient.        Sincerely,        Pedro Luis Rahman MD

## 2022-08-11 NOTE — PROGRESS NOTES
Current Eye Medications:  None     Subjective:  Vision is doing OK in the distance. 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 No eye pain or discomfort in either eye.      Objective:  See Ophthalmology Exam.      Assessment:  Bairon Reed is a 56 year old male who presents with:   Encounter Diagnoses   Name Primary?     Pseudophakia - Right Eye Yes     Regular astigmatism of both eyes      Presbyopia        Plan:  Glasses prescription given     Pedro Luis Rahman MD  [phone]

## 2022-08-19 ENCOUNTER — TRANSFERRED RECORDS (OUTPATIENT)
Dept: HEALTH INFORMATION MANAGEMENT | Facility: CLINIC | Age: 57
End: 2022-08-19

## 2022-08-22 ENCOUNTER — TELEPHONE (OUTPATIENT)
Dept: CARDIOLOGY | Facility: CLINIC | Age: 57
End: 2022-08-22

## 2022-08-22 ENCOUNTER — TRANSFERRED RECORDS (OUTPATIENT)
Dept: HEALTH INFORMATION MANAGEMENT | Facility: CLINIC | Age: 57
End: 2022-08-22

## 2022-08-22 NOTE — TELEPHONE ENCOUNTER
[facility] Call Center    Phone Message    May a detailed message be left on voicemail: yes     Reason for Call: Other: PT has work conflict and needs to r/s hios stres test in [place]     Please call pt to r/s    Action Taken: Other: Cardiology    Travel Screening: Not Applicable

## 2022-09-08 ENCOUNTER — TELEPHONE (OUTPATIENT)
Dept: FAMILY MEDICINE | Facility: CLINIC | Age: 57
End: 2022-09-08

## 2022-09-08 ENCOUNTER — ANCILLARY PROCEDURE (OUTPATIENT)
Dept: CARDIOLOGY | Facility: CLINIC | Age: 57
End: 2022-09-08
Attending: FAMILY MEDICINE
Payer: COMMERCIAL

## 2022-09-08 DIAGNOSIS — R07.9 CHEST PAIN, UNSPECIFIED TYPE: ICD-10-CM

## 2022-09-08 DIAGNOSIS — N52.9 ERECTILE DYSFUNCTION, UNSPECIFIED ERECTILE DYSFUNCTION TYPE: Primary | ICD-10-CM

## 2022-09-08 PROCEDURE — 93325 DOPPLER ECHO COLOR FLOW MAPG: CPT | Performed by: INTERNAL MEDICINE

## 2022-09-08 PROCEDURE — 93321 DOPPLER ECHO F-UP/LMTD STD: CPT | Performed by: INTERNAL MEDICINE

## 2022-09-08 PROCEDURE — 93018 CV STRESS TEST I&R ONLY: CPT | Performed by: INTERNAL MEDICINE

## 2022-09-08 PROCEDURE — 93016 CV STRESS TEST SUPVJ ONLY: CPT | Performed by: INTERNAL MEDICINE

## 2022-09-08 PROCEDURE — 93017 CV STRESS TEST TRACING ONLY: CPT | Performed by: INTERNAL MEDICINE

## 2022-09-08 PROCEDURE — 93350 STRESS TTE ONLY: CPT | Performed by: INTERNAL MEDICINE

## 2022-09-08 RX ORDER — SILDENAFIL 100 MG/1
TABLET, FILM COATED ORAL
Qty: 5 TABLET | Refills: 2 | Status: SHIPPED | OUTPATIENT
Start: 2022-09-08 | End: 2023-08-11

## 2022-09-08 RX ADMIN — Medication 7 ML: at 15:26

## 2022-09-09 NOTE — TELEPHONE ENCOUNTER
I called and discussed in detail with patient  Stress test fine  Okay for ED med   Sent in prescription  Discussed in detail  Went over recent labs also  Greg Salas MD

## 2022-09-28 ENCOUNTER — OFFICE VISIT (OUTPATIENT)
Dept: FAMILY MEDICINE | Facility: CLINIC | Age: 57
End: 2022-09-28
Payer: COMMERCIAL

## 2022-09-28 VITALS
RESPIRATION RATE: 20 BRPM | OXYGEN SATURATION: 96 % | DIASTOLIC BLOOD PRESSURE: 85 MMHG | WEIGHT: 209 LBS | HEART RATE: 90 BPM | BODY MASS INDEX: 29.99 KG/M2 | SYSTOLIC BLOOD PRESSURE: 136 MMHG | TEMPERATURE: 97.4 F

## 2022-09-28 DIAGNOSIS — J02.9 ACUTE PHARYNGITIS, UNSPECIFIED ETIOLOGY: Primary | ICD-10-CM

## 2022-09-28 LAB
DEPRECATED S PYO AG THROAT QL EIA: NEGATIVE
GROUP A STREP BY PCR: NOT DETECTED

## 2022-09-28 PROCEDURE — 87651 STREP A DNA AMP PROBE: CPT | Performed by: FAMILY MEDICINE

## 2022-09-28 PROCEDURE — 99214 OFFICE O/P EST MOD 30 MIN: CPT | Performed by: FAMILY MEDICINE

## 2022-09-28 RX ORDER — AMOXICILLIN 875 MG
875 TABLET ORAL 2 TIMES DAILY
Qty: 14 TABLET | Refills: 0 | Status: SHIPPED | OUTPATIENT
Start: 2022-09-28 | End: 2022-10-05

## 2022-09-28 ASSESSMENT — ASTHMA QUESTIONNAIRES
QUESTION_2 LAST FOUR WEEKS HOW OFTEN HAVE YOU HAD SHORTNESS OF BREATH: NOT AT ALL
QUESTION_4 LAST FOUR WEEKS HOW OFTEN HAVE YOU USED YOUR RESCUE INHALER OR NEBULIZER MEDICATION (SUCH AS ALBUTEROL): NOT AT ALL
HOSPITALIZATION_OVERNIGHT_LAST_YEAR_TOTAL: TWO
QUESTION_5 LAST FOUR WEEKS HOW WOULD YOU RATE YOUR ASTHMA CONTROL: COMPLETELY CONTROLLED
QUESTION_1 LAST FOUR WEEKS HOW MUCH OF THE TIME DID YOUR ASTHMA KEEP YOU FROM GETTING AS MUCH DONE AT WORK, SCHOOL OR AT HOME: NONE OF THE TIME
QUESTION_3 LAST FOUR WEEKS HOW OFTEN DID YOUR ASTHMA SYMPTOMS (WHEEZING, COUGHING, SHORTNESS OF BREATH, CHEST TIGHTNESS OR PAIN) WAKE YOU UP AT NIGHT OR EARLIER THAN USUAL IN THE MORNING: NOT AT ALL
ACT_TOTALSCORE: 25
ACT_TOTALSCORE: 25

## 2022-09-28 ASSESSMENT — PAIN SCALES - GENERAL: PAINLEVEL: EXTREME PAIN (8)

## 2022-09-28 NOTE — LETTER
[address] [address]  Phone: [phone]    September 28, 2022        Bairon Reed  [address] [place] [address]          To whom it may concern:    RE: Bairon Reed    Patient was seen and treated today at our clinic.  Off work due to non-COVID illness 9/28 - 9/30/2022.  May return to work earlier if symptoms resolve.    Please contact me for questions or concerns.      Sincerely,        Jocelyne Hutchison MD

## 2022-09-28 NOTE — PROGRESS NOTES
Assessment & Plan     Acute pharyngitis, unspecified etiology  My first visit with patient.  Previous history reviewed with him and in his chart.  Initially developed a nonspecific soreness in his throat on the right a few days ago and thought it was perhaps a tooth infection as he has known about some poor dentition.  But he is not having a tooth ache per se.  Things have progressed to the point that it is difficult to swallow or lay on the right side of his neck due to discomfort.  But it seems different than a sore throat per se.  It is tender to touch on the outside and I do have a difficult time examining it.  There is a little bit of lymphadenopathy.  I do not appreciate any oral lesions or swelling.  Strep test was negative.  So I suspect this is lymphadenitis perhaps related to a dental issue but difficult to say.  We will treat with a course of antibiotics and some topical analgesia and hopefully things improve in a day or 2.  - amoxicillin (AMOXIL) 875 MG tablet; Take 1 tablet (875 mg) by mouth 2 times daily for 7 days  - magic mouthwash suspension (diphenhydrAMINE, lidocaine, aluminum-magnesium & simethicone); Swish and swallow 10 mLs in mouth 4 times daily as needed for sore throat         See Patient Instructions    Return in about 3 days (around 10/1/2022) for If not improving as expected, [place] [employer] message.    Jocelyne Hutchison MD  [facility]    Jazlyn Maher is a 56 year old, presenting for the following health issues:  URI      URI    History of Present Illness       Reason for visit:  Sore throat    He eats 0-1 servings of fruits and vegetables daily.He consumes 1 sweetened beverage(s) daily.He exercises with enough effort to increase his heart rate 30 to 60 minutes per day.  He exercises with enough effort to increase his heart rate 4 days per week. He is missing 1 dose(s) of medications per week.       Acute Illness  Acute illness concerns: sore  throat  Onset/Duration: 2-3 days  Symptoms:  Fever: No  Chills/Sweats: No  Headache (location?): No  Sinus Pressure: No  Conjunctivitis:  No  Ear Pain: no  Rhinorrhea: No  Congestion: No  Sore Throat: YES  Cough: no  Wheeze: No  Decreased Appetite: No  Nausea: No  Vomiting: No  Diarrhea: No  Dysuria/Freq.: No  Dysuria or Hematuria: No  Fatigue/Achiness: YES  Sick/Strep Exposure: No  Therapies tried and outcome: none    Here today for throat/neck symptoms as above.  He does not feel sick at all but it is sore on the side of his neck as above.    Review of Systems   Constitutional, HEENT, cardiovascular, pulmonary, gi and gu systems are negative, except as otherwise noted.      Objective    /85   Pulse 90   Temp 97.4  F (36.3  C) (Temporal)   Resp 20   Wt 94.8 kg (209 lb)   SpO2 96%   BMI 29.99 kg/m    Body mass index is 29.99 kg/m .  Physical Exam   Alert, pleasant, upbeat, and in no apparent discomfort.  Ears normal. Throat and pharynx normal. Neck supple.  Very tender to touch on the right side of his upper neck in the region of the anterior cervical chain.  There is a little bit of more prominence but I cannot push deep enough to really appreciate any lymphadenopathy.  S1 and S2 normal, no murmurs, clicks, gallops or rubs. Regular rate and rhythm. Chest is clear; no wheezes or rales. No edema or JVD.  Past labs reviewed with the patient.     Rapid strep negative

## 2022-09-29 ENCOUNTER — OFFICE VISIT (OUTPATIENT)
Dept: FAMILY MEDICINE | Facility: CLINIC | Age: 57
End: 2022-09-29
Payer: COMMERCIAL

## 2022-09-29 VITALS
BODY MASS INDEX: 29.23 KG/M2 | TEMPERATURE: 99 F | RESPIRATION RATE: 17 BRPM | DIASTOLIC BLOOD PRESSURE: 96 MMHG | OXYGEN SATURATION: 95 % | HEIGHT: 70 IN | SYSTOLIC BLOOD PRESSURE: 138 MMHG | HEART RATE: 97 BPM | WEIGHT: 204.2 LBS

## 2022-09-29 DIAGNOSIS — J04.0 LARYNGITIS: ICD-10-CM

## 2022-09-29 DIAGNOSIS — J02.9 ACUTE PHARYNGITIS, UNSPECIFIED ETIOLOGY: Primary | ICD-10-CM

## 2022-09-29 PROCEDURE — 99213 OFFICE O/P EST LOW 20 MIN: CPT | Performed by: FAMILY MEDICINE

## 2022-09-29 RX ORDER — HYDROCODONE BITARTRATE AND ACETAMINOPHEN 5; 325 MG/1; MG/1
1 TABLET ORAL EVERY 6 HOURS PRN
Qty: 18 TABLET | Refills: 0 | Status: SHIPPED | OUTPATIENT
Start: 2022-09-29 | End: 2023-02-17

## 2022-09-29 ASSESSMENT — PAIN SCALES - GENERAL: PAINLEVEL: EXTREME PAIN (8)

## 2022-09-29 NOTE — LETTER
[address] [address]  Phone: [phone]    September 29, 2022        Bairon Reed  [address] [place] [address]          To whom it may concern:    RE: Bairon Reed    Patient was seen and treated today at our clinic. Patient has laryngitis and may need to rest voice as needed as often as possible to recover for the next 3 weeks.    Please contact me for questions or concerns.      Sincerely,        Tj Bui MD

## 2022-09-29 NOTE — PROGRESS NOTES
"  Jazlyn Maher is a 56 year old presenting for the following health issues:  No chief complaint on file.      HPI     Acute Illness  Acute illness concerns: Sore Throat   Onset/Duration: 5 days   Symptoms:  Fever: No  Chills/Sweats: No  Headache (location?): No  Sinus Pressure: No  Conjunctivitis:  No  Ear Pain: no  Rhinorrhea: No  Congestion: No  Sore Throat: YES  Cough: no  Wheeze: No  Decreased Appetite: Difficult to eat due to throat pain   Nausea: No  Vomiting: No  Diarrhea: No  Dysuria/Freq.: No  Dysuria or Hematuria: No  Fatigue/Achiness: YES   Sick/Strep Exposure: No  Therapies tried and outcome: Amoxicillin and states it has not helped at all and he states he is starting to lose voice and symptoms are worsening.       Review of Systems   Constitutional, HEENT, cardiovascular, pulmonary, GI, , musculoskeletal, neuro, skin, endocrine and psych systems are negative, except as otherwise noted.      Objective    BP (!) 138/96 (BP Location: Left arm, Patient Position: Sitting, Cuff Size: Adult Large)   Pulse 97   Temp 99  F (37.2  C) (Oral)   Resp 17   Ht 1.778 m (5' 10\")   Wt 92.6 kg (204 lb 3.2 oz)   SpO2 95%   BMI 29.30 kg/m    Body mass index is 29.3 kg/m .  Physical Exam   GENERAL: healthy, alert and no distress  NECK: no adenopathy, no asymmetry, masses, or scars and thyroid normal to palpation  RESP: lungs clear to auscultation - no rales, rhonchi or wheezes  CV: regular rate and rhythm, normal S1 S2, no S3 or S4, no murmur, click or rub, no peripheral edema and peripheral pulses strong  ABDOMEN: soft, nontender, no hepatosplenomegaly, no masses and bowel sounds normal  Neck: tender nodes right cervical anterior chain    A/P:  (J02.9) Acute pharyngitis, unspecified etiology  (primary encounter diagnosis)  Comment:   Plan: HYDROcodone-acetaminophen (NORCO) 5-325 MG         tablet        Strep negative. Discussed scheduling ibuprofen. May use Norco as needed. Patient on amoxicillin and will " finish course. Discussed likely viral and usual course. Push fluids.    (J04.0) Laryngitis  Comment:   Plan: rest voice. Stay hydrated. May take a few weeks to recover discussed.    Tj Bui MD

## 2022-10-13 ENCOUNTER — APPOINTMENT (OUTPATIENT)
Dept: OPTOMETRY | Facility: CLINIC | Age: 57
End: 2022-10-13
Payer: COMMERCIAL

## 2022-10-13 PROCEDURE — 92340 FIT SPECTACLES MONOFOCAL: CPT | Performed by: OPTOMETRIST

## 2022-10-28 ENCOUNTER — TRANSFERRED RECORDS (OUTPATIENT)
Dept: HEALTH INFORMATION MANAGEMENT | Facility: CLINIC | Age: 57
End: 2022-10-28

## 2022-11-09 ENCOUNTER — TRANSFERRED RECORDS (OUTPATIENT)
Dept: HEALTH INFORMATION MANAGEMENT | Facility: CLINIC | Age: 57
End: 2022-11-09

## 2022-12-01 ENCOUNTER — TRANSFERRED RECORDS (OUTPATIENT)
Dept: HEALTH INFORMATION MANAGEMENT | Facility: CLINIC | Age: 57
End: 2022-12-01

## 2023-01-02 ENCOUNTER — OFFICE VISIT (OUTPATIENT)
Dept: FAMILY MEDICINE | Facility: CLINIC | Age: 58
End: 2023-01-02
Payer: COMMERCIAL

## 2023-01-02 VITALS
HEART RATE: 83 BPM | BODY MASS INDEX: 29.06 KG/M2 | TEMPERATURE: 98.9 F | SYSTOLIC BLOOD PRESSURE: 150 MMHG | HEIGHT: 70 IN | RESPIRATION RATE: 14 BRPM | DIASTOLIC BLOOD PRESSURE: 100 MMHG | WEIGHT: 203 LBS | OXYGEN SATURATION: 93 %

## 2023-01-02 DIAGNOSIS — R43.2 LOSS OF TASTE: ICD-10-CM

## 2023-01-02 DIAGNOSIS — R19.7 DIARRHEA, UNSPECIFIED TYPE: ICD-10-CM

## 2023-01-02 DIAGNOSIS — I10 HYPERTENSION GOAL BP (BLOOD PRESSURE) < 140/90: ICD-10-CM

## 2023-01-02 DIAGNOSIS — R52 BODY ACHES: Primary | ICD-10-CM

## 2023-01-02 PROCEDURE — U0005 INFEC AGEN DETEC AMPLI PROBE: HCPCS | Performed by: PHYSICIAN ASSISTANT

## 2023-01-02 PROCEDURE — U0003 INFECTIOUS AGENT DETECTION BY NUCLEIC ACID (DNA OR RNA); SEVERE ACUTE RESPIRATORY SYNDROME CORONAVIRUS 2 (SARS-COV-2) (CORONAVIRUS DISEASE [COVID-19]), AMPLIFIED PROBE TECHNIQUE, MAKING USE OF HIGH THROUGHPUT TECHNOLOGIES AS DESCRIBED BY CMS-2020-01-R: HCPCS | Performed by: PHYSICIAN ASSISTANT

## 2023-01-02 PROCEDURE — 99213 OFFICE O/P EST LOW 20 MIN: CPT | Performed by: PHYSICIAN ASSISTANT

## 2023-01-02 ASSESSMENT — ENCOUNTER SYMPTOMS
SORE THROAT: 1
DIARRHEA: 1

## 2023-01-02 NOTE — LETTER
January 2, 2023      Bairon Reed  [address] [place] [address]        To Whom It May Concern,      Please excuse Gunnar from work 1/1-1/3/23 due to illness.            Sincerely,        Ani Ibrahim PA-C

## 2023-01-02 NOTE — LETTER
January 2, 2023      Bairon Reed  [address] [place] [address]        To Whom It May Concern,      Please excuse Gunnar from work on 1/1/23 and 1/2/23 due to illness.          Sincerely,        Ani Ibrahim PA-C

## 2023-01-02 NOTE — PROGRESS NOTES
"  Assessment & Plan     Body aches  Test for covid.  Stay home until results are back.  Stay hydrated.  Follow up as needed  - Symptomatic COVID-19 Virus (Coronavirus) by PCR    Loss of taste  As above  - Symptomatic COVID-19 Virus (Coronavirus) by PCR    Diarrhea, unspecified type  As above  - Symptomatic COVID-19 Virus (Coronavirus) by PCR    Hypertension goal BP (blood pressure) < 140/90  Not at goal.  Follow up with PCP in about a month                   Return in about 4 weeks (around 1/30/2023) for BP Recheck.    [facility] [place] Maher is a 57 year old accompanied by his self , presenting for the following health issues:  Chills, Pharyngitis, and Diarrhea      Pharyngitis   Associated symptoms include diarrhea.   Diarrhea  Associated symptoms include a sore throat.        Acute Illness  Acute illness concerns: no taste or smell,runny nose   Onset/Duration: 3 days   Symptoms:  Fever: No  Chills/Sweats: YES  Headache (location?): YES sometimes   Sinus Pressure: No  Conjunctivitis:  No  Ear Pain: YES: right  Rhinorrhea: YES  Congestion: YES  Sore Throat: No  Cough: no  Wheeze: No  Decreased Appetite: YES  Nausea: YES  Vomiting: No  Diarrhea: YES  Dysuria/Freq.: No  Dysuria or Hematuria: No  Fatigue/Achiness: YES  Sick/Strep Exposure: YES friends   Therapies tried and outcome: ibuprofen,clarice's   All over achy  No blood in diarrhea.        Review of Systems   HENT: Positive for sore throat.    Gastrointestinal: Positive for diarrhea.            Objective    BP (!) 163/101   Pulse 83   Temp 98.9  F (37.2  C) (Oral)   Resp 14   Ht 1.778 m (5' 10\")   Wt 92.1 kg (203 lb)   SpO2 93%   BMI 29.13 kg/m    Body mass index is 29.13 kg/m .  Physical Exam  Constitutional:       General: He is not in acute distress.  HENT:      Right Ear: Tympanic membrane, ear canal and external ear normal.      Left Ear: Tympanic membrane, ear canal and external ear normal. "      Mouth/Throat:      Mouth: Mucous membranes are moist.      Pharynx: No posterior oropharyngeal erythema.   Cardiovascular:      Rate and Rhythm: Normal rate and regular rhythm.   Pulmonary:      Effort: Pulmonary effort is normal.      Breath sounds: Normal breath sounds.   Abdominal:      General: Bowel sounds are normal.      Tenderness: There is no abdominal tenderness.   Lymphadenopathy:      Cervical: No cervical adenopathy.   Neurological:      Mental Status: He is alert.

## 2023-01-03 LAB — SARS-COV-2 RNA RESP QL NAA+PROBE: NEGATIVE

## 2023-01-04 ENCOUNTER — TELEPHONE (OUTPATIENT)
Dept: FAMILY MEDICINE | Facility: CLINIC | Age: 58
End: 2023-01-04

## 2023-01-04 NOTE — LETTER
[facility]  [address]  [place] [address]  [phone]            2023    To whom it may concern:    Bairon Reed geeta [birth year] is a patient of our clinic who has been ill this week.   Missed work through today due to symptoms.  He tested negative for covid.            Sincerely,                  Greg Salas MD

## 2023-01-04 NOTE — TELEPHONE ENCOUNTER
Called to notify patient. Patient states he needs the letter to say he tested negative for covid. Will call patient after new letter is in so he can come pick it up per patient request.

## 2023-01-04 NOTE — LETTER
[facility]  [address]  [place] [address]  [phone]            2023    To whom it may concern:    Bairno Reed dob [birth year] is a patient of ours who has been ill this week.  Needs to be off work through today.            Sincerely,                  Greg Salas MD

## 2023-01-04 NOTE — TELEPHONE ENCOUNTER
Patient called still not feeling good even though tested Negative for Covid needs a note for work for today this would be his Friday. Will  please call when ready.  Yumiko Mathis   [employer]

## 2023-01-08 ENCOUNTER — HEALTH MAINTENANCE LETTER (OUTPATIENT)
Age: 58
End: 2023-01-08

## 2023-01-27 ENCOUNTER — TELEPHONE (OUTPATIENT)
Dept: FAMILY MEDICINE | Facility: CLINIC | Age: 58
End: 2023-01-27

## 2023-01-27 ENCOUNTER — VIRTUAL VISIT (OUTPATIENT)
Dept: FAMILY MEDICINE | Facility: CLINIC | Age: 58
End: 2023-01-27
Payer: COMMERCIAL

## 2023-01-27 DIAGNOSIS — M10.9 GOUT, UNSPECIFIED CAUSE, UNSPECIFIED CHRONICITY, UNSPECIFIED SITE: Primary | ICD-10-CM

## 2023-01-27 DIAGNOSIS — M79.674 PAIN OF TOE OF RIGHT FOOT: ICD-10-CM

## 2023-01-27 PROCEDURE — 99213 OFFICE O/P EST LOW 20 MIN: CPT | Mod: 93 | Performed by: PHYSICIAN ASSISTANT

## 2023-01-27 RX ORDER — PREDNISONE 20 MG/1
40 TABLET ORAL DAILY
Qty: 10 TABLET | Refills: 0 | Status: SHIPPED | OUTPATIENT
Start: 2023-01-27 | End: 2024-03-06

## 2023-01-27 RX ORDER — INDOMETHACIN 50 MG/1
50 CAPSULE ORAL 3 TIMES DAILY PRN
Qty: 30 CAPSULE | Refills: 4 | Status: SHIPPED | OUTPATIENT
Start: 2023-01-27

## 2023-01-27 ASSESSMENT — ASTHMA QUESTIONNAIRES
QUESTION_4 LAST FOUR WEEKS HOW OFTEN HAVE YOU USED YOUR RESCUE INHALER OR NEBULIZER MEDICATION (SUCH AS ALBUTEROL): NOT AT ALL
QUESTION_1 LAST FOUR WEEKS HOW MUCH OF THE TIME DID YOUR ASTHMA KEEP YOU FROM GETTING AS MUCH DONE AT WORK, SCHOOL OR AT HOME: NONE OF THE TIME
QUESTION_2 LAST FOUR WEEKS HOW OFTEN HAVE YOU HAD SHORTNESS OF BREATH: NOT AT ALL
QUESTION_3 LAST FOUR WEEKS HOW OFTEN DID YOUR ASTHMA SYMPTOMS (WHEEZING, COUGHING, SHORTNESS OF BREATH, CHEST TIGHTNESS OR PAIN) WAKE YOU UP AT NIGHT OR EARLIER THAN USUAL IN THE MORNING: NOT AT ALL
QUESTION_5 LAST FOUR WEEKS HOW WOULD YOU RATE YOUR ASTHMA CONTROL: NOT CONTROLLED AT ALL
ACT_TOTALSCORE: 21
ACT_TOTALSCORE: 21

## 2023-01-27 ASSESSMENT — PATIENT HEALTH QUESTIONNAIRE - PHQ9
SUM OF ALL RESPONSES TO PHQ QUESTIONS 1-9: 0
10. IF YOU CHECKED OFF ANY PROBLEMS, HOW DIFFICULT HAVE THESE PROBLEMS MADE IT FOR YOU TO DO YOUR WORK, TAKE CARE OF THINGS AT HOME, OR GET ALONG WITH OTHER PEOPLE: NOT DIFFICULT AT ALL
SUM OF ALL RESPONSES TO PHQ QUESTIONS 1-9: 0

## 2023-01-27 NOTE — PROGRESS NOTES
"Gunnar is a 57 year old who is being evaluated via a billable telephone visit.      What phone number would you like to be contacted at? [phone]  How would you like to obtain your AVS? Eber    Distant Location (provider location):  On-site    Assessment & Plan     Gout, unspecified cause, unspecified chronicity, unspecified site  Recent flare x 1 day  - indomethacin (INDOCIN) 50 MG capsule; Take 1 capsule (50 mg) by mouth 3 times daily as needed for moderate pain (4-6)  - predniSONE (DELTASONE) 20 MG tablet; Take 2 tablets (40 mg) by mouth daily For gout flares  To continue allopurinol for prevention of flares and may reach out with any needs at anytime.          BMI:   Estimated body mass index is 29.13 kg/m  as calculated from the following:    Height as of 1/2/23: 1.778 m (5' 10\").    Weight as of 1/2/23: 92.1 kg (203 lb).         Follow up on as needed basis      BHARTI ZARCO PA-C  [facility]   Gunnar is a 57 year old, presenting for the following health issues:  Arthritis      HPI     Gout/ Single Inflamed Joint  Onset/Duration: 01/25/236  Description:   Location: big toe and ankle right foot - right  Joint Swelling: No  Redness: YES  Pain: YES  Intensity: between moderate and severe  Progression of Symptoms: worsening and constant  Accompanying Signs & Symptoms:  Fevers: No  History:   Trauma to the area: No  Previous history of gout: YES  Recent illness: No  Alcohol use: No  Diuretic use: No  Precipitating or alleviating factors: None  Therapies tried and outcome: none        Review of Systems   Constitutional, HEENT, cardiovascular, pulmonary, gi and gu systems are negative, except as otherwise noted.      Objective           Vitals:  No vitals were obtained today due to virtual visit.    Physical Exam   healthy, alert and no distress  PSYCH: Alert and oriented times 3; coherent speech, normal   rate and volume, able to articulate logical thoughts, able   to abstract " reason, no tangential thoughts, no hallucinations   or delusions  His affect is normal  RESP: No cough, no audible wheezing, able to talk in full sentences  Remainder of exam unable to be completed due to telephone visits                Phone call duration: 5 minutes

## 2023-01-27 NOTE — TELEPHONE ENCOUNTER
Patient is calling and asking for prednisone and indomethacin.    He stated that he is having a gout flare.  His big toe, and ankle are swollen and starting to get red.    He stated that this is typical of a flare, he denies any injuries to the area.    Routed to PCP to please advise.    Veronica PATTON, BSN  Triage Nurse  [facility]: [facility]  Ph: [phone]

## 2023-01-27 NOTE — TELEPHONE ENCOUNTER
Pt was notified of provider's message. Pt is having issues with his phone and is unable to complete an e visit. Writer offered an appt and pt requested FZ or AN. No openings at . Appt scheduled at AN.    Natali Russell RN  [facility]

## 2023-01-30 NOTE — PROGRESS NOTES
Small Joint Injection/Arthrocentesis: R great MTP    Date/Time: 6/9/2020 8:55 AM  Performed by: Harshil Pemberton DPM  Authorized by: Harshil Pemberton DPM   Indications:  Joint swelling and pain  Needle Size:  27 G  Guidance: landmark     Approach:  Dorsal  Location:  Great toe    Site:  R great MTP                    Medications:  0.5 mL lidocaine 1 %; 10 mg triamcinolone acetonide 10 MG/ML        Outcome:  Tolerated well, no immediate complications  Procedure discussed: discussed risks, benefits, and alternatives    Consent Given by:  Patient  Timeout: timeout called immediately prior to procedure    Prep: patient was prepped and draped in usual sterile fashion           Subjective:    Pt is seen today in consult from Dr. De Leon with the c/c of painful right first metatarsal phalangeal joint.  3 weeks ago the patient dropped a glass.  He states it did not hit the top of his foot.  The glass broke and did cut the medial portion of the right first MTPJ.  The patient did not think he got any glass in his skin in his toe.  He had minimal bleeding.  He states the laceration healed up.  He continues to have pain in the dorsal medial right first MTPJ.  The pain is aggravated by activity and relieved by rest.  The patient does have a history of gout.  He denies any ecchymosis erythema.  He is noted no weakness or increased toe deformity.  He denies any fever or chills.    ROS:  A 10-point review of systems was performed and is positive for that noted in the HPI and as seen above.  All other areas are negative.        No Known Allergies    Current Outpatient Medications   Medication Sig Dispense Refill     calcium carbonate (OS-HANNAH 500 MG [place]. [place]) 500 MG tablet Take 500 mg by mouth daily       Cholecalciferol (VITAMIN D3 PO) Take 1,000 Units by mouth daily       glucosamine-chondroitin 500-400 MG CAPS Take 1 capsule by mouth daily       indomethacin (INDOCIN) 50 MG capsule Take 1 capsule (50 mg) by mouth 3 times daily as  needed for moderate pain 30 capsule 4     lisinopril (PRINIVIL/ZESTRIL) 20 MG tablet Take 1 tablet (20 mg) by mouth daily 90 tablet 3     Multiple Vitamins-Minerals (MULTIVITAMIN OR) Take 1 tablet by mouth daily.       Omega-3 Fatty Acids (OMEGA-3 FISH OIL PO) Take 2 g by mouth daily       predniSONE (DELTASONE) 20 MG tablet 2 tab daily for 5 days 10 tablet 0     sildenafil (VIAGRA) 100 MG tablet Take 1 tablet (100 mg) by mouth daily as needed (daily) 10 tablet 3     VITAMIN E NATURAL PO Take 100 Units by mouth         Patient Active Problem List   Diagnosis     Hyperlipidemia LDL goal <160     Insomnia     Bilateral arm weakness     Anxiety     Nondependent alcohol abuse     Shoulder impingement - right     Cervical radiculopathy at C7     Cervicalgia     Degeneration of cervical intervertebral disc     Vitamin D deficiency     Cervical radiculopathy     S/P spinal fusion     Peroneal nerve palsy     Foot drop, right     Lumbar stenosis with neurogenic claudication     Lumbar radiculopathy     Lumbar herniated disc     S/P lumbar microdiscectomy     Hypertension goal BP (blood pressure) < 140/90     Benign non-nodular prostatic hyperplasia with lower urinary tract symptoms     Gout, unspecified cause, unspecified chronicity, unspecified site       Past Medical History:   Diagnosis Date     Anxiety      Cervical spondylosis with myelopathy 2/4/2013     Cervicalgia      Chronic low back pain 9/13/2011     Chronic low back pain      Degeneration of cervical intervertebral disc      Gout      Hyperlipidemia LDL goal <160 11/18/2009     Hypertension goal BP (blood pressure) < 140/90 2/10/2015     Insomnia 11/18/2009     Nondependent alcohol abuse 11/14/2012     Pain in shoulder 9/13/2011     Peroneal nerve palsy Feb 2012    rt     Radiculopathy, cervical 9/13/2011     Shoulder impingement     right     Vocal cord paralysis     LEFT       Past Surgical History:   Procedure Laterality Date     BACK SURGERY  [id number]    x  3. Lower back surgery     BACK SURGERY      another lumbar procedure spine 2019     COLONOSCOPY WITH CO2 INSUFFLATION N/A 2/21/2020    Procedure: COLONOSCOPY, WITH CO2 INSUFFLATION;  Surgeon: Yasmani Constantino MD;  Location: [facility]     DISCECTOMY LUMBAR POSTERIOR MICROSCOPIC ONE LEVEL  5/17/2013    Procedure: DISCECTOMY LUMBAR POSTERIOR MICROSCOPIC ONE LEVEL;  BILATERAL LUMBAR L4-5 EXTENDED HEMILAMINECTOMY, MICROFORAMINOTOMY AND MICRODISCECTOMY WITH METRIX II;  Surgeon: Andrew Alejandre MD;  Location: [facility] OR     EP ABLATION      age 19 heart ablation     FUSION CERVICAL ANTERIOR ONE LEVEL  3/5/2013    Procedure: FUSION CERVICAL ANTERIOR ONE LEVEL;  C 6-7 ANTERIOR DISCECTOMY AND FUSION WITH NEURO MONITORING ;  Surgeon: Andrew Alejandre MD;  Location:  OR     KNEE SURGERY  2009    Right     LARYNGOSCOPY WITH MICROSCOPE  5/28/2013    Procedure: LARYNGOSCOPY WITH MICROSCOPE;  MICRODIRECT LARYNGOSCOPY, WITH LEFT VOCAL CORD INJECTION ;  Surgeon: Jorge Vergara MD;  Location:  [facility]     neck sugery      2019 spine surgery cleaned up     NECK SURGERY  2/2010    [facility]       Family History   Problem Relation Age of Onset     Heart Disease Father 45        heart attack     Myocardial Infarction Father      Arthritis Sister      Heart Disease Mother 51        pneumonia and enlarged heart     Myocardial Infarction Other      Glaucoma No family hx of      Macular Degeneration No family hx of      Cancer No family hx of      Diabetes No family hx of      Hypertension No family hx of      Cerebrovascular Disease No family hx of      Thyroid Disease No family hx of        Social History     Tobacco Use     Smoking status: Never Smoker     Smokeless tobacco: Current User     Types: Chew     Tobacco comment: Lives in smoke free household   Substance Use Topics     Alcohol use: No     Alcohol/week: 0.0 standard drinks     Comment: Quit 2010. Chemical dependency treatment in 1989. ...31 yrs of 1.5  tins weekly.  sober for 7 months..         Exam:    Vitals: /76   Wt 95.3 kg (210 lb)   BMI 30.06 kg/m    BMI: Body mass index is 30.06 kg/m .  Height: Data Unavailable    Constitutional/ general:  Pt is in no apparent distress, appears well-nourished.  Cooperative with history and physical exam.     Psych:  The patient answered questions appropriately.  Normal affect.  Seems to have reasonable expectations, in terms of treatment.     Eyes:  Visual scanning/ tracking without deficit.     Ears:  Response to auditory stimuli is normal.  negative hearing aid devices.  Auricles in proper alignment.     Lymphatic:  Popliteal lymph nodes not enlarged.     Lungs:  Non labored breathing, non labored speech. No cough.  No audible wheezing. Even, quiet breathing.       Vascular:  positive pedal pulses bilaterally for both the DP and PT arteries.  CFT < 3 sec.  negative ankle edema.  positive pedal hair growth.    Neuro:  Alert and oriented x 3. Coordinated gait.  Light touch sensation is intact to the L4, L5, S1 distributions. No obvious deficits.  No evidence of neurological-based weakness, spasticity, or contracture in the lower extremities.      Derm: Normal texture and turgor.  No erythema, ecchymosis, or cyanosis.      Musculoskeletal:    Lower extremity muscle strength is normal.  Patient is ambulatory without an assistive device or brace.  No gross deformities.  Normal arch.   Normal ROM all forefoot and rearfoot joints except nuiiw7mh MTPJ.  Patient has pain with range of motion.   Wisam proliferation dorsally.   Patient's range of motion is 20% normal.  There is no open wounds around this joint.  There is no pain plantarly or laterally.  Pain dorsal medial.  Slight edema and skin discoloration is noted dorsal medial.  No echymosis, signs of infection or trauma. No open lesions, increased warmth or ascending cellulitis.          Uric Acid  3.5 - 7.2 mg/dL  6.4     Resulting Agency   [facility]          Specimen  Collected: 05/10/17  3:35 PM  Last Resulted: 05/10/17  7:00 PM                RIGHT TOE TWO OR MORE VIEWS   6/3/2020 9:33 AM      HISTORY: Pain of toe of right foot.     COMPARISON: 3/28/2013 x-ray.                                                                      IMPRESSION: Advanced first MTP joint degenerative changes with bony  proliferation about the medial aspect of the joint more so than on the  prior study. This has the appearance of someone with chronic gout.     There is no evidence of radiopaque foreign body or fracture involving patient's range of motion is about 20% normal the great toe. There is a clinical suspicion of glass in this region.      Assessment:    Right first MTPJ history of laceration   hallux Limitus right foot  Patient with history of gout    Plan:  X-rays from past personally reviewed.  We reviewed patient's most recent uric acid.  Explained to patient that he has significant arthritis in this joint.  Discussed etiology could be mixed between gout and arthritis of this joint which is common.  Discussed etiology and treatment options in detail w/ the pt.  Xrays reviewed with patient.  Recommended wearing a stiff soled shoe, icing, limiting activities, not going barefoot, and taking ibuprofen prn for discomfort.  Patient would like injection to relieve his pain.  See above note.  We also briefly discussed surgical correction of this if bothersome in the future with fusion.  Return to clinic prn.   Thank you for allowing me participate in the care of this patient.        Harshil Pemberton, JUVENAL SANFORD, FACFAS        50

## 2023-02-09 PROBLEM — H25.811 COMBINED FORM OF AGE-RELATED CATARACT, RIGHT EYE: Status: RESOLVED | Noted: 2021-09-23 | Resolved: 2023-02-09

## 2023-02-17 ENCOUNTER — OFFICE VISIT (OUTPATIENT)
Dept: FAMILY MEDICINE | Facility: CLINIC | Age: 58
End: 2023-02-17
Payer: COMMERCIAL

## 2023-02-17 VITALS
HEART RATE: 76 BPM | BODY MASS INDEX: 29.26 KG/M2 | HEIGHT: 70 IN | WEIGHT: 204.38 LBS | OXYGEN SATURATION: 100 % | RESPIRATION RATE: 16 BRPM | DIASTOLIC BLOOD PRESSURE: 100 MMHG | TEMPERATURE: 97.1 F | SYSTOLIC BLOOD PRESSURE: 148 MMHG

## 2023-02-17 DIAGNOSIS — S91.209A AVULSION OF TOENAIL, INITIAL ENCOUNTER: Primary | ICD-10-CM

## 2023-02-17 DIAGNOSIS — Z00.00 HEALTHCARE MAINTENANCE: ICD-10-CM

## 2023-02-17 DIAGNOSIS — I10 HYPERTENSION GOAL BP (BLOOD PRESSURE) < 140/90: ICD-10-CM

## 2023-02-17 DIAGNOSIS — M79.674 PAIN OF TOE OF RIGHT FOOT: ICD-10-CM

## 2023-02-17 DIAGNOSIS — M10.9 GOUT, UNSPECIFIED CAUSE, UNSPECIFIED CHRONICITY, UNSPECIFIED SITE: ICD-10-CM

## 2023-02-17 DIAGNOSIS — K04.7 DENTAL INFECTION: ICD-10-CM

## 2023-02-17 PROCEDURE — 99214 OFFICE O/P EST MOD 30 MIN: CPT | Performed by: FAMILY MEDICINE

## 2023-02-17 RX ORDER — AMOXICILLIN 500 MG/1
500 CAPSULE ORAL 3 TIMES DAILY
Qty: 63 CAPSULE | Refills: 1 | Status: SHIPPED | OUTPATIENT
Start: 2023-02-17 | End: 2023-03-10

## 2023-02-17 RX ORDER — AMLODIPINE BESYLATE 5 MG/1
5 TABLET ORAL DAILY
Qty: 30 TABLET | Refills: 2 | Status: SHIPPED | OUTPATIENT
Start: 2023-02-17 | End: 2023-08-30

## 2023-02-17 RX ORDER — ALLOPURINOL 100 MG/1
100 TABLET ORAL DAILY
Qty: 90 TABLET | Refills: 3 | Status: SHIPPED | OUTPATIENT
Start: 2023-02-17 | End: 2023-08-30

## 2023-02-17 ASSESSMENT — PAIN SCALES - GENERAL: PAINLEVEL: NO PAIN (0)

## 2023-02-17 NOTE — PROGRESS NOTES
"       Subjective   Gunnar is a 57 year old , presenting for the following health issues:  Toenail      HPI     Toenail problems       Review of Systems      Changed footwear at work    Felt snug    Back to better shoes now    Neck surgery over a year ago  Good result          Objective    BP (!) 178/119 (BP Location: Right arm, Patient Position: Chair, Cuff Size: Adult Regular)   Pulse 76   Temp 97.1  F (36.2  C) (Temporal)   Resp 16   Ht 1.778 m (5' 10\")   Wt 92.7 kg (204 lb 6 oz)   SpO2 100%   BMI 29.32 kg/m    Body mass index is 29.32 kg/m .  Physical Exam    Full physical not done     Mentation and affect are fine    No tremor of speech or extremity    Patient has a loose right great nail on foot; basically avulsed, will likely fall off soon.  Per patient, left foot is similar.    Range of motion of neck good          ASSESSMENT / PLAN:  (S91.563A) Avulsion of toenail, initial encounter  (primary encounter diagnosis)  Comment: nails will fall off; no treatment needed   Plan: follow up prn ; make sure shoes have enough room     (M10.9) Gout, unspecified cause, unspecified chronicity, unspecified site  Comment: restart this as he does get occasional flares   Plan: allopurinol (ZYLOPRIM) 100 MG tablet             (M79.674) Pain of toe of right foot  Comment: as above   Plan: allopurinol (ZYLOPRIM) 100 MG tablet             (K04.7) Dental infection  Comment: patient plans on getting tooth removed but can't afford it yet.  Gave antibiotics to use as needed until then   Plan: amoxicillin (AMOXIL) 500 MG capsule             (Z00.00) Healthcare maintenance  Comment: patient can schedule eye exam   Plan: Adult Eye  Referral             (I10) Hypertension goal BP (blood pressure) < 140/90  Comment: we agreed strarting med is good idea   Plan: amLODIPine (NORVASC) 5 MG tablet        See us in 2-3 months       I reviewed the patient's medications, allergies, medical history, family history, and social " history.    Greg Salas MD

## 2023-02-17 NOTE — PATIENT INSTRUCTIONS
Start amlodipine 5 mg daily     See us in 2-3 months, sooner if needed     Restart the allopurinol    Amoxicillin as needed to keep dental infection contained

## 2023-02-24 ENCOUNTER — TRANSFERRED RECORDS (OUTPATIENT)
Dept: HEALTH INFORMATION MANAGEMENT | Facility: CLINIC | Age: 58
End: 2023-02-24

## 2023-03-10 ENCOUNTER — TRANSFERRED RECORDS (OUTPATIENT)
Dept: HEALTH INFORMATION MANAGEMENT | Facility: CLINIC | Age: 58
End: 2023-03-10

## 2023-03-31 ENCOUNTER — TRANSFERRED RECORDS (OUTPATIENT)
Dept: HEALTH INFORMATION MANAGEMENT | Facility: CLINIC | Age: 58
End: 2023-03-31

## 2023-05-16 ENCOUNTER — TRANSFERRED RECORDS (OUTPATIENT)
Dept: HEALTH INFORMATION MANAGEMENT | Facility: CLINIC | Age: 58
End: 2023-05-16
Payer: COMMERCIAL

## 2023-08-10 ENCOUNTER — TELEPHONE (OUTPATIENT)
Dept: FAMILY MEDICINE | Facility: CLINIC | Age: 58
End: 2023-08-10
Payer: COMMERCIAL

## 2023-08-11 DIAGNOSIS — N52.9 ERECTILE DYSFUNCTION, UNSPECIFIED ERECTILE DYSFUNCTION TYPE: ICD-10-CM

## 2023-08-11 RX ORDER — SILDENAFIL 100 MG/1
TABLET, FILM COATED ORAL
Qty: 5 TABLET | Refills: 1 | Status: SHIPPED | OUTPATIENT
Start: 2023-08-11 | End: 2023-08-30

## 2023-08-11 NOTE — TELEPHONE ENCOUNTER
Medication Question or Refill        What medication are you calling about (include dose and sig)?: sildenafil (VIAGRA) 100 MG tablet     [facility] Pharm[facility]   [facility] [address]  [address] [address]  Phone: [phone] Fax: [phone]      Controlled Substance Agreement on file:   CSA -- Patient Level:    CSA: None found at the patient level.       Who prescribed the medication?: Greg Mehta MD    Do you need a refill? Yes    When did you use the medication last? N/A    Patient offered an appointment? No - Patient has upcoming appt on 08/30/23 for Annual Physical. Please call once completed.    Do you have any questions or concerns?  No      Could we send this information to you in [facility] or would you prefer to receive a phone call?:   Patient would prefer a phone call   Okay to leave a detailed message?: Yes at Cell number on file:    Telephone Information:   Mobile [phone]

## 2023-08-11 NOTE — TELEPHONE ENCOUNTER
LVM for patient informing them that a refill request was sent per patient request. Patient provided with clinic number if they would like to call clinic for further information regarding what prescription or status.     Margarita Funk -    [facility]

## 2023-08-15 ENCOUNTER — TRANSFERRED RECORDS (OUTPATIENT)
Dept: HEALTH INFORMATION MANAGEMENT | Facility: CLINIC | Age: 58
End: 2023-08-15
Payer: COMMERCIAL

## 2023-08-21 NOTE — TELEPHONE ENCOUNTER
Patient LM at 1031 requesting to know the levels of his last injection that he had with us. He states he needs this information as he is trying to proceed with surgery and/or another injection. Please call. Phone #[phone]         Erlinda Ceballos    [facility]     Alert-The patient is alert, awake and responds to voice. The patient is oriented to time, place, and person. The triage nurse is able to obtain subjective information.

## 2023-08-30 ENCOUNTER — OFFICE VISIT (OUTPATIENT)
Dept: FAMILY MEDICINE | Facility: CLINIC | Age: 58
End: 2023-08-30
Payer: COMMERCIAL

## 2023-08-30 VITALS
TEMPERATURE: 98.2 F | WEIGHT: 201.4 LBS | DIASTOLIC BLOOD PRESSURE: 74 MMHG | SYSTOLIC BLOOD PRESSURE: 128 MMHG | BODY MASS INDEX: 28.83 KG/M2 | RESPIRATION RATE: 16 BRPM | OXYGEN SATURATION: 98 % | HEIGHT: 70 IN | HEART RATE: 92 BPM

## 2023-08-30 DIAGNOSIS — Z12.5 SCREENING FOR PROSTATE CANCER: ICD-10-CM

## 2023-08-30 DIAGNOSIS — M79.674 PAIN OF TOE OF RIGHT FOOT: ICD-10-CM

## 2023-08-30 DIAGNOSIS — L82.0 INFLAMED SEBORRHEIC KERATOSIS: ICD-10-CM

## 2023-08-30 DIAGNOSIS — Z00.00 ROUTINE GENERAL MEDICAL EXAMINATION AT A HEALTH CARE FACILITY: Primary | ICD-10-CM

## 2023-08-30 DIAGNOSIS — R73.01 IMPAIRED FASTING GLUCOSE: ICD-10-CM

## 2023-08-30 DIAGNOSIS — N52.9 ERECTILE DYSFUNCTION, UNSPECIFIED ERECTILE DYSFUNCTION TYPE: ICD-10-CM

## 2023-08-30 DIAGNOSIS — R53.83 FATIGUE, UNSPECIFIED TYPE: ICD-10-CM

## 2023-08-30 DIAGNOSIS — I10 HYPERTENSION GOAL BP (BLOOD PRESSURE) < 140/90: ICD-10-CM

## 2023-08-30 DIAGNOSIS — F10.11 HISTORY OF ALCOHOL ABUSE: ICD-10-CM

## 2023-08-30 DIAGNOSIS — E78.5 HYPERLIPIDEMIA LDL GOAL <100: ICD-10-CM

## 2023-08-30 DIAGNOSIS — M10.9 GOUT, UNSPECIFIED CAUSE, UNSPECIFIED CHRONICITY, UNSPECIFIED SITE: ICD-10-CM

## 2023-08-30 LAB
BASOPHILS # BLD AUTO: 0 10E3/UL (ref 0–0.2)
BASOPHILS NFR BLD AUTO: 1 %
EOSINOPHIL # BLD AUTO: 0.2 10E3/UL (ref 0–0.7)
EOSINOPHIL NFR BLD AUTO: 3 %
ERYTHROCYTE [DISTWIDTH] IN BLOOD BY AUTOMATED COUNT: 12.3 % (ref 10–15)
HBA1C MFR BLD: 6.9 % (ref 0–5.6)
HCT VFR BLD AUTO: 44.2 % (ref 40–53)
HGB BLD-MCNC: 15.5 G/DL (ref 13.3–17.7)
IMM GRANULOCYTES # BLD: 0 10E3/UL
IMM GRANULOCYTES NFR BLD: 0 %
LYMPHOCYTES # BLD AUTO: 2.4 10E3/UL (ref 0.8–5.3)
LYMPHOCYTES NFR BLD AUTO: 35 %
MCH RBC QN AUTO: 31.3 PG (ref 26.5–33)
MCHC RBC AUTO-ENTMCNC: 35.1 G/DL (ref 31.5–36.5)
MCV RBC AUTO: 89 FL (ref 78–100)
MONOCYTES # BLD AUTO: 0.6 10E3/UL (ref 0–1.3)
MONOCYTES NFR BLD AUTO: 9 %
NEUTROPHILS # BLD AUTO: 3.7 10E3/UL (ref 1.6–8.3)
NEUTROPHILS NFR BLD AUTO: 53 %
PLATELET # BLD AUTO: 246 10E3/UL (ref 150–450)
RBC # BLD AUTO: 4.95 10E6/UL (ref 4.4–5.9)
WBC # BLD AUTO: 6.9 10E3/UL (ref 4–11)

## 2023-08-30 PROCEDURE — 80061 LIPID PANEL: CPT | Performed by: FAMILY MEDICINE

## 2023-08-30 PROCEDURE — 83036 HEMOGLOBIN GLYCOSYLATED A1C: CPT | Performed by: FAMILY MEDICINE

## 2023-08-30 PROCEDURE — 84443 ASSAY THYROID STIM HORMONE: CPT | Performed by: FAMILY MEDICINE

## 2023-08-30 PROCEDURE — 99396 PREV VISIT EST AGE 40-64: CPT | Performed by: FAMILY MEDICINE

## 2023-08-30 PROCEDURE — G0103 PSA SCREENING: HCPCS | Performed by: FAMILY MEDICINE

## 2023-08-30 PROCEDURE — 36415 COLL VENOUS BLD VENIPUNCTURE: CPT | Performed by: FAMILY MEDICINE

## 2023-08-30 PROCEDURE — 85025 COMPLETE CBC W/AUTO DIFF WBC: CPT | Performed by: FAMILY MEDICINE

## 2023-08-30 PROCEDURE — 99213 OFFICE O/P EST LOW 20 MIN: CPT | Mod: 25 | Performed by: FAMILY MEDICINE

## 2023-08-30 PROCEDURE — 80053 COMPREHEN METABOLIC PANEL: CPT | Performed by: FAMILY MEDICINE

## 2023-08-30 RX ORDER — TADALAFIL 20 MG/1
TABLET ORAL
Qty: 6 TABLET | Refills: 3 | Status: SHIPPED | OUTPATIENT
Start: 2023-08-30 | End: 2024-07-09

## 2023-08-30 RX ORDER — ALLOPURINOL 100 MG/1
100 TABLET ORAL DAILY
Qty: 90 TABLET | Refills: 3 | Status: SHIPPED | OUTPATIENT
Start: 2023-08-30

## 2023-08-30 RX ORDER — NALTREXONE HYDROCHLORIDE 50 MG/1
50 TABLET, FILM COATED ORAL DAILY
Qty: 30 TABLET | Refills: 1 | Status: SHIPPED | OUTPATIENT
Start: 2023-08-30 | End: 2023-09-29

## 2023-08-30 RX ORDER — SILDENAFIL 100 MG/1
TABLET, FILM COATED ORAL
Qty: 5 TABLET | Refills: 1 | Status: SHIPPED | OUTPATIENT
Start: 2023-08-30 | End: 2024-04-12

## 2023-08-30 RX ORDER — AMLODIPINE BESYLATE 5 MG/1
5 TABLET ORAL DAILY
Qty: 30 TABLET | Refills: 3 | Status: SHIPPED | OUTPATIENT
Start: 2023-08-30 | End: 2024-04-22

## 2023-08-30 ASSESSMENT — ENCOUNTER SYMPTOMS
MYALGIAS: 0
CONSTIPATION: 0
FREQUENCY: 1
ARTHRALGIAS: 0
CHILLS: 0
HEMATOCHEZIA: 0
ABDOMINAL PAIN: 0
PARESTHESIAS: 0
SHORTNESS OF BREATH: 0
EYE PAIN: 0
HEADACHES: 0
JOINT SWELLING: 0
NERVOUS/ANXIOUS: 0
HEMATURIA: 0
SORE THROAT: 0
NAUSEA: 0
DIARRHEA: 0
HEARTBURN: 0
PALPITATIONS: 0
DYSURIA: 0
COUGH: 0
FEVER: 0
DIZZINESS: 0
WEAKNESS: 0

## 2023-08-30 ASSESSMENT — PATIENT HEALTH QUESTIONNAIRE - PHQ9
SUM OF ALL RESPONSES TO PHQ QUESTIONS 1-9: 2
10. IF YOU CHECKED OFF ANY PROBLEMS, HOW DIFFICULT HAVE THESE PROBLEMS MADE IT FOR YOU TO DO YOUR WORK, TAKE CARE OF THINGS AT HOME, OR GET ALONG WITH OTHER PEOPLE: NOT DIFFICULT AT ALL
SUM OF ALL RESPONSES TO PHQ QUESTIONS 1-9: 2

## 2023-08-30 ASSESSMENT — ASTHMA QUESTIONNAIRES: ACT_TOTALSCORE: 25

## 2023-08-30 NOTE — PATIENT INSTRUCTIONS
Start naltrexone    Cialis as needed    Other meds as is    Keep working on healthy diet/exercise     We will send you lab results                Preventive Health Recommendations  Male Ages 50 - 64    Yearly exam:             See your health care provider every year in order to  o   Review health changes.   o   Discuss preventive care.    o   Review your medicines if your doctor has prescribed any.   Have a cholesterol test every 5 years, or more frequently if you are at risk for high cholesterol/heart disease.   Have a diabetes test (fasting glucose) every three years. If you are at risk for diabetes, you should have this test more often.   Have a colonoscopy at age 50, or have a yearly FIT test (stool test). These exams will check for colon cancer.    Talk with your health care provider about whether or not a prostate cancer screening test (PSA) is right for you.  You should be tested each year for STDs (sexually transmitted diseases), if you re at risk.     Shots: Get a flu shot each year. Get a tetanus shot every 10 years.     Nutrition:  Eat at least 5 servings of fruits and vegetables daily.   Eat whole-grain bread, whole-wheat pasta and brown rice instead of white grains and rice.   Get adequate Calcium and Vitamin D.     Lifestyle  Exercise for at least 150 minutes a week (30 minutes a day, 5 days a week). This will help you control your weight and prevent disease.   Limit alcohol to one drink per day.   No smoking.   Wear sunscreen to prevent skin cancer.   See your dentist every six months for an exam and cleaning.   See your eye doctor every 1 to 2 years.    Preventive Health Recommendations  Male Ages 50 - 64    Yearly exam:             See your health care provider every year in order to  o   Review health changes.   o   Discuss preventive care.    o   Review your medicines if your doctor has prescribed any.   Have a cholesterol test every 5 years, or more frequently if you are at risk for high  cholesterol/heart disease.   Have a diabetes test (fasting glucose) every three years. If you are at risk for diabetes, you should have this test more often.   Have a colonoscopy at age 50, or have a yearly FIT test (stool test). These exams will check for colon cancer.    Talk with your health care provider about whether or not a prostate cancer screening test (PSA) is right for you.  You should be tested each year for STDs (sexually transmitted diseases), if you re at risk.     Shots: Get a flu shot each year. Get a tetanus shot every 10 years.     Nutrition:  Eat at least 5 servings of fruits and vegetables daily.   Eat whole-grain bread, whole-wheat pasta and brown rice instead of white grains and rice.   Get adequate Calcium and Vitamin D.     Lifestyle  Exercise for at least 150 minutes a week (30 minutes a day, 5 days a week). This will help you control your weight and prevent disease.   Limit alcohol to one drink per day.   No smoking.   Wear sunscreen to prevent skin cancer.   See your dentist every six months for an exam and cleaning.   See your eye doctor every 1 to 2 years.

## 2023-08-30 NOTE — PROGRESS NOTES
SUBJECTIVE:   CC: Gunnar is an 57 year old who presents for preventative health visit.       8/30/2023     4:42 PM   Additional Questions   Roomed by Sarah       Healthy Habits:     Getting at least 3 servings of Calcium per day:  NO    Bi-annual eye exam:  Yes    Dental care twice a year:  NO    Sleep apnea or symptoms of sleep apnea:  Daytime drowsiness    Diet:  Regular (no restrictions)    Frequency of exercise:  1 day/week    Duration of exercise:  15-30 minutes    Taking medications regularly:  Yes    Medication side effects:  None    Additional concerns today:  No      Today's PHQ-9 Score:       8/30/2023     4:35 PM   PHQ-9 SCORE   PHQ-9 Total Score MyChart 2 (Minimal depression)   PHQ-9 Total Score 2                  Social History     Tobacco Use    Smoking status: Never    Smokeless tobacco: Current     Types: Chew     Last attempt to quit: 4/1/2013    Tobacco comments:     Lives in smoke free household   Substance Use Topics    Alcohol use: No     Alcohol/week: 0.0 standard drinks of alcohol     Comment: Quit 2010. Chemical dependency treatment in 1989. ...31 yrs of 1.5 tins weekly.  sober for 7 months..         8/30/2023     4:41 PM   Alcohol Use   Prescreen: >3 drinks/day or >7 drinks/week? Yes   AUDIT SCORE  16       Last PSA:   PSA   Date Value Ref Range Status   01/14/2021 1.96 0 - 4 ug/L Final     Comment:     Assay Method:  Chemiluminescence using Siemens Vista analyzer     Prostate Specific Antigen Screen   Date Value Ref Range Status   07/28/2022 2.86 0.00 - 4.00 ug/L Final       Reviewed orders with patient. Reviewed health maintenance and updated orders accordingly - Yes      Reviewed and updated as needed this visit by clinical staff                  Reviewed and updated as needed this visit by Provider                     Review of Systems   Constitutional:  Negative for chills and fever.   HENT:  Negative for congestion, ear pain, hearing loss and sore throat.    Eyes:  Negative for pain and  visual disturbance.   Respiratory:  Negative for cough and shortness of breath.    Cardiovascular:  Negative for chest pain, palpitations and peripheral edema.   Gastrointestinal:  Negative for abdominal pain, constipation, diarrhea, heartburn, hematochezia and nausea.   Genitourinary:  Positive for frequency and impotence. Negative for dysuria, genital sores, hematuria, penile discharge and urgency.   Musculoskeletal:  Negative for arthralgias, joint swelling and myalgias.   Skin:  Negative for rash.   Neurological:  Negative for dizziness, weakness, headaches and paresthesias.   Psychiatric/Behavioral:  Negative for mood changes. The patient is not nervous/anxious.      In AA    Drinking 2-3 x per week    Less than used to , just wants to nip it in bud    Kids healthy    Not great diet    No gout flares        OBJECTIVE:   There were no vitals taken for this visit.    Physical Exam  Constitutional:       Appearance: He is well-developed.   HENT:      Head: Normocephalic and atraumatic.      Right Ear: Tympanic membrane, ear canal and external ear normal.      Left Ear: Tympanic membrane, ear canal and external ear normal.      Nose: Nose normal.      Mouth/Throat:      Mouth: Mucous membranes are moist.      Pharynx: Oropharynx is clear.   Eyes:      Conjunctiva/sclera: Conjunctivae normal.   Neck:      Vascular: No carotid bruit.   Cardiovascular:      Rate and Rhythm: Normal rate and regular rhythm.      Heart sounds: Normal heart sounds.   Pulmonary:      Effort: Pulmonary effort is normal. No respiratory distress.      Breath sounds: Normal breath sounds.   Abdominal:      Palpations: Abdomen is soft.      Tenderness: There is no abdominal tenderness.   Neurological:      Mental Status: He is alert and oriented to person, place, and time.      Cranial Nerves: No cranial nerve deficit.   Psychiatric:         Speech: Speech normal.         Behavior: Behavior normal.     Some sun damaged skin seen  No worrisome  lesions  One irritated seborrheic keratosis on back, treated in usual fashion with liquid nitrogen       Diagnostic Test Results:  Labs reviewed in Epic    ASSESSMENT/PLAN:   Bairon was seen today for physical.    Diagnoses and all orders for this visit:    Routine general medical examination at a health care facility    Erectile dysfunction, unspecified erectile dysfunction type  -     sildenafil (VIAGRA) 100 MG tablet; 1/2 to 1 po one hour prior to anticipated intercourse  -     tadalafil (ADCIRCA/CIALIS) 20 MG tablet; 1/2 to 1 po up to once daily as needed for intercourse    Inflamed seborrheic keratosis    Gout, unspecified cause, unspecified chronicity, unspecified site  -     allopurinol (ZYLOPRIM) 100 MG tablet; Take 1 tablet (100 mg) by mouth daily    Pain of toe of right foot  -     allopurinol (ZYLOPRIM) 100 MG tablet; Take 1 tablet (100 mg) by mouth daily    Hypertension goal BP (blood pressure) < 140/90  -     amLODIPine (NORVASC) 5 MG tablet; Take 1 tablet (5 mg) by mouth daily    Hyperlipidemia LDL goal <100  -     Lipid panel reflex to direct LDL Non-fasting; Future  -     Comprehensive metabolic panel; Future  -     Lipid panel reflex to direct LDL Non-fasting  -     Comprehensive metabolic panel    Fatigue, unspecified type  -     CBC with Platelets & Differential; Future  -     TSH with free T4 reflex; Future  -     CBC with Platelets & Differential  -     TSH with free T4 reflex    Impaired fasting glucose  -     Hemoglobin A1c; Future  -     Hemoglobin A1c    Screening for prostate cancer  -     Prostate Specific Antigen Screen; Future  -     Prostate Specific Antigen Screen    History of alcohol abuse  -     naltrexone (DEPADE/REVIA) 50 MG tablet; Take 1 tablet (50 mg) by mouth daily    Patient stable overall  Trial of naltrexone daily, let us know in 1-2 months how it is working  Advised cessation of chewing tobacco  Increase exercise as able  Trial of cialis  He has normal heart stress test  "last year   No std concern  Up to date on colonoscopy     Patient has been advised of split billing requirements and indicates understanding: Yes      COUNSELING:   Reviewed preventive health counseling, as reflected in patient instructions       Regular exercise       Healthy diet/nutrition       Vision screening       Safe sex practices/STD prevention       Colorectal cancer screening       Prostate cancer screening      BMI:   Estimated body mass index is 29.32 kg/m  as calculated from the following:    Height as of 2/17/23: 1.778 m (5' 10\").    Weight as of 2/17/23: 92.7 kg (204 lb 6 oz).   Weight management plan: Discussed healthy diet and exercise guidelines      He reports that he has never smoked. His smokeless tobacco use includes chew.  Nicotine/Tobacco Cessation Plan:   Information offered: Patient not interested at this time            Greg Salas MD  [facility] submitted by the patient for this visit:  Patient Health Questionnaire (Submitted on 8/30/2023)  If you checked off any problems, how difficult have these problems made it for you to do your work, take care of things at home, or get along with other people?: Not difficult at all  PHQ9 TOTAL SCORE: 2    "

## 2023-08-31 LAB
ALBUMIN SERPL BCG-MCNC: 4.6 G/DL (ref 3.5–5.2)
ALP SERPL-CCNC: 62 U/L (ref 40–129)
ALT SERPL W P-5'-P-CCNC: 36 U/L (ref 0–70)
ANION GAP SERPL CALCULATED.3IONS-SCNC: 12 MMOL/L (ref 7–15)
AST SERPL W P-5'-P-CCNC: 32 U/L (ref 0–45)
BILIRUB SERPL-MCNC: 0.5 MG/DL
BUN SERPL-MCNC: 9.4 MG/DL (ref 6–20)
CALCIUM SERPL-MCNC: 9.5 MG/DL (ref 8.6–10)
CHLORIDE SERPL-SCNC: 104 MMOL/L (ref 98–107)
CHOLEST SERPL-MCNC: 224 MG/DL
CREAT SERPL-MCNC: 0.89 MG/DL (ref 0.67–1.17)
DEPRECATED HCO3 PLAS-SCNC: 23 MMOL/L (ref 22–29)
GFR SERPL CREATININE-BSD FRML MDRD: >90 ML/MIN/1.73M2
GLUCOSE SERPL-MCNC: 119 MG/DL (ref 70–99)
HDLC SERPL-MCNC: 48 MG/DL
LDLC SERPL CALC-MCNC: 149 MG/DL
NONHDLC SERPL-MCNC: 176 MG/DL
POTASSIUM SERPL-SCNC: 4.2 MMOL/L (ref 3.4–5.3)
PROT SERPL-MCNC: 7.1 G/DL (ref 6.4–8.3)
PSA SERPL DL<=0.01 NG/ML-MCNC: 3.28 NG/ML (ref 0–3.5)
SODIUM SERPL-SCNC: 139 MMOL/L (ref 136–145)
TRIGL SERPL-MCNC: 136 MG/DL
TSH SERPL DL<=0.005 MIU/L-ACNC: 1.46 UIU/ML (ref 0.3–4.2)

## 2023-08-31 NOTE — RESULT ENCOUNTER NOTE
Cholesterol is quite a bit higher than last year.  We could consider a medication for this.  If interested, let us know.    Otherwise keep working on healthy diet/exercise.    Also the hemoglobin a1c ( diabetes test ) is higher.      Cut out the alcohol and see us in clinic in about 4 months to recheck this.     Other labs are okay.    Greg Salas MD

## 2023-09-29 ENCOUNTER — OFFICE VISIT (OUTPATIENT)
Dept: OPHTHALMOLOGY | Facility: CLINIC | Age: 58
End: 2023-09-29
Payer: COMMERCIAL

## 2023-09-29 ENCOUNTER — TELEPHONE (OUTPATIENT)
Dept: FAMILY MEDICINE | Facility: CLINIC | Age: 58
End: 2023-09-29

## 2023-09-29 DIAGNOSIS — E78.5 HYPERLIPIDEMIA LDL GOAL <100: ICD-10-CM

## 2023-09-29 DIAGNOSIS — Z96.1 PSEUDOPHAKIA: Primary | ICD-10-CM

## 2023-09-29 DIAGNOSIS — H52.4 PRESBYOPIA OF BOTH EYES: ICD-10-CM

## 2023-09-29 DIAGNOSIS — F41.9 ANXIETY: Primary | ICD-10-CM

## 2023-09-29 DIAGNOSIS — H52.223 REGULAR ASTIGMATISM OF BOTH EYES: ICD-10-CM

## 2023-09-29 DIAGNOSIS — H52.12 MYOPIA OF LEFT EYE: ICD-10-CM

## 2023-09-29 DIAGNOSIS — F10.11 HISTORY OF ALCOHOL ABUSE: ICD-10-CM

## 2023-09-29 PROCEDURE — 92014 COMPRE OPH EXAM EST PT 1/>: CPT | Performed by: STUDENT IN AN ORGANIZED HEALTH CARE EDUCATION/TRAINING PROGRAM

## 2023-09-29 PROCEDURE — 92015 DETERMINE REFRACTIVE STATE: CPT | Performed by: STUDENT IN AN ORGANIZED HEALTH CARE EDUCATION/TRAINING PROGRAM

## 2023-09-29 RX ORDER — NALTREXONE HYDROCHLORIDE 50 MG/1
50 TABLET, FILM COATED ORAL DAILY
Qty: 30 TABLET | Refills: 1 | Status: SHIPPED | OUTPATIENT
Start: 2023-09-29

## 2023-09-29 RX ORDER — LORAZEPAM 0.5 MG/1
0.5 TABLET ORAL EVERY 6 HOURS PRN
Qty: 10 TABLET | Refills: 0 | Status: SHIPPED | OUTPATIENT
Start: 2023-09-29

## 2023-09-29 ASSESSMENT — TONOMETRY
OS_IOP_MMHG: 20
IOP_METHOD: APPLANATION
OD_IOP_MMHG: 20

## 2023-09-29 ASSESSMENT — CONF VISUAL FIELD
OS_SUPERIOR_TEMPORAL_RESTRICTION: 0
OS_INFERIOR_NASAL_RESTRICTION: 0
OS_INFERIOR_TEMPORAL_RESTRICTION: 0
OS_SUPERIOR_NASAL_RESTRICTION: 0
OD_INFERIOR_NASAL_RESTRICTION: 0
OD_INFERIOR_TEMPORAL_RESTRICTION: 0
OS_NORMAL: 1
OD_NORMAL: 1
OD_SUPERIOR_TEMPORAL_RESTRICTION: 0
OD_SUPERIOR_NASAL_RESTRICTION: 0

## 2023-09-29 ASSESSMENT — VISUAL ACUITY
OS_SC: 20/25
OS_SC: J7
OS_SC+: -2
OD_SC: 20/20
METHOD: SNELLEN - LINEAR
OD_SC: J16

## 2023-09-29 ASSESSMENT — EXTERNAL EXAM - LEFT EYE: OS_EXAM: NORMAL

## 2023-09-29 ASSESSMENT — CUP TO DISC RATIO
OD_RATIO: 0.45
OS_RATIO: 0.4

## 2023-09-29 ASSESSMENT — SLIT LAMP EXAM - LIDS
COMMENTS: NORMAL
COMMENTS: NORMAL

## 2023-09-29 ASSESSMENT — REFRACTION_MANIFEST
OS_AXIS: 092
OD_ADD: +2.50
OD_SPHERE: PLANO
OS_SPHERE: -0.75
OS_CYLINDER: +0.75
OD_CYLINDER: +0.50
OS_ADD: +2.50
OD_AXIS: 080

## 2023-09-29 ASSESSMENT — EXTERNAL EXAM - RIGHT EYE: OD_EXAM: NORMAL

## 2023-09-29 NOTE — TELEPHONE ENCOUNTER
Pt notified of Provider's message as written. Assisted with scheduling telephone visit for 10/6.   Pt is still interested in getting a prescription for the cholesterol medication. He would like to pick this up with the other 2 prescriptions. Please advise.    Natali Russell RN  [facility]

## 2023-09-29 NOTE — LETTER
9/29/2023         RE: Bairon Reed  [address] [address] [address]        Dear Colleague,    Thank you for referring your patient, Bairon Reed, to the [facility]. Please see a copy of my visit note below.     Current Eye Medications:  none     Subjective:  Complete exam: patient states his near visual acuity has gotten worse.  He is using +3.00 or +3.25 OTC readers.  He states he is satisfied with his distance visual acuity.  Patient states his eyes are comfortable.     Objective:  See Ophthalmology Exam.       Assessment:  Bairon Reed is a 57 year old male who presents with:   Encounter Diagnoses   Name Primary?     Pseudophakia - Right Eye      Myopia of left eye      Regular astigmatism of both eyes      Presbyopia of both eyes      Stable eye exam.      Plan:  Continue over the counter readers or can fill glasses prescription given     Pedro Luis Rahman MD  [phone]       Again, thank you for allowing me to participate in the care of your patient.        Sincerely,        Pedro Luis Rahman MD

## 2023-09-29 NOTE — PATIENT INSTRUCTIONS
Continue over the counter readers or can fill glasses prescription given     Pedro Luis Rahman MD  [phone]

## 2023-09-29 NOTE — PROGRESS NOTES
Current Eye Medications:  none     Subjective:  Complete exam: patient states his near visual acuity has gotten worse.  He is using +3.00 or +3.25 OTC readers.  He states he is satisfied with his distance visual acuity.  Patient states his eyes are comfortable.     Objective:  See Ophthalmology Exam.       Assessment:  Bairon Reed is a 57 year old male who presents with:   Encounter Diagnoses   Name Primary?    Pseudophakia - Right Eye     Myopia of left eye     Regular astigmatism of both eyes     Presbyopia of both eyes      Stable eye exam.      Plan:  Continue over the counter readers or can fill glasses prescription given     Pedro Luis Rahman MD  [phone]

## 2023-09-29 NOTE — TELEPHONE ENCOUNTER
I refilled naltrexone in case he needs  that    I did send in a few short acting anxiety pills lorazepam but he should only use these very sparingly    Have patient do a virtual appointment with me in the next couple weeks    We can talk about the lab results at that time    Please inform patient    Greg Salas MD

## 2023-09-29 NOTE — TELEPHONE ENCOUNTER
"Pt calling. States he had a physical recently. An anti-crave medication was prescribed for alcoholism. Pt did not know name of medication. Per office visit 8/30/23:  \"History of alcohol abuse  -     naltrexone (DEPADE/REVIA) 50 MG tablet; Take 1 tablet (50 mg) by mouth daily     Patient stable overall  Trial of naltrexone daily, let us know in 1-2 months how it is working  Advised cessation of chewing tobacco  Increase exercise as able  Trial of cialis  He has normal heart stress test last year   No std concern  Up to date on colonoscopy\"    Pt states he relapsed 10 days ago. Pt stopped taking the Naltrexone, but states he restarted this and took this am. Pt has had an assessment for treatment. Pt can't get into therapy. Hasn't slept in 1.5 weeks. \"Anxiety is through the roof.\" Pt is requesting a medication to help with anxiety and sleep.     Pt also mentioned that he did not receive a letter with results.     Per result note:  \"    Cholesterol is quite a bit higher than last year.  We could consider a medication for this.  If interested, let us know.     Otherwise keep working on healthy diet/exercise.     Also the hemoglobin a1c ( diabetes test ) is higher.       Cut out the alcohol and see us in clinic in about 4 months to recheck this.     Other labs are okay.     Greg Salas MD   Written by Greg Salas MD on 8/31/2023  5:06 PM CDT\"    Pt is interested in Cholesterol medication. Should he just do diet and exercise for A1C being higher?    Please advise.    Natali Russell RN  [facility]    "

## 2023-10-02 RX ORDER — ATORVASTATIN CALCIUM 10 MG/1
10 TABLET, FILM COATED ORAL DAILY
Qty: 30 TABLET | Refills: 1 | Status: SHIPPED | OUTPATIENT
Start: 2023-10-02 | End: 2024-04-22

## 2023-10-02 NOTE — TELEPHONE ENCOUNTER
I sent in prescription for atorvastatin 10 mg daily for cholesterol    Please inform patient    Greg Salas MD

## 2023-10-02 NOTE — TELEPHONE ENCOUNTER
Writer called patient and left VM stating that requested medication has been sent in to preferred pharmacy and to please call back to [phone] with any further questions related to this.    RENAN Venegas RN  [facility]

## 2023-10-03 ENCOUNTER — TELEPHONE (OUTPATIENT)
Dept: FAMILY MEDICINE | Facility: CLINIC | Age: 58
End: 2023-10-03
Payer: COMMERCIAL

## 2023-10-03 DIAGNOSIS — F41.9 ANXIETY: Primary | ICD-10-CM

## 2023-10-03 NOTE — TELEPHONE ENCOUNTER
Patient notified of Provider's message as written.  Patient verbalized understanding.    - patient does not have a printer. Please have the work letter ready for him to  at the  by tomorrow morning      Kevin Crain RN  [facility]

## 2023-10-03 NOTE — LETTER
10-3-      To whom it may concern:    Bairon Reed  [birth year] has been unable to work since 9- due to medical reasons.  Advise he remain off work until October 15.              Sincerely,                    Greg Salas MD

## 2023-10-03 NOTE — TELEPHONE ENCOUNTER
Called patient.  He has been on treatment for alcohol abuse.  His employer recommended that he take time off.  He has been off since 9/15/2023 and when he tried to file short term disability, they told him that it needs approval from his provider.  Patient had misunderstood the process and has been on an unapproved medical leave.  He thought employer was telling him to take time off but did not know that this needed a medical provider's approval.    Lorazepam has not been helping.  Works with machines to produce medical supplies.  Is only sleeping 2 hours a night and is too tired to work.    - Patient requesting something else to help with anxiety/sleep  - Patient requesting to  a letter from Dr. Salas approving him for the time he had off since 9/15/2023 and an additional week off work to start new med and get his mind/body back on track.    Has a follow-up appointment with Dr. Salas on 10/6/2023.    Kevin Crain RN  [facility]

## 2023-10-03 NOTE — TELEPHONE ENCOUNTER
I sent in prescription for sertraline to start.  Also did letter he can print out.    Keep appointment for Oct 6.    Please inform patient.    Greg Salas MD

## 2023-10-03 NOTE — TELEPHONE ENCOUNTER
Advised to start daily Claritin or Zyrtec. If symptoms do not improve or worsen in the next 2-4 weeks, consider adding a nasal spray. Patient picked up letter from Maritza Zaragoza. Patient is asking for copy of letter to be sent to [email or web address] Attn: Emma, patient also requested Dr. Salas or his team to call him at the phone number of record.

## 2023-10-03 NOTE — TELEPHONE ENCOUNTER
General Call    Contacts         Type Contact Phone/Fax    10/03/2023 06:36 AM CDT Phone (Incoming) Gunnar Reed (Self) [phone] (M)          Reason for Call:  return to work/medication    What are your questions or concerns:  please call patient. He would like to discuss a medication and return to work. He would not give any other information.     Date of last appointment with provider: 8-30-23    Could we send this information to you in [employer] or would you prefer to receive a phone call?:   Patient would prefer a phone call   Okay to leave a detailed message?: No at Home number on file [phone] ([place])

## 2023-10-04 NOTE — TELEPHONE ENCOUNTER
I put signed copy in team basket  Can send this or via epi a copy to the email address he requests  Thanks  Greg Salas MD

## 2023-10-06 ENCOUNTER — VIRTUAL VISIT (OUTPATIENT)
Dept: FAMILY MEDICINE | Facility: CLINIC | Age: 58
End: 2023-10-06
Payer: COMMERCIAL

## 2023-10-06 DIAGNOSIS — G47.00 INSOMNIA, UNSPECIFIED TYPE: Primary | ICD-10-CM

## 2023-10-06 DIAGNOSIS — F41.9 ANXIETY: ICD-10-CM

## 2023-10-06 DIAGNOSIS — F10.11 HISTORY OF ALCOHOL ABUSE: ICD-10-CM

## 2023-10-06 PROCEDURE — 99442 PR PHYSICIAN TELEPHONE EVALUATION 11-20 MIN: CPT | Mod: 95 | Performed by: FAMILY MEDICINE

## 2023-10-06 NOTE — PROGRESS NOTES
Gunnar is a 57 year old who is being evaluated via a billable telephone visit.      What phone number would you like to be contacted at? [phone]  How would you like to obtain your AVS? MyChart    Distant Location (provider location):  On-site         Subjective   Gunnar is a 57 year old, presenting for the following health issues:  Results        10/6/2023     4:41 PM   Additional Questions   Roomed by Yaneth HOWARD              Review of Systems   Only 1 1/2 to 2 hours of sleep at night     No naps during day    No urge to drink    On the naltrexone for 3 weeks    Works with machines          Objective           Vitals:  No vitals were obtained today due to virtual visit.    Physical Exam   healthy, alert, and no distress  PSYCH: Alert and oriented times 3; coherent speech, normal   rate and volume, able to articulate logical thoughts, able   to abstract reason, no tangential thoughts, no hallucinations   or delusions  His affect is normal  RESP: No cough, no audible wheezing, able to talk in full sentences  Remainder of exam unable to be completed due to telephone visits         ASSESSMENT / PLAN:  (G47.00) Insomnia, unspecified type  (primary encounter diagnosis)  Comment: discussed in detail with patient.  Severe insomnia.  Taking high doses melatonon, didn't help.  Lorazepam didn't help.  Patient to schedule with sleep specialist.   Plan: Adult Sleep Eval & Management          Referral             (F41.9) Anxiety  Comment: continue sertraline  Plan: as above.  Discussed in detail.  Do another phone visit in about 3 weeks.    Patient has next week off.  If he needs more time off, let us know.     (F10.11) History of alcohol abuse  Comment: naltrexone seems to be helping, no craving.   Plan: continue    Be seen promptly if symptoms acutely worsen       I reviewed the patient's medications, allergies, medical history, family history, and social history.    Greg Salas MD          Phone  call duration:  14 minutes ( 4:54 to 5:08pm )    Greg Salas MD

## 2023-10-12 ENCOUNTER — OFFICE VISIT (OUTPATIENT)
Dept: URGENT CARE | Facility: URGENT CARE | Age: 58
End: 2023-10-12
Payer: COMMERCIAL

## 2023-10-12 VITALS
WEIGHT: 195 LBS | OXYGEN SATURATION: 97 % | HEART RATE: 95 BPM | RESPIRATION RATE: 18 BRPM | SYSTOLIC BLOOD PRESSURE: 158 MMHG | DIASTOLIC BLOOD PRESSURE: 115 MMHG | TEMPERATURE: 98.1 F | BODY MASS INDEX: 27.98 KG/M2

## 2023-10-12 DIAGNOSIS — Z20.822 EXPOSURE TO 2019 NOVEL CORONAVIRUS: Primary | ICD-10-CM

## 2023-10-12 LAB
DEPRECATED S PYO AG THROAT QL EIA: NEGATIVE
GROUP A STREP BY PCR: NOT DETECTED

## 2023-10-12 PROCEDURE — 87651 STREP A DNA AMP PROBE: CPT | Performed by: PHYSICIAN ASSISTANT

## 2023-10-12 PROCEDURE — 87635 SARS-COV-2 COVID-19 AMP PRB: CPT | Performed by: PHYSICIAN ASSISTANT

## 2023-10-12 PROCEDURE — 99213 OFFICE O/P EST LOW 20 MIN: CPT | Performed by: PHYSICIAN ASSISTANT

## 2023-10-12 NOTE — PROGRESS NOTES
"Assessment & Plan     Exposure to 2019 novel coronavirus  - Symptomatic COVID-19 Virus (Coronavirus) by PCR Nose  - Streptococcus A Rapid Screen w/Reflex to PCR - Clinic Collect  - Group A Streptococcus PCR Throat Swab  - nirmatrelvir and ritonavir (PAXLOVID) 300 mg/100 mg therapy pack; Take 3 tablets by mouth 2 times daily for 5 days    Strep negative, COVID pending.  Given that he is on the fourth day of symptoms today, I did opt to treat him with Paxlovid while we wait for the COVID test result.    Return in about 1 week (around 10/19/2023) for visit with primary care provider if not improving.   COVID-19 positive patient.  Encounter for consideration of medication intervention. Patient does qualify for a prescription. Full discussion with patient including medication options, risks and benefits. Potential drug interactions reviewed with patient.     Treatment Planned: Paxlovid  Temporary change to home medications: do not take prednisone, atorvastatin, amlodipine, cialis and viagara restart 3 days after your last dose of Paxlovid (October 19)    Estimated body mass index is 27.98 kg/m  as calculated from the following:    Height as of 8/30/23: 1.778 m (5' 10\").    Weight as of this encounter: 88.5 kg (195 lb).  GFR Estimate   Date Value Ref Range Status   08/30/2023 >90 >60 mL/min/1.73m2 Final   01/14/2021 >90 >60 mL/min/[1.73_m2] Final     Comment:     Non  GFR Calc  Starting 12/18/2018, serum creatinine based estimated GFR (eGFR) will be   calculated using the Chronic Kidney Disease Epidemiology Collaboration   (CKD-EPI) equation.       ALT   Date Value Ref Range Status   08/30/2023 36 0 - 70 U/L Final     Comment:     Reference intervals for this test were updated on 6/12/2023 to more accurately reflect our healthy population. There may be differences in the flagging of prior results with similar values performed with this method. Interpretation of those prior results can be made in the " context of the updated reference intervals.     01/14/2021 34 0 - 70 U/L Final     AST   Date Value Ref Range Status   08/30/2023 32 0 - 45 U/L Final     Comment:     Reference intervals for this test were updated on 6/12/2023 to more accurately reflect our healthy population. There may be differences in the flagging of prior results with similar values performed with this method. Interpretation of those prior results can be made in the context of the updated reference intervals.   01/14/2021 12 0 - 45 U/L Final     Alkaline Phosphatase   Date Value Ref Range Status   08/30/2023 62 40 - 129 U/L Final   01/14/2021 62 40 - 150 U/L Final     Lab Results   Component Value Date    LHMLC65AFS Negative 01/02/2023     Jessika Kim PA-C  [facility]      Subjective   Bairon Reed is a 57 year old who presents for the following health issues     Patient presents with:  Urgent Care  Covid Concern: Per patient symptoms started on Monday cough-productive, body aches, headaches, sore throat, sinus pressure, runny nose, body aches, fatigued, headaches, and ear pain      ANITA Maher presents clinic today for evaluation of cough, body aches, headaches, chills and sweats, fatigue, sore throat, sinus pain and pressure, runny nose, ear pain.  Symptoms first began 4 days ago and have not improved.  He is currently in outpatient treatment and some of the other members of his group are currently out, he suspects with COVID.    Review of Systems   ROS negative except as stated above.      Objective    BP (!) 158/115   Pulse 95   Temp 98.1  F (36.7  C) (Tympanic)   Resp 18   Wt 88.5 kg (195 lb)   SpO2 97%   BMI 27.98 kg/m    Physical Exam   GENERAL: healthy, alert and no distress  EYES: Eyes grossly normal to inspection, PERRL and conjunctivae and sclerae normal  HENT: ear canals and TM's normal, nose and mouth without ulcers or lesions  NECK: no adenopathy, no asymmetry, masses, or scars and thyroid  normal to palpation  RESP: lungs clear to auscultation - no rales, rhonchi or wheezes  CV: regular rate and rhythm, normal S1 S2, no S3 or S4, no murmur, click or rub, no peripheral edema and peripheral pulses strong  SKIN: no suspicious lesions or rashes  NEURO: Normal strength and tone, mentation intact and speech normal    Results for orders placed or performed in visit on 10/12/23   Streptococcus A Rapid Screen w/Reflex to PCR - Clinic Collect     Status: Normal    Specimen: Throat; Swab   Result Value Ref Range    Group A Strep antigen Negative Negative

## 2023-10-12 NOTE — LETTER
[facility] [place]  [address] [facility] [address]  Phone: [phone]    October 12, 2023        Bairon Reed  [address] [address] [address]      RE: Bairon Reed    To Whom it May Concern:    Bairon was evaluated today for symptoms that could be related to COVID-19. Per CDC guidelines, the following criteria must be met before it is recommended to be around others:    At least 5 days since symptoms first appeared (October 8-13) then masking on days 6-10 (October 14-18) and  At least 24 hours with no fever without fever-reducing medication and  Other symptoms of COVID-19 are improving. Loss of taste and smell may persist for weeks or months after recovery and need not delay the end of isolation    Please excuse from outpatient treatment missed during this time, even if this is virtual, to allow time for rest and healing.    For more information, visit:   https://www.cdc.gov/coronavirus/2019-ncov/if-you-are-sick/isolation.html  https://www.cdc.gov/coronavirus/2019-ncov/daily-life-coping/dzmpnjhfn-an-utuf.html    Please contact me for questions or concerns.    Sincerely,      Jessika Kim PA-C

## 2023-10-12 NOTE — PATIENT INSTRUCTIONS
Hold atorvastatin, amlodipine, cialis and viagara restart 3 days after your last dose of Paxlovid (October 19)    Symptoms began October 8  Stay home and away from others through October 13  You may be around others wearing a well fitting mask October 14-18  Your isolation restrictions are over on October 19 as long as your symptoms are improving and you have been fever free for 24 hours, even if you still test positive for COVID.  However, if you test negative twice, at least 48 hours apart between days 6-10, you can stop wearing your mask earlier    Paxlovid can cause a rebound. This can happen if there is enough virus left in your body when the course of medication is complete that your symptoms develop again. If you start to feel ill again soon after finishing your course of Paxlovid, please repeat the above isolation to prevent spreading the virus to others.    Check O2 levels. If your number stays at 90 or below at rest, go to the emergency room.    Visit the CDC websites for more information and most up to date guidelines:  www.cdc.gov/coronavirus/2019-ncov/your-health/isolation.html  www.cdc.gov/coronavirus/2019-ncov/hcp/duration-isolation.html

## 2023-10-13 ENCOUNTER — TELEPHONE (OUTPATIENT)
Dept: URGENT CARE | Facility: URGENT CARE | Age: 58
End: 2023-10-13
Payer: COMMERCIAL

## 2023-10-13 ENCOUNTER — TELEPHONE (OUTPATIENT)
Dept: NURSING | Facility: CLINIC | Age: 58
End: 2023-10-13
Payer: COMMERCIAL

## 2023-10-13 LAB — SARS-COV-2 RNA RESP QL NAA+PROBE: POSITIVE

## 2023-10-13 NOTE — TELEPHONE ENCOUNTER
Patient is calling for COVID results.  They are still pending.  It will take up to 48 hrs for the results.  Patient got Paxlovid yesterday, started it today.    Patient will check results on [facility].    Marni Rojas RN, BSN  [facility]

## 2023-10-13 NOTE — TELEPHONE ENCOUNTER
Coronavirus (COVID-19) Notification    Caller Name Patient    Reason for call  Notify of Positive Coronavirus (COVID-19) lab results, assess symptoms,  review [facility] recommendations    Lab Result    Lab test:  2019-nCoV rRt-PCR or SARS-CoV-2 PCR    Oropharyngeal AND/OR nasopharyngeal swabs is POSITIVE for 2019-nCoV RNA/SARS-COV-2 PCR (COVID-19 virus)      Gather patient reported symptoms   Assessment   Current Symptoms at time of phone call, reported by patient: (if no symptoms, document: No symptoms] Ear pain, Fatigue, Sore throat resolving, Cough resolving, No Headache, Body Aches/Chills   Date of symptom(s) onset (if applicable) 10/10-11/23     If at time of call, Patients symptoms have worsened, the Patient should contact 911 or have someone drive them to Emergency Dept promptly:    If Patient calling 911, inform 911 personal that you have tested positive for the Coronavirus (COVID-19).  Place mask on and await 911 to arrive.  If Emergency Dept, If possible, please have another adult drive you to the Emergency Dept but you need to wear mask when in contact with other people.      Treatment Options:   Is patient interested in discussing COVID treatment? No.        Review information with Patient    Your result was positive. This means you have COVID-19 (coronavirus).    How can I protect others?    These guidelines are for isolating before returning to work, school or .    If you DO have symptoms  Stay home and away from others   For at least 5 days after your symptoms started, AND  You are fever free for 24 hours (with no medicine that reduces fever), AND  Your other symptoms are better  Wear a mask for 10 full days anytime you are around others    If you DON'T have symptoms  Stay home and away from others for at least 5 days after your positive test  Wear a mask for 10 full days anytime you are around others    There may be different guidelines for healthcare facilities.  Please check with the  specific sites before arriving.    If you have been told by a doctor that you were severely ill with COVID-19 or are immunocompromised, you should isolate for at least 10 days.    You should not go back to work until you meet the guidelines above for ending your home isolation. You don't need to be retested for COVID-19 before going back to work--studies show that you won't spread the virus if it's been at least 10 days since your symptoms started (or 20 days, if you have a weak immune system).    Employers, schools, and daycares: This is an official notice for this person's medical guidelines for returning in-person.  They must meet the above guidelines before going back to work, school or  in person.    You will receive a positive COVID-19 letter via [employer] or the mail soon with additional self-care information.    Would you like me to review some of that information with you now?  No    If you were tested for an upcoming procedure, please contact your provider for next steps.    WALTER GONZALEZ

## 2023-10-14 ENCOUNTER — TELEPHONE (OUTPATIENT)
Dept: FAMILY MEDICINE | Facility: CLINIC | Age: 58
End: 2023-10-14
Payer: COMMERCIAL

## 2023-10-16 ENCOUNTER — OFFICE VISIT (OUTPATIENT)
Dept: OPTOMETRY | Facility: CLINIC | Age: 58
End: 2023-10-16
Payer: COMMERCIAL

## 2023-10-16 ENCOUNTER — TELEPHONE (OUTPATIENT)
Dept: FAMILY MEDICINE | Facility: CLINIC | Age: 58
End: 2023-10-16

## 2023-10-16 DIAGNOSIS — U07.1 INFECTION DUE TO 2019 NOVEL CORONAVIRUS: Primary | ICD-10-CM

## 2023-10-16 PROCEDURE — 92340 FIT SPECTACLES MONOFOCAL: CPT | Performed by: OPTOMETRIST

## 2023-10-16 NOTE — TELEPHONE ENCOUNTER
Reason for Call:  Form, our goal is to have forms completed with 72 hours, however, some forms may require a visit or additional information.    Type of letter, form or note:   F[employer] for Duty    Who is the form from?: Patient    Where did the form come from: Patient or family brought in       What clinic location was the form placed at?: [facility]    Where the form was placed:  placed in Provider box    What number is listed as a contact on the form?: [phone]       Additional comments: Urgent.  Pt needs asap today to continue being paid    Call taken on 10/16/2023 at 9:27 AM by Giulia Hayes

## 2023-10-16 NOTE — LETTER
2023      To whom it may concern:    Bairon connor [birth year] is a patient of mine who now has covid.  Recovering at home.  Plan return to work next Monday Oct 23, 2023, no restrictions.                 Sincerely,                   Greg Salas MD

## 2023-10-16 NOTE — TELEPHONE ENCOUNTER
I called patient and discussed in detail.    He was diagnosed with covid last week.  On paxlovid for 5 days, has today and tomorrow left.  Still with symptoms.    Plan return to work early next week Monday Oct 23, no restrictions.   Has been off since 9-15.  Originally off work due to anxiety/ depression/ anxiety.    Greg Salas MD

## 2023-10-16 NOTE — TELEPHONE ENCOUNTER
Called patient let him know papers were ready to  at the first floor .  Yumiko Mathis   [employer]

## 2023-12-14 ENCOUNTER — OFFICE VISIT (OUTPATIENT)
Dept: FAMILY MEDICINE | Facility: CLINIC | Age: 58
End: 2023-12-14
Payer: COMMERCIAL

## 2023-12-14 ENCOUNTER — NURSE TRIAGE (OUTPATIENT)
Dept: NURSING | Facility: CLINIC | Age: 58
End: 2023-12-14
Payer: COMMERCIAL

## 2023-12-14 VITALS
TEMPERATURE: 98.2 F | OXYGEN SATURATION: 97 % | DIASTOLIC BLOOD PRESSURE: 98 MMHG | HEIGHT: 70 IN | BODY MASS INDEX: 28.2 KG/M2 | SYSTOLIC BLOOD PRESSURE: 148 MMHG | HEART RATE: 84 BPM | WEIGHT: 197 LBS

## 2023-12-14 DIAGNOSIS — R19.7 DIARRHEA, UNSPECIFIED TYPE: ICD-10-CM

## 2023-12-14 DIAGNOSIS — I10 HYPERTENSION GOAL BP (BLOOD PRESSURE) < 140/90: Chronic | ICD-10-CM

## 2023-12-14 DIAGNOSIS — Z20.822 PERSON UNDER INVESTIGATION FOR COVID-19: Primary | ICD-10-CM

## 2023-12-14 PROBLEM — E11.9 DIABETES MELLITUS, TYPE 2 (H): Status: ACTIVE | Noted: 2023-12-14

## 2023-12-14 PROCEDURE — 87635 SARS-COV-2 COVID-19 AMP PRB: CPT | Performed by: FAMILY MEDICINE

## 2023-12-14 PROCEDURE — 99213 OFFICE O/P EST LOW 20 MIN: CPT | Performed by: FAMILY MEDICINE

## 2023-12-14 NOTE — LETTER
December 14, 2023      Bairon Reed  [address] [address] [facility] [address]        To Whom It May Concern,   Bairon Reed was seen and he can return to work in 2 days if better              Sincerely,        Martina Ocampo MD

## 2023-12-14 NOTE — TELEPHONE ENCOUNTER
Not feeling well. Home sick for four days today. Diarrhea, feverish.  I connected with scheduling for an appointment and advised urgent care if they can't get him in.  Micki Gutiérrez RN  [facility]    Reason for Disposition   [1] MODERATE diarrhea (e.g., 4-6 times / day more than normal) AND [2] present > 48 hours (2 days)    Additional Information   Negative: Shock suspected (e.g., cold/pale/clammy skin, too weak to stand, low BP, rapid pulse)   Negative: Difficult to awaken or acting confused (e.g., disoriented, slurred speech)   Negative: Sounds like a life-threatening emergency to the triager   Negative: Vomiting also present and worse than the diarrhea   Negative: [1] Blood in stool AND [2] without diarrhea   Negative: Diarrhea in a cancer patient who is currently (or recently) receiving chemotherapy or radiation therapy, or cancer patient who has metastatic or end-stage cancer and is receiving palliative care   Negative: Diarrhea begins while taking an antibiotic by mouth (oral antibiotic)   Negative: [1] SEVERE abdominal pain (e.g., excruciating) AND [2] present > 1 hour   Negative: [1] SEVERE abdominal pain AND [2] age > 60 years   Negative: [1] Blood in the stool AND [2] moderate or large amount of blood   Negative: Black or tarry bowel movements  (Exception: Chronic-unchanged black-grey BMs AND is taking iron pills or Pepto-Bismol.)   Negative: [1] Drinking very little AND [2] dehydration suspected (e.g., no urine > 12 hours, very dry mouth, very lightheaded)   Negative: Patient sounds very sick or weak to the triager   Negative: [1] SEVERE diarrhea (e.g., 7 or more times / day more than normal) AND [2] age > 60 years   Negative: [1] Constant abdominal pain AND [2] present > 2 hours   Negative: [1] Fever > 103 F (39.4 C) AND [2] not able to get the fever down using Fever Care Advice   Negative: [1] SEVERE diarrhea (e.g., 7 or more times / day more than normal) AND [2] present > 24 hours (1  day)    Protocols used: Diarrhea-A-AH

## 2023-12-14 NOTE — PROGRESS NOTES
"  Assessment & Plan     Person under investigation for COVID-19  Advised rest/fluids/Tylenol  - Symptomatic COVID-19 Virus (Coronavirus) by PCR; Future    Diarrhea, unspecified type  As above     Hypertension goal BP (blood pressure) < 140/90  Follow up PCP recheck 2-3 weeks    Follow up 1 week if not better/sooner if worse  MD ILYA [facility] FLOR Maher is a 58 year old, presenting for the following health issues:  Diarrhea, Otalgia, and Pharyngitis        12/14/2023    12:59 PM   Additional Questions   Roomed by Dary BOLAND       History of Present Illness       Reason for visit:  Feeling fluish  Symptom onset:  3-7 days ago  Symptom progression:  Staying the same  Had these symptoms before:  Yes  Has tried/received treatment for these symptoms:  No  What makes it worse:  Being out of bed  What makes it better:  Laying down    He eats 2-3 servings of fruits and vegetables daily.He consumes 1 sweetened beverage(s) daily.He exercises with enough effort to increase his heart rate 20 to 29 minutes per day.  He exercises with enough effort to increase his heart rate 4 days per week.   He is taking medications regularly.   Feels achey  Diarrhea 3 times daily  No cough  No sob  No fever  Wants a covid test    Review of Systems   CONSTITUTIONAL: NEGATIVE for fever, chills, change in weight  INTEGUMENTARY/SKIN: none  ENT/MOUTH: NEGATIVE for ear, mouth and throat problems  RESP: NEGATIVE for significant cough or SOB  CV: NEGATIVE for chest pain, palpitations or peripheral edema  GI: as above, no abdominal pain  ROS otherwise negative      Objective    BP (!) 148/98   Pulse 84   Temp 98.2  F (36.8  C) (Temporal)   Ht 1.778 m (5' 10\")   Wt 89.4 kg (197 lb)   SpO2 97%   BMI 28.27 kg/m    Body mass index is 28.27 kg/m .  Physical Exam   GENERAL: healthy, alert and no distress  EYES: Eyes grossly normal to inspection  HENT: ear canals and TM's normal, nose and mouth without ulcers or " lesions  NECK: no adenopathy, no asymmetry, masses, or scars and thyroid normal to palpation  RESP: lungs clear to auscultation - no rales, rhonchi or wheezes  CV: regular rate and rhythm, normal S1 S2, no S3 or S4, no murmur, click or rub, no peripheral edema and peripheral pulses strong  ABDOMEN: soft, nontender, no hepatosplenomegaly, no masses and bowel sounds normal  MS: no gross musculoskeletal defects noted, no edema

## 2023-12-15 LAB — SARS-COV-2 RNA RESP QL NAA+PROBE: NEGATIVE

## 2023-12-27 ENCOUNTER — TRANSFERRED RECORDS (OUTPATIENT)
Dept: HEALTH INFORMATION MANAGEMENT | Facility: CLINIC | Age: 58
End: 2023-12-27
Payer: COMMERCIAL

## 2024-01-01 ENCOUNTER — TELEPHONE (OUTPATIENT)
Dept: FAMILY MEDICINE | Facility: CLINIC | Age: 59
End: 2024-01-01
Payer: COMMERCIAL

## 2024-01-01 NOTE — TELEPHONE ENCOUNTER
Reason for Call:  Appointment Request    Patient requesting this type of appt:  Hospital/ED Follow-Up     Requested provider: Greg Salas    Reason patient unable to be scheduled: Not within requested timeframe    When does patient want to be seen/preferred time: 1-2 days    Comments: HOPING TO BE SEEN 1/2 FOR WORK COMP INJURY HOSPITAL FOLLOW UP    Could we [facility] this information to you [facility] or would you prefer to receive a phone call?:   Patient would prefer a phone call   Okay to leave a detailed message?: Yes at Home number on file [phone] (home)    Call taken on 1/1/2024 at 7:58 AM by Anita Gtz

## 2024-01-03 ENCOUNTER — OFFICE VISIT (OUTPATIENT)
Dept: FAMILY MEDICINE | Facility: CLINIC | Age: 59
End: 2024-01-03
Payer: COMMERCIAL

## 2024-01-03 VITALS
BODY MASS INDEX: 29.75 KG/M2 | HEIGHT: 70 IN | RESPIRATION RATE: 16 BRPM | HEART RATE: 95 BPM | WEIGHT: 207.8 LBS | OXYGEN SATURATION: 96 % | SYSTOLIC BLOOD PRESSURE: 138 MMHG | DIASTOLIC BLOOD PRESSURE: 94 MMHG

## 2024-01-03 DIAGNOSIS — T14.8XXA BRUISING: ICD-10-CM

## 2024-01-03 DIAGNOSIS — M25.551 HIP PAIN, RIGHT: ICD-10-CM

## 2024-01-03 DIAGNOSIS — E11.69 TYPE 2 DIABETES MELLITUS WITH OTHER SPECIFIED COMPLICATION, WITHOUT LONG-TERM CURRENT USE OF INSULIN (H): ICD-10-CM

## 2024-01-03 DIAGNOSIS — M79.652 PAIN OF LEFT THIGH: ICD-10-CM
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
## 2024-01-03 PROCEDURE — 99213 OFFICE O/P EST LOW 20 MIN: CPT | Performed by: FAMILY MEDICINE

## 2024-01-03 ASSESSMENT — PAIN SCALES - GENERAL: PAINLEVEL: EXTREME PAIN (8)

## 2024-01-03 NOTE — LETTER
January 3, 2024      Bairon Reed  [address] [address] [address]        To Whom It May Concern:    Bairon Reed was seen in our clinic today. He may return to work with the following: excuse from work today; limited to light duty - for next 2 days and resume normal duties on 1/6/2024.        Sincerely,      Iglesia Barrientos

## 2024-01-03 NOTE — PROGRESS NOTES
Assessment & Plan     Fall, subsequent encounter   - slid on ice in parking lot at work    Hip pain, right   - contusion from fall    Pain of left thigh   - possible muscle strain    Bruising: lt lower inner thigh   - possibly muscle strain and is on aspirin, anticipate spontaneous resolution    Type 2 diabetes mellitus with other specified complication, without long-term current use of insulin (H)    Well controlled    HTN:   BP slightly elevated possible due to pain    See Patient Instructions    Iglesia Barrientos MD  [facility] FLOR Maher is a 59 yo M with Hx of residual spinal stenosis with previous laminectomy, cervicl radiculopathy    1/3: work comp    Subjective   Gunnar is a 58 year old, presenting for the following health issues:  Hospital F/U      1/3/2024     9:04 AM   Additional Questions   Roomed by SUSSY PEARL   Accompanied by self      HPI:   Landed on Rt hip   Left leg bruised; inner thigh and hurts    Fell at work; in the parking lot; as was walking slid on on a paddle of frozen.   Hit Rt elbow and Rt Hip   Takes a baby ASA      HPI   ED/UC Followup:  Facility:  [facility]   Date of visit: 12/28/2023  Reason for visit: Acute left-sided low back pain with left-sided sciatica (Primary Dx);  Paresthesias;  Fall, initial encounter  Discharge Disposition: Home Self Care   Current Status: improving     Impression and Plan:  Bairon Reed is a 58 y.o. male who presents today with left-sided arm and leg pain after falling and landing on his right side. No concerning injuries.He follow-up with the spine doctors as his pain/paresthesias may linger/worsen given this new fall and he will require additional evaluation/management.       Elevated BP  BP Readings from Last 6 Encounters:   01/03/24 (!) 166/115   12/14/23 (!) 148/98   10/12/23 (!) 158/115   08/30/23 128/74   02/17/23 (!) 148/100   01/02/23 (!) 150/100         Review of Systems   Constitutional, HEENT, cardiovascular,  pulmonary, gi and gu systems are negative, except as otherwise noted.      Objective    There were no vitals taken for this visit.  There is no height or weight on file to calculate BMI.  Physical Exam   GENERAL: healthy, alert and no distress  RESP: lungs clear to auscultation - no rales, rhonchi or wheezes  CV: regular rate and rhythm, no murmur, click or rub, no peripheral edema   MS: no gross musculoskeletal defects noted, tenderness Lt thigh mild bruising inner lower lt thigh

## 2024-01-06 ENCOUNTER — NURSE TRIAGE (OUTPATIENT)
Dept: NURSING | Facility: CLINIC | Age: 59
End: 2024-01-06
Payer: COMMERCIAL

## 2024-01-06 NOTE — TELEPHONE ENCOUNTER
Patient was seen in ER a couple of weeks ago as he fell on the ice and was seen last week at [facility].  Bruising on upper left thigh on the side and back and goes down to calf.  Denies pain and denies that bruise is getting larger.  Denies taking a blood thinner.  Patient will continue to monitor and will phone back if any changes.        Reason for Disposition   Minor bruise    Additional Information   Negative: Shock suspected (e.g., cold/pale/clammy skin, too weak to stand, low BP, rapid pulse)   Negative: Sounds like a life-threatening emergency to the triager   Negative: Dizziness or lightheadedness   Negative: [1] Bruise on head, face, chest, or abdomen AND [2] taking Coumadin (warfarin) or other strong blood thinner, or known bleeding disorder (e.g., thrombocytopenia)   Negative: Unexplained bleeding from another site (e.g., gums, nose, urine) as well   Negative: Patient sounds very sick or weak to the triager   Negative: [1] Not caused by an injury AND [2] 5 or more bruises now   Negative: [1] Raised bruise AND [2] size > 2 inches (5 cm) AND [3] getting bigger   Negative: [1] SEVERE pain AND [2] not improved 2 hours after pain medicine/ice packs   Negative: Suspicious history for the injury   Negative: Taking Coumadin (warfarin) or other strong blood thinner, or known bleeding disorder (e.g., thrombocytopenia)  (Exception: Very small, painless bruise at heparin injection site.)   Negative: [1] Not caused by an injury AND [2] < 5 unexplained bruises   Negative: [1] After 10 days AND [2] bruise not fading   Negative: [1] After 3 weeks AND [2] bruise still present   Negative: Minor bruising at site of heparin injection (e.g., heparin, Fragmin, Innohep, Lovenox)   Negative: Bruising easily is a chronic symptom (recurrent or ongoing AND present > 4 weeks)    Protocols used: Bruises-A-AH

## 2024-01-10 ENCOUNTER — NURSE TRIAGE (OUTPATIENT)
Dept: FAMILY MEDICINE | Facility: CLINIC | Age: 59
End: 2024-01-10

## 2024-01-10 ENCOUNTER — OFFICE VISIT (OUTPATIENT)
Dept: FAMILY MEDICINE | Facility: CLINIC | Age: 59
End: 2024-01-10
Payer: COMMERCIAL

## 2024-01-10 VITALS
TEMPERATURE: 99.8 F | HEIGHT: 70 IN | DIASTOLIC BLOOD PRESSURE: 99 MMHG | SYSTOLIC BLOOD PRESSURE: 142 MMHG | RESPIRATION RATE: 18 BRPM | HEART RATE: 105 BPM | OXYGEN SATURATION: 92 % | WEIGHT: 207 LBS | BODY MASS INDEX: 29.63 KG/M2

## 2024-01-10 DIAGNOSIS — J45.909 UNCOMPLICATED ASTHMA, UNSPECIFIED ASTHMA SEVERITY, UNSPECIFIED WHETHER PERSISTENT: ICD-10-CM

## 2024-01-10 DIAGNOSIS — R68.89 FLU-LIKE SYMPTOMS: Primary | ICD-10-CM

## 2024-01-10 LAB
FLUAV RNA SPEC QL NAA+PROBE: POSITIVE
FLUBV RNA RESP QL NAA+PROBE: NEGATIVE
RSV RNA SPEC NAA+PROBE: NEGATIVE
SARS-COV-2 RNA RESP QL NAA+PROBE: NEGATIVE

## 2024-01-10 PROCEDURE — 99214 OFFICE O/P EST MOD 30 MIN: CPT | Performed by: FAMILY MEDICINE

## 2024-01-10 PROCEDURE — 87637 SARSCOV2&INF A&B&RSV AMP PRB: CPT | Performed by: FAMILY MEDICINE

## 2024-01-10 NOTE — LETTER
January 10, 2024      Bairon Reed  [address] [address] [facility] [id number]        To Whom It May Concern:    Bairon Reed  was seen on 1/10/2024.  Please excuse him  until 1/14/2024 due to illness.        Sincerely,        ZAID GONZALEZ, DO

## 2024-01-10 NOTE — PROGRESS NOTES
Assessment & Plan   Problem List Items Addressed This Visit       Asthma     Other Visit Diagnoses       Flu-like symptoms    -  Primary    Relevant Orders    Symptomatic Influenza A/B, RSV, & SARS-CoV2 PCR (COVID-19) Nose (Completed)           Tamiflu for positive influenza A    DO ILYA BE [facility] FLOR Maher is a 58 year old, presenting for the following health issues:  Suspected Covid        1/10/2024     2:12 PM   Additional Questions   Roomed by Cathi BENNETT CMA       History of Present Illness       Reason for visit:  Cough  Symptoms include:  Fever, cough, bodyaches, chills  Symptom intensity:  Severe  Symptom progression:  Worsening  Had these symptoms before:  No  What makes it worse:  Nothing  What makes it better:  Tylenol, alcoselcer    He eats 0-1 servings of fruits and vegetables daily.He consumes 0 sweetened beverage(s) daily.He exercises with enough effort to increase his heart rate 9 or less minutes per day.  He exercises with enough effort to increase his heart rate 3 or less days per week.   He is taking medications regularly.     3 days since onset, chills  High risk because of diabetes and asthma    COVID-19 Symptom Review  How many days ago did these symptoms start? 3 days    Are any of the following symptoms significant for you?  New or worsening difficulty breathing? Yes  Please describe what kind of difficulty you are having breathing:Mild dyspnea (able to do ADLs without difficulty, mild shortness of breath with activities such as climbing one or two flights of stairs or walking briskly)  Worsening cough? Yes, I am coughing up mucus.  Fever or chills? Yes, I felt feverish or had chills.  Headache: No  Sore throat: YES  Chest pain: No  Diarrhea: No  Body aches? YES    What treatments has patient tried? Acetaminophen and Alcoscelcer  tablets  Does patient live in a nursing home, group home, or shelter? No  Does patient have a way to get  "food/medications during quarantined? Yes, I have a friend or family member who can help me.                Review of Systems         Objective    BP (!) 142/99 (BP Location: Left arm, Patient Position: Chair, Cuff Size: Adult Large)   Pulse 105   Temp 99.8  F (37.7  C) (Temporal)   Resp 18   Ht 1.778 m (5' 10\")   Wt 93.9 kg (207 lb)   SpO2 92%   BMI 29.70 kg/m    Body mass index is 29.7 kg/m .  Physical Exam  Constitutional:       General: He is not in acute distress.     Appearance: He is ill-appearing.   Pulmonary:      Effort: Pulmonary effort is normal. Respiratory distress: coughing.      Breath sounds: Normal breath sounds.   Neurological:      Mental Status: He is alert.            Results for orders placed or performed in visit on 01/10/24   Symptomatic Influenza A/B, RSV, & SARS-CoV2 PCR (COVID-19) Nose     Status: Abnormal    Specimen: Nose; Swab   Result Value Ref Range    Influenza A PCR Positive (A) Negative    Influenza B PCR Negative Negative    RSV PCR Negative Negative    SARS CoV2 PCR Negative Negative    Narrative    Testing was performed using the Xpert Xpress CoV2/Flu/RSV Assay on the [employer] GeneXpert Instrument. This test should be ordered for the detection of SARS-CoV-2, influenza, and RSV viruses in individuals who meet clinical and/or epidemiological criteria. Test performance is unknown in asymptomatic patients. This test is for in vitro diagnostic use under the FDA EUA for laboratories certified under CLIA to perform high or moderate complexity testing. This test has not been FDA cleared or approved. A negative result does not rule out the presence of PCR inhibitors in the specimen or target RNA in concentration below the limit of detection for the assay. If only one viral target is positive but coinfection with multiple targets is suspected, the sample should be re-tested with another FDA cleared, approved, or authorized test, if coinfection would change clinical management. This " test was validated by the [facility]. These laboratories are certified under the Clinical Laboratory Improvement Amendments of 1988 (CLIA-88) as qualified to perform high complexity laboratory testing.

## 2024-01-10 NOTE — TELEPHONE ENCOUNTER
Patient has cold like symptoms and would like an appointment today. He thinks he has a fever, he has a cough and runny nose. He has not tested for covid yet, but he'd like to be seen for his cough    RN scheduled appt for patient to be seen today. RN went over when to call back, when to go to UC/Ed and patient verbalized understanding    Reason for Disposition   SEVERE coughing spells (e.g., whooping sound after coughing, vomiting after coughing)    Additional Information   Negative: Bluish (or gray) lips or face   Negative: SEVERE difficulty breathing (e.g., struggling for each breath, speaks in single words)   Negative: Rapid onset of cough and has hives   Negative: Coughing started suddenly after medicine, an allergic food or bee sting   Negative: Difficulty breathing after exposure to flames, smoke, or fumes   Negative: Sounds like a life-threatening emergency to the triager   Negative: Previous asthma attacks and this feels like asthma attack   Negative: Dry cough (non-productive; no sputum or minimal clear sputum) and within 14 days of COVID-19 Exposure   Negative: MODERATE difficulty breathing (e.g., speaks in phrases, SOB even at rest, pulse 100-120) and still present when not coughing   Negative: Chest pain present when not coughing   Negative: Passed out (i.e., fainted, collapsed and was not responding)   Negative: Patient sounds very sick or weak to the triager   Negative: MILD difficulty breathing (e.g., minimal/no SOB at rest, SOB with walking, pulse <100) and still present when not coughing   Negative: Coughed up > 1 tablespoon (15 ml) blood (Exception: Blood-tinged sputum.)   Negative: Fever > 103 F (39.4 C)   Negative: Fever > 101 F (38.3 C) and over 60 years of age   Negative: Fever > 100.0 F (37.8 C) and has diabetes mellitus or a weak immune system (e.g., HIV positive, cancer chemotherapy, organ transplant, splenectomy, chronic steroids)   Negative: Fever > 100.0 F (37.8 C) and bedridden (e.g.,  "CVA, chronic illness, recovering from surgery)   Negative: Increasing ankle swelling   Negative: Wheezing is present    Answer Assessment - Initial Assessment Questions  1. ONSET: \"When did the cough begin?\"       yesterday  2. SEVERITY: \"How bad is the cough today?\"       When he moves, he coughs a bit.  3. SPUTUM: \"Describe the color of your sputum\" (none, dry cough; clear, white, yellow, green)      no  4. HEMOPTYSIS: \"Are you coughing up any blood?\" If so ask: \"How much?\" (flecks, streaks, tablespoons, etc.)      no  5. DIFFICULTY BREATHING: \"Are you having difficulty breathing?\" If Yes, ask: \"How bad is it?\" (e.g., mild, moderate, severe)     - MILD: No SOB at rest, mild SOB with walking, speaks normally in sentences, can lie down, no retractions, pulse < 100.     - MODERATE: SOB at rest, SOB with minimal exertion and prefers to sit, cannot lie down flat, speaks in phrases, mild retractions, audible wheezing, pulse 100-120.     - SEVERE: Very SOB at rest, speaks in single words, struggling to breathe, sitting hunched forward, retractions, pulse > 120   No, only when he is in a coughing spell, but not otherwise  6. FEVER: \"Do you have a fever?\" If Yes, ask: \"What is your temperature, how was it measured, and when did it start?\"      Unable to take temp, but thinks he does  7. CARDIAC HISTORY: \"Do you have any history of heart disease?\" (e.g., heart attack, congestive heart failure)       no  8. LUNG HISTORY: \"Do you have any history of lung disease?\"  (e.g., pulmonary embolus, asthma, emphysema)      no  9. PE RISK FACTORS: \"Do you have a history of blood clots?\" (or: recent major surgery, recent prolonged travel, bedridden)      no  10. OTHER SYMPTOMS: \"Do you have any other symptoms?\" (e.g., runny nose, wheezing, chest pain)        Stuffed nose  No chest pain  11. PREGNANCY: \"Is there any chance you are pregnant?\" \"When was your last menstrual period?\"        no  12. TRAVEL: \"Have you traveled out of the " "country in the last month?\" (e.g., travel history, exposures)        no    Protocols used: Cough-A-JONATAN Barker RN    "

## 2024-01-11 ENCOUNTER — TELEPHONE (OUTPATIENT)
Dept: FAMILY MEDICINE | Facility: CLINIC | Age: 59
End: 2024-01-11
Payer: COMMERCIAL

## 2024-01-11 DIAGNOSIS — J10.1 INFLUENZA A: Primary | ICD-10-CM

## 2024-01-11 RX ORDER — BENZONATATE 200 MG/1
200 CAPSULE ORAL 3 TIMES DAILY PRN
Qty: 20 CAPSULE | Refills: 1 | Status: SHIPPED | OUTPATIENT
Start: 2024-01-11

## 2024-01-11 RX ORDER — OSELTAMIVIR PHOSPHATE 75 MG/1
75 CAPSULE ORAL 2 TIMES DAILY
Qty: 10 CAPSULE | Refills: 0 | Status: SHIPPED | OUTPATIENT
Start: 2024-01-11 | End: 2024-01-16

## 2024-01-11 NOTE — TELEPHONE ENCOUNTER
Routing to provider      Received call from patient regarding positive influenza A test results from 1/10 that he saw in [facility]. Results not yet reviewed by provider.    Had in person office visit 1/10/24    Inquiring about treatment plan .    Christine M Klisch, RN

## 2024-01-11 NOTE — TELEPHONE ENCOUNTER
Test Results    Contacts         Type Contact Phone/Fax    01/11/2024 07:52 AM CST Phone (Incoming) Gunnar Reed (Self) [phone] ()            Who ordered the test:  [facility]    Type of test: Lab    Date of test:  1/10/24    Where was the test performed:  [facility]    What are your questions/concerns?:  I informed patient he has a positive influenza test. He wants to know if he needs any treatment.    Could we send this information to you in [facility] or would you prefer to receive a phone call?:   Patient would prefer a phone call   Okay to leave a detailed message?: Yes at Cell number on file:    Telephone Information:   Mobile [phone]     Janelle LÓPEZ [facility]

## 2024-02-11 ENCOUNTER — HEALTH MAINTENANCE LETTER (OUTPATIENT)
Age: 59
End: 2024-02-11

## 2024-03-20 ENCOUNTER — TELEPHONE (OUTPATIENT)
Dept: FAMILY MEDICINE | Facility: CLINIC | Age: 59
End: 2024-03-20
Payer: COMMERCIAL

## 2024-03-20 NOTE — TELEPHONE ENCOUNTER
Patient Quality Outreach    Patient is due for the following:   Diabetes -  A1C, Microalbumin, and Foot Exam  Asthma  -  ACT needed and Asthma follow-up visit  Hypertension -  BP check    Next Steps:   Schedule a office visit for Diabetes, Blood pressure and Asthma recheck    Type of outreach:    Sent letter.    Next Steps:  Reach out within 90 days via Phone.    Max number of attempts reached: Yes. Will try again in 90 days if patient still on fail list.    Questions for provider review:    None           Yaneth Salmeron, SCARLET  Chart routed to chart closed.

## 2024-03-20 NOTE — LETTER
March 20, 2024    To  Bairon Reed  [address] [address] [id number]    Your team at [facility] cares about your health. We have reviewed your chart and based on our findings; we are making the following recommendations to better manage your health.     You are in particular need of attention regarding the following:     Schedule a DIABETIC FOLLOW UP appointment for Office Visit. Patients with diabetes should see their provider regularly.  Asthma Control Test     This screening tool helps us to assess how well your asthma is controlled.Good asthma control leads to fewer asthma symptoms and greater health. If your asthma is not in good control (score is 19 or less) or you have been to the ER or urgent care for your asthma, it is recommended you be seen by your provider for medication and lifestyle adjustments.      Please complete and return the attached Asthma Control Test respond below with you answers for each question: Adult ACT     This is valuable information that is requested by your Care Team.    HYPERTENSION FOLLOW UP: Office Visit    If you have already completed these items, please contact the clinic via phone or   MyChart so your care team can review and update your records. Thank you for   choosing [facility] for your healthcare needs. For any questions,   concerns, or to schedule an appointment please contact our clinic.    Healthy Regards,      Your [facility] Care Team

## 2024-04-12 DIAGNOSIS — N52.9 ERECTILE DYSFUNCTION, UNSPECIFIED ERECTILE DYSFUNCTION TYPE: ICD-10-CM

## 2024-04-12 RX ORDER — SILDENAFIL 100 MG/1
TABLET, FILM COATED ORAL
Qty: 10 TABLET | Refills: 0 | Status: SHIPPED | OUTPATIENT
Start: 2024-04-12 | End: 2024-06-04

## 2024-06-04 DIAGNOSIS — N52.9 ERECTILE DYSFUNCTION, UNSPECIFIED ERECTILE DYSFUNCTION TYPE: ICD-10-CM

## 2024-06-04 RX ORDER — SILDENAFIL 100 MG/1
TABLET, FILM COATED ORAL
Qty: 10 TABLET | Refills: 0 | Status: SHIPPED | OUTPATIENT
Start: 2024-06-04

## 2024-06-30 ENCOUNTER — HEALTH MAINTENANCE LETTER (OUTPATIENT)
Age: 59
End: 2024-06-30

## 2024-07-08 DIAGNOSIS — I10 HYPERTENSION GOAL BP (BLOOD PRESSURE) < 140/90: ICD-10-CM

## 2024-07-08 DIAGNOSIS — E78.5 HYPERLIPIDEMIA LDL GOAL <100: ICD-10-CM

## 2024-07-09 DIAGNOSIS — N52.9 ERECTILE DYSFUNCTION, UNSPECIFIED ERECTILE DYSFUNCTION TYPE: ICD-10-CM

## 2024-07-09 RX ORDER — ATORVASTATIN CALCIUM 10 MG/1
10 TABLET, FILM COATED ORAL DAILY
Qty: 90 TABLET | Refills: 0 | Status: SHIPPED | OUTPATIENT
Start: 2024-07-09

## 2024-07-09 RX ORDER — TADALAFIL 20 MG/1
TABLET ORAL
Qty: 6 TABLET | Refills: 1 | Status: SHIPPED | OUTPATIENT
Start: 2024-07-09

## 2024-07-09 RX ORDER — AMLODIPINE BESYLATE 5 MG/1
5 TABLET ORAL DAILY
Qty: 90 TABLET | Refills: 0 | Status: SHIPPED | OUTPATIENT
Start: 2024-07-09

## 2024-07-31 ENCOUNTER — PATIENT OUTREACH (OUTPATIENT)
Dept: CARE COORDINATION | Facility: CLINIC | Age: 59
End: 2024-07-31
Payer: COMMERCIAL

## 2024-08-14 ENCOUNTER — PATIENT OUTREACH (OUTPATIENT)
Dept: CARE COORDINATION | Facility: CLINIC | Age: 59
End: 2024-08-14
Payer: COMMERCIAL

## 2024-10-30 ENCOUNTER — OFFICE VISIT (OUTPATIENT)
Dept: FAMILY MEDICINE | Facility: CLINIC | Age: 59
End: 2024-10-30
Payer: COMMERCIAL

## 2024-10-30 VITALS
BODY MASS INDEX: 28.73 KG/M2 | DIASTOLIC BLOOD PRESSURE: 87 MMHG | WEIGHT: 205.2 LBS | RESPIRATION RATE: 19 BRPM | SYSTOLIC BLOOD PRESSURE: 135 MMHG | HEART RATE: 78 BPM | OXYGEN SATURATION: 100 % | HEIGHT: 71 IN | TEMPERATURE: 98 F

## 2024-10-30 DIAGNOSIS — R53.83 FATIGUE, UNSPECIFIED TYPE: ICD-10-CM

## 2024-10-30 DIAGNOSIS — M79.674 PAIN OF TOE OF RIGHT FOOT: ICD-10-CM

## 2024-10-30 DIAGNOSIS — N52.9 ERECTILE DYSFUNCTION, UNSPECIFIED ERECTILE DYSFUNCTION TYPE: ICD-10-CM

## 2024-10-30 DIAGNOSIS — E78.5 HYPERLIPIDEMIA LDL GOAL <100: ICD-10-CM

## 2024-10-30 DIAGNOSIS — R73.01 IMPAIRED FASTING GLUCOSE: ICD-10-CM

## 2024-10-30 DIAGNOSIS — I10 HYPERTENSION GOAL BP (BLOOD PRESSURE) < 140/90: ICD-10-CM

## 2024-10-30 DIAGNOSIS — Z00.00 ROUTINE GENERAL MEDICAL EXAMINATION AT A HEALTH CARE FACILITY: Primary | ICD-10-CM

## 2024-10-30 DIAGNOSIS — M10.9 GOUT, UNSPECIFIED CAUSE, UNSPECIFIED CHRONICITY, UNSPECIFIED SITE: ICD-10-CM

## 2024-10-30 DIAGNOSIS — N40.1 BENIGN PROSTATIC HYPERPLASIA WITH LOWER URINARY TRACT SYMPTOMS, SYMPTOM DETAILS UNSPECIFIED: ICD-10-CM

## 2024-10-30 DIAGNOSIS — Z12.5 SCREENING FOR PROSTATE CANCER: ICD-10-CM

## 2024-10-30 LAB
BASOPHILS # BLD AUTO: 0 10E3/UL (ref 0–0.2)
BASOPHILS NFR BLD AUTO: 0 %
EOSINOPHIL # BLD AUTO: 0.2 10E3/UL (ref 0–0.7)
EOSINOPHIL NFR BLD AUTO: 3 %
ERYTHROCYTE [DISTWIDTH] IN BLOOD BY AUTOMATED COUNT: 11.8 % (ref 10–15)
EST. AVERAGE GLUCOSE BLD GHB EST-MCNC: 148 MG/DL
HBA1C MFR BLD: 6.8 % (ref 0–5.6)
HCT VFR BLD AUTO: 43 % (ref 40–53)
HGB BLD-MCNC: 15 G/DL (ref 13.3–17.7)
IMM GRANULOCYTES # BLD: 0 10E3/UL
IMM GRANULOCYTES NFR BLD: 0 %
LYMPHOCYTES # BLD AUTO: 2.8 10E3/UL (ref 0.8–5.3)
LYMPHOCYTES NFR BLD AUTO: 42 %
MCH RBC QN AUTO: 31.5 PG (ref 26.5–33)
MCHC RBC AUTO-ENTMCNC: 34.9 G/DL (ref 31.5–36.5)
MCV RBC AUTO: 90 FL (ref 78–100)
MONOCYTES # BLD AUTO: 0.8 10E3/UL (ref 0–1.3)
MONOCYTES NFR BLD AUTO: 12 %
NEUTROPHILS # BLD AUTO: 2.9 10E3/UL (ref 1.6–8.3)
NEUTROPHILS NFR BLD AUTO: 43 %
PLATELET # BLD AUTO: 299 10E3/UL (ref 150–450)
RBC # BLD AUTO: 4.76 10E6/UL (ref 4.4–5.9)
WBC # BLD AUTO: 6.7 10E3/UL (ref 4–11)

## 2024-10-30 PROCEDURE — 82043 UR ALBUMIN QUANTITATIVE: CPT | Performed by: FAMILY MEDICINE

## 2024-10-30 PROCEDURE — 84443 ASSAY THYROID STIM HORMONE: CPT | Performed by: FAMILY MEDICINE

## 2024-10-30 PROCEDURE — 83036 HEMOGLOBIN GLYCOSYLATED A1C: CPT | Performed by: FAMILY MEDICINE

## 2024-10-30 PROCEDURE — 80061 LIPID PANEL: CPT | Performed by: FAMILY MEDICINE

## 2024-10-30 PROCEDURE — 36415 COLL VENOUS BLD VENIPUNCTURE: CPT | Performed by: FAMILY MEDICINE

## 2024-10-30 PROCEDURE — 99396 PREV VISIT EST AGE 40-64: CPT | Performed by: FAMILY MEDICINE

## 2024-10-30 PROCEDURE — G0103 PSA SCREENING: HCPCS | Performed by: FAMILY MEDICINE

## 2024-10-30 PROCEDURE — 99213 OFFICE O/P EST LOW 20 MIN: CPT | Mod: 25 | Performed by: FAMILY MEDICINE

## 2024-10-30 PROCEDURE — 85025 COMPLETE CBC W/AUTO DIFF WBC: CPT | Performed by: FAMILY MEDICINE

## 2024-10-30 PROCEDURE — 80053 COMPREHEN METABOLIC PANEL: CPT | Performed by: FAMILY MEDICINE

## 2024-10-30 PROCEDURE — 82570 ASSAY OF URINE CREATININE: CPT | Performed by: FAMILY MEDICINE

## 2024-10-30 RX ORDER — AMLODIPINE BESYLATE 5 MG/1
5 TABLET ORAL DAILY
Qty: 90 TABLET | Refills: 3 | Status: SHIPPED | OUTPATIENT
Start: 2024-10-30

## 2024-10-30 RX ORDER — TAMSULOSIN HYDROCHLORIDE 0.4 MG/1
0.4 CAPSULE ORAL DAILY
Qty: 30 CAPSULE | Refills: 1 | Status: SHIPPED | OUTPATIENT
Start: 2024-10-30

## 2024-10-30 RX ORDER — ATORVASTATIN CALCIUM 10 MG/1
10 TABLET, FILM COATED ORAL DAILY
Qty: 90 TABLET | Refills: 0 | Status: SHIPPED | OUTPATIENT
Start: 2024-10-30

## 2024-10-30 RX ORDER — ALLOPURINOL 100 MG/1
100 TABLET ORAL DAILY
Qty: 90 TABLET | Refills: 3 | Status: SHIPPED | OUTPATIENT
Start: 2024-10-30

## 2024-10-30 RX ORDER — SILDENAFIL 100 MG/1
TABLET, FILM COATED ORAL
Qty: 10 TABLET | Refills: 0 | Status: SHIPPED | OUTPATIENT
Start: 2024-10-30

## 2024-10-30 SDOH — HEALTH STABILITY: PHYSICAL HEALTH: ON AVERAGE, HOW MANY DAYS PER WEEK DO YOU ENGAGE IN MODERATE TO STRENUOUS EXERCISE (LIKE A BRISK WALK)?: 2 DAYS

## 2024-10-30 SDOH — HEALTH STABILITY: PHYSICAL HEALTH: ON AVERAGE, HOW MANY MINUTES DO YOU ENGAGE IN EXERCISE AT THIS LEVEL?: 20 MIN

## 2024-10-30 ASSESSMENT — ASTHMA QUESTIONNAIRES
ACT_TOTALSCORE: 25
ACT_TOTALSCORE: 25
QUESTION_4 LAST FOUR WEEKS HOW OFTEN HAVE YOU USED YOUR RESCUE INHALER OR NEBULIZER MEDICATION (SUCH AS ALBUTEROL): NOT AT ALL
QUESTION_1 LAST FOUR WEEKS HOW MUCH OF THE TIME DID YOUR ASTHMA KEEP YOU FROM GETTING AS MUCH DONE AT WORK, SCHOOL OR AT HOME: NONE OF THE TIME
QUESTION_2 LAST FOUR WEEKS HOW OFTEN HAVE YOU HAD SHORTNESS OF BREATH: NOT AT ALL
QUESTION_3 LAST FOUR WEEKS HOW OFTEN DID YOUR ASTHMA SYMPTOMS (WHEEZING, COUGHING, SHORTNESS OF BREATH, CHEST TIGHTNESS OR PAIN) WAKE YOU UP AT NIGHT OR EARLIER THAN USUAL IN THE MORNING: NOT AT ALL
QUESTION_5 LAST FOUR WEEKS HOW WOULD YOU RATE YOUR ASTHMA CONTROL: COMPLETELY CONTROLLED

## 2024-10-30 ASSESSMENT — SOCIAL DETERMINANTS OF HEALTH (SDOH): HOW OFTEN DO YOU GET TOGETHER WITH FRIENDS OR RELATIVES?: THREE TIMES A WEEK

## 2024-10-30 NOTE — PATIENT INSTRUCTIONS
Work on range of motion for shoulder, gradually add strengthening    We will send you lab results    Restart meds    Once back on past meds, then start new one tamsulosin    In a couple months could do phone visit to follow up on urination    Stay on aspirin    Keep working on healthy diet/exercise                 Patient Education   Preventive Care Advice   This is general advice given by our system to help you stay healthy. However, your care team may have specific advice just for you. Please talk to your care team about your preventive care needs.  Nutrition  Eat 5 or more servings of fruits and vegetables each day.  Try wheat bread, brown rice and whole grain pasta (instead of white bread, rice, and pasta).  Get enough calcium and vitamin D. Check the label on foods and aim for 100% of the RDA (recommended daily allowance).  Lifestyle  Exercise at least 150 minutes each week  (30 minutes a day, 5 days a week).  Do muscle strengthening activities 2 days a week. These help control your weight and prevent disease.  No smoking.  Wear sunscreen to prevent skin cancer.  Have a dental exam and cleaning every 6 months.  Yearly exams  See your health care team every year to talk about:  Any changes in your health.  Any medicines your care team has prescribed.  Preventive care, family planning, and ways to prevent chronic diseases.  Shots (vaccines)   HPV shots (up to age 26), if you've never had them before.  Hepatitis B shots (up to age 59), if you've never had them before.  COVID-19 shot: Get this shot when it's due.  Flu shot: Get a flu shot every year.  Tetanus shot: Get a tetanus shot every 10 years.  Pneumococcal, hepatitis A, and RSV shots: Ask your care team if you need these based on your risk.  Shingles shot (for age 50 and up)  General health tests  Diabetes screening:  Starting at age 35, Get screened for diabetes at least every 3 years.  If you are younger than age 35, ask your care team if you should be  screened for diabetes.  Cholesterol test: At age 39, start having a cholesterol test every 5 years, or more often if advised.  Bone density scan (DEXA): At age 50, ask your care team if you should have this scan for osteoporosis (brittle bones).  Hepatitis C: Get tested at least once in your life.  STIs (sexually transmitted infections)  Before age 24: Ask your care team if you should be screened for STIs.  After age 24: Get screened for STIs if you're at risk. You are at risk for STIs (including HIV) if:  You are sexually active with more than one person.  You don't use condoms every time.  You or a partner was diagnosed with a sexually transmitted infection.  If you are at risk for HIV, ask about PrEP medicine to prevent HIV.  Get tested for HIV at least once in your life, whether you are at risk for HIV or not.  Cancer screening tests  Cervical cancer screening: If you have a cervix, begin getting regular cervical cancer screening tests starting at age 21.  Breast cancer scan (mammogram): If you've ever had breasts, begin having regular mammograms starting at age 40. This is a scan to check for breast cancer.  Colon cancer screening: It is important to start screening for colon cancer at age 45.  Have a colonoscopy test every 10 years (or more often if you're at risk) Or, ask your provider about stool tests like a FIT test every year or Cologuard test every 3 years.  To learn more about your testing options, visit:   .  For help making a decision, visit:   https://bit.ly/pi56441.  Prostate cancer screening test: If you have a prostate, ask your care team if a prostate cancer screening test (PSA) at age 55 is right for you.  Lung cancer screening: If you are a current or former smoker ages 50 to 80, ask your care team if ongoing lung cancer screenings are right for you.  For informational purposes only. Not to replace the advice of your health care provider. Copyright   2023 [employer]. All rights  reserved. Clinically reviewed by the [facility] Transitions Program. [employer] [id number] - REV 01/24.

## 2024-10-30 NOTE — PROGRESS NOTES
Preventive Care Visit  [facility] / NHRMCHAWA Salas MD, Family Medicine  Oct 30, 2024          Jazlyn Maher is a 59 year old, presenting for the following:  Physical        10/30/2024     4:51 PM   Additional Questions   Roomed by keiko HOWARD     Urinating a lot    Once at night    Frequent during day    Good stream    Right shoulder painful when laying on it    When active,okay    Range of motion fine   Ran out of blood pressure med amlodipine    Quit drinking    Mood good  Off ssri    No gout flares    Kids healthy    Health club membership on hold        Health Care Directive  Patient does not have a Health Care Directive: Discussed advance care planning with patient; however, patient declined at this time.      10/30/2024   General Health   How would you rate your overall physical health? Good   Feel stress (tense, anxious, or unable to sleep) Only a little      (!) STRESS CONCERN      10/30/2024   Nutrition   Three or more servings of calcium each day? (!) NO   Diet: Regular (no restrictions)   How many servings of fruit and vegetables per day? (!) I DON'T KNOW   How many sweetened beverages each day? (!) 2            10/30/2024   Exercise   Days per week of moderate/strenous exercise 2 days   Average minutes spent exercising at this level 20 min      (!) EXERCISE CONCERN      10/30/2024   Social Factors   Frequency of gathering with friends or relatives Three times a week   Worry food won't last until get money to buy more No   Food not last or not have enough money for food? No   Do you have housing? (Housing is defined as stable permanent housing and does not include staying ouside in a car, in a tent, in an abandoned building, in an overnight shelter, or couch-surfing.) Yes   Are you worried about losing your housing? No   Lack of transportation? No   Unable to get utilities (heat,electricity)? No            10/30/2024   Fall Risk   Fallen 2 or more times in the past  year? No    Trouble with walking or balance? No        Patient-reported          10/30/2024   Dental   Dentist two times every year? (!) NO            10/30/2024   TB Screening   Were you born outside of the [place]? No              Today's PHQ-2 Score:       1/10/2024     2:10 PM   PHQ-2 ( 1999 [employer])   Q1: Little interest or pleasure in doing things 0    Q2: Feeling down, depressed or hopeless 0    PHQ-2 Score 0   Q1: Little interest or pleasure in doing things Not at all   Q2: Feeling down, depressed or hopeless Not at all   PHQ-2 Score 0       Patient-reported         10/30/2024   Substance Use   Alcohol more than 3/day or more than 7/wk Not Applicable   Do you use any other substances recreationally? No        Social History     Tobacco Use    Smoking status: Never    Smokeless tobacco: Current     Types: Chew     Last attempt to quit: 4/1/2013    Tobacco comments:     Lives in smoke free household   Vaping Use    Vaping status: Never Used   Substance Use Topics    Alcohol use: No     Alcohol/week: 0.0 standard drinks of alcohol     Comment: Quit 2010. Chemical dependency treatment in 1989. ...31 yrs of 1.5 tins weekly.  sober for 7 months..    Drug use: No             10/30/2024   One time HIV Screening   Previous HIV test? No          10/30/2024   STI Screening   New sexual partner(s) since last STI/HIV test? No      Last PSA:   PSA   Date Value Ref Range Status   01/14/2021 1.96 0 - 4 ug/L Final     Comment:     Assay Method:  Chemiluminescence using Siemens Vista analyzer     Prostate Specific Antigen Screen   Date Value Ref Range Status   08/30/2023 3.28 0.00 - 3.50 ng/mL Final   07/28/2022 2.86 0.00 - 4.00 ug/L Final     ASCVD Risk   The 10-year ASCVD risk score (Markos GOMEZ, et al., 2019) is: 27.6%    Values used to calculate the score:      Age: 59 years      Sex: Male      Is Non- : No      Diabetic: Yes      Tobacco smoker: No      Systolic Blood Pressure: 160 mmHg      Is  "BP treated: Yes      HDL Cholesterol: 48 mg/dL      Total Cholesterol: 224 mg/dL           Reviewed and updated as needed this visit by Provider                        Objective    Exam  BP (!) 160/106 (BP Location: Right arm, Patient Position: Chair, Cuff Size: Adult Large)   Pulse 78   Temp 98  F (36.7  C) (Temporal)   Resp 19   Ht 1.791 m (5' 10.5\")   Wt 93.1 kg (205 lb 3.2 oz)   SpO2 100%   BMI 29.03 kg/m     Estimated body mass index is 29.03 kg/m  as calculated from the following:    Height as of this encounter: 1.791 m (5' 10.5\").    Weight as of this encounter: 93.1 kg (205 lb 3.2 oz).    Physical Exam  GENERAL: alert and no distress  EYES: Eyes grossly normal to inspection, PERRL and conjunctivae and sclerae normal  HENT: ear canals and TM's normal, nose and mouth without ulcers or lesions  NECK: no adenopathy, no asymmetry, masses, or scars  RESP: lungs clear to auscultation - no rales, rhonchi or wheezes  CV: regular rate and rhythm, normal S1 S2, no S3 or S4, no murmur, click or rub, no peripheral edema  ABDOMEN: soft, nontender, no hepatosplenomegaly, no masses and bowel sounds normal  MS: no gross musculoskeletal defects noted, no edema  SKIN: no suspicious lesions or rashes  NEURO: Normal strength and tone, mentation intact and speech normal  PSYCH: mentation appears normal, affect normal/bright        1. Routine general medical examination at a health care facility    2. Hypertension goal BP (blood pressure) < 140/90    3. Erectile dysfunction, unspecified erectile dysfunction type    4. Benign prostatic hyperplasia with lower urinary tract symptoms, symptom details unspecified    5. Gout, unspecified cause, unspecified chronicity, unspecified site    6. Pain of toe of right foot    7. Hyperlipidemia LDL goal <100    8. Impaired fasting glucose    9. Screening for prostate cancer    10. Fatigue, unspecified type        Discussed multiple issues with patient  Restart chol and blood pressure " meds  Stay on asa  Check labs fasting  No std concern  Up to date on colonoscopy, due next year  Keep working on healthy diet/exercise   Stay on allopurinol  Trial of tamsulosin  Discussed overactive bladder also  Refill sildenafil, discussed in detail   Patient congratulated on staying off etoh                     Signed Electronically by: Greg Salas MD

## 2024-10-31 LAB
ALBUMIN SERPL BCG-MCNC: 4.5 G/DL (ref 3.5–5.2)
ALP SERPL-CCNC: 56 U/L (ref 40–150)
ALT SERPL W P-5'-P-CCNC: 44 U/L (ref 0–70)
ANION GAP SERPL CALCULATED.3IONS-SCNC: 10 MMOL/L (ref 7–15)
AST SERPL W P-5'-P-CCNC: 37 U/L (ref 0–45)
BILIRUB SERPL-MCNC: 0.6 MG/DL
BUN SERPL-MCNC: 8.8 MG/DL (ref 8–23)
CALCIUM SERPL-MCNC: 9.2 MG/DL (ref 8.8–10.4)
CHLORIDE SERPL-SCNC: 104 MMOL/L (ref 98–107)
CHOLEST SERPL-MCNC: 236 MG/DL
CREAT SERPL-MCNC: 0.93 MG/DL (ref 0.67–1.17)
EGFRCR SERPLBLD CKD-EPI 2021: >90 ML/MIN/1.73M2
FASTING STATUS PATIENT QL REPORTED: YES
FASTING STATUS PATIENT QL REPORTED: YES
GLUCOSE SERPL-MCNC: 102 MG/DL (ref 70–99)
HCO3 SERPL-SCNC: 25 MMOL/L (ref 22–29)
HDLC SERPL-MCNC: 51 MG/DL
LDLC SERPL CALC-MCNC: 161 MG/DL
NONHDLC SERPL-MCNC: 185 MG/DL
POTASSIUM SERPL-SCNC: 4.5 MMOL/L (ref 3.4–5.3)
PROT SERPL-MCNC: 6.9 G/DL (ref 6.4–8.3)
SODIUM SERPL-SCNC: 139 MMOL/L (ref 135–145)
TRIGL SERPL-MCNC: 118 MG/DL
TSH SERPL DL<=0.005 MIU/L-ACNC: 1.67 UIU/ML (ref 0.3–4.2)

## 2024-11-01 LAB
CREAT UR-MCNC: 22.7 MG/DL
MICROALBUMIN UR-MCNC: <12 MG/L
MICROALBUMIN/CREAT UR: NORMAL MG/G{CREAT}
PSA SERPL DL<=0.01 NG/ML-MCNC: 3.1 NG/ML (ref 0–3.5)

## 2024-11-01 NOTE — RESULT ENCOUNTER NOTE
The hemoglobin a1c is still in the mild diabetes range.     Cholesterol higher than last year.     Work hard on healthy diet/ exercise.  We should recheck these labs in about 6 months.    Other labs are fine.    Greg Salas MD

## 2024-12-05 ENCOUNTER — OFFICE VISIT (OUTPATIENT)
Dept: FAMILY MEDICINE | Facility: CLINIC | Age: 59
End: 2024-12-05
Payer: COMMERCIAL

## 2024-12-05 VITALS
OXYGEN SATURATION: 98 % | DIASTOLIC BLOOD PRESSURE: 100 MMHG | HEART RATE: 101 BPM | WEIGHT: 199.13 LBS | HEIGHT: 71 IN | TEMPERATURE: 97.2 F | BODY MASS INDEX: 27.88 KG/M2 | SYSTOLIC BLOOD PRESSURE: 146 MMHG | RESPIRATION RATE: 16 BRPM

## 2024-12-05 DIAGNOSIS — I10 HYPERTENSION GOAL BP (BLOOD PRESSURE) < 140/90: Chronic | ICD-10-CM

## 2024-12-05 DIAGNOSIS — K52.9 GASTROENTERITIS: Primary | ICD-10-CM

## 2024-12-05 DIAGNOSIS — F10.11 HISTORY OF ALCOHOL ABUSE: ICD-10-CM

## 2024-12-05 RX ORDER — NALTREXONE HYDROCHLORIDE 50 MG/1
50 TABLET, FILM COATED ORAL DAILY
Qty: 30 TABLET | Refills: 2 | Status: SHIPPED | OUTPATIENT
Start: 2024-12-05

## 2024-12-05 ASSESSMENT — ENCOUNTER SYMPTOMS
VOMITING: 1
DIARRHEA: 1

## 2024-12-05 ASSESSMENT — PAIN SCALES - GENERAL: PAINLEVEL_OUTOF10: NO PAIN (0)

## 2024-12-05 NOTE — LETTER
2024    To whom it may concern:    Bairon Reed  [birth year] is a patient of mine. He has been ill this week but now recovered. Okay to return to work no restrictions.                Sincerely,                    Greg Salas MD

## 2024-12-05 NOTE — PATIENT INSTRUCTIONS
Stay well hydrated    Take some electrolyte containing fluids     Restart the naltrexone as needed     Restart blood pressure med

## 2024-12-05 NOTE — PROGRESS NOTES
"      Subjective   Gunnar is a 59 year old, presenting for the following health issues:  Vomiting and Diarrhea (Since Monday)        12/5/2024    10:42 AM   Additional Questions   Roomed by Yaneth Salmeron     History of Present Illness       Reason for visit:  Stomach issues   He is taking medications regularly.         Getting better    Vomited a couple days ago    Had diarrhea, a little this am but overall better    Needs note to return to work    Urinating fine    Wants to get back on naltrexone    Dry for 11 months          Objective    BP (!) 135/103 (BP Location: Left arm, Patient Position: Chair, Cuff Size: Adult Regular)   Pulse 101   Temp 97.2  F (36.2  C) (Temporal)   Resp 16   Ht 1.791 m (5' 10.5\")   Wt 90.3 kg (199 lb 2 oz)   SpO2 98%   BMI 28.17 kg/m    Body mass index is 28.17 kg/m .  Physical Exam  Constitutional:       Appearance: He is well-developed.   HENT:      Head: Normocephalic and atraumatic.   Eyes:      Conjunctiva/sclera: Conjunctivae normal.   Neck:      Vascular: No carotid bruit.   Cardiovascular:      Rate and Rhythm: Normal rate and regular rhythm.      Heart sounds: Normal heart sounds.   Pulmonary:      Effort: Pulmonary effort is normal. No respiratory distress.      Breath sounds: Normal breath sounds.   Abdominal:      General: There is no distension.      Palpations: Abdomen is soft.      Tenderness: There is no abdominal tenderness. There is no guarding or rebound.   Neurological:      Mental Status: He is alert and oriented to person, place, and time.      Cranial Nerves: No cranial nerve deficit.   Psychiatric:         Speech: Speech normal.         Behavior: Behavior normal.         No edema    Radials symmetric         ASSESSMENT / PLAN:  (K52.9) Gastroenteritis  (primary encounter diagnosis)  Comment: this is resolving.  No testing needed.   Plan: see AVS.  Follow up prn.  Note given to return to work.     (F10.11) History of alcohol abuse  Comment: needs " prescription.  Can get back on for a few weeks if needed to control cravings.   Plan: naltrexone (DEPADE/REVIA) 50 MG tablet             (I10) Hypertension goal BP (blood pressure) < 140/90  Comment: on recheck still a little high  Plan: restart med ( he was holding meds this week during illness )      I reviewed the patient's medications, allergies, medical history, family history, and social history.    Greg Salas MD          Signed Electronically by: Greg Salas MD

## 2025-01-13 DIAGNOSIS — M10.9 GOUT, UNSPECIFIED CAUSE, UNSPECIFIED CHRONICITY, UNSPECIFIED SITE: ICD-10-CM

## 2025-01-13 RX ORDER — PREDNISONE 20 MG/1
TABLET ORAL
Qty: 10 TABLET | Refills: 0 | Status: SHIPPED | OUTPATIENT
Start: 2025-01-13

## 2025-01-13 RX ORDER — PREDNISONE 20 MG/1
TABLET ORAL
Qty: 10 TABLET | Refills: 0 | Status: CANCELLED | OUTPATIENT
Start: 2025-01-13

## 2025-01-13 NOTE — TELEPHONE ENCOUNTER
Medication Question or Refill    Contacts       Contact Date/Time Type Contact Phone/Fax    01/13/2025 10:14 AM CST Phone (Incoming) Emiliemack Gunnar BRITTON (Self) [phone] (M)            What medication are you calling about (include dose and sig)?: predniSONE (DELTASONE) 20 MG tablet     Preferred Pharmacy:   [facility] [address]  [address] [facility] [address]  Phone: [phone] Fax: [phone]    Controlled Substance Agreement on file:   CSA -- Patient Level:    CSA: None found at the patient level.       Who prescribed the medication?: Dr. Salas    Do you need a refill? Yes    When did you use the medication last? ?    Patient offered an appointment? No    Do you have any questions or concerns?  Yes: Patient is having a gout flare up and requesting refill of prednisone    Could we send this information to you in [facility] or would you prefer to receive a phone call?:   Patient would prefer a phone call   Okay to leave a detailed message?: Yes at Cell number on file:    Telephone Information:   Mobile [phone]     Maribel Moore,

## 2025-01-14 DIAGNOSIS — E78.5 HYPERLIPIDEMIA LDL GOAL <100: ICD-10-CM

## 2025-01-14 RX ORDER — ATORVASTATIN CALCIUM 10 MG/1
10 TABLET, FILM COATED ORAL DAILY
Qty: 90 TABLET | Refills: 0 | Status: SHIPPED | OUTPATIENT
Start: 2025-01-14

## 2025-01-28 ENCOUNTER — TRANSFERRED RECORDS (OUTPATIENT)
Dept: HEALTH INFORMATION MANAGEMENT | Facility: CLINIC | Age: 60
End: 2025-01-28
Payer: COMMERCIAL

## 2025-02-08 ENCOUNTER — HEALTH MAINTENANCE LETTER (OUTPATIENT)
Age: 60
End: 2025-02-08

## 2025-03-20 ENCOUNTER — TELEPHONE (OUTPATIENT)
Dept: FAMILY MEDICINE | Facility: CLINIC | Age: 60
End: 2025-03-20
Payer: COMMERCIAL

## 2025-03-20 NOTE — TELEPHONE ENCOUNTER
S-(situation): High blood sugar    B-(background): States he was at a pre-employment screening today and they edgar blood. He also has a form that needs to be filled out.    A-(assessment): States he has no symptoms, but this is concerning as his previous tests from Dr Salas have also been elevated. Last A1c from 10/30/24 was 6.8. States he wants to get this taken care of today.     R-(recommendations): States he is on his way to the clinic right now to try to get this form filled out. States his new employer wants him to start on Monday next week but they need this form filled out first. Advised that Dr Salas has no appointments left today and the only other available appointments at the [facility] are virtual appointments later this evening. He states he will still drop off the form at the  and ask if it can be filled out for him.

## 2025-04-09 ENCOUNTER — OFFICE VISIT (OUTPATIENT)
Dept: CARDIOLOGY | Facility: CLINIC | Age: 60
End: 2025-04-09
Payer: COMMERCIAL

## 2025-04-09 ENCOUNTER — ORDERS ONLY (AUTO-RELEASED) (OUTPATIENT)
Dept: CARDIOLOGY | Facility: CLINIC | Age: 60
End: 2025-04-09

## 2025-04-09 VITALS
HEART RATE: 91 BPM | BODY MASS INDEX: 29.54 KG/M2 | SYSTOLIC BLOOD PRESSURE: 137 MMHG | WEIGHT: 211 LBS | DIASTOLIC BLOOD PRESSURE: 94 MMHG | HEIGHT: 71 IN | OXYGEN SATURATION: 97 %

## 2025-04-09 DIAGNOSIS — R00.2 PALPITATIONS: Primary | ICD-10-CM

## 2025-04-09 DIAGNOSIS — R00.2 PALPITATIONS: ICD-10-CM

## 2025-04-09 DIAGNOSIS — R07.2 PRECORDIAL PAIN: ICD-10-CM

## 2025-04-09 PROCEDURE — 99204 OFFICE O/P NEW MOD 45 MIN: CPT | Performed by: INTERNAL MEDICINE

## 2025-04-09 PROCEDURE — 3075F SYST BP GE 130 - 139MM HG: CPT | Performed by: INTERNAL MEDICINE

## 2025-04-09 PROCEDURE — G2211 COMPLEX E/M VISIT ADD ON: HCPCS | Performed by: INTERNAL MEDICINE

## 2025-04-09 PROCEDURE — 3080F DIAST BP >= 90 MM HG: CPT | Performed by: INTERNAL MEDICINE

## 2025-04-09 RX ORDER — METOPROLOL SUCCINATE 50 MG/1
50 TABLET, EXTENDED RELEASE ORAL DAILY
Qty: 30 TABLET | Refills: 11 | Status: SHIPPED | OUTPATIENT
Start: 2025-04-09

## 2025-04-09 RX ORDER — NAPROXEN 500 MG/1
TABLET ORAL
COMMUNITY
Start: 2024-12-26

## 2025-04-09 NOTE — PATIENT INSTRUCTIONS
Bairon Reed,    It was a pleasure to see you today at [facility].     My recommendations after this visit include:    Zio patch  Stress test  Metoprolol    [place] mobile to monitor heart  rhythm    RANJITH Soriano MD, FACC, [facility]

## 2025-04-09 NOTE — PROGRESS NOTES
Thank you, Dr. Eldridge, for asking [facility] to evaluate Mr. Bairon Reed.      Assessment/Recommendations   Assessment:    Palpitation, uncertain mechanism  Hypertension, suboptimal control  Hypercholesterolemia on statin, suboptimal control of LDL cholesterol  Peripheral arterial/cerebrovascular disease, retrograde flow in the left vertebral artery, asymptomatic  Diabetes mellitus, diet controlled  Plan:  Metoprolol extended release 50 mg a day to improve blood pressure control  Zio patch and Kardia mobile  Stress test to rule out ischemia contributing to valvular arrhythmias  May need to intensify treatment of LDL cholesterol  Follow-up based on the results of the tests  The longitudinal plan of care for the diagnosis(es)/condition(s) as documented were addressed during this visit. Due to the added complexity in care, I will continue to support Gunnar in the subsequent management and with ongoing continuity of care.      History of Present Illness    Mr. Bairon Reed is a 59 year old male who comes in for cardiac evaluation he reports that he has been concerned about irregular beating of the heart.  He denies any prolonged heart racing or syncope.  The heart palpitations are unrelated to physical activities.  He does not get clear-cut chest pains.  He has not had weight gain, PND, orthopnea.  He reports that his blood pressure has been elevated at home.  He has been compliant with amlodipine.    He had some form of ablation when he was a teenager.  He is unsure about symptoms or type of ablation.    He is concerned about family history of heart disease.        Stress Test: September 2022  Target heart rate achieved. Elevated blood pressure response to exercise.  No angina symptoms with exercise.  No ECG evidence of ischemia.  Normal segmental and global LV function with EF of approximately 55-60% at  rest; with exercise left ventricular cavity size decreases and LVEF increases  to  "65-70%.  No stress induced regional wall motion abnormalities.  Good functional capacity for age.         Physical Examination Review of Systems   BP (!) 137/94 (BP Location: Right arm, Patient Position: Sitting, Cuff Size: Adult Large)   Pulse 91   Ht 1.803 m (5' 11\")   Wt 95.7 kg (211 lb)   SpO2 97%   BMI 29.43 kg/m    Body mass index is 29.43 kg/m .  Wt Readings from Last 3 Encounters:   04/09/25 95.7 kg (211 lb)   12/05/24 90.3 kg (199 lb 2 oz)   10/30/24 93.1 kg (205 lb 3.2 oz)     General Appearance:   Alert, cooperative, no distress, appears stated age   Head/ENT: Normocephalic, without obvious abnormality. Membranes moist      EYES:  no scleral icterus, normal conjunctivae   Neck: Supple, symmetrical, trachea midline, no adenopathy, thyroid: not enlarged, symmetric, no carotid bruit or JVD   Chest/Lungs:   Lungs are clear to auscultation, respirations unlabored. No tenderness or deformity    Cardiovascular:   Regular rhythm, S1, S2 normal, no murmur, rub or gallop.   Abdomen:  Soft, non-tender, bowel sounds active all four quadrants,  no masses, no organomegaly   Extremities: no cyanosis or clubbing. No edema   Skin: Skin color, texture, turgor normal, no rashes or lesions.    Psychiatric: Normal affect, calm   Neurologic: Alert and oriented x 3, moving all four extremities.     Encounter Vitals  BP: (!) 137/94  Pulse: 91  SpO2: 97 %  Weight: 95.7 kg (211 lb)  Height: 180.3 cm (5' 11\")                                          Lab Results    Chemistry/lipid CBC Cardiac Enzymes/BNP/TSH/INR   Recent Labs   Lab Test 10/30/24  1736   CHOL 236*   HDL 51   *   TRIG 118     Recent Labs   Lab Test 10/30/24  1736 08/30/23  1746 07/28/22  0941   * 149* 119*     Recent Labs   Lab Test 10/30/24  1736      POTASSIUM 4.5   CHLORIDE 104   CO2 25   *   BUN 8.8   CR 0.93   GFRESTIMATED >90   HANNAH 9.2     Recent Labs   Lab Test 10/30/24  1736 08/30/23  1746 07/28/22  0941   CR 0.93 0.89 0.83 " "    Recent Labs   Lab Test 10/30/24  1736 08/30/23  1746 07/28/22  0941   A1C 6.8* 6.9* 6.5*          Recent Labs   Lab Test 10/30/24  1736   WBC 6.7   HGB 15.0   HCT 43.0   MCV 90        Recent Labs   Lab Test 10/30/24  1736 08/30/23  1746 07/28/22  0941   HGB 15.0 15.5 15.5    No results for input(s): \"TROPONINI\" in the last 67227 hours.  No results for input(s): \"BNP\", \"NTBNPI\", \"NTBNP\" in the last 67519 hours.  Recent Labs   Lab Test 10/30/24  1736   TSH 1.67     No results for input(s): \"INR\" in the last 95790 hours.     Medical History  Surgical History Family History Social History   Past Medical History:   Diagnosis Date    Anxiety     Bilateral arm weakness 9/13/2011    CAD (coronary artery disease) 8/31/2012    Cervical spondylosis with myelopathy 2/4/2013    Cervicalgia     Chronic low back pain 9/13/2011    Chronic low back pain     Combined form of age-related cataract, right eye 9/23/2021    Added automatically from request for surgery [id number]    Degeneration of cervical intervertebral disc     Diabetes mellitus, type 2 (H) 12/14/2023    Gout     Hyperlipidemia LDL goal <160 11/18/2009    Hypertension goal BP (blood pressure) < 140/90 2/10/2015    Insomnia 11/18/2009    Nondependent alcohol abuse 11/14/2012    Nonsenile cataract     Pain in shoulder 9/13/2011    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 Lower back surgery    BACK SURGERY      another lumbar procedure spine 2019    CATARACT IOL, RT/LT      COLONOSCOPY WITH CO2 INSUFFLATION N/A 02/21/2020    Procedure: COLONOSCOPY, WITH CO2 INSUFFLATION;  Surgeon: Yasmani Constantino MD;  Location: [facility] OR    DISCECTOMY LUMBAR POSTERIOR MICROSCOPIC ONE LEVEL  05/17/2013    Procedure: DISCECTOMY LUMBAR POSTERIOR MICROSCOPIC ONE LEVEL;  BILATERAL LUMBAR L4-5 " EXTENDED HEMILAMINECTOMY, MICROFORAMINOTOMY AND MICRODISCECTOMY WITH METRIX II;  Surgeon: Andrew Alejandre MD;  Location: [facility] OR    EP ABLATION      age 19 heart ablation    FUSION CERVICAL ANTERIOR ONE LEVEL  03/05/2013    Procedure: FUSION CERVICAL ANTERIOR ONE LEVEL;  C 6-7 ANTERIOR DISCECTOMY AND FUSION WITH NEURO MONITORING ;  Surgeon: Andrew Alejandre MD;  Location:  OR    KNEE SURGERY  2009    Right    LARYNGOSCOPY WITH MICROSCOPE  05/28/2013    Procedure: LARYNGOSCOPY WITH MICROSCOPE;  MICRODIRECT LARYNGOSCOPY, WITH LEFT VOCAL CORD INJECTION ;  Surgeon: Jorge Vergara MD;  Location:  [facility]    neck sugery      2019 spine surgery cleaned up    NECK SURGERY  02/2010    [facility]    NECK SURGERY  01/17/2022    PHACOEMULSIFICATION CLEAR CORNEA WITH STANDARD INTRAOCULAR LENS IMPLANT Right 10/25/2021    Procedure: RIGHT PHACOEMULSIFICATION, CATARACT, WITH INTRAOCULAR LENS IMPLANT;  Surgeon: Pedro Luis Rahman MD;  Location: [facility] OR     Father had cardiac events in his 40s   Social History     Socioeconomic History    Marital status:      Spouse name: Not on file    Number of children: 2    Years of education: 13    Highest education level: Not on file   Occupational History    Occupation: [employer]     Comment: active job   Tobacco Use    Smoking status: Never     Passive exposure: Never    Smokeless tobacco: Current     Types: Chew     Last attempt to quit: 4/1/2013    Tobacco comments:     Lives in smoke free household   Vaping Use    Vaping status: Never Used   Substance and Sexual Activity    Alcohol use: No     Alcohol/week: 0.0 standard drinks of alcohol     Comment: Quit 2010. Chemical dependency treatment in 1989. ...31 yrs of 1.5 tins weekly.  sober for 7 months..    Drug use: No    Sexual activity: Yes     Partners: Female   Other Topics Concern    Parent/sibling w/ CABG, MI or angioplasty before 65F 55M? Yes     Comment: father   Social History Narrative    Not on file      Social Drivers of Health     Financial Resource Strain: Low Risk  (10/30/2024)    Financial Resource Strain     Within the past 12 months, have you or your family members you live with been unable to get utilities (heat, electricity) when it was really needed?: No   Food Insecurity: Low Risk  (10/30/2024)    Food Insecurity     Within the past 12 months, did you worry that your food would run out before you got money to buy more?: No     Within the past 12 months, did the food you bought just not last and you didn t have money to get more?: No   Transportation Needs: Low Risk  (10/30/2024)    Transportation Needs     Within the past 12 months, has lack of transportation kept you from medical appointments, getting your medicines, non-medical meetings or appointments, work, or from getting things that you need?: No   Physical Activity: Insufficiently Active (10/30/2024)    Exercise Vital Sign     Days of Exercise per Week: 2 days     Minutes of Exercise per Session: 20 min   Stress: No Stress Concern Present (10/30/2024)    [employer] [place] [employer] - Occupational Stress Questionnaire     Feeling of Stress : Only a little   Social Connections: Unknown (10/30/2024)    Social Connection and Isolation Panel [NHANES]     Frequency of Communication with Friends and Family: Not on file     Frequency of Social Gatherings with Friends and Family: Three times a week     Attends Orthodoxy Services: Not on file     Active Member of Clubs or Organizations: Not on file     Attends Club or Organization Meetings: Not on file     Marital Status: Not on file   Interpersonal Safety: Low Risk  (10/30/2024)    Interpersonal Safety     Do you feel physically and emotionally safe where you currently live?: Yes     Within the past 12 months, have you been hit, slapped, kicked or otherwise physically hurt by someone?: No     Within the past 12 months, have you been humiliated or emotionally abused in other ways by your  partner or ex-partner?: No   Housing Stability: Low Risk  (10/30/2024)    Housing Stability     Do you have housing? : Yes     Are you worried about losing your housing?: No         Medications  Allergies   Current Outpatient Medications   Medication Sig Dispense Refill    allopurinol (ZYLOPRIM) 100 MG tablet Take 1 tablet (100 mg) by mouth daily. 90 tablet 3    amLODIPine (NORVASC) 5 MG tablet Take 1 tablet (5 mg) by mouth daily. 90 tablet 3    aspirin (ASA) 81 MG EC tablet Take 1 tablet (81 mg) by mouth daily      atorvastatin (LIPITOR) 10 MG tablet Take 1 tablet by mouth once daily 90 tablet 1    calcium carbonate (OS-HANNAH 500 MG [place]. [place]) 500 MG tablet Take 500 mg by mouth daily      glucosamine-chondroitin 500-400 MG CAPS Take 1 capsule by mouth daily      indomethacin (INDOCIN) 50 MG capsule Take 1 capsule (50 mg) by mouth 3 times daily as needed for moderate pain (4-6) 30 capsule 4    Multiple Vitamins-Minerals (ZINC PO)       naltrexone (DEPADE/REVIA) 50 MG tablet Take 1 tablet (50 mg) by mouth daily. 30 tablet 2    naproxen (NAPROSYN) 500 MG tablet TAKE 1 TABLET BY MOUTH EVERY 12 HOURS WITH FOOD AS NEEDED      predniSONE (DELTASONE) 20 MG tablet 2 tabs once daily as needed for gout flares 10 tablet 0    sildenafil (VIAGRA) 100 MG tablet TAKE 1/2 TO 1 TABLET BY MOUTH ONE HOUR PRIOR TO ANTICIPATED INTERCOURSE 10 tablet 0    tamsulosin (FLOMAX) 0.4 MG capsule Take 1 capsule (0.4 mg) by mouth daily. 30 capsule 1      No Known Allergies

## 2025-04-09 NOTE — LETTER
4/9/2025    Greg Salas MD  [address]  [place] [address]    RE: Bairon Reed       Dear Colleague,     I had the pleasure of seeing Bairon Reed in [facility].        Thank you, Dr. Eldridge, for asking [facility] to evaluate Mr. Bairon Reed.      Assessment/Recommendations   Assessment:    Palpitation, uncertain mechanism  Hypertension, suboptimal control  Hypercholesterolemia on statin, suboptimal control of LDL cholesterol  Peripheral arterial/cerebrovascular disease, retrograde flow in the left vertebral artery, asymptomatic  Diabetes mellitus, diet controlled  Plan:  Metoprolol extended release 50 mg a day to improve blood pressure control  Zio patch and Kardia mobile  Stress test to rule out ischemia contributing to valvular arrhythmias  May need to intensify treatment of LDL cholesterol  Follow-up based on the results of the tests  The longitudinal plan of care for the diagnosis(es)/condition(s) as documented were addressed during this visit. Due to the added complexity in care, I will continue to support Gunnar in the subsequent management and with ongoing continuity of care.      History of Present Illness    Mr. Bairon Reed is a 59 year old male who comes in for cardiac evaluation he reports that he has been concerned about irregular beating of the heart.  He denies any prolonged heart racing or syncope.  The heart palpitations are unrelated to physical activities.  He does not get clear-cut chest pains.  He has not had weight gain, PND, orthopnea.  He reports that his blood pressure has been elevated at home.  He has been compliant with amlodipine.    He had some form of ablation when he was a teenager.  He is unsure about symptoms or type of ablation.    He is concerned about family history of heart disease.        Stress Test: September 2022  Target heart rate achieved. Elevated blood pressure response to exercise.  No angina  "symptoms with exercise.  No ECG evidence of ischemia.  Normal segmental and global LV function with EF of approximately 55-60% at  rest; with exercise left ventricular cavity size decreases and LVEF increases  to 65-70%.  No stress induced regional wall motion abnormalities.  Good functional capacity for age.         Physical Examination Review of Systems   BP (!) 137/94 (BP Location: Right arm, Patient Position: Sitting, Cuff Size: Adult Large)   Pulse 91   Ht 1.803 m (5' 11\")   Wt 95.7 kg (211 lb)   SpO2 97%   BMI 29.43 kg/m    Body mass index is 29.43 kg/m .  Wt Readings from Last 3 Encounters:   04/09/25 95.7 kg (211 lb)   12/05/24 90.3 kg (199 lb 2 oz)   10/30/24 93.1 kg (205 lb 3.2 oz)     General Appearance:   Alert, cooperative, no distress, appears stated age   Head/ENT: Normocephalic, without obvious abnormality. Membranes moist      EYES:  no scleral icterus, normal conjunctivae   Neck: Supple, symmetrical, trachea midline, no adenopathy, thyroid: not enlarged, symmetric, no carotid bruit or JVD   Chest/Lungs:   Lungs are clear to auscultation, respirations unlabored. No tenderness or deformity    Cardiovascular:   Regular rhythm, S1, S2 normal, no murmur, rub or gallop.   Abdomen:  Soft, non-tender, bowel sounds active all four quadrants,  no masses, no organomegaly   Extremities: no cyanosis or clubbing. No edema   Skin: Skin color, texture, turgor normal, no rashes or lesions.    Psychiatric: Normal affect, calm   Neurologic: Alert and oriented x 3, moving all four extremities.     Encounter Vitals  BP: (!) 137/94  Pulse: 91  SpO2: 97 %  Weight: 95.7 kg (211 lb)  Height: 180.3 cm (5' 11\")                                          Lab Results    Chemistry/lipid CBC Cardiac Enzymes/BNP/TSH/INR   Recent Labs   Lab Test 10/30/24  1736   CHOL 236*   HDL 51   *   TRIG 118     Recent Labs   Lab Test 10/30/24  1736 08/30/23  1746 07/28/22  0941   * 149* 119*     Recent Labs   Lab Test " "10/30/24  1736      POTASSIUM 4.5   CHLORIDE 104   CO2 25   *   BUN 8.8   CR 0.93   GFRESTIMATED >90   HANNAH 9.2     Recent Labs   Lab Test 10/30/24  1736 08/30/23  1746 07/28/22  0941   CR 0.93 0.89 0.83     Recent Labs   Lab Test 10/30/24  1736 08/30/23  1746 07/28/22  0941   A1C 6.8* 6.9* 6.5*          Recent Labs   Lab Test 10/30/24  1736   WBC 6.7   HGB 15.0   HCT 43.0   MCV 90        Recent Labs   Lab Test 10/30/24  1736 08/30/23  1746 07/28/22  0941   HGB 15.0 15.5 15.5    No results for input(s): \"TROPONINI\" in the last 83039 hours.  No results for input(s): \"BNP\", \"NTBNPI\", \"NTBNP\" in the last 19384 hours.  Recent Labs   Lab Test 10/30/24  1736   TSH 1.67     No results for input(s): \"INR\" in the last 51548 hours.     Medical History  Surgical History Family History Social History   Past Medical History:   Diagnosis Date     Anxiety      Bilateral arm weakness 9/13/2011     CAD (coronary artery disease) 8/31/2012     Cervical spondylosis with myelopathy 2/4/2013     Cervicalgia      Chronic low back pain 9/13/2011     Chronic low back pain      Combined form of age-related cataract, right eye 9/23/2021    Added automatically from request for surgery [id number]     Degeneration of cervical intervertebral disc      Diabetes mellitus, type 2 (H) 12/14/2023     Gout      Hyperlipidemia LDL goal <160 11/18/2009     Hypertension goal BP (blood pressure) < 140/90 2/10/2015     Insomnia 11/18/2009     Nondependent alcohol abuse 11/14/2012     Nonsenile cataract      Pain in shoulder 9/13/2011     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 Lower back surgery     BACK SURGERY      another lumbar procedure spine 2019     CATARACT IOL, RT/LT       COLONOSCOPY WITH CO2 INSUFFLATION N/A 02/21/2020    Procedure: " COLONOSCOPY, WITH CO2 INSUFFLATION;  Surgeon: Yasmani Constantino MD;  Location: [facility]     DISCECTOMY LUMBAR POSTERIOR MICROSCOPIC ONE LEVEL  05/17/2013    Procedure: DISCECTOMY LUMBAR POSTERIOR MICROSCOPIC ONE LEVEL;  BILATERAL LUMBAR L4-5 EXTENDED HEMILAMINECTOMY, MICROFORAMINOTOMY AND MICRODISCECTOMY WITH METRIX II;  Surgeon: Andrew Alejandre MD;  Location:  OR     EP ABLATION      age 19 heart ablation     FUSION CERVICAL ANTERIOR ONE LEVEL  03/05/2013    Procedure: FUSION CERVICAL ANTERIOR ONE LEVEL;  C 6-7 ANTERIOR DISCECTOMY AND FUSION WITH NEURO MONITORING ;  Surgeon: Andrew Alejandre MD;  Location:  OR     KNEE SURGERY  2009    Right     LARYNGOSCOPY WITH MICROSCOPE  05/28/2013    Procedure: LARYNGOSCOPY WITH MICROSCOPE;  MICRODIRECT LARYNGOSCOPY, WITH LEFT VOCAL CORD INJECTION ;  Surgeon: Jorge Vergara MD;  Location: [facility]     neck sugery      2019 spine surgery cleaned up     NECK SURGERY  02/2010    [facility]     NECK SURGERY  01/17/2022     PHACOEMULSIFICATION CLEAR CORNEA WITH STANDARD INTRAOCULAR LENS IMPLANT Right 10/25/2021    Procedure: RIGHT PHACOEMULSIFICATION, CATARACT, WITH INTRAOCULAR LENS IMPLANT;  Surgeon: Pedro Luis Rahman MD;  Location: [facility] OR     Father had cardiac events in his 40s   Social History     Socioeconomic History     Marital status:      Spouse name: Not on file     Number of children: 2     Years of education: 13     Highest education level: Not on file   Occupational History     Occupation: [employer]     Comment: active job   Tobacco Use     Smoking status: Never     Passive exposure: Never     Smokeless tobacco: Current     Types: Chew     Last attempt to quit: 4/1/2013     Tobacco comments:     Lives in smoke free household   Vaping Use     Vaping status: Never Used   Substance and Sexual Activity     Alcohol use: No     Alcohol/week: 0.0 standard drinks of alcohol     Comment: Quit 2010. Chemical dependency treatment in 1989.  ...31 yrs of 1.5 tins weekly.  sober for 7 months..     Drug use: No     Sexual activity: Yes     Partners: Female   Other Topics Concern     Parent/sibling w/ CABG, MI or angioplasty before 65F 55M? Yes     Comment: father   Social History Narrative     Not on file     Social Drivers of Health     Financial Resource Strain: Low Risk  (10/30/2024)    Financial Resource Strain      Within the past 12 months, have you or your family members you live with been unable to get utilities (heat, electricity) when it was really needed?: No   Food Insecurity: Low Risk  (10/30/2024)    Food Insecurity      Within the past 12 months, did you worry that your food would run out before you got money to buy more?: No      Within the past 12 months, did the food you bought just not last and you didn t have money to get more?: No   Transportation Needs: Low Risk  (10/30/2024)    Transportation Needs      Within the past 12 months, has lack of transportation kept you from medical appointments, getting your medicines, non-medical meetings or appointments, work, or from getting things that you need?: No   Physical Activity: Insufficiently Active (10/30/2024)    Exercise Vital Sign      Days of Exercise per Week: 2 days      Minutes of Exercise per Session: 20 min   Stress: No Stress Concern Present (10/30/2024)    [employer] [employer] - Occupational Stress Questionnaire      Feeling of Stress : Only a little   Social Connections: Unknown (10/30/2024)    Social Connection and Isolation Panel [NHANES]      Frequency of Communication with Friends and Family: Not on file      Frequency of Social Gatherings with Friends and Family: Three times a week      Attends Yarsani Services: Not on file      Active Member of Clubs or Organizations: Not on file      Attends Club or Organization Meetings: Not on file      Marital Status: Not on file   Interpersonal Safety: Low Risk  (10/30/2024)    Interpersonal Safety      Do you  feel physically and emotionally safe where you currently live?: Yes      Within the past 12 months, have you been hit, slapped, kicked or otherwise physically hurt by someone?: No      Within the past 12 months, have you been humiliated or emotionally abused in other ways by your partner or ex-partner?: No   Housing Stability: Low Risk  (10/30/2024)    Housing Stability      Do you have housing? : Yes      Are you worried about losing your housing?: No         Medications  Allergies   Current Outpatient Medications   Medication Sig Dispense Refill     allopurinol (ZYLOPRIM) 100 MG tablet Take 1 tablet (100 mg) by mouth daily. 90 tablet 3     amLODIPine (NORVASC) 5 MG tablet Take 1 tablet (5 mg) by mouth daily. 90 tablet 3     aspirin (ASA) 81 MG EC tablet Take 1 tablet (81 mg) by mouth daily       atorvastatin (LIPITOR) 10 MG tablet Take 1 tablet by mouth once daily 90 tablet 1     calcium carbonate (OS-HANNAH 500 MG [place]. [place]) 500 MG tablet Take 500 mg by mouth daily       glucosamine-chondroitin 500-400 MG CAPS Take 1 capsule by mouth daily       indomethacin (INDOCIN) 50 MG capsule Take 1 capsule (50 mg) by mouth 3 times daily as needed for moderate pain (4-6) 30 capsule 4     Multiple Vitamins-Minerals (ZINC PO)        naltrexone (DEPADE/REVIA) 50 MG tablet Take 1 tablet (50 mg) by mouth daily. 30 tablet 2     naproxen (NAPROSYN) 500 MG tablet TAKE 1 TABLET BY MOUTH EVERY 12 HOURS WITH FOOD AS NEEDED       predniSONE (DELTASONE) 20 MG tablet 2 tabs once daily as needed for gout flares 10 tablet 0     sildenafil (VIAGRA) 100 MG tablet TAKE 1/2 TO 1 TABLET BY MOUTH ONE HOUR PRIOR TO ANTICIPATED INTERCOURSE 10 tablet 0     tamsulosin (FLOMAX) 0.4 MG capsule Take 1 capsule (0.4 mg) by mouth daily. 30 capsule 1      No Known Allergies                     Thank you for allowing me to participate in the care of your patient.      Sincerely,     Lawrence Soriano MD       [facility]  cc:   Referred Self, MD  No address on file

## 2025-05-04 ENCOUNTER — TRANSFERRED RECORDS (OUTPATIENT)
Dept: MULTI SPECIALTY CLINIC | Facility: CLINIC | Age: 60
End: 2025-05-04

## 2025-05-04 LAB — RETINOPATHY: NORMAL

## 2025-05-11 ENCOUNTER — HEALTH MAINTENANCE LETTER (OUTPATIENT)
Age: 60
End: 2025-05-11

## 2025-06-08 PROBLEM — R00.2 PALPITATIONS: Status: RESOLVED | Noted: 2025-04-09 | Resolved: 2025-06-08

## 2025-06-08 PROBLEM — R07.2 PRECORDIAL PAIN: Status: RESOLVED | Noted: 2025-04-09 | Resolved: 2025-06-08

## 2025-07-21 ENCOUNTER — TELEPHONE (OUTPATIENT)
Dept: FAMILY MEDICINE | Facility: CLINIC | Age: 60
End: 2025-07-21
Payer: COMMERCIAL

## 2025-07-21 DIAGNOSIS — E78.5 HYPERLIPIDEMIA LDL GOAL <100: ICD-10-CM

## 2025-07-21 DIAGNOSIS — R73.01 IMPAIRED FASTING GLUCOSE: Primary | ICD-10-CM

## 2025-07-21 NOTE — TELEPHONE ENCOUNTER
General Call    Contacts       Contact Date/Time Type Contact Phone/Fax    07/21/2025 03:46 PM CDT Phone (Incoming) Gunnar Reed (Self) [phone] (M)          Reason for Call:  Upcoming lab and Office Visit    What are your questions or concerns:  Would like to know if any other labs should be done at appointment for lab for 7/23 aside from A1C and cholesterol.     Date of last appointment with provider: 12/05/2024    Could we send this information to you in [employer] or would you prefer to receive a phone call?:   No preference   Okay to leave a detailed message?: Yes at Cell number on file:    Telephone Information:   Mobile [phone]

## 2025-07-23 ENCOUNTER — LAB (OUTPATIENT)
Dept: LAB | Facility: CLINIC | Age: 60
End: 2025-07-23
Payer: COMMERCIAL

## 2025-07-23 DIAGNOSIS — E78.5 HYPERLIPIDEMIA LDL GOAL <100: ICD-10-CM

## 2025-07-23 DIAGNOSIS — R73.01 IMPAIRED FASTING GLUCOSE: ICD-10-CM

## 2025-07-23 LAB
EST. AVERAGE GLUCOSE BLD GHB EST-MCNC: 137 MG/DL
HBA1C MFR BLD: 6.4 % (ref 0–5.6)

## 2025-07-23 PROCEDURE — 36415 COLL VENOUS BLD VENIPUNCTURE: CPT

## 2025-07-23 PROCEDURE — 80061 LIPID PANEL: CPT

## 2025-07-23 PROCEDURE — 80053 COMPREHEN METABOLIC PANEL: CPT

## 2025-07-23 PROCEDURE — 83036 HEMOGLOBIN GLYCOSYLATED A1C: CPT

## 2025-07-24 LAB
ALBUMIN SERPL BCG-MCNC: 4.6 G/DL (ref 3.5–5.2)
ALP SERPL-CCNC: 63 U/L (ref 40–150)
ALT SERPL W P-5'-P-CCNC: 33 U/L (ref 0–70)
ANION GAP SERPL CALCULATED.3IONS-SCNC: 11 MMOL/L (ref 7–15)
AST SERPL W P-5'-P-CCNC: 30 U/L (ref 0–45)
BILIRUB SERPL-MCNC: 0.5 MG/DL
BUN SERPL-MCNC: 14 MG/DL (ref 8–23)
CALCIUM SERPL-MCNC: 9.8 MG/DL (ref 8.8–10.4)
CHLORIDE SERPL-SCNC: 104 MMOL/L (ref 98–107)
CHOLEST SERPL-MCNC: 223 MG/DL
CREAT SERPL-MCNC: 1 MG/DL (ref 0.67–1.17)
EGFRCR SERPLBLD CKD-EPI 2021: 87 ML/MIN/1.73M2
FASTING STATUS PATIENT QL REPORTED: YES
FASTING STATUS PATIENT QL REPORTED: YES
GLUCOSE SERPL-MCNC: 133 MG/DL (ref 70–99)
HCO3 SERPL-SCNC: 24 MMOL/L (ref 22–29)
HDLC SERPL-MCNC: 41 MG/DL
LDLC SERPL CALC-MCNC: 156 MG/DL
NONHDLC SERPL-MCNC: 182 MG/DL
POTASSIUM SERPL-SCNC: 4.8 MMOL/L (ref 3.4–5.3)
PROT SERPL-MCNC: 7.2 G/DL (ref 6.4–8.3)
SODIUM SERPL-SCNC: 139 MMOL/L (ref 135–145)
TRIGL SERPL-MCNC: 131 MG/DL

## 2025-07-31 ENCOUNTER — OFFICE VISIT (OUTPATIENT)
Dept: FAMILY MEDICINE | Facility: CLINIC | Age: 60
End: 2025-07-31
Payer: COMMERCIAL

## 2025-07-31 VITALS
HEART RATE: 75 BPM | HEIGHT: 71 IN | BODY MASS INDEX: 28.42 KG/M2 | RESPIRATION RATE: 16 BRPM | SYSTOLIC BLOOD PRESSURE: 135 MMHG | WEIGHT: 203 LBS | TEMPERATURE: 97 F | DIASTOLIC BLOOD PRESSURE: 86 MMHG | OXYGEN SATURATION: 99 %

## 2025-07-31 DIAGNOSIS — R00.2 PALPITATIONS: ICD-10-CM

## 2025-07-31 DIAGNOSIS — I10 HYPERTENSION GOAL BP (BLOOD PRESSURE) < 140/90: ICD-10-CM

## 2025-07-31 DIAGNOSIS — Z12.11 SCREEN FOR COLON CANCER: ICD-10-CM

## 2025-07-31 DIAGNOSIS — E78.5 HYPERLIPIDEMIA LDL GOAL <100: ICD-10-CM

## 2025-07-31 DIAGNOSIS — M10.9 GOUT, UNSPECIFIED CAUSE, UNSPECIFIED CHRONICITY, UNSPECIFIED SITE: Primary | ICD-10-CM

## 2025-07-31 DIAGNOSIS — M79.674 PAIN OF TOE OF RIGHT FOOT: ICD-10-CM

## 2025-07-31 DIAGNOSIS — N52.9 ERECTILE DYSFUNCTION, UNSPECIFIED ERECTILE DYSFUNCTION TYPE: ICD-10-CM

## 2025-07-31 DIAGNOSIS — R73.03 PREDIABETES: ICD-10-CM

## 2025-07-31 RX ORDER — PREDNISONE 20 MG/1
TABLET ORAL
Qty: 10 TABLET | Refills: 0 | Status: SHIPPED | OUTPATIENT
Start: 2025-07-31

## 2025-07-31 RX ORDER — ATORVASTATIN CALCIUM 20 MG/1
20 TABLET, FILM COATED ORAL DAILY
Qty: 90 TABLET | Refills: 3 | Status: SHIPPED | OUTPATIENT
Start: 2025-07-31

## 2025-07-31 RX ORDER — AMLODIPINE BESYLATE 5 MG/1
5 TABLET ORAL DAILY
Qty: 90 TABLET | Refills: 3 | Status: SHIPPED | OUTPATIENT
Start: 2025-07-31

## 2025-07-31 RX ORDER — ALLOPURINOL 100 MG/1
200 TABLET ORAL DAILY
Qty: 180 TABLET | Refills: 3 | Status: SHIPPED | OUTPATIENT
Start: 2025-07-31

## 2025-07-31 RX ORDER — METOPROLOL SUCCINATE 50 MG/1
50 TABLET, EXTENDED RELEASE ORAL DAILY
Qty: 90 TABLET | Refills: 3 | Status: SHIPPED | OUTPATIENT
Start: 2025-07-31

## 2025-07-31 RX ORDER — ATORVASTATIN CALCIUM 10 MG/1
10 TABLET, FILM COATED ORAL DAILY
Qty: 90 TABLET | Refills: 1 | Status: CANCELLED | OUTPATIENT
Start: 2025-07-31

## 2025-07-31 RX ORDER — SILDENAFIL 100 MG/1
TABLET, FILM COATED ORAL
Qty: 10 TABLET | Refills: 2 | Status: SHIPPED | OUTPATIENT
Start: 2025-07-31

## 2025-07-31 ASSESSMENT — ASTHMA QUESTIONNAIRES
QUESTION_4 LAST FOUR WEEKS HOW OFTEN HAVE YOU USED YOUR RESCUE INHALER OR NEBULIZER MEDICATION (SUCH AS ALBUTEROL): NOT AT ALL
QUESTION_3 LAST FOUR WEEKS HOW OFTEN DID YOUR ASTHMA SYMPTOMS (WHEEZING, COUGHING, SHORTNESS OF BREATH, CHEST TIGHTNESS OR PAIN) WAKE YOU UP AT NIGHT OR EARLIER THAN USUAL IN THE MORNING: NOT AT ALL
QUESTION_1 LAST FOUR WEEKS HOW MUCH OF THE TIME DID YOUR ASTHMA KEEP YOU FROM GETTING AS MUCH DONE AT WORK, SCHOOL OR AT HOME: NONE OF THE TIME
QUESTION_5 LAST FOUR WEEKS HOW WOULD YOU RATE YOUR ASTHMA CONTROL: COMPLETELY CONTROLLED
QUESTION_2 LAST FOUR WEEKS HOW OFTEN HAVE YOU HAD SHORTNESS OF BREATH: NOT AT ALL
ACT_TOTALSCORE: 25

## 2025-07-31 ASSESSMENT — PAIN SCALES - GENERAL: PAINLEVEL_OUTOF10: NO PAIN (0)

## 2025-07-31 NOTE — PROGRESS NOTES
"  Assessment & Plan     (M10.9) Gout, unspecified cause, unspecified chronicity, unspecified site  (primary encounter diagnosis)  Comment: to help prevent / reduce intensity of future flares, increase allopurinol.  Warned of a flare when increasing dose.   Plan: predniSONE (DELTASONE) 20 MG tablet,         allopurinol (ZYLOPRIM) 100 MG tablet             (E78.5) Hyperlipidemia LDL goal <100  Comment: increase to 20 mg and keep working on healthy diet/exercise   Plan: atorvastatin (LIPITOR) 20 MG tablet             (I10) Hypertension goal BP (blood pressure) < 140/90  Comment: blood pressure okay on recheck   Plan: amLODIPine (NORVASC) 5 MG tablet        Refill med     (Z12.11) Screen for colon cancer  Comment:  patient to schedule   Plan: Colonoscopy Screening  Referral             (F00.033) Pain of toe of right foot  Comment: refill  but increase dose   Plan: allopurinol (ZYLOPRIM) 100 MG tablet             (R00.2) Palpitations  Comment: refill   Plan: metoprolol succinate ER (TOPROL XL) 50 MG 24 hr        tablet             (N52.9) Erectile dysfunction, unspecified erectile dysfunction type  Comment: uses occasionally no side effects   Plan: sildenafil (VIAGRA) 100 MG tablet             (R73.03) Prediabetes  Comment: discussed keep working on healthy diet/exercise   Plan: as above     BMI  Estimated body mass index is 28.31 kg/m  as calculated from the following:    Height as of this encounter: 1.803 m (5' 11\").    Weight as of this encounter: 92.1 kg (203 lb).   Weight management plan: Discussed healthy diet and exercise guidelines        Jazlyn Maher is a 59 year old, presenting for the following health issues:  No chief complaint on file.        12/5/2024    10:42 AM   Additional Questions   Roomed by Yaneth Salmeron       Via the Health Maintenance questionnaire, the patient has reported the following services have been completed -Eye Exam: [facility] [employer] 2025-05-04, this information has been sent to " "the abstraction team.  History of Present Illness       Hypertension: He presents for follow up of hypertension.  He does not check blood pressure  regularly outside of the clinic. Outside blood pressures have been over 140/90. He does not follow a low salt diet.     He eats 0-1 servings of fruits and vegetables daily.He consumes 2 sweetened beverage(s) daily.He exercises with enough effort to increase his heart rate 20 to 29 minutes per day.  He is missing 2 dose(s) of medications per week.       Going to [place] to see relatives     Neck and back better  A couple flares recently     Walking a lot at work    Urinary frequency    Once at night     Stream okay    Staying off etoh          Objective    BP (!) 159/100 (BP Location: Right arm, Patient Position: Chair, Cuff Size: Adult Large)   Pulse 75   Temp 97  F (36.1  C) (Temporal)   Resp 16   Ht 1.803 m (5' 11\")   Wt 92.1 kg (203 lb)   SpO2 99%   BMI 28.31 kg/m    Body mass index is 28.31 kg/m .  Physical Exam  Constitutional:       Appearance: He is well-developed.   HENT:      Head: Normocephalic and atraumatic.   Eyes:      Conjunctiva/sclera: Conjunctivae normal.   Neck:      Vascular: No carotid bruit.   Cardiovascular:      Rate and Rhythm: Normal rate and regular rhythm.      Heart sounds: Normal heart sounds.   Pulmonary:      Effort: Pulmonary effort is normal. No respiratory distress.      Breath sounds: Normal breath sounds.   Neurological:      Mental Status: He is alert and oriented to person, place, and time.      Cranial Nerves: No cranial nerve deficit.   Psychiatric:         Speech: Speech normal.         Behavior: Behavior normal.      No edema    Radials symmetric        Reviewed recent labs in detail            Signed Electronically by: Greg Salas MD    "

## 2025-07-31 NOTE — PATIENT INSTRUCTIONS
Increase allopurinol to 200 mg daily    Increase cholesterol pill atorvastatin to 20 mg daily    Continue other meds as is    Keep working on healthy diet/exercise     Schedule colonoscopy     Could get shingles shot at pharmacy

## (undated) DEVICE — SOL WATER IRRIG 1000ML BOTTLE 07139-09

## (undated) DEVICE — SU ETHILON 10-0 TG140-6 12" 9003G

## (undated) DEVICE — PREP CHLORAPREP 26ML TINTED ORANGE  260815

## (undated) DEVICE — PREP POVIDONE IODINE SWABS X3

## (undated) DEVICE — EYE PACK CUSTOM CATARACT AS12127-01

## (undated) DEVICE — GLOVE PROTEXIS W/NEU-THERA 7.0  2D73TE70

## (undated) RX ORDER — SIMETHICONE 40MG/0.6ML
SUSPENSION, DROPS(FINAL DOSAGE FORM)(ML) ORAL
Status: DISPENSED
Start: 2020-02-21

## (undated) RX ORDER — FENTANYL CITRATE 50 UG/ML
INJECTION, SOLUTION INTRAMUSCULAR; INTRAVENOUS
Status: DISPENSED
Start: 2020-02-21

## (undated) RX ORDER — ACETAMINOPHEN 325 MG/1
TABLET ORAL
Status: DISPENSED
Start: 2021-10-25